# Patient Record
Sex: FEMALE | Race: WHITE | NOT HISPANIC OR LATINO | Employment: FULL TIME | URBAN - METROPOLITAN AREA
[De-identification: names, ages, dates, MRNs, and addresses within clinical notes are randomized per-mention and may not be internally consistent; named-entity substitution may affect disease eponyms.]

---

## 2017-03-13 ENCOUNTER — TRANSCRIBE ORDERS (OUTPATIENT)
Dept: PHYSICAL THERAPY | Facility: HOSPITAL | Age: 62
End: 2017-03-13

## 2017-03-13 DIAGNOSIS — S82.142D CLOSED DISPLACED BICONDYLAR FRACTURE OF LEFT TIBIA WITH ROUTINE HEALING, SUBSEQUENT ENCOUNTER: ICD-10-CM

## 2017-03-13 DIAGNOSIS — S82.92XD: Primary | ICD-10-CM

## 2017-03-14 ENCOUNTER — HOSPITAL ENCOUNTER (OUTPATIENT)
Dept: PHYSICAL THERAPY | Facility: HOSPITAL | Age: 62
Setting detail: THERAPIES SERIES
Discharge: HOME | End: 2017-03-14

## 2017-03-14 DIAGNOSIS — Z98.890 S/P ORIF (OPEN REDUCTION INTERNAL FIXATION) FRACTURE: Primary | ICD-10-CM

## 2017-03-14 DIAGNOSIS — Z87.81 S/P ORIF (OPEN REDUCTION INTERNAL FIXATION) FRACTURE: Primary | ICD-10-CM

## 2017-03-14 PROCEDURE — 97110 THERAPEUTIC EXERCISES: CPT | Performed by: PHYSICAL THERAPIST

## 2017-03-14 PROCEDURE — 97162 PT EVAL MOD COMPLEX 30 MIN: CPT | Performed by: PHYSICAL THERAPIST

## 2017-03-14 NOTE — PROGRESS NOTES
Outpatient Physical Therapy Ortho Initial Evaluation  [facility]     Patient Name: Rosalba Girard  : 1955  MRN: [id number]  Today's Date: 3/14/2017      Visit Date: 2017    Patient Active Problem List   Diagnosis   • Essential hypertension, benign   • GERD (gastroesophageal reflux disease)   • IBS (irritable bowel syndrome)   • S/P ORIF (open reduction internal fixation) fracture  left tibial plateau    • Tibial plateau fracture, left   • Leukocytosis        Past Medical History   Diagnosis Date   • Chronic superficial dermatitis    • Dietary counseling    • Encounter for routine adult health examination    • GERD (gastroesophageal reflux disease)    • Hair or hair follicle disorder    • History of anemia    • Hives    • Hypertension    • Irritable bowel disease    • Malaise and fatigue    • Myalgia and myositis    • PONV (postoperative nausea and vomiting)      on occasion    • Prediabetes    • Rash and nonspecific skin eruption    • Sinusitis, acute maxillary    • UTI (urinary tract infection)    • Wears glasses         Past Surgical History   Procedure Laterality Date   • Bladder surgery       Cuff   • Hysterectomy     • Cholecystectomy     • Hernia repair       umbilical    • Colonoscopy w/ polypectomy     • Tibial plateau open reduction internal fixation Left 2016     Procedure: LEFT TIBIAL PLATEAU OPEN REDUCTION INTERNAL FIXATION;  Surgeon: Constantine Durbin MD;  Location: [facility];  Service:        Visit Dx:     ICD-10-CM ICD-9-CM   1. S/P ORIF (open reduction internal fixation) fracture  left tibial plateau  Z96.7 V45.89    Z87.81 V15.51             Patient History       17 1115             Chief Complaint Difficulty Walking;Joint stiffness;Joint swelling;Muscle weakness;Pain;Difficulty with daily activities  -MW    Type of Pain Knee pain  -MW    Date Current Problem(s) Began 16  -MW    Brief Description of Current Complaint Pt reports falling on 2016 and  "sustaining a Lt tibial plateau fx as well as patellar fx. She was initially sent home, and then seen by ortho for surgical repair. LT tibial ORIF was performed on 12/21/2016 by Dr. Jasmine with plate and screws placed. After acute care hospitalization, pt reports a 2.5 to 3 week stay at [facility]. She has been followed by  PT since January 12, 2017. Pt was NWB per MD orders until 3/2/17 (per  PT report); pt states she has \"limited weight bearing.\" Pt also notes that she sprained her LT ankle during her original injury, but it has not been addressed. She also is complaining of LLE swelling with ankle and foot swelling, making it difficult to wear her shoe for work.   -MW    Previous treatment for THIS PROBLEM Rehabilitation   [facility] and    -MW    Onset Date- PT 3/14/2017  -MW    Patient/Caregiver Goals Improve mobility;Return to prior level of function   walk and climb stairs again  -MW    Patient's Rating of General Health Good  -MW    Patient seeing anyone else for problem(s)? Yes  -MW    How has patient tried to help current problem?  exercises; long stretches into flexion and extension with 3 lb wt.   -MW    Are you or can you be pregnant No  -MW       Pain Location Knee   LT   -MW    Pain at Present 6  -MW    Pain Frequency Constant/continuous  -MW    What Performance Factors Make the Current Problem(s) WORSE? bending, weight bearing   -MW    Difficulties at work? Just returned to work yesterday; using w/c in dept, has assist from coworkers.   -MW    Difficulties with ADL's? bathing, dressing   -MW       Any falls in the past year: Yes  -MW    Number of falls reported in the last 12 months 2  -MW    Factors that contributed to the fall: Lost balance  -MW    Does patient have a fear of falling Yes (comment)  -MW       Prior Rehab/Home Health Experiences Yes  -MW    When was the prior experience with Rehab/Home Health Jan - March  -MW    Are you currently receiving Home Health services No  -MW       Primary " "Language English  -MW    Are you able to read Yes  -MW    Are you able to write Yes  -MW    How does patient learn best? Demonstration  -MW    Teaching needs identified Home Exercise Program;Management of Condition  -MW    Patient is concerned about/has problems with Walking;Standing;Climbing Stairs;Difficulty with self care (i.e. bathing, dressing, toileting:;Performing home management (household chores, shopping, care of dependents);Performing job responsibilities/community activities (work, school,   would like to return to indep living (w/ mom now)  -MW    Does patient have problems with the following? None  -MW    Barriers to learning None  -MW    Action taken for identified issues condition management addressed during eval  -MW    Pt Participated in POC and Goals Yes  -MW       Are you being hurt, hit, or frightened by anyone at home or in your life? No  -MW    Are you being neglected by a caregiver No  -MW      User Key  (r) = Recorded By, (t) = Taken By, (c) = Cosigned By    Initials Name Provider Type    MW Yenifer Russell, PT Physical Therapist                PT Ortho       03/14/17 1118    Subjective Comments    Subjective Comments c/o pain LT knee; also swelling of LLE including foot/ ankle. Notes LT ankle sprained at time of original injury.   -MW    Precautions and Contraindications    Precautions/Limitations other (see comments);brace on when up   need to clarify WB status; per pt \"limited\" per  WBAT   -MW    Subjective Pain    Able to rate subjective pain? yes  -MW    Pre-Treatment Pain Level 5  -MW    Post-Treatment Pain Level 6  -MW    Subjective Pain Comment knee hurts with activity   -MW    ROM (Range of Motion)    General ROM lower extremity range of motion deficits identified  -MW    General LE Assessment    ROM RLE ROM was WFL  -MW    Left Knee    Extension/Flexion AROM Deficit 20 deg ext lag - 65 deg flex  -MW    Extension/Flexion PROM Deficit 12 deg ext lag - 65 deg flex   -MW    " Extension/Flexion ROM Details pain limites knee flex; both also impacted by soft tissue swelling of LLE   -MW    Left Ankle    Dorsiflexion AROM Deficit -5 deg  -MW    Dorsiflexion PROM Deficit -5 deg   -MW    Dorsiflexion Pathology firm pathologic end feel;other (see comments)   mixed soft tissue restriction and tight Achilles from NWB  -MW    Plantarflexion AROM Deficit 45 deg  -MW    Plantarflexion PROM Deficit NT  -MW    Plantarflexion Pathology other (see comments)   soft / edema limited   -MW    Inversion ROM Details WFL   -MW    Eversion ROM Details WFL   -MW    MMT (Manual Muscle Testing)    General MMT Assessment Detail NT due to c/o pain in knee with resistance at ankle   -MW    Girth    Girth Measured? Left Lower Extremity  -MW    LLE Quick Girth (cm)    Met-heads 24 cm  -MW    Mid foot 25.1 cm  -MW    Smallest ankle 25 cm  -MW    Largest calf 38.2 cm  -MW    Tib tuberosity 44 cm  -MW    Mid patella 42.5 cm  -MW    Distal thigh 54 cm  -MW    Proximal thigh 56 cm  -MW    Transfers    Transfers, Sit-Stand [place] verbal cues required;conditional independence  -MW    Transfers, Stand-Sit [place] conditional independence  -MW    Transfers, Sit-Stand-Sit, Assist Device rolling walker  -MW    Transfer, Maintain Weight Bearing Status other (see comments)   still acting as NWB; VC to use foot for balance   -MW    Transfer, Safety Issues weight-shifting ability decreased  -MW    Transfer, Impairments pain;motor control impaired;strength decreased;impaired balance;ROM decreased  -MW    Gait Assessment/Treatment    Gait, Comment not tested; will assess in parallel bars next visit  -MW      User Key  (r) = Recorded By, (t) = Taken By, (c) = Cosigned By    Initials Name Provider Type    ENRIQUE Russell, PT Physical Therapist                    Lymphedema       03/14/17 7281             Compression/Skin Care wrapping location;bandaging  -MW    Wrapping Location lower extremity  -MW    Wrapping Location LE  left:;foot to groin  -MW    Wrapping Comments compressogrip size 4 foot to proximal calf; size 5 mid calf to mid thigh   -MW    Bandage Layers cotton elastic stocking- single layer (comment size);cotton elastic stocking- double layer (comment size)   double distally  -MW    Compression/Skin Care Comments encouraged pt to keep compression in place to decrease edema   -MW      User Key  (r) = Recorded By, (t) = Taken By, (c) = Cosigned By    Initials Name Provider Type    ENRIQUE Russell PT Physical Therapist                    Therapy Education       03/14/17 1115             Given Symptoms/condition management;Edema management;HEP   sheet knee flex/ext; brace off when seated; compression on  -MW    Program New  -MW    How Provided Verbal;Demonstration  -MW    Provided to Patient  -MW    Level of Understanding Verbalized;Teach back education performed  -MW      User Key  (r) = Recorded By, (t) = Taken By, (c) = Cosigned By    Initials Name Provider Type    ENRIQUE Russell PT Physical Therapist                PT OP Goals       03/14/17 1115    PT Short Term Goals    STG 1 Pt independent with initial HEP for ROM, strengthening and standing balance.   -MW    STG 2 Pt to demonstrate increased LT knee AROM to 0-90 deg for improved transfers and function.   -MW    STG 3 Pt to demonstrate improved LT ankle AROM to > 5 deg DF for improved gait training / heel rise.   -    STG 4 Pt to ambulate > 350 ft with least restrictive A.D. safely (WB per MD order).   -    STG 5 Pt able to ascend / descend 6 steps independently with hand rail and A.D.   -    Long Term Goals    LTG Date to Achieve 06/14/17  -    LTG 1 Pt to demonstrate increased LT knee AROM to 0-110 deg for improved transfers and function.   -MW    LTG 2 Pt to ambulate household  and community distances with least restrictive A.D., WBAT.   -MW    LTG 3 Pt able to ascend / descend 1 flight of stairs independently with hand rail and A.D.   -    LTG 4  Pt to report increase in LEFS score by 50% to demonstrate improved functional mobility and QOL.   -MW    LTG 5 Pt able to return home / live independently.   -MW    Time Calculation    PT Goal Re-Cert Due Date 04/11/17  -MW      User Key  (r) = Recorded By, (t) = Taken By, (c) = Cosigned By    Initials Name Provider Type     Yenifer Russell, PT Physical Therapist                PT Assessment/Plan       03/14/17 1115          Functional Limitations Impaired gait;Impaired locomotion;Performance in self-care ADL;Performance in work activities;Limitation in home management  -MW    Impairments Joint mobility;Muscle strength;Pain;Range of motion;Gait;Balance;Edema  -MW    Assessment Comments Pt presents to OP PT for continued rehab of LT tibial plateau fx with ORIF and reported patellar fx. Pt displays limited LT knee and ankle ROM, weakness, LLE swelling from mid thigh to toes, impaired functional mobility. Pt states she wants to walk and climb stairs again. Progress has been limited by pain in the past with limited ROM and tolerance to weight bearing. Expect gradual progress with continued PT and pt participation in HEP. Need to clarify WB status and brace use expectations as pt's report and medical corespondence are inconsistent.   -MW    Rehab Potential Fair  -MW    Patient/caregiver participated in establishment of treatment plan and goals Yes  -MW    Patient would benefit from skilled therapy intervention Yes  -MW       PT Frequency 2x/week  -MW    Predicted Duration of Therapy Intervention (days/wks) 3 months   -MW    Planned CPT's? PT THER PROC EA 15 MIN: 87731;PT MANUAL THERAPY EA 15 MIN: 48075;PT GAIT TRAINING EA 15 MIN: 75753  -MW    Physical Therapy Interventions (Optional Details) ROM (Range of Motion);strengthening;stretching;manual therapy techniques;gait training;balance training  -    PT Plan Comments next vist at [address] [place] [address] for parallel bars / gait training; await clarification of WB status from  Dr. Durbin  -MW      User Key  (r) = Recorded By, (t) = Taken By, (c) = Cosigned By    Initials Name Provider Type    ENRIQUE Russell, AARTI Physical Therapist                  Exercises       03/14/17 1115          Subjective Comments    Subjective Comments c/o pain LT knee; also swelling of LLE including foot/ ankle. Notes LT ankle sprained at time of original injury.   -MW      Subjective Pain    Able to rate subjective pain? yes  -MW      Pre-Treatment Pain Level 5  -MW      Post-Treatment Pain Level 6  -MW      Subjective Pain Comment knee hurts with activity   -MW      Exercise 1    Exercise Name 1 AA knee ext/ flex w sheet   -MW      Cueing 1 Verbal;Tactile  -MW      Equipment 1 Other  -MW      Reps 1 10  -MW      Exercise 2    Exercise Name 2 AA heel slides   -MW      Cueing 2 Verbal;Tactile  -MW      Reps 2 5  -MW      Exercise 3    Exercise Name 3 Passive Ext   -MW      Time (Seconds) 3 90  -MW        User Key  (r) = Recorded By, (t) = Taken By, (c) = Cosigned By    Initials Name Provider Type    ENRIQUE Russell, AARTI Physical Therapist           Manual Rx (last 36 hours)      Manual Treatments       03/14/17 1115          Manual Rx 1    Manual Rx 1 Location LLE  -MW      Manual Rx 1 Type focused MLD to Lt knee, calf   -MW      Manual Rx 1 Duration 5  -MW      Manual Rx 2    Manual Rx 2 Location Lt knee   -MW      Manual Rx 2 Type A-P glides   -MW      Manual Rx 2 Grade very gentle   -MW        User Key  (r) = Recorded By, (t) = Taken By, (c) = Cosigned By    Initials Name Provider Type    ENRIQUE Russell PT Physical Therapist                            Outcome Measures       03/14/17 1115          Lower Extremity Functional Index    Any of your usual work, housework or school activities 1  -MW      Your usual hobbies, recreational or sporting activities 1  -MW      Getting into or out of the bath 1  -MW      Walking between rooms 1  -MW      Putting on your shoes or socks 1  -MW      Squatting  0  -MW      Lifting an object, like a bag of groceries from the floor 1  -MW      Performing light activities around your home 1  -MW      Performing heavy activities around your home 0  -MW      Getting into or out of a car 1  -MW      Walking 2 blocks 0  -MW      Walking a mile 0  -MW      Going up or down 10 stairs (about 1 flight of stairs) 1  -MW      Standing for 1 hour 1  -MW      Sitting for 1 hour 1  -MW      Running on even ground 0  -MW      Running on uneven ground 0  -MW      Making sharp turns while running fast 0  -MW      Hopping 1  -MW      Rolling over in bed 1  -MW      Total 13  -MW      Functional Assessment    Outcome Measure Options Lower Extremity Functional Scale (LEFS)  -MW        User Key  (r) = Recorded By, (t) = Taken By, (c) = Cosigned By    Initials Name Provider Type    ENRIQUE Russell, PT Physical Therapist            Time Calculation:   Start Time: 1115  Total Timed Code Minutes- PT: 20 minute(s)     Therapy Charges for Today     Code Description Service Date Service Provider Modifiers Qty    37623001725 HC PT EVAL MOD COMPLEXITY 4 3/14/2017 Yenifer Russell, PT GP 1    38607004417 HC PT THER PROC EA 15 MIN 3/14/2017 Yenifer Russell, PT GP 1          PT G-Codes  Outcome Measure Options: Lower Extremity Functional Scale (LEFS)         Yenifer Russell, AARTI  3/14/2017

## 2017-03-16 ENCOUNTER — HOSPITAL ENCOUNTER (OUTPATIENT)
Dept: PHYSICAL THERAPY | Facility: HOSPITAL | Age: 62
Setting detail: THERAPIES SERIES
Discharge: HOME | End: 2017-03-16

## 2017-03-16 DIAGNOSIS — Z74.09 IMPAIRED FUNCTIONAL MOBILITY, BALANCE, GAIT, AND ENDURANCE: ICD-10-CM

## 2017-03-16 DIAGNOSIS — Z87.81 S/P ORIF (OPEN REDUCTION INTERNAL FIXATION) FRACTURE: Primary | ICD-10-CM

## 2017-03-16 DIAGNOSIS — Z98.890 S/P ORIF (OPEN REDUCTION INTERNAL FIXATION) FRACTURE: Primary | ICD-10-CM

## 2017-03-16 DIAGNOSIS — M25.562 PAIN AND SWELLING OF LEFT KNEE: ICD-10-CM

## 2017-03-16 DIAGNOSIS — M25.462 PAIN AND SWELLING OF LEFT KNEE: ICD-10-CM

## 2017-03-16 PROCEDURE — 97116 GAIT TRAINING THERAPY: CPT | Performed by: PHYSICAL THERAPIST

## 2017-03-16 PROCEDURE — 97140 MANUAL THERAPY 1/> REGIONS: CPT | Performed by: PHYSICAL THERAPIST

## 2017-03-16 PROCEDURE — 97110 THERAPEUTIC EXERCISES: CPT | Performed by: PHYSICAL THERAPIST

## 2017-03-16 NOTE — PROGRESS NOTES
Outpatient Physical Therapy Ortho Treatment Note   [facility]     Patient Name: Rosalba Girard  : 1955  MRN: [id number]  Today's Date: 3/16/2017      Visit Date: 2017    Visit Dx:    ICD-10-CM ICD-9-CM   1. S/P ORIF (open reduction internal fixation) fracture  left tibial plateau  Z96.7 V45.89    Z87.81 V15.51   2. Impaired functional mobility, balance, gait, and endurance Z74.09 V49.89   3. Pain and swelling of left knee M25.562 719.46    M25.462 719.06       Patient Active Problem List   Diagnosis   • Essential hypertension, benign   • GERD (gastroesophageal reflux disease)   • IBS (irritable bowel syndrome)   • S/P ORIF (open reduction internal fixation) fracture  left tibial plateau    • Tibial plateau fracture, left   • Leukocytosis        Past Medical History   Diagnosis Date   • Chronic superficial dermatitis    • Dietary counseling    • Encounter for routine adult health examination    • GERD (gastroesophageal reflux disease)    • Hair or hair follicle disorder    • History of anemia    • Hives    • Hypertension    • Irritable bowel disease    • Malaise and fatigue    • Myalgia and myositis    • PONV (postoperative nausea and vomiting)      on occasion    • Prediabetes    • Rash and nonspecific skin eruption    • Sinusitis, acute maxillary    • UTI (urinary tract infection)    • Wears glasses         Past Surgical History   Procedure Laterality Date   • Bladder surgery       Cuff   • Hysterectomy     • Cholecystectomy     • Hernia repair       umbilical    • Colonoscopy w/ polypectomy     • Tibial plateau open reduction internal fixation Left 2016     Procedure: LEFT TIBIAL PLATEAU OPEN REDUCTION INTERNAL FIXATION;  Surgeon: Constantine Durbin MD;  Location: [facility];  Service:              PT Ortho       17 1115    Subjective Comments    Subjective Comments states very grateful to be able to walk a little in the parallel bars. Notes compression has helped with  swelling. Only wearing knee brace when standing up/ putting wt on Lt leg.   -MW    Precautions and Contraindications    Precautions/Limitations brace on when up   WBAT per MD   -MW    Subjective Pain    Able to rate subjective pain? yes  -MW    Pre-Treatment Pain Level 2  -MW    Post-Treatment Pain Level 4  -MW    Subjective Pain Comment took ibuprofen prior to PT   -MW    Left Knee    Extension/Flexion ROM Details 10-75 deg AAROM (10 deg lag)  -MW    Left Ankle    Dorsiflexion ROM Details 8 deg passively; 0 deg (to neutral) actively  -MW    Gait Assessment/Treatment    Gait, [place] Level conditional independence;verbal cues required  -MW    Gait, Assistive Device other (see comments)   parallel bars   -    Gait, Distance (Feet) 24   12' x 2 in parallel bars   -    Gait, Gait Pattern Analysis swing-to gait   VC for swing through   -    Gait, Gait Deviations left:;antalgic;decreased heel strike;weight-shifting ability decreased   wide ALEJANDRO; VC to bring Lt leg in line with body/ narrow ALEJANDRO   -    Gait, Maintain Weight Bearing Status cues to maintain weight bearing status   cues to wt bear; still has habit of NWB   -MW    Gait, Impairments ROM decreased;strength decreased;coordination impaired;motor control impaired;pain  -MW    Gait, Comment gait pattern improved with VC  for heel strike, wt shif; ankle DF weakness evident with poor toe raise for heel strike   -    Initials Name Provider Type     Yenifer Russell, PT Physical Therapist                            PT Assessment/Plan       03/16/17 1115          Functional Limitations Impaired gait;Impaired locomotion;Performance in self-care ADL;Performance in work activities;Limitation in home management  -    Impairments Joint mobility;Muscle strength;Pain;Range of motion;Gait;Balance;Edema  -MW    Assessment Comments Pt with good effort practicing WB in parallel bars. Demonstrated significant weakness of LT DF/ Ant tib with poor toe clearance  during gait. Pt more confident with knee flexion practice on stationary bike where she can control flexion / pressure. Edema decreased slightly with compressogrip use. Pain, swelling, weakness and soft tissue restrictions cont to limit function. Cont PT 2 x wk. Expect to add ASTYM to promote tissue movement/ scar tissue remodeling to improve lymph flow and flexibility/ ROM.   -MW    Rehab Potential Fair  -MW    Patient/caregiver participated in establishment of treatment plan and goals Yes  -MW    Patient would benefit from skilled therapy intervention Yes  -MW       PT Frequency 2x/week  -MW    Physical Therapy Interventions (Optional Details) ROM (Range of Motion);strengthening;stretching;manual therapy techniques;gait training;balance training  -MW    PT Plan Comments cont WBAT; bike, parallel bars, add ASTYM    -MW      User Key  (r) = Recorded By, (t) = Taken By, (c) = Cosigned By    Initials Name Provider Type    MW Yenifer Russell, PT Physical Therapist                    Exercises       03/16/17 1115          Subjective Comments    Subjective Comments states very grateful to be able to walk a little in the parallel bars. Notes compression has helped with swelling. Only wearing knee brace when standing up/ putting wt on Lt leg.   -MW      Subjective Pain    Able to rate subjective pain? yes  -MW      Pre-Treatment Pain Level 2  -MW      Post-Treatment Pain Level 4  -MW      Subjective Pain Comment took ibuprofen prior to PT   -MW      Exercise 1    Exercise Name 1 AAROM flex   -MW      Cueing 1 Verbal;Tactile  -MW      Reps 1 5  -MW      Time (Seconds) 1 60   each stretch   -MW      Exercise 2    Exercise Name 2 AA flexion on bike (partial rotations)   -MW      Time (Minutes) 2 5  -MW      Exercise 3    Exercise Name 3 Heel cord stretch   -MW      Reps 3 2  -MW      Time (Seconds) 3 90  -MW      Exercise 4    Exercise Name 4 Hip ext standing   -MW      Reps 4 10  -MW      Exercise 5    Exercise Name 5 high  marching / hip flex standing   -MW      Reps 5 10  -MW      Exercise 6    Exercise Name 6 sidestepping in parallel bars   -MW      Reps 6 1   6 ' Rt and Lt   -MW      Exercise 7    Exercise Name 7 backwards walking in parallel bars   -MW      Reps 7 2   12' x 2   -MW        User Key  (r) = Recorded By, (t) = Taken By, (c) = Cosigned By    Initials Name Provider Type    ENRIQUE Russell PT Physical Therapist                        Manual Rx (last 36 hours)      Manual Treatments       03/16/17 1115          Manual Rx 1    Manual Rx 1 Location LLE  -MW      Manual Rx 1 Type brief MLD lt knee,   -MW      Manual Rx 1 Duration 5  -MW      Manual Rx 2    Manual Rx 2 Location Lt knee   -MW      Manual Rx 2 Type A-P glides and distraction   -MW      Manual Rx 2 Grade gentle to grade 1  -MW      Manual Rx 2 Duration 5  -MW        User Key  (r) = Recorded By, (t) = Taken By, (c) = Cosigned By    Initials Name Provider Type    ENRIQUE Russell PT Physical Therapist                PT OP Goals       03/16/17 1722       Time Calculation    PT Goal Re-Cert Due Date 04/11/17  -MW       User Key  (r) = Recorded By, (t) = Taken By, (c) = Cosigned By    Initials Name Provider Type    ENRIQUE Russell PT Physical Therapist                Therapy Education       03/16/17 1115          Therapy Education    Given Symptoms/condition management;Edema management;HEP   sheet flex, ext; standing calf stretch   -MW      Program Modified  -MW      How Provided Verbal;Demonstration  -MW      Provided to Patient  -MW      Level of Understanding Verbalized;Teach back education performed  -MW        User Key  (r) = Recorded By, (t) = Taken By, (c) = Cosigned By    Initials Name Provider Type    ENRIQUE Russell PT Physical Therapist          Time Calculation:   Start Time: 1115  Total Timed Code Minutes- PT: 45 minute(s)    Therapy Charges for Today     Code Description Service Date Service Provider Modifiers Qty    46939770525  PT  MANUAL THERAPY EA 15 MIN 3/16/2017 Yenifer Russell, PT GP 1    08999839974 HC PT THER PROC EA 15 MIN 3/16/2017 Yenifer Russell, PT GP 1    24769693562 HC GAIT TRAINING EA 15 MIN 3/16/2017 Yenifer Russell, PT GP 1                    Yenifer Russell, PT  3/16/2017

## 2017-03-20 ENCOUNTER — HOSPITAL ENCOUNTER (OUTPATIENT)
Dept: PHYSICAL THERAPY | Facility: HOSPITAL | Age: 62
Setting detail: THERAPIES SERIES
Discharge: HOME | End: 2017-03-20

## 2017-03-20 DIAGNOSIS — Z74.09 IMPAIRED FUNCTIONAL MOBILITY, BALANCE, GAIT, AND ENDURANCE: ICD-10-CM

## 2017-03-20 DIAGNOSIS — Z98.890 S/P ORIF (OPEN REDUCTION INTERNAL FIXATION) FRACTURE: Primary | ICD-10-CM

## 2017-03-20 DIAGNOSIS — Z87.81 S/P ORIF (OPEN REDUCTION INTERNAL FIXATION) FRACTURE: Primary | ICD-10-CM

## 2017-03-20 DIAGNOSIS — M25.562 PAIN AND SWELLING OF LEFT KNEE: ICD-10-CM

## 2017-03-20 DIAGNOSIS — M25.462 PAIN AND SWELLING OF LEFT KNEE: ICD-10-CM

## 2017-03-20 PROCEDURE — 97140 MANUAL THERAPY 1/> REGIONS: CPT | Performed by: PHYSICAL THERAPIST

## 2017-03-20 PROCEDURE — 97110 THERAPEUTIC EXERCISES: CPT | Performed by: PHYSICAL THERAPIST

## 2017-03-20 PROCEDURE — 97116 GAIT TRAINING THERAPY: CPT | Performed by: PHYSICAL THERAPIST

## 2017-03-20 NOTE — PROGRESS NOTES
Outpatient Physical Therapy Ortho Treatment Note   Shanika     Patient Name: Rosalba Girard  : 1955  MRN: [id number]  Today's Date: 3/20/2017      Visit Date: 2017    Visit Dx:    ICD-10-CM ICD-9-CM   1. S/P ORIF (open reduction internal fixation) fracture  left tibial plateau  Z96.7 V45.89    Z87.81 V15.51   2. Impaired functional mobility, balance, gait, and endurance Z74.09 V49.89   3. Pain and swelling of left knee M25.562 719.46    M25.462 719.06       Patient Active Problem List   Diagnosis   • Essential hypertension, benign   • GERD (gastroesophageal reflux disease)   • IBS (irritable bowel syndrome)   • S/P ORIF (open reduction internal fixation) fracture  left tibial plateau    • Tibial plateau fracture, left   • Leukocytosis        Past Medical History   Diagnosis Date   • Chronic superficial dermatitis    • Dietary counseling    • Encounter for routine adult health examination    • GERD (gastroesophageal reflux disease)    • Hair or hair follicle disorder    • History of anemia    • Hives    • Hypertension    • Irritable bowel disease    • Malaise and fatigue    • Myalgia and myositis    • PONV (postoperative nausea and vomiting)      on occasion    • Prediabetes    • Rash and nonspecific skin eruption    • Sinusitis, acute maxillary    • UTI (urinary tract infection)    • Wears glasses         Past Surgical History   Procedure Laterality Date   • Bladder surgery       Cuff   • Hysterectomy     • Cholecystectomy     • Hernia repair       umbilical    • Colonoscopy w/ polypectomy     • Tibial plateau open reduction internal fixation Left 2016     Procedure: LEFT TIBIAL PLATEAU OPEN REDUCTION INTERNAL FIXATION;  Surgeon: Constantine Durbin MD;  Location: [facility];  Service:              PT Ortho       17 3745    Subjective Comments    Subjective Comments asking if reasonable to be able to walk in the next month  -MW    Precautions and Contraindications     Precautions/Limitations brace on when up   WBAT LLE; brace on when up (off for supervised gait training  -MW    Subjective Pain    Able to rate subjective pain? yes  -MW    Pre-Treatment Pain Level 4  -MW    Post-Treatment Pain Level 4  -MW    Subjective Pain Comment Lt knee   -MW    Left Knee    Extension/Flexion ROM Details 8-70 deg AAROM   -MW    Left Ankle    Dorsiflexion AROM Deficit neutral   -MW    Transfers    Transfers, Sit-Stand [place] conditional independence  -MW    Transfers, Stand-Sit [place] conditional independence  -MW    Transfers, Sit-Stand-Sit, Assist Device other (see comments)   UE support   -MW    Transfer, Maintain Weight Bearing Status cues to maintain weight bearing status   VC to weight bear   -MW    Transfer, Safety Issues weight-shifting ability decreased;step length decreased  -MW    Transfer, Impairments pain;postural control impaired;motor control impaired;impaired balance;strength decreased;ROM decreased  -MW    Gait Assessment/Treatment    Gait, [place] Level conditional independence;verbal cues required  -MW    Gait, Assistive Device other (see comments)   parallel bars   -MW    Gait, Gait Pattern Analysis swing-through gait   VC for swing through   -MW    Gait, Gait Deviations left:;antalgic;talisha decreased;decreased heel strike  -MW    Gait, Impairments ROM decreased;strength decreased;coordination impaired;motor control impaired;pain  -MW    Gait, Comment speed and wt shifting improved with practice in parallel bars   -MW      User Key  (r) = Recorded By, (t) = Taken By, (c) = Cosigned By    Initials Name Provider Type    ENRIQUE Russell PT Physical Therapist                            PT Assessment/Plan       03/20/17 1125          Functional Limitations Impaired gait;Impaired locomotion;Performance in self-care ADL;Performance in work activities;Limitation in home management  -MW    Impairments Joint mobility;Muscle strength;Pain;Range of  motion;Gait;Balance;Edema  -    Assessment Comments Pt with continued good effort but still focused on knee pain/ seems afraid to try to weight bear. Knee flexion remains problematic; expect to add ASTYM to stimulate tissue remodeling to improve ROM. Pt to cont to have brace off with knee bent 30 min of each hour, then elevated 30 min to improve mobility without increasing pedal edema. Cont PT 2 x wk.   -MW    Rehab Potential Fair  -MW    Patient/caregiver participated in establishment of treatment plan and goals Yes  -MW    Patient would benefit from skilled therapy intervention Yes  -MW       PT Frequency 2x/week  -MW    Physical Therapy Interventions (Optional Details) ROM (Range of Motion);strengthening;stretching;manual therapy techniques;gait training;balance training  -    PT Plan Comments cont WBAT; bike, parallel bars, add ASTYM    -      User Key  (r) = Recorded By, (t) = Taken By, (c) = Cosigned By    Initials Name Provider Type    ENRIQUE Russell, PT Physical Therapist                    Exercises       03/20/17 1125          Subjective Comments    Subjective Comments asking if reasonable to be able to walk in the next month  -      Subjective Pain    Able to rate subjective pain? yes  -MW      Pre-Treatment Pain Level 4  -MW      Post-Treatment Pain Level 4  -MW      Subjective Pain Comment Lt knee   -MW      Exercise 1    Exercise Name 1 see gym flowsheet for details   -        User Key  (r) = Recorded By, (t) = Taken By, (c) = Cosigned By    Initials Name Provider Type    ENRIQUE Russell PT Physical Therapist                        Manual Rx       Manual Treatments       03/20/17 1125          Manual Rx 1    Manual Rx 1 Location LLE  -MW      Manual Rx 1 Type brief MLD lt knee,   -MW      Manual Rx 1 Duration 5  -MW      Manual Rx 2    Manual Rx 2 Location Lt knee   -MW      Manual Rx 2 Type A-P glides and distraction   -MW      Manual Rx 2 Grade gentle to grade 1  -MW      Manual  Rx 2 Duration 5  -MW        User Key  (r) = Recorded By, (t) = Taken By, (c) = Cosigned By    Initials Name Provider Type    ENRIQUE Russell PT Physical Therapist                PT OP Goals       03/20/17 1353       Time Calculation    PT Goal Re-Cert Due Date 04/11/17  -MW       User Key  (r) = Recorded By, (t) = Taken By, (c) = Cosigned By    Initials Name Provider Type    ENRIQUE Russell PT Physical Therapist                Therapy Education       03/20/17 1125          Therapy Education    Given Symptoms/condition management;Edema management;HEP  -MW      Program Reinforced  -MW      How Provided Verbal;Demonstration  -MW      Provided to Patient  -MW      Level of Understanding Verbalized;Teach back education performed  -MW        User Key  (r) = Recorded By, (t) = Taken By, (c) = Cosigned By    Initials Name Provider Type    ENRIQUE Russell PT Physical Therapist                Time Calculation:   Start Time: 1125  Total Timed Code Minutes- PT: 45 minute(s)    Therapy Charges for Today     Code Description Service Date Service Provider Modifiers Qty    20464611368 HC PT MANUAL THERAPY EA 15 MIN 3/20/2017 Yenifer Russell PT GP 1    49762009459 HC PT THER PROC EA 15 MIN 3/20/2017 Yenifer Russell PT GP 1    89934906680 HC GAIT TRAINING EA 15 MIN 3/20/2017 Yenifer Russell PT GP 1                    Yenifer Russell PT  3/20/2017

## 2017-03-23 ENCOUNTER — HOSPITAL ENCOUNTER (OUTPATIENT)
Dept: PHYSICAL THERAPY | Facility: HOSPITAL | Age: 62
Setting detail: THERAPIES SERIES
Discharge: HOME | End: 2017-03-23

## 2017-03-23 DIAGNOSIS — M25.462 PAIN AND SWELLING OF LEFT KNEE: ICD-10-CM

## 2017-03-23 DIAGNOSIS — Z74.09 IMPAIRED FUNCTIONAL MOBILITY, BALANCE, GAIT, AND ENDURANCE: ICD-10-CM

## 2017-03-23 DIAGNOSIS — Z98.890 S/P ORIF (OPEN REDUCTION INTERNAL FIXATION) FRACTURE: Primary | ICD-10-CM

## 2017-03-23 DIAGNOSIS — Z87.81 S/P ORIF (OPEN REDUCTION INTERNAL FIXATION) FRACTURE: Primary | ICD-10-CM

## 2017-03-23 DIAGNOSIS — M25.562 PAIN AND SWELLING OF LEFT KNEE: ICD-10-CM

## 2017-03-23 PROCEDURE — 97140 MANUAL THERAPY 1/> REGIONS: CPT | Performed by: PHYSICAL THERAPIST

## 2017-03-23 PROCEDURE — 97110 THERAPEUTIC EXERCISES: CPT | Performed by: PHYSICAL THERAPIST

## 2017-03-23 PROCEDURE — 97116 GAIT TRAINING THERAPY: CPT | Performed by: PHYSICAL THERAPIST

## 2017-03-23 NOTE — PROGRESS NOTES
Outpatient Physical Therapy Ortho Treatment Note   Shanika     Patient Name: Rosalba Girard  : 1955  MRN: [id number]  Today's Date: 3/23/2017      Visit Date: 2017    Visit Dx:    ICD-10-CM ICD-9-CM   1. S/P ORIF (open reduction internal fixation) fracture  left tibial plateau  Z96.7 V45.89    Z87.81 V15.51   2. Pain and swelling of left knee M25.562 719.46    M25.462 719.06   3. Impaired functional mobility, balance, gait, and endurance Z74.09 V49.89       Patient Active Problem List   Diagnosis   • Essential hypertension, benign   • GERD (gastroesophageal reflux disease)   • IBS (irritable bowel syndrome)   • S/P ORIF (open reduction internal fixation) fracture  left tibial plateau    • Tibial plateau fracture, left   • Leukocytosis        Past Medical History:   Diagnosis Date   • Chronic superficial dermatitis    • Dietary counseling    • Encounter for routine adult health examination    • GERD (gastroesophageal reflux disease)    • Hair or hair follicle disorder    • History of anemia    • Hives    • Hypertension    • Irritable bowel disease    • Malaise and fatigue    • Myalgia and myositis    • PONV (postoperative nausea and vomiting)     on occasion    • Prediabetes    • Rash and nonspecific skin eruption    • Sinusitis, acute maxillary    • UTI (urinary tract infection)    • Wears glasses         Past Surgical History:   Procedure Laterality Date   • BLADDER SURGERY      Cuff   • CHOLECYSTECTOMY     • COLONOSCOPY W/ POLYPECTOMY     • HERNIA REPAIR      umbilical    • HYSTERECTOMY     • TIBIAL PLATEAU OPEN REDUCTION INTERNAL FIXATION Left 2016    Procedure: LEFT TIBIAL PLATEAU OPEN REDUCTION INTERNAL FIXATION;  Surgeon: Constantine Durbin MD;  Location: [facility];  Service:              PT Ortho       17 1120    Subjective Comments    Subjective Comments states she brought shorts for ASTYM; has been practicing walking at home with walker and knee brace   -MW     Precautions and Contraindications    Precautions/Limitations brace on when up   WBAT; brace off in PT for gait training  -MW    Subjective Pain    Able to rate subjective pain? yes  -MW    Pre-Treatment Pain Level 4  -MW    Post-Treatment Pain Level 4  -MW    Subjective Pain Comment LT knee   -MW    Transfers    Transfers, Sit-Stand [place] independent  -MW    Transfers, Stand-Sit [place] independent  -MW    Transfers, Sit-Stand-Sit, Assist Device rolling walker  -MW    Transfer, Safety Issues weight-shifting ability decreased;step length decreased  -MW    Transfer, Impairments pain;postural control impaired;motor control impaired;impaired balance;strength decreased;ROM decreased  -MW    Gait Assessment/Treatment    Gait, [place] Level conditional independence;verbal cues required  -MW    Gait, Assistive Device rolling walker  -MW    Gait, Distance (Feet) 75  -MW    Gait, Gait Pattern Analysis swing-to gait   VC for swing through   -MW    Gait, Gait Deviations left:;decreased heel strike;weight-shifting ability decreased;swing-to-stance ratio decreased;stride length decreased;step length decreased  -MW    Gait, Impairments ROM decreased;strength decreased;coordination impaired;motor control impaired;pain  -MW    Gait, Comment VC to step through with Rt foot to encourage natural gait pattern;   -MW      User Key  (r) = Recorded By, (t) = Taken By, (c) = Cosigned By    Initials Name Provider Type    MW Yenifer Russell, PT Physical Therapist                            PT Assessment/Plan       03/23/17 1120          Functional Limitations Impaired gait;Impaired locomotion;Performance in self-care ADL;Performance in work activities;Limitation in home management  -MW    Impairments Joint mobility;Muscle strength;Pain;Range of motion;Gait;Balance;Edema  -MW    Assessment Comments LT knee ROM improved after ATSYM and prone knee flexion with distraction and relaxation. Overall swelling also seems to have  decreased slightly. Needs frequent verbal cues to change gait pattern to begin to move toward a swing through rather than step to gait. Expect cont gradual progress with ASTYM, manual therapy, ther ex and gait training.   -MW    Rehab Potential Fair  -MW    Patient/caregiver participated in establishment of treatment plan and goals Yes  -MW    Patient would benefit from skilled therapy intervention Yes  -MW       PT Frequency 2x/week  -MW    Physical Therapy Interventions (Optional Details) ROM (Range of Motion);strengthening;stretching;manual therapy techniques;gait training;balance training  -    PT Plan Comments cont bike, ASTYM, gait/ ther ex  -MW      User Key  (r) = Recorded By, (t) = Taken By, (c) = Cosigned By    Initials Name Provider Type    ENRIQUE Russell, PT Physical Therapist                    Exercises       03/23/17 1120          Subjective Comments    Subjective Comments states she brought shorts for ASTYM; has been practicing walking at home with walker and knee brace   -MW      Subjective Pain    Able to rate subjective pain? yes  -MW      Pre-Treatment Pain Level 4  -MW      Post-Treatment Pain Level 4  -MW      Subjective Pain Comment LT knee   -MW      Exercise 1    Exercise Name 1 see gym flowsheet for details   -        User Key  (r) = Recorded By, (t) = Taken By, (c) = Cosigned By    Initials Name Provider Type    MW Yenifer Russell, PT Physical Therapist                        Manual Rx (last 36 hours)      Manual Treatments       03/23/17 1500          Manual Rx 1    Manual Rx 1 Location ASTYM LLE   -MW      Manual Rx 1 Duration 15  -MW      Manual Rx 2    Manual Rx 2 Location LLE   -MW      Manual Rx 2 Type brief local MLD   -MW      Manual Rx 2 Duration 5  -MW      Manual Rx 3    Manual Rx 3 Location LT knee   -MW      Manual Rx 3 Type A-P glides and distraction   -MW      Manual Rx 3 Duration 8  -MW        User Key  (r) = Recorded By, (t) = Taken By, (c) = Cosigned By     Initials Name Provider Type    ENRIQUE Russell PT Physical Therapist                PT OP Goals       03/23/17 1632       Time Calculation    PT Goal Re-Cert Due Date 04/11/17  -MW       User Key  (r) = Recorded By, (t) = Taken By, (c) = Cosigned By    Initials Name Provider Type    ENRIQUE Russell PT Physical Therapist                Therapy Education       03/23/17 1120          Therapy Education    Given Symptoms/condition management;Edema management;HEP   prone knee AA flexion w strap   -MW      Program Modified  -MW      How Provided Verbal;Demonstration  -MW      Provided to Patient  -MW      Level of Understanding Verbalized;Teach back education performed  -MW        User Key  (r) = Recorded By, (t) = Taken By, (c) = Cosigned By    Initials Name Provider Type    ENRIQUE Russell PT Physical Therapist                Time Calculation:   Start Time: 1120  Total Timed Code Minutes- PT: 50 minute(s)    Therapy Charges for Today     Code Description Service Date Service Provider Modifiers Qty    20351951096 HC PT THER PROC EA 15 MIN 3/23/2017 Yenifer Russell, PT GP 1    22782569183 HC PT MANUAL THERAPY EA 15 MIN 3/23/2017 Yenifer Russell, PT GP 1    11404641964 HC GAIT TRAINING EA 15 MIN 3/23/2017 Yenifer Russell, PT GP 1                    Yenifer Russell PT  3/23/2017

## 2017-03-27 ENCOUNTER — HOSPITAL ENCOUNTER (OUTPATIENT)
Dept: PHYSICAL THERAPY | Facility: HOSPITAL | Age: 62
Setting detail: THERAPIES SERIES
Discharge: HOME | End: 2017-03-27

## 2017-03-27 DIAGNOSIS — Z87.81 S/P ORIF (OPEN REDUCTION INTERNAL FIXATION) FRACTURE: Primary | ICD-10-CM

## 2017-03-27 DIAGNOSIS — M25.462 PAIN AND SWELLING OF LEFT KNEE: ICD-10-CM

## 2017-03-27 DIAGNOSIS — Z74.09 IMPAIRED FUNCTIONAL MOBILITY, BALANCE, GAIT, AND ENDURANCE: ICD-10-CM

## 2017-03-27 DIAGNOSIS — Z98.890 S/P ORIF (OPEN REDUCTION INTERNAL FIXATION) FRACTURE: Primary | ICD-10-CM

## 2017-03-27 DIAGNOSIS — M25.562 PAIN AND SWELLING OF LEFT KNEE: ICD-10-CM

## 2017-03-27 PROCEDURE — 97140 MANUAL THERAPY 1/> REGIONS: CPT | Performed by: PHYSICAL THERAPIST

## 2017-03-27 PROCEDURE — 97116 GAIT TRAINING THERAPY: CPT | Performed by: PHYSICAL THERAPIST

## 2017-03-27 NOTE — PROGRESS NOTES
Outpatient Physical Therapy Ortho Treatment Note   Shanika     Patient Name: Rosalba Girard  : 1955  MRN: [id number]  Today's Date: 3/27/2017      Visit Date: 2017    Visit Dx:    ICD-10-CM ICD-9-CM   1. S/P ORIF (open reduction internal fixation) fracture  left tibial plateau  Z96.7 V45.89    Z87.81 V15.51   2. Pain and swelling of left knee M25.562 719.46    M25.462 719.06   3. Impaired functional mobility, balance, gait, and endurance Z74.09 V49.89       Patient Active Problem List   Diagnosis   • Essential hypertension, benign   • GERD (gastroesophageal reflux disease)   • IBS (irritable bowel syndrome)   • S/P ORIF (open reduction internal fixation) fracture  left tibial plateau    • Tibial plateau fracture, left   • Leukocytosis        Past Medical History:   Diagnosis Date   • Chronic superficial dermatitis    • Dietary counseling    • Encounter for routine adult health examination    • GERD (gastroesophageal reflux disease)    • Hair or hair follicle disorder    • History of anemia    • Hives    • Hypertension    • Irritable bowel disease    • Malaise and fatigue    • Myalgia and myositis    • PONV (postoperative nausea and vomiting)     on occasion    • Prediabetes    • Rash and nonspecific skin eruption    • Sinusitis, acute maxillary    • UTI (urinary tract infection)    • Wears glasses         Past Surgical History:   Procedure Laterality Date   • BLADDER SURGERY      Cuff   • CHOLECYSTECTOMY     • COLONOSCOPY W/ POLYPECTOMY     • HERNIA REPAIR      umbilical    • HYSTERECTOMY     • TIBIAL PLATEAU OPEN REDUCTION INTERNAL FIXATION Left 2016    Procedure: LEFT TIBIAL PLATEAU OPEN REDUCTION INTERNAL FIXATION;  Surgeon: Constantine Durbin MD;  Location: [facility];  Service:              PT Ortho       17 1310    Subjective Comments    Subjective Comments says her heel has been tender; and ankle hurts more than knee at end of session   -MW    Subjective Pain    Able to  rate subjective pain? yes  -MW    Pre-Treatment Pain Level 4  -MW    Post-Treatment Pain Level 6  -MW    Subjective Pain Comment knee 4 / ankle 6   -MW    Left Knee    Extension/Flexion ROM Details 8-80 AAROM post ASTYM   -MW    Transfers    Transfers, Sit-Stand [place] independent  -MW    Transfers, Stand-Sit [place] independent  -MW    Transfers, Sit-Stand-Sit, Assist Device rolling walker  -MW    Transfer, Safety Issues weight-shifting ability decreased;step length decreased  -MW    Transfer, Impairments pain;postural control impaired;motor control impaired;impaired balance;strength decreased;ROM decreased  -MW    Gait Assessment/Treatment    Gait, [place] Level conditional independence;verbal cues required  -MW    Gait, Assistive Device rolling walker  -MW    Gait, Distance (Feet) 200   total walking around in clinic to restroom/ tx room to chg   -MW    Gait, Gait Pattern Analysis swing-to gait   VC for swing through   -MW    Gait, Impairments ROM decreased;strength decreased;coordination impaired;motor control impaired;pain  -MW      User Key  (r) = Recorded By, (t) = Taken By, (c) = Cosigned By    Initials Name Provider Type    MW Yenifer Russell, PT Physical Therapist                            PT Assessment/Plan       03/27/17 1310       PT Assessment    Functional Limitations Impaired gait;Impaired locomotion;Performance in self-care ADL;Performance in work activities;Limitation in home management  -MW     Impairments Joint mobility;Muscle strength;Pain;Range of motion;Gait;Balance;Edema  -MW     Assessment Comments Lt knee function slowly improving; pt taking initiative to ambulate in clinic with walker and brace on. Knee ROM again increased post ATSYM and manual techniques; 8-80. Noted irritation on skin from top edge of compressogrips; pt now wearing just below knee as edema decreased in leg overall. Noted post gait training in parallel bars that LLE is sweaty, warm and discolored  (pinkish purple) - need to monitor for complex regional pain syndrome given h/o trauma. Cont PT to progress ROM, strength and functional mobility.   -MW     Rehab Potential Fair  -MW     Patient/caregiver participated in establishment of treatment plan and goals Yes  -MW     Patient would benefit from skilled therapy intervention Yes  -MW     PT Plan    PT Frequency 2x/week  -MW     Physical Therapy Interventions (Optional Details) ROM (Range of Motion);strengthening;stretching;manual therapy techniques;gait training;balance training  -MW     PT Plan Comments cont bike, ASTYM, gait/ ther ex  -MW       User Key  (r) = Recorded By, (t) = Taken By, (c) = Cosigned By    Initials Name Provider Type    ENRIQUE Russell PT Physical Therapist                    Exercises       03/27/17 1310          Subjective Comments    Subjective Comments says her heel has been tender; and ankle hurts more than knee at end of session   -MW      Subjective Pain    Able to rate subjective pain? yes  -MW      Pre-Treatment Pain Level 4  -MW      Post-Treatment Pain Level 6  -MW      Subjective Pain Comment knee 4 / ankle 6   -MW      Exercise 1    Exercise Name 1 ther exer post ASTYM; see gym flowsheet for details   -MW        User Key  (r) = Recorded By, (t) = Taken By, (c) = Cosigned By    Initials Name Provider Type    ENRIQUE Russell, PT Physical Therapist                        Manual Rx (last 36 hours)      Manual Treatments       03/27/17 1310          Manual Rx 1    Manual Rx 1 Location ASTYM LLE   -MW      Manual Rx 1 Type evaluator, isolator and localizer   -MW      Manual Rx 1 Grade noted ring of erythema nearly circumferential around thigh, nonblanchable posterior aspect (compression binding, pt removed Friday)   -MW      Manual Rx 1 Duration 15  -MW      Manual Rx 2    Manual Rx 2 Location LT knee   -MW      Manual Rx 2 Type brief local MLD intermittently with knee stretching   -MW      Manual Rx 2 Duration 5  -MW       Manual Rx 3    Manual Rx 3 Location LT knee (prone AAROM in flexion)   -MW      Manual Rx 3 Type A-P glides and distraction; distraction in partial flexion and long axis distraction with relaxation cues)  -MW      Manual Rx 3 Duration 8  -MW        User Key  (r) = Recorded By, (t) = Taken By, (c) = Cosigned By    Initials Name Provider Type    ENRIQUE Russell PT Physical Therapist                PT OP Goals       03/27/17 1640       Time Calculation    PT Goal Re-Cert Due Date 04/11/17  -MW       User Key  (r) = Recorded By, (t) = Taken By, (c) = Cosigned By    Initials Name Provider Type    ENRIQUE Russell PT Physical Therapist                Therapy Education       03/27/17 1310          Therapy Education    Given Symptoms/condition management;Edema management;HEP   SLR, Hip ABD (SL), hip ext, knee ext/ quad set w heel elevat  -MW      Program Reinforced  -MW      How Provided Verbal;Demonstration  -MW      Provided to Patient  -MW      Level of Understanding Verbalized  -MW        User Key  (r) = Recorded By, (t) = Taken By, (c) = Cosigned By    Initials Name Provider Type    ENRIQUE Russell PT Physical Therapist                Time Calculation:   Start Time: 1310  Total Timed Code Minutes- PT: 45 minute(s)    Therapy Charges for Today     Code Description Service Date Service Provider Modifiers Qty    92112179776 HC PT MANUAL THERAPY EA 15 MIN 3/27/2017 Yenifer Russell PT GP 2    35387500423 HC GAIT TRAINING EA 15 MIN 3/27/2017 Yenifer Russell PT GP 1                    Yenifer Russell PT  3/27/2017

## 2017-03-30 ENCOUNTER — HOSPITAL ENCOUNTER (OUTPATIENT)
Dept: PHYSICAL THERAPY | Facility: HOSPITAL | Age: 62
Setting detail: THERAPIES SERIES
Discharge: HOME | End: 2017-03-30

## 2017-03-30 DIAGNOSIS — Z87.81 S/P ORIF (OPEN REDUCTION INTERNAL FIXATION) FRACTURE: Primary | ICD-10-CM

## 2017-03-30 DIAGNOSIS — Z74.09 IMPAIRED FUNCTIONAL MOBILITY, BALANCE, GAIT, AND ENDURANCE: ICD-10-CM

## 2017-03-30 DIAGNOSIS — M25.562 PAIN AND SWELLING OF LEFT KNEE: ICD-10-CM

## 2017-03-30 DIAGNOSIS — Z98.890 S/P ORIF (OPEN REDUCTION INTERNAL FIXATION) FRACTURE: Primary | ICD-10-CM

## 2017-03-30 DIAGNOSIS — M25.462 PAIN AND SWELLING OF LEFT KNEE: ICD-10-CM

## 2017-03-30 PROCEDURE — 97010 HOT OR COLD PACKS THERAPY: CPT

## 2017-03-30 PROCEDURE — 97140 MANUAL THERAPY 1/> REGIONS: CPT

## 2017-03-30 PROCEDURE — 97110 THERAPEUTIC EXERCISES: CPT

## 2017-03-30 NOTE — PROGRESS NOTES
Outpatient Physical Therapy Ortho Treatment Note   Shanika     Patient Name: Rosalba Girard  : 1955  MRN: [id number]  Today's Date: 3/30/2017      Visit Date: 2017    Visit Dx:    ICD-10-CM ICD-9-CM   1. S/P ORIF (open reduction internal fixation) fracture  left tibial plateau  Z96.7 V45.89    Z87.81 V15.51   2. Pain and swelling of left knee M25.562 719.46    M25.462 719.06   3. Impaired functional mobility, balance, gait, and endurance Z74.09 V49.89       Patient Active Problem List   Diagnosis   • Essential hypertension, benign   • GERD (gastroesophageal reflux disease)   • IBS (irritable bowel syndrome)   • S/P ORIF (open reduction internal fixation) fracture  left tibial plateau    • Tibial plateau fracture, left   • Leukocytosis        Past Medical History:   Diagnosis Date   • Chronic superficial dermatitis    • Dietary counseling    • Encounter for routine adult health examination    • GERD (gastroesophageal reflux disease)    • Hair or hair follicle disorder    • History of anemia    • Hives    • Hypertension    • Irritable bowel disease    • Malaise and fatigue    • Myalgia and myositis    • PONV (postoperative nausea and vomiting)     on occasion    • Prediabetes    • Rash and nonspecific skin eruption    • Sinusitis, acute maxillary    • UTI (urinary tract infection)    • Wears glasses         Past Surgical History:   Procedure Laterality Date   • BLADDER SURGERY      Cuff   • CHOLECYSTECTOMY     • COLONOSCOPY W/ POLYPECTOMY     • HERNIA REPAIR      umbilical    • HYSTERECTOMY     • TIBIAL PLATEAU OPEN REDUCTION INTERNAL FIXATION Left 2016    Procedure: LEFT TIBIAL PLATEAU OPEN REDUCTION INTERNAL FIXATION;  Surgeon: Constantine Durbin MD;  Location: [facility];  Service:                              PT Assessment/Plan       17 0815       PT Assessment    Assessment Comments Slow progress continues. Some gains with ROM stretching while seated today. Client noted  calcaneus tenderness today and difficulty weight bearing through left leg.  -MM     PT Plan    PT Plan Comments Continue per plan of treatment with exercises and manual therapy.  -MM       User Key  (r) = Recorded By, (t) = Taken By, (c) = Cosigned By    Initials Name Provider Type    FERNANDO Anderson PT Physical Therapist                Modalities       03/30/17 0815          Ice    Ice Applied Yes  -MM      Location left knee  -MM      Rx Minutes 10 mins  -MM      Ice S/P Rx Yes  -MM        User Key  (r) = Recorded By, (t) = Taken By, (c) = Cosigned By    Initials Name Provider Type    FERNANDO Anderson PT Physical Therapist                Exercises       03/30/17 0815          Subjective Comments    Subjective Comments Client reports moderate knee pain today at 4-5/10.  -MM      Subjective Pain    Able to rate subjective pain? yes  -MM      Pre-Treatment Pain Level 5  -MM      Post-Treatment Pain Level 5  -MM      Subjective Pain Comment Ankle pain was rated at 5/10.  -MM      Exercise 1    Exercise Name 1 Included exercises in clinical setting per flow sheet. Progressed exercises to include: mini step through lunges in parallel bars, standing leg curl, marching in parallel bars and PNF stretching.  -MM      Cueing 1 Verbal  -MM      Time (Minutes) 1 45  -MM      Treatment Type 1 Ther Ex  -MM        User Key  (r) = Recorded By, (t) = Taken By, (c) = Cosigned By    Initials Name Provider Type    FERNANDO Anderson PT Physical Therapist                        Manual Rx (last 36 hours)      Manual Treatments       03/30/17 0815          Manual Rx 1    Manual Rx 1 Location Provided soft tissue mobilization using ASTYM for left knee. Tenderness noted anterior and posterior left knee.   -MM      Manual Rx 1 Duration 15  -MM        User Key  (r) = Recorded By, (t) = Taken By, (c) = Cosigned By    Initials Name Provider Type    FERNANDO Anderson PT Physical Therapist                        Time Calculation:    Start Time: 0815  Total Timed Code Minutes- PT: 45 minute(s)    Therapy Charges for Today     Code Description Service Date Service Provider Modifiers Qty    79732375930 HC PT MANUAL THERAPY EA 15 MIN 3/30/2017 Padilla Anderson, PT GP 1    03597111962 HC PT THER PROC EA 15 MIN 3/30/2017 Padilla Anderson, PT GP 3    13365080715 HC PT HOT/COLD PACK WC NONMCARE 3/30/2017 Padilla Anderson, PT GP 1                    Padilla Anderson, PT  3/30/2017

## 2017-04-05 ENCOUNTER — HOSPITAL ENCOUNTER (OUTPATIENT)
Dept: PHYSICAL THERAPY | Facility: HOSPITAL | Age: 62
Setting detail: THERAPIES SERIES
Discharge: HOME | End: 2017-04-05

## 2017-04-05 DIAGNOSIS — Z74.09 IMPAIRED FUNCTIONAL MOBILITY, BALANCE, GAIT, AND ENDURANCE: ICD-10-CM

## 2017-04-05 DIAGNOSIS — M25.562 PAIN AND SWELLING OF LEFT KNEE: ICD-10-CM

## 2017-04-05 DIAGNOSIS — M25.462 PAIN AND SWELLING OF LEFT KNEE: ICD-10-CM

## 2017-04-05 DIAGNOSIS — Z87.81 S/P ORIF (OPEN REDUCTION INTERNAL FIXATION) FRACTURE: Primary | ICD-10-CM

## 2017-04-05 DIAGNOSIS — Z98.890 S/P ORIF (OPEN REDUCTION INTERNAL FIXATION) FRACTURE: Primary | ICD-10-CM

## 2017-04-05 PROCEDURE — 97140 MANUAL THERAPY 1/> REGIONS: CPT | Performed by: PHYSICAL THERAPIST

## 2017-04-05 PROCEDURE — 97110 THERAPEUTIC EXERCISES: CPT | Performed by: PHYSICAL THERAPIST

## 2017-04-05 NOTE — PROGRESS NOTES
Outpatient Physical Therapy Ortho Treatment Note   Shanika     Patient Name: Rosalba Girard  : 1955  MRN: [id number]  Today's Date: 2017      Visit Date: 2017    Visit Dx:    ICD-10-CM ICD-9-CM   1. S/P ORIF (open reduction internal fixation) fracture  left tibial plateau  Z96.7 V45.89    Z87.81 V15.51   2. Pain and swelling of left knee M25.562 719.46    M25.462 719.06   3. Impaired functional mobility, balance, gait, and endurance Z74.09 V49.89       Patient Active Problem List   Diagnosis   • Essential hypertension, benign   • GERD (gastroesophageal reflux disease)   • IBS (irritable bowel syndrome)   • S/P ORIF (open reduction internal fixation) fracture  left tibial plateau    • Tibial plateau fracture, left   • Leukocytosis        Past Medical History:   Diagnosis Date   • Chronic superficial dermatitis    • Dietary counseling    • Encounter for routine adult health examination    • GERD (gastroesophageal reflux disease)    • Hair or hair follicle disorder    • History of anemia    • Hives    • Hypertension    • Irritable bowel disease    • Malaise and fatigue    • Myalgia and myositis    • PONV (postoperative nausea and vomiting)     on occasion    • Prediabetes    • Rash and nonspecific skin eruption    • Sinusitis, acute maxillary    • UTI (urinary tract infection)    • Wears glasses         Past Surgical History:   Procedure Laterality Date   • BLADDER SURGERY      Cuff   • CHOLECYSTECTOMY     • COLONOSCOPY W/ POLYPECTOMY     • HERNIA REPAIR      umbilical    • HYSTERECTOMY     • TIBIAL PLATEAU OPEN REDUCTION INTERNAL FIXATION Left 2016    Procedure: LEFT TIBIAL PLATEAU OPEN REDUCTION INTERNAL FIXATION;  Surgeon: Constantine Durbin MD;  Location: [facility];  Service:              PT Ortho       17 1100    Subjective Comments    Subjective Comments States ankle pn often more severe than the knee. Has been taking her brace off occasionally at home when practicing  walking. Concerned that her weakness may cause her to fall.  -RB    Subjective Pain    Able to rate subjective pain? yes  -RB    Pre-Treatment Pain Level 7  -RB    Post-Treatment Pain Level 5  -RB    Subjective Pain Comment L knee 5/10  -RB    Left Knee    Extension/Flexion ROM Details 9-84 degrees post tx  -RB      User Key  (r) = Recorded By, (t) = Taken By, (c) = Cosigned By    Initials Name Provider Type    BRAEDEN Mcgovern, PT Physical Therapist                            PT Assessment/Plan       04/05/17 1137       PT Assessment    Assessment Comments Pt demo small improvement in knee flxn AAROM by end of tx. Required frequent cues for gt mechanics in // parallel bars-Emphasized heel strike and increasing hip/knee flxn throughout swing of the LLE  -RB     PT Plan    PT Plan Comments Cont per POC, progressing WBing ther ex as hossein for functional strengthening/balance and manual therapy to improve knee/ankle ROM.  -RB       User Key  (r) = Recorded By, (t) = Taken By, (c) = Cosigned By    Initials Name Provider Type    BRAEDEN Mcgovern, PT Physical Therapist                    Exercises       04/05/17 1100          Subjective Comments    Subjective Comments States ankle pn often more severe than the knee. Has been taking her brace off occasionally at home when practicing walking. Concerned that her weakness may cause her to fall.  -RB      Subjective Pain    Able to rate subjective pain? yes  -RB      Pre-Treatment Pain Level 7  -RB      Post-Treatment Pain Level 5  -RB      Subjective Pain Comment L knee 5/10  -RB      Exercise 1    Exercise Name 1 Ther ex in clinic per flow sheet, including warm up on NuStep, gt and balance activities in // bars, AAROM w/ manual assist  -RB      Time (Minutes) 1 35  -RB      Treatment Type 1 Ther Ex  -RB        User Key  (r) = Recorded By, (t) = Taken By, (c) = Cosigned By    Initials Name Provider Type    BRAEDEN Mcgovern, PT Physical Therapist                        Manual  Rx (last 36 hours)      Manual Treatments       04/05/17 1100          Manual Rx 1    Manual Rx 1 Location L knee  -RB      Manual Rx 1 Type Manual overpressure into TKE w/ active quad set  -RB      Manual Rx 1 Duration 20 reps  -RB      Manual Rx 2    Manual Rx 2 Location L knee  -RB      Manual Rx 2 Type post tibial glides in supine, followed by manual assist into flxn  -RB      Manual Rx 2 Duration 5 min  -RB        User Key  (r) = Recorded By, (t) = Taken By, (c) = Cosigned By    Initials Name Provider Type    BRAEDEN Mcgovern, PT Physical Therapist                PT OP Goals       04/05/17 1145       Time Calculation    PT Goal Re-Cert Due Date 04/11/17  -RB       User Key  (r) = Recorded By, (t) = Taken By, (c) = Cosigned By    Initials Name Provider Type    BRAEDEN Mcgovern PT Physical Therapist                Therapy Education       04/05/17 1137          Therapy Education    Given HEP;Symptoms/condition management  -RB      Program Reinforced  -RB      How Provided Verbal  -RB      Provided to Patient  -RB      Level of Understanding Teach back education performed  -RB        User Key  (r) = Recorded By, (t) = Taken By, (c) = Cosigned By    Initials Name Provider Type    BRAEDEN Mcgovern PT Physical Therapist                Time Calculation:   Start Time: 1030  Total Timed Code Minutes- PT: 45 minute(s)    Therapy Charges for Today     Code Description Service Date Service Provider Modifiers Qty    97679879606 HC PT THER PROC EA 15 MIN 4/5/2017 Shy Mcgovern, PT GP 2    09323796863 HC PT MANUAL THERAPY EA 15 MIN 4/5/2017 Shy Mcgovern, PT GP 1                    Shy Mcgovern, PT  4/5/2017

## 2017-04-07 ENCOUNTER — HOSPITAL ENCOUNTER (OUTPATIENT)
Dept: PHYSICAL THERAPY | Facility: HOSPITAL | Age: 62
Setting detail: THERAPIES SERIES
Discharge: HOME | End: 2017-04-07

## 2017-04-07 DIAGNOSIS — M25.562 PAIN AND SWELLING OF LEFT KNEE: ICD-10-CM

## 2017-04-07 DIAGNOSIS — M25.462 PAIN AND SWELLING OF LEFT KNEE: ICD-10-CM

## 2017-04-07 DIAGNOSIS — Z74.09 IMPAIRED FUNCTIONAL MOBILITY, BALANCE, GAIT, AND ENDURANCE: Primary | ICD-10-CM

## 2017-04-07 PROCEDURE — 97110 THERAPEUTIC EXERCISES: CPT

## 2017-04-07 PROCEDURE — 97140 MANUAL THERAPY 1/> REGIONS: CPT

## 2017-04-07 NOTE — PROGRESS NOTES
Outpatient Physical Therapy Ortho Treatment Note  [facility]     Patient Name: Rosalba Girard  : 1955  MRN: [id number]  Today's Date: 2017      Visit Date: 2017    Visit Dx:    ICD-10-CM ICD-9-CM   1. Impaired functional mobility, balance, gait, and endurance Z74.09 V49.89   2. Pain and swelling of left knee M25.562 719.46    M25.462 719.06       Patient Active Problem List   Diagnosis   • Essential hypertension, benign   • GERD (gastroesophageal reflux disease)   • IBS (irritable bowel syndrome)   • S/P ORIF (open reduction internal fixation) fracture  left tibial plateau    • Tibial plateau fracture, left   • Leukocytosis        Past Medical History:   Diagnosis Date   • Chronic superficial dermatitis    • Dietary counseling    • Encounter for routine adult health examination    • GERD (gastroesophageal reflux disease)    • Hair or hair follicle disorder    • History of anemia    • Hives    • Hypertension    • Irritable bowel disease    • Malaise and fatigue    • Myalgia and myositis    • PONV (postoperative nausea and vomiting)     on occasion    • Prediabetes    • Rash and nonspecific skin eruption    • Sinusitis, acute maxillary    • UTI (urinary tract infection)    • Wears glasses         Past Surgical History:   Procedure Laterality Date   • BLADDER SURGERY      Cuff   • CHOLECYSTECTOMY     • COLONOSCOPY W/ POLYPECTOMY     • HERNIA REPAIR      umbilical    • HYSTERECTOMY     • TIBIAL PLATEAU OPEN REDUCTION INTERNAL FIXATION Left 2016    Procedure: LEFT TIBIAL PLATEAU OPEN REDUCTION INTERNAL FIXATION;  Surgeon: Constantine Durbin MD;  Location: [facility];  Service:              PT Ortho       17 1100    Subjective Comments    Subjective Comments States ankle pn often more severe than the knee. Has been taking her brace off occasionally at home when practicing walking. Concerned that her weakness may cause her to fall.  -RB    Subjective Pain    Able to rate subjective  pain? yes  -RB    Pre-Treatment Pain Level 7  -RB    Post-Treatment Pain Level 5  -RB    Subjective Pain Comment L knee 5/10  -RB    Left Knee    Extension/Flexion ROM Details 9-84 degrees post tx  -RB      User Key  (r) = Recorded By, (t) = Taken By, (c) = Cosigned By    Initials Name Provider Type    BRAEDEN Mcgovern, PT Physical Therapist                            PT Assessment/Plan       04/07/17 1530       PT Assessment    Assessment Comments Much of pain and lack in knee flexion still appears to be linked to swelling/pitting edema around joint liine and patellar border.  -GB     PT Plan    PT Plan Comments Continue to progress as tolerated until next MD appointment.  -GB       User Key  (r) = Recorded By, (t) = Taken By, (c) = Cosigned By    Initials Name Provider Type    ORCÍO Posada, PT Physical Therapist                    Exercises       04/07/17 1500          Subjective Comments    Subjective Comments Patient is still with more complaints of ankle pain.  -GB      Subjective Pain    Able to rate subjective pain? yes  -GB      Pre-Treatment Pain Level 5  -GB      Post-Treatment Pain Level 4  -GB      Exercise 1    Exercise Name 1 Ther ex in clinical setting as per flow sheet  -GB      Time (Minutes) 1 40  -GB      Treatment Type 1 Ther Ex  -GB      Exercise 2    Exercise Name 2 Gait drills not listed include increased step length, step through gait pattern and high step march with pause and in line step with heel-toe pattern   Neuromuscular melissa  -GB      Time (Minutes) 2 6-7  -GB        User Key  (r) = Recorded By, (t) = Taken By, (c) = Cosigned By    Initials Name Provider Type    ROCÍO Posada PT Physical Therapist                        Manual Rx (last 36 hours)      Manual Treatments       04/07/17 1500          Manual Rx 1    Manual Rx 1 Location L knee  -GB      Manual Rx 1 Type Kinesiotape with Y strip for patellar support and Web technique for MJL and LJL swelling  -GB       Manual Rx 1 Duration 3-4 min  -GB      Manual Rx 2    Manual Rx 2 Location L knee  -GB      Manual Rx 2 Type post tibial glides in supine, followed by manual assist into flxn  -GB      Manual Rx 2 Duration 3-4 min  -GB      Manual Rx 3    Manual Rx 3 Location Left knee  -GB      Manual Rx 3 Type AAROM/PROM  -GB      Manual Rx 3 Duration 2-3 min  -GB      Manual Rx 4    Manual Rx 4 Location Left leg  -GB      Manual Rx 4 Type Comprisogrip - size 5 applied from MTP to above calf  -GB      Manual Rx 5    Manual Rx 5 Location Left Knee  -GB      Manual Rx 5 Type STM  -GB      Manual Rx 5 Duration 3-4 min  -GB        User Key  (r) = Recorded By, (t) = Taken By, (c) = Cosigned By    Initials Name Provider Type    ROCÍO Posada PT Physical Therapist                    Therapy Education       04/07/17 1532          Therapy Education    Given HEP   calf, prone knee flexion, quad sets, hip SLRs, Soleus stretch and Single leg stance knee curls added to HEP  -GB      Program Progressed  -GB      How Provided Verbal;Demonstration;Written  -GB      Provided to Patient  -GB      Level of Understanding Teach back education performed;Verbalized;Demonstrated  -GB        User Key  (r) = Recorded By, (t) = Taken By, (c) = Cosigned By    Initials Name Provider Type    ROCÍO Posada PT Physical Therapist                Time Calculation:   Start Time: 1025  Total Timed Code Minutes- PT: 55 minute(s)    Therapy Charges for Today     Code Description Service Date Service Provider Modifiers Qty    37454013053 HC PT THER PROC EA 15 MIN 4/7/2017 Peter Posada, PT GP 3    [id number] HC PT MANUAL THERAPY EA 15 MIN 4/7/2017 Peter Posada PT GP 1                    Peter Posada, PT  4/7/2017

## 2017-04-11 ENCOUNTER — HOSPITAL ENCOUNTER (OUTPATIENT)
Dept: PHYSICAL THERAPY | Facility: HOSPITAL | Age: 62
Setting detail: THERAPIES SERIES
Discharge: HOME | End: 2017-04-11

## 2017-04-11 DIAGNOSIS — Z87.81 S/P ORIF (OPEN REDUCTION INTERNAL FIXATION) FRACTURE: ICD-10-CM

## 2017-04-11 DIAGNOSIS — S82.142A TIBIAL PLATEAU FRACTURE, LEFT, CLOSED, INITIAL ENCOUNTER: ICD-10-CM

## 2017-04-11 DIAGNOSIS — Z98.890 S/P ORIF (OPEN REDUCTION INTERNAL FIXATION) FRACTURE: ICD-10-CM

## 2017-04-11 DIAGNOSIS — M25.562 PAIN AND SWELLING OF LEFT KNEE: ICD-10-CM

## 2017-04-11 DIAGNOSIS — Z74.09 IMPAIRED FUNCTIONAL MOBILITY, BALANCE, GAIT, AND ENDURANCE: Primary | ICD-10-CM

## 2017-04-11 DIAGNOSIS — M25.462 PAIN AND SWELLING OF LEFT KNEE: ICD-10-CM

## 2017-04-11 PROCEDURE — 97110 THERAPEUTIC EXERCISES: CPT

## 2017-04-11 NOTE — PROGRESS NOTES
Outpatient Physical Therapy Ortho Re-Assessment   [place]     Patient Name: Rosalba Girard  : 1955  MRN: [id number]  Today's Date: 2017      Visit Date: 2017    Patient Active Problem List   Diagnosis   • Essential hypertension, benign   • GERD (gastroesophageal reflux disease)   • IBS (irritable bowel syndrome)   • S/P ORIF (open reduction internal fixation) fracture  left tibial plateau    • Tibial plateau fracture, left   • Leukocytosis        Past Medical History:   Diagnosis Date   • Chronic superficial dermatitis    • Dietary counseling    • Encounter for routine adult health examination    • GERD (gastroesophageal reflux disease)    • Hair or hair follicle disorder    • History of anemia    • Hives    • Hypertension    • Irritable bowel disease    • Malaise and fatigue    • Myalgia and myositis    • PONV (postoperative nausea and vomiting)     on occasion    • Prediabetes    • Rash and nonspecific skin eruption    • Sinusitis, acute maxillary    • UTI (urinary tract infection)    • Wears glasses         Past Surgical History:   Procedure Laterality Date   • BLADDER SURGERY      Cuff   • CHOLECYSTECTOMY     • COLONOSCOPY W/ POLYPECTOMY     • HERNIA REPAIR      umbilical    • HYSTERECTOMY     • TIBIAL PLATEAU OPEN REDUCTION INTERNAL FIXATION Left 2016    Procedure: LEFT TIBIAL PLATEAU OPEN REDUCTION INTERNAL FIXATION;  Surgeon: Constantine Durbin MD;  Location: [facility];  Service:        Visit Dx:     ICD-10-CM ICD-9-CM   1. Impaired functional mobility, balance, gait, and endurance Z74.09 V49.89   2. Pain and swelling of left knee M25.562 719.46    M25.462 719.06   3. S/P ORIF (open reduction internal fixation) fracture  left tibial plateau  Z96.7 V45.89    Z87.81 V15.51   4. Tibial plateau fracture, left, closed, initial encounter S82.142A 823.00                 PT Ortho       17 1115    Subjective Comments    Subjective Comments Client reports that she is very  concerned about the swelling and discomfort throughout her left ankle. She also continues to have knee pain. She is also concerned about the lack of mobility, swelling, and loss of function in regard to walking and negotiating stairs.  -MM    Subjective Pain    Able to rate subjective pain? yes  -MM    Pre-Treatment Pain Level 5  -MM    Post-Treatment Pain Level 4  -MM    Left Knee    Extension/Flexion ROM Details -4/75 degrees (supine)   -MM    Left Ankle    Dorsiflexion AROM Deficit 3  -MM    Plantarflexion AROM Deficit 42  -MM    Left Hip    Hip Flexion Gross Movement (5/5) normal  -MM    Hip ABduction Gross Movement (4-/5) good minus  -MM    Hip ADduction Gross Movement (4-/5) good minus  -MM    Right Hip    Hip Flexion Gross Movement (5/5) normal  -MM    Hip ABduction Gross Movement (4/5) good  -MM    Hip ADduction Gross Movement (4/5) good  -MM    Left Knee    Knee Extension Gross Movement (4-/5) good minus  -MM    Knee Flexion Gross Movement (4-/5) good minus   limited range  -MM    Left Ankle/Foot    Ankle PF Gross Movement (4+/5) good plus  -MM    Ankle Dorsiflexion Gross Movement (4/5) good   with pain  -MM    Subtalar Inversion Gross Movement (4/5) good   with pain  -MM    Subtalar Eversion Gross Movement (4+/5) good plus  -MM    Girth    Girth Measured? --   compression stocking removed to check girth.  -MM    LLE Quick Girth (cm)    Met-heads 23 cm  -MM    Mid foot 23.8 cm  -MM    Smallest ankle 25 cm  -MM    Largest calf 36 cm  -MM    Tib tuberosity 38 cm  -MM    Mid patella 42.5 cm  -MM    Distal thigh 44 cm  -MM    Proximal thigh 56 cm  -MM    Transfers    Transfers, Sit-Stand-Sit, Assist Device rolling walker  -MM    Gait Assessment/Treatment    Gait, Summers Level conditional independence  -MM    Gait, Assistive Device rolling walker  -MM    Gait, Distance (Feet) 525  -MM    Gait, Gait Pattern Analysis swing-to gait  -MM    Gait, Gait Deviations --   left:;decreased heel strike;weight-shifting  ability decrease  -MM      User Key  (r) = Recorded By, (t) = Taken By, (c) = Cosigned By    Initials Name Provider Type    MM Padilla Anderson, PT Physical Therapist                                PT OP Goals       04/11/17 1115       PT Short Term Goals    STG Date to Achieve 05/04/17  -MM     STG 1 Pt independent with initial HEP for ROM, strengthening and standing balance.   -MM     STG 1 Progress Ongoing  -MM     STG 2 Pt to demonstrate increased LT knee AROM to 0-90 deg for improved transfers and function.   -MM     STG 2 Progress Ongoing  -MM     STG 2 Progress Comments slow progress  -MM     STG 3 Pt to demonstrate improved LT ankle AROM to > 5 deg DF for improved gait training / heel rise.   -MM     STG 3 Progress Ongoing;Progressing  -MM     STG 4 Pt to ambulate > 350 ft with least restrictive A.D. safely (WB per MD order).   -MM     STG 4 Progress Ongoing;Progressing  -MM     STG 4 Progress Comments able to ambulate with walker, but poor gait pattern  -MM     STG 5 Pt able to ascend / descend 6 steps independently with hand rail and A.D.   -MM     STG 5 Progress Ongoing  -MM     Long Term Goals    LTG Date to Achieve 06/14/17  -MM     LTG 1 Pt to demonstrate increased LT knee AROM to 0-110 deg for improved transfers and function.   -MM     LTG 1 Progress Ongoing  -MM     LTG 2 Pt to ambulate household  and community distances with least restrictive A.D., WBAT.   -MM     LTG 2 Progress Ongoing  -MM     LTG 3 Pt able to ascend / descend 1 flight of stairs independently with hand rail and A.D.   -MM     LTG 3 Progress Ongoing  -MM     LTG 4 Pt to report increase in LEFS score by 50% to demonstrate improved functional mobility and QOL.   -MM     LTG 4 Progress Ongoing  -MM     LTG 5 Pt able to return home / live independently.   -MM     LTG 5 Progress Ongoing  -MM     Time Calculation    PT Goal Re-Cert Due Date 05/11/17  -MM       User Key  (r) = Recorded By, (t) = Taken By, (c) = Cosigned By    Initials  Name Provider Type    MM Padilla PENALOZA Justin, PT Physical Therapist                PT Assessment/Plan       04/11/17 1115       PT Assessment    Assessment Comments Overall progress is slower than expected. Client has quite a bit of left LE swelling. She has several limitations. She is quite concerned about diffuse left ankle pain and tenderness. Knee ROM was less today from the previous couple of sessions, but is still better overall than it was when she started. Knee flexion was limited today to 75 degrees. Knee extension improved to -4 degrees.  Further skilled care is required to maximize overall ROM, function, and strength.  -MM     Please refer to paper survey for additional self-reported information Yes  -MM     Rehab Potential Fair  -MM     Patient/caregiver participated in establishment of treatment plan and goals Yes  -MM     Patient would benefit from skilled therapy intervention Yes  -MM     PT Plan    PT Frequency 2x/week  -MM     Predicted Duration of Therapy Intervention (days/wks) 6 weeks  -MM     PT Plan Comments Continue per plan of treatment. Client is also returning to MD on Thursday. Check on any updates regarding the knee or ankle.  -MM       User Key  (r) = Recorded By, (t) = Taken By, (c) = Cosigned By    Initials Name Provider Type    MM Padilla PENALOZA Justin, PT Physical Therapist                  Exercises       04/11/17 1115          Subjective Comments    Subjective Comments Client reports that she is very concerned about the swelling and discomfort throughout her left ankle. She also continues to have knee pain. She is also concerned about the lack of mobility, swelling, and loss of function in regard to walking and negotiating stairs.  -MM      Subjective Pain    Able to rate subjective pain? yes  -MM      Pre-Treatment Pain Level 5  -MM      Post-Treatment Pain Level 4  -MM      Exercise 1    Exercise Name 1 Re-exam time included in ther-ex. Also reviewed exercise program. Included nu-step in  the clinicl.  -MM      Time (Minutes) 1 45  -MM      Treatment Type 1 Ther Ex  -MM        User Key  (r) = Recorded By, (t) = Taken By, (c) = Cosigned By    Initials Name Provider Type    FERNANDO Anderson, PT Physical Therapist                              Outcome Measures       04/11/17 1115          Functional Assessment    Outcome Measure Options Lower Extremity Functional Scale (LEFS)  -MM        User Key  (r) = Recorded By, (t) = Taken By, (c) = Cosigned By    Initials Name Provider Type    MM Padilla Anderson, PT Physical Therapist            Time Calculation:   Start Time: 1115  Total Timed Code Minutes- PT: 45 minute(s)     Therapy Charges for Today     Code Description Service Date Service Provider Modifiers Qty    58547727281  PT THER PROC EA 15 MIN 4/11/2017 Padilla Anderson, PT GP 3          PT G-Codes  Outcome Measure Options: Lower Extremity Functional Scale (LEFS)         Padilla Anderson, PT  4/11/2017

## 2017-04-14 ENCOUNTER — HOSPITAL ENCOUNTER (OUTPATIENT)
Dept: PHYSICAL THERAPY | Facility: HOSPITAL | Age: 62
Setting detail: THERAPIES SERIES
Discharge: HOME | End: 2017-04-14

## 2017-04-14 DIAGNOSIS — Z98.890 S/P ORIF (OPEN REDUCTION INTERNAL FIXATION) FRACTURE: ICD-10-CM

## 2017-04-14 DIAGNOSIS — M25.462 PAIN AND SWELLING OF LEFT KNEE: Primary | ICD-10-CM

## 2017-04-14 DIAGNOSIS — Z87.81 S/P ORIF (OPEN REDUCTION INTERNAL FIXATION) FRACTURE: ICD-10-CM

## 2017-04-14 DIAGNOSIS — Z74.09 IMPAIRED FUNCTIONAL MOBILITY, BALANCE, GAIT, AND ENDURANCE: ICD-10-CM

## 2017-04-14 DIAGNOSIS — M25.562 PAIN AND SWELLING OF LEFT KNEE: Primary | ICD-10-CM

## 2017-04-14 PROCEDURE — 97140 MANUAL THERAPY 1/> REGIONS: CPT

## 2017-04-14 PROCEDURE — 97530 THERAPEUTIC ACTIVITIES: CPT

## 2017-04-14 PROCEDURE — 97112 NEUROMUSCULAR REEDUCATION: CPT

## 2017-04-14 NOTE — PROGRESS NOTES
Outpatient Physical Therapy Ortho Treatment Note   [place]     Patient Name: Rosalba Girard  : 1955  MRN: [id number]  Today's Date: 2017      Visit Date: 2017    Visit Dx:    ICD-10-CM ICD-9-CM   1. Pain and swelling of left knee M25.562 719.46    M25.462 719.06   2. S/P ORIF (open reduction internal fixation) fracture  left tibial plateau  Z96.7 V45.89    Z87.81 V15.51   3. Impaired functional mobility, balance, gait, and endurance Z74.09 V49.89       Patient Active Problem List   Diagnosis   • Essential hypertension, benign   • GERD (gastroesophageal reflux disease)   • IBS (irritable bowel syndrome)   • S/P ORIF (open reduction internal fixation) fracture  left tibial plateau    • Tibial plateau fracture, left   • Leukocytosis        Past Medical History:   Diagnosis Date   • Chronic superficial dermatitis    • Dietary counseling    • Encounter for routine adult health examination    • GERD (gastroesophageal reflux disease)    • Hair or hair follicle disorder    • History of anemia    • Hives    • Hypertension    • Irritable bowel disease    • Malaise and fatigue    • Myalgia and myositis    • PONV (postoperative nausea and vomiting)     on occasion    • Prediabetes    • Rash and nonspecific skin eruption    • Sinusitis, acute maxillary    • UTI (urinary tract infection)    • Wears glasses         Past Surgical History:   Procedure Laterality Date   • BLADDER SURGERY      Cuff   • CHOLECYSTECTOMY     • COLONOSCOPY W/ POLYPECTOMY     • HERNIA REPAIR      umbilical    • HYSTERECTOMY     • TIBIAL PLATEAU OPEN REDUCTION INTERNAL FIXATION Left 2016    Procedure: LEFT TIBIAL PLATEAU OPEN REDUCTION INTERNAL FIXATION;  Surgeon: Constantine Durbin MD;  Location: [facility];  Service:              PT Ortho       17 1030    Left Knee    Extension/Flexion ROM Details 75 flexion  -LF    LLE Quick Girth (cm)    Mid foot 23.8 cm  -LF    Smallest ankle 25.8 cm  -LF      User Key  (r)  "= Recorded By, (t) = Taken By, (c) = Cosigned By    Initials Name Provider Type    LF Anastacia Shields PT Physical Therapist                            PT Assessment/Plan       04/14/17 1200       PT Assessment    Assessment Comments cues required for increased step-length RLE.  some improvement in edema dorsum of foot observed following MLD.  Decreased UE assit with weight-shifting activities with encouragement  -LF     PT Plan    PT Plan Comments cont. per POC.    -LF       User Key  (r) = Recorded By, (t) = Taken By, (c) = Cosigned By    Initials Name Provider Type    LF Anastacia Shields, PT Physical Therapist                    Exercises       04/14/17 1030          Subjective Comments    Subjective Comments had follow-up with surgeon since last visit.  Had x-ray of foot/ankle and no apparent fracture.  Has order for dynasplint.  Wearing her knee brace, but per doctor doesn't need.  She feels it provides her stability for stairs  -LF      Subjective Pain    Able to rate subjective pain? yes  -LF      Pre-Treatment Pain Level 4  -LF      Post-Treatment Pain Level 4  -LF      Exercise 1    Exercise Name 1 ther ex per f/s.  Included fwd step-ups 4\" step x10 each, TKE on green theradisc.  Ambulated in clinic with RWx  -LF      Time (Minutes) 1 30  -LF      Treatment Type 1 Ther Ex  -LF      Exercise 2    Exercise Name 2 Neuromuscular re-ed included weight-shift side-side and tandem on blue theradiscs starting with bilat. UE support and progressed to finger support.  also static standing on green discs,  high march, heel-toe, backward walking.   -LF      Time (Minutes) 2 15  -LF        User Key  (r) = Recorded By, (t) = Taken By, (c) = Cosigned By    Initials Name Provider Type     Anastacia Shields PT Physical Therapist                        Manual Rx (last 36 hours)      Manual Treatments       04/14/17 1030          Manual Rx 4    Manual Rx 4 Location left leg  -LF      Manual Rx 4 Type size 5 compressogrip MT " heads to distal knee, with double layer foot to lower calf  -LF      Manual Rx 5    Manual Rx 5 Location left knee and lower leg  -LF      Manual Rx 5 Type ASTYM as well as simple MLD with focus foot and ankle   -LF      Manual Rx 5 Duration 15  -LF        User Key  (r) = Recorded By, (t) = Taken By, (c) = Cosigned By    Initials Name Provider Type    LF Anastacia Shields, PT Physical Therapist            Time Calculation:   Start Time: 1030  Total Timed Code Minutes- PT: 60 minute(s)    Therapy Charges for Today     Code Description Service Date Service Provider Modifiers Qty    64690056284 HC PT THERAPEUTIC ACT EA 15 MIN 4/14/2017 Anastacia Shields, PT GP 2    50214313857 HC PT MANUAL THERAPY EA 15 MIN 4/14/2017 Anastacia Shields, PT GP 1    44458813787 HC PT NEUROMUSC RE EDUCATION EA 15 MIN 4/14/2017 Anastacia Shields, PT GP 1                    Anastacia Shields, PT  4/14/2017

## 2017-04-18 ENCOUNTER — HOSPITAL ENCOUNTER (OUTPATIENT)
Dept: PHYSICAL THERAPY | Facility: HOSPITAL | Age: 62
Setting detail: THERAPIES SERIES
Discharge: HOME | End: 2017-04-18

## 2017-04-18 DIAGNOSIS — M25.562 PAIN AND SWELLING OF LEFT KNEE: ICD-10-CM

## 2017-04-18 DIAGNOSIS — M25.462 PAIN AND SWELLING OF LEFT KNEE: ICD-10-CM

## 2017-04-18 DIAGNOSIS — Z87.81 S/P ORIF (OPEN REDUCTION INTERNAL FIXATION) FRACTURE: ICD-10-CM

## 2017-04-18 DIAGNOSIS — S82.142A TIBIAL PLATEAU FRACTURE, LEFT, CLOSED, INITIAL ENCOUNTER: ICD-10-CM

## 2017-04-18 DIAGNOSIS — Z98.890 S/P ORIF (OPEN REDUCTION INTERNAL FIXATION) FRACTURE: ICD-10-CM

## 2017-04-18 DIAGNOSIS — Z74.09 IMPAIRED FUNCTIONAL MOBILITY, BALANCE, GAIT, AND ENDURANCE: Primary | ICD-10-CM

## 2017-04-18 PROCEDURE — 97140 MANUAL THERAPY 1/> REGIONS: CPT

## 2017-04-18 PROCEDURE — 97110 THERAPEUTIC EXERCISES: CPT

## 2017-04-18 NOTE — PROGRESS NOTES
Outpatient Physical Therapy Ortho Treatment Note   [place]     Patient Name: Rosalba Girard  : 1955  MRN: [id number]  Today's Date: 2017      Visit Date: 2017    Visit Dx:    ICD-10-CM ICD-9-CM   1. Impaired functional mobility, balance, gait, and endurance Z74.09 V49.89   2. Tibial plateau fracture, left, closed, initial encounter S82.142A 823.00   3. S/P ORIF (open reduction internal fixation) fracture  left tibial plateau  Z96.7 V45.89    Z87.81 V15.51   4. Pain and swelling of left knee M25.562 719.46    M25.462 719.06       Patient Active Problem List   Diagnosis   • Essential hypertension, benign   • GERD (gastroesophageal reflux disease)   • IBS (irritable bowel syndrome)   • S/P ORIF (open reduction internal fixation) fracture  left tibial plateau    • Tibial plateau fracture, left   • Leukocytosis        Past Medical History:   Diagnosis Date   • Chronic superficial dermatitis    • Dietary counseling    • Encounter for routine adult health examination    • GERD (gastroesophageal reflux disease)    • Hair or hair follicle disorder    • History of anemia    • Hives    • Hypertension    • Irritable bowel disease    • Malaise and fatigue    • Myalgia and myositis    • PONV (postoperative nausea and vomiting)     on occasion    • Prediabetes    • Rash and nonspecific skin eruption    • Sinusitis, acute maxillary    • UTI (urinary tract infection)    • Wears glasses         Past Surgical History:   Procedure Laterality Date   • BLADDER SURGERY      Cuff   • CHOLECYSTECTOMY     • COLONOSCOPY W/ POLYPECTOMY     • HERNIA REPAIR      umbilical    • HYSTERECTOMY     • TIBIAL PLATEAU OPEN REDUCTION INTERNAL FIXATION Left 2016    Procedure: LEFT TIBIAL PLATEAU OPEN REDUCTION INTERNAL FIXATION;  Surgeon: Constantine Durbin MD;  Location: [facility];  Service:                    PT Assessment/Plan       17 1100       PT Assessment    Assessment Comments Able to progress to  single UE support with weight-shifting activities, but more difficulty due to pain and weakness with increased weight-bearing on left   -LF     PT Plan    PT Plan Comments cont. 2x/week.  -LF       User Key  (r) = Recorded By, (t) = Taken By, (c) = Cosigned By    Initials Name Provider Type    LF Anastacia Shields, PT Physical Therapist                    Exercises       04/18/17 1100          Subjective Comments    Subjective Comments Reports increased pain yesterday for unknown reason, but better today  -LF      Subjective Pain    Able to rate subjective pain? yes  -LF      Pre-Treatment Pain Level 5  -LF      Post-Treatment Pain Level 3  -LF      Exercise 1    Exercise Name 1 cont' with ther ex per f/s.  Cues to initiate quad set with SLR.    -LF      Time (Minutes) 1 25  -LF      Exercise 2    Exercise Name 2 worked on weight-shifting with fwd step and side step with small lunge, and UE support x1.    -LF      Time (Minutes) 2 5  -LF        User Key  (r) = Recorded By, (t) = Taken By, (c) = Cosigned By    Initials Name Provider Type    LF Anastacia Shields, PT Physical Therapist                        Manual Rx (last 36 hours)      Manual Treatments       04/18/17 1100          Manual Rx 2    Manual Rx 2 Location L knee  -LF      Manual Rx 2 Type post tibial glides  with manual assist into flex sitting and supine.  Also patellar mobs all directions  -LF      Manual Rx 2 Duration 10  -LF      Manual Rx 4    Manual Rx 4 Location left leg  -LF      Manual Rx 4 Type size 4 compressogrip foot to proximal ankle, size 5 ankle to distal knee  -LF      Manual Rx 4 Duration tried 3.5 compressogrip initially for foot and ankle, but patient felt it was too tight  -LF      Manual Rx 5    Manual Rx 5 Location left knee and lower leg  -LF      Manual Rx 5 Type ASTYM as well as simple MLD L LE with focus foot and ankle   -LF      Manual Rx 5 Duration 20  -LF        User Key  (r) = Recorded By, (t) = Taken By, (c) = Cosigned By     Initials Name Provider Type    LF Anastacia Shields, PT Physical Therapist          Time Calculation:   Start Time: 1100  Total Timed Code Minutes- PT: 60 minute(s)    Therapy Charges for Today     Code Description Service Date Service Provider Modifiers Qty    71992075837 HC PT MANUAL THERAPY EA 15 MIN 4/18/2017 Anastacia Shields, PT GP 2    96122213188 HC PT THER PROC EA 15 MIN 4/18/2017 Anastacia Shields, PT GP 2                    Anastacia Shields, PT  4/18/2017

## 2017-04-21 ENCOUNTER — HOSPITAL ENCOUNTER (OUTPATIENT)
Dept: PHYSICAL THERAPY | Facility: HOSPITAL | Age: 62
Setting detail: THERAPIES SERIES
Discharge: HOME | End: 2017-04-21

## 2017-04-21 DIAGNOSIS — Z74.09 IMPAIRED FUNCTIONAL MOBILITY, BALANCE, GAIT, AND ENDURANCE: Primary | ICD-10-CM

## 2017-04-21 DIAGNOSIS — Z98.890 S/P ORIF (OPEN REDUCTION INTERNAL FIXATION) FRACTURE: ICD-10-CM

## 2017-04-21 DIAGNOSIS — M25.462 PAIN AND SWELLING OF LEFT KNEE: ICD-10-CM

## 2017-04-21 DIAGNOSIS — S82.142A TIBIAL PLATEAU FRACTURE, LEFT, CLOSED, INITIAL ENCOUNTER: ICD-10-CM

## 2017-04-21 DIAGNOSIS — M25.562 PAIN AND SWELLING OF LEFT KNEE: ICD-10-CM

## 2017-04-21 DIAGNOSIS — Z87.81 S/P ORIF (OPEN REDUCTION INTERNAL FIXATION) FRACTURE: ICD-10-CM

## 2017-04-21 PROCEDURE — 97110 THERAPEUTIC EXERCISES: CPT

## 2017-04-21 NOTE — PROGRESS NOTES
Outpatient Physical Therapy Ortho Treatment Note   [place]     Patient Name: Rosalba Girard  : 1955  MRN: [id number]  Today's Date: 2017      Visit Date: 2017    Visit Dx:    ICD-10-CM ICD-9-CM   1. Impaired functional mobility, balance, gait, and endurance Z74.09 V49.89   2. Tibial plateau fracture, left, closed, initial encounter S82.142A 823.00   3. S/P ORIF (open reduction internal fixation) fracture  left tibial plateau  Z96.7 V45.89    Z87.81 V15.51   4. Pain and swelling of left knee M25.562 719.46    M25.462 719.06       Patient Active Problem List   Diagnosis   • Essential hypertension, benign   • GERD (gastroesophageal reflux disease)   • IBS (irritable bowel syndrome)   • S/P ORIF (open reduction internal fixation) fracture  left tibial plateau    • Tibial plateau fracture, left   • Leukocytosis        Past Medical History:   Diagnosis Date   • Chronic superficial dermatitis    • Dietary counseling    • Encounter for routine adult health examination    • GERD (gastroesophageal reflux disease)    • Hair or hair follicle disorder    • History of anemia    • Hives    • Hypertension    • Irritable bowel disease    • Malaise and fatigue    • Myalgia and myositis    • PONV (postoperative nausea and vomiting)     on occasion    • Prediabetes    • Rash and nonspecific skin eruption    • Sinusitis, acute maxillary    • UTI (urinary tract infection)    • Wears glasses         Past Surgical History:   Procedure Laterality Date   • BLADDER SURGERY      Cuff   • CHOLECYSTECTOMY     • COLONOSCOPY W/ POLYPECTOMY     • HERNIA REPAIR      umbilical    • HYSTERECTOMY     • TIBIAL PLATEAU OPEN REDUCTION INTERNAL FIXATION Left 2016    Procedure: LEFT TIBIAL PLATEAU OPEN REDUCTION INTERNAL FIXATION;  Surgeon: Constantine Durbin MD;  Location: [facility];  Service:                              PT Assessment/Plan       17 1115       PT Assessment    Assessment Comments Began  exercises in the gym, but she needed to leave early due to personal reasons.   -MM     PT Plan    PT Plan Comments Continue as able with treatment to improve overall strength and function. Continue treatments to minimize swelling as well.   -MM       User Key  (r) = Recorded By, (t) = Taken By, (c) = Cosigned By    Initials Name Provider Type    MM Padilla Anderson, PT Physical Therapist                    Exercises       04/21/17 1115          Subjective Comments    Subjective Comments Client reported moderate knee and ankle pain.  -MM      Subjective Pain    Able to rate subjective pain? yes  -MM      Pre-Treatment Pain Level 5  -MM      Post-Treatment Pain Level 5  -MM      Exercise 1    Exercise Name 1 Included exercises in clinical setting per flow sheet.  -MM      Cueing 1 Verbal  -MM      Time (Minutes) 1 15  -MM      Treatment Type 1 Ther Ex  -MM        User Key  (r) = Recorded By, (t) = Taken By, (c) = Cosigned By    Initials Name Provider Type    MM Padilla Anderson, PT Physical Therapist                                       Time Calculation:   Start Time: 1115  Total Timed Code Minutes- PT: 15 minute(s)    Therapy Charges for Today     Code Description Service Date Service Provider Modifiers Qty    91566156849  PT THER PROC EA 15 MIN 4/21/2017 Padilla Anderson, PT GP 1                    Padilla Anderson, PT  4/21/2017

## 2017-04-25 ENCOUNTER — HOSPITAL ENCOUNTER (OUTPATIENT)
Dept: PHYSICAL THERAPY | Facility: HOSPITAL | Age: 62
Setting detail: THERAPIES SERIES
Discharge: HOME | End: 2017-04-25

## 2017-04-25 DIAGNOSIS — M25.462 PAIN AND SWELLING OF LEFT KNEE: ICD-10-CM

## 2017-04-25 DIAGNOSIS — S82.142A TIBIAL PLATEAU FRACTURE, LEFT, CLOSED, INITIAL ENCOUNTER: ICD-10-CM

## 2017-04-25 DIAGNOSIS — Z87.81 S/P ORIF (OPEN REDUCTION INTERNAL FIXATION) FRACTURE: ICD-10-CM

## 2017-04-25 DIAGNOSIS — Z74.09 IMPAIRED FUNCTIONAL MOBILITY, BALANCE, GAIT, AND ENDURANCE: Primary | ICD-10-CM

## 2017-04-25 DIAGNOSIS — Z98.890 S/P ORIF (OPEN REDUCTION INTERNAL FIXATION) FRACTURE: ICD-10-CM

## 2017-04-25 DIAGNOSIS — M25.562 PAIN AND SWELLING OF LEFT KNEE: ICD-10-CM

## 2017-04-25 PROCEDURE — 97140 MANUAL THERAPY 1/> REGIONS: CPT

## 2017-04-25 PROCEDURE — 97116 GAIT TRAINING THERAPY: CPT

## 2017-04-25 PROCEDURE — 97110 THERAPEUTIC EXERCISES: CPT

## 2017-04-25 NOTE — PROGRESS NOTES
Outpatient Physical Therapy Ortho Treatment Note  [facility]     Patient Name: Rosalba Girard  : 1955  MRN: [id number]  Today's Date: 2017      Visit Date: 2017    Visit Dx:    ICD-10-CM ICD-9-CM   1. Impaired functional mobility, balance, gait, and endurance Z74.09 V49.89   2. Tibial plateau fracture, left, closed, initial encounter S82.142A 823.00   3. S/P ORIF (open reduction internal fixation) fracture  left tibial plateau  Z96.7 V45.89    Z87.81 V15.51   4. Pain and swelling of left knee M25.562 719.46    M25.462 719.06       Patient Active Problem List   Diagnosis   • Essential hypertension, benign   • GERD (gastroesophageal reflux disease)   • IBS (irritable bowel syndrome)   • S/P ORIF (open reduction internal fixation) fracture  left tibial plateau    • Tibial plateau fracture, left   • Leukocytosis        Past Medical History:   Diagnosis Date   • Chronic superficial dermatitis    • Dietary counseling    • Encounter for routine adult health examination    • GERD (gastroesophageal reflux disease)    • Hair or hair follicle disorder    • History of anemia    • Hives    • Hypertension    • Irritable bowel disease    • Malaise and fatigue    • Myalgia and myositis    • PONV (postoperative nausea and vomiting)     on occasion    • Prediabetes    • Rash and nonspecific skin eruption    • Sinusitis, acute maxillary    • UTI (urinary tract infection)    • Wears glasses         Past Surgical History:   Procedure Laterality Date   • BLADDER SURGERY      Cuff   • CHOLECYSTECTOMY     • COLONOSCOPY W/ POLYPECTOMY     • HERNIA REPAIR      umbilical    • HYSTERECTOMY     • TIBIAL PLATEAU OPEN REDUCTION INTERNAL FIXATION Left 2016    Procedure: LEFT TIBIAL PLATEAU OPEN REDUCTION INTERNAL FIXATION;  Surgeon: Constantine Durbin MD;  Location: [facility];  Service:                              PT Assessment/Plan       17 9969       PT Assessment    Assessment Comments Client is  making improvement in the clinic with gait pattern and ability to use a standard cane for short distances. With practice she should be able to progress to independent walking with a cane for short distances and a rollater walker with a seat for longer distances. Client continues with remarkable knee flexion stiffness and limited ROM. A dynasplint should be most helpful for maximizing ROM. She is awaiting approval.   -MM     PT Plan    PT Plan Comments Continue per plan of treatment with focus on ROM and functional ability. Consider stair training for independence to her apartment. Continue training with standard cane.  -MM       User Key  (r) = Recorded By, (t) = Taken By, (c) = Cosigned By    Initials Name Provider Type    FERNANDO Anderson, PT Physical Therapist                    Exercises       04/25/17 1115          Subjective Comments    Subjective Comments Client reported pain today at 4/10.   -MM      Subjective Pain    Able to rate subjective pain? yes  -MM      Pre-Treatment Pain Level 4  -MM      Post-Treatment Pain Level 4  -MM      Exercise 1    Exercise Name 1 Included exercises in clinical setting per flow sheet. Exercises included: sit to stand primarily using left leg from high seat, nu-step, mini lunges, and step-ups. Also included active knee flexion and extension over stability ball to improve ROM.  -MM      Cueing 1 Verbal  -MM      Time (Minutes) 1 15  -MM      Treatment Type 1 Ther Ex  -MM      Exercise 2    Exercise Name 2 Practiced walking in the clinic with right hand support using parallel bars and using standard cane. Worked on step length, weight shift, and heel strike. Included marching, sidestepping, backward walking and forward walking.  -MM      Time (Minutes) 2 20  -MM        User Key  (r) = Recorded By, (t) = Taken By, (c) = Cosigned By    Initials Name Provider Type    FERNANDO Anderson, PT Physical Therapist                        Manual Rx (last 36 hours)      Manual  Treatments       04/25/17 1115          Manual Rx 1    Manual Rx 1 Location Included passive stretching and PNF techniques to maximize left knee flexion. Included oscillations as able.   -MM      Manual Rx 1 Duration 10  -MM        User Key  (r) = Recorded By, (t) = Taken By, (c) = Cosigned By    Initials Name Provider Type    MM Padilla Anderson, PT Physical Therapist                        Time Calculation:   Start Time: 1115  Total Timed Code Minutes- PT: 45 minute(s)    Therapy Charges for Today     Code Description Service Date Service Provider Modifiers Qty    66705371299  PT THER PROC EA 15 MIN 4/25/2017 Padilla Anderson, PT GP 1    [id number]  PT MANUAL THERAPY EA 15 MIN 4/25/2017 Padilla Anderson, PT GP 1    [id number]  GAIT TRAINING EA 15 MIN 4/25/2017 Padilla Anderson, PT GP 1                    Padilla Anderson, PT  4/25/2017

## 2017-04-28 ENCOUNTER — HOSPITAL ENCOUNTER (OUTPATIENT)
Dept: PHYSICAL THERAPY | Facility: HOSPITAL | Age: 62
Setting detail: THERAPIES SERIES
Discharge: HOME | End: 2017-04-28

## 2017-04-28 DIAGNOSIS — Z98.890 S/P ORIF (OPEN REDUCTION INTERNAL FIXATION) FRACTURE: ICD-10-CM

## 2017-04-28 DIAGNOSIS — Z74.09 IMPAIRED FUNCTIONAL MOBILITY, BALANCE, GAIT, AND ENDURANCE: Primary | ICD-10-CM

## 2017-04-28 DIAGNOSIS — S82.142A TIBIAL PLATEAU FRACTURE, LEFT, CLOSED, INITIAL ENCOUNTER: ICD-10-CM

## 2017-04-28 DIAGNOSIS — M25.462 PAIN AND SWELLING OF LEFT KNEE: ICD-10-CM

## 2017-04-28 DIAGNOSIS — Z87.81 S/P ORIF (OPEN REDUCTION INTERNAL FIXATION) FRACTURE: ICD-10-CM

## 2017-04-28 DIAGNOSIS — M25.562 PAIN AND SWELLING OF LEFT KNEE: ICD-10-CM

## 2017-04-28 PROCEDURE — 97116 GAIT TRAINING THERAPY: CPT

## 2017-04-28 PROCEDURE — 97140 MANUAL THERAPY 1/> REGIONS: CPT

## 2017-04-28 PROCEDURE — 97110 THERAPEUTIC EXERCISES: CPT

## 2017-04-28 NOTE — PROGRESS NOTES
Outpatient Physical Therapy Ortho Treatment Note  [facility]     Patient Name: Rosalba Girard  : 1955  MRN: [id number]  Today's Date: 2017      Visit Date: 2017    Visit Dx:    ICD-10-CM ICD-9-CM   1. Impaired functional mobility, balance, gait, and endurance Z74.09 V49.89   2. Pain and swelling of left knee M25.562 719.46    M25.462 719.06   3. Tibial plateau fracture, left, closed, initial encounter S82.142A 823.00   4. S/P ORIF (open reduction internal fixation) fracture  left tibial plateau  Z96.7 V45.89    Z87.81 V15.51       Patient Active Problem List   Diagnosis   • Essential hypertension, benign   • GERD (gastroesophageal reflux disease)   • IBS (irritable bowel syndrome)   • S/P ORIF (open reduction internal fixation) fracture  left tibial plateau    • Tibial plateau fracture, left   • Leukocytosis        Past Medical History:   Diagnosis Date   • Chronic superficial dermatitis    • Dietary counseling    • Encounter for routine adult health examination    • GERD (gastroesophageal reflux disease)    • Hair or hair follicle disorder    • History of anemia    • Hives    • Hypertension    • Irritable bowel disease    • Malaise and fatigue    • Myalgia and myositis    • PONV (postoperative nausea and vomiting)     on occasion    • Prediabetes    • Rash and nonspecific skin eruption    • Sinusitis, acute maxillary    • UTI (urinary tract infection)    • Wears glasses         Past Surgical History:   Procedure Laterality Date   • BLADDER SURGERY      Cuff   • CHOLECYSTECTOMY     • COLONOSCOPY W/ POLYPECTOMY     • HERNIA REPAIR      umbilical    • HYSTERECTOMY     • TIBIAL PLATEAU OPEN REDUCTION INTERNAL FIXATION Left 2016    Procedure: LEFT TIBIAL PLATEAU OPEN REDUCTION INTERNAL FIXATION;  Surgeon: Constantine Durbin MD;  Location: [facility];  Service:                PT Assessment/Plan       17 1000       PT Assessment    Assessment Comments Demonstrate step-thru gait  pattern using RWx today without being cued.  Apprehensive with cane, especially going up/down stairs.  She is dependent on w/c for mobility at work and to/from appointments.   Set goal for patient to be walking with Rwx primarily by  next week   -LF     PT Plan    PT Plan Comments continue per POC to progress strength and ROM and decrease dependence on w/c for mobility  -LF       User Key  (r) = Recorded By, (t) = Taken By, (c) = Cosigned By    Initials Name Provider Type    LF Anastacia Shields, PT Physical Therapist                    Exercises       04/28/17 1000          Subjective Comments    Subjective Comments having a little more pain today and contributes to the weather  -LF      Subjective Pain    Able to rate subjective pain? yes  -LF      Pre-Treatment Pain Level 6  -LF      Post-Treatment Pain Level 3  -LF      Exercise 1    Exercise Name 1 Exercises included side-step and high march step in parallel bars, step-over 1/2 foam roll, lateral weight-shifting; toe taps onto 2 cones to work on balance  -LF      Time (Minutes) 1 20  -LF      Exercise 2    Exercise Name 2 Continued gait training with cane, as well as walking in parallel bars with UE support x1.  cues for step-thru gait pattern.  Also up/radha 1 flight stairs with HR and SC, CGA provided.  Ambulated with RWx outside up/down ramp, and curb step w/ CGA.  She is independent ambulating with RWx on level ground, and SBA with cane on level ground.  Also getting in/of restroom w/ rolling walking and navigating opening the door which she was able to do with verbal cues  -LF      Time (Minutes) 2 30  -LF        User Key  (r) = Recorded By, (t) = Taken By, (c) = Cosigned By    Initials Name Provider Type    LF Anastacia Shields, PT Physical Therapist                Manual Rx (last 36 hours)      Manual Treatments       04/28/17 1000          Manual Rx 1    Manual Rx 1 Location included passive stretching to increase knee flexion, patellar mobs.  Also educated  patient on patellar mobs and reinforced importance of self-stretching into flexion multiple times a day  -LF      Manual Rx 1 Duration 10  -LF      Manual Rx 4    Manual Rx 4 Location left leg  -LF      Manual Rx 4 Type size 4 compressogrip foot to distal knee  -LF      Manual Rx 5    Manual Rx 5 Location left knee and lower leg  -LF      Manual Rx 5 Type ASTYM; as well as simple MLD at ankle  -LF      Manual Rx 5 Duration 20  -LF        User Key  (r) = Recorded By, (t) = Taken By, (c) = Cosigned By    Initials Name Provider Type    DIOGENES Shields, AARTI Physical Therapist                    Therapy Education       04/28/17 1000          Therapy Education    Given HEP;Symptoms/condition management  -LF      Program Reinforced  -LF      How Provided Verbal;Demonstration  -LF      Provided to Patient  -LF      Level of Understanding Verbalized  -LF        User Key  (r) = Recorded By, (t) = Taken By, (c) = Cosigned By    Initials Name Provider Type    DIOGENES Shields PT Physical Therapist                Time Calculation:   Start Time: 1000  Total Timed Code Minutes- PT: 80 minute(s)    Therapy Charges for Today     Code Description Service Date Service Provider Modifiers Qty    77872936710 HC PT MANUAL THERAPY EA 15 MIN 4/28/2017 Anastacia Shields, PT GP 2    16846440696 HC PT THER PROC EA 15 MIN 4/28/2017 Anastacia Shields, PT GP 1    75537808210 HC GAIT TRAINING EA 15 MIN 4/28/2017 Anastacia Shields, PT GP 2                    Anastacia Shields, PT  4/28/2017

## 2017-05-02 ENCOUNTER — HOSPITAL ENCOUNTER (OUTPATIENT)
Dept: PHYSICAL THERAPY | Facility: HOSPITAL | Age: 62
Setting detail: THERAPIES SERIES
Discharge: HOME | End: 2017-05-02

## 2017-05-02 DIAGNOSIS — Z74.09 IMPAIRED FUNCTIONAL MOBILITY, BALANCE, GAIT, AND ENDURANCE: Primary | ICD-10-CM

## 2017-05-02 DIAGNOSIS — S82.142A TIBIAL PLATEAU FRACTURE, LEFT, CLOSED, INITIAL ENCOUNTER: ICD-10-CM

## 2017-05-02 DIAGNOSIS — M25.462 PAIN AND SWELLING OF LEFT KNEE: ICD-10-CM

## 2017-05-02 DIAGNOSIS — M25.562 PAIN AND SWELLING OF LEFT KNEE: ICD-10-CM

## 2017-05-02 PROCEDURE — 97110 THERAPEUTIC EXERCISES: CPT

## 2017-05-02 PROCEDURE — 97116 GAIT TRAINING THERAPY: CPT

## 2017-05-02 PROCEDURE — 97140 MANUAL THERAPY 1/> REGIONS: CPT

## 2017-05-04 ENCOUNTER — HOSPITAL ENCOUNTER (OUTPATIENT)
Dept: PHYSICAL THERAPY | Facility: HOSPITAL | Age: 62
Setting detail: THERAPIES SERIES
Discharge: HOME | End: 2017-05-04

## 2017-05-04 DIAGNOSIS — M25.462 PAIN AND SWELLING OF LEFT KNEE: ICD-10-CM

## 2017-05-04 DIAGNOSIS — M25.562 PAIN AND SWELLING OF LEFT KNEE: ICD-10-CM

## 2017-05-04 DIAGNOSIS — Z87.81 S/P ORIF (OPEN REDUCTION INTERNAL FIXATION) FRACTURE: ICD-10-CM

## 2017-05-04 DIAGNOSIS — Z98.890 S/P ORIF (OPEN REDUCTION INTERNAL FIXATION) FRACTURE: ICD-10-CM

## 2017-05-04 DIAGNOSIS — Z74.09 IMPAIRED FUNCTIONAL MOBILITY, BALANCE, GAIT, AND ENDURANCE: Primary | ICD-10-CM

## 2017-05-04 PROCEDURE — 97140 MANUAL THERAPY 1/> REGIONS: CPT

## 2017-05-04 PROCEDURE — 97530 THERAPEUTIC ACTIVITIES: CPT

## 2017-05-04 PROCEDURE — 97116 GAIT TRAINING THERAPY: CPT

## 2017-05-05 ENCOUNTER — APPOINTMENT (OUTPATIENT)
Dept: PHYSICAL THERAPY | Facility: HOSPITAL | Age: 62
End: 2017-05-05

## 2017-05-09 ENCOUNTER — HOSPITAL ENCOUNTER (OUTPATIENT)
Dept: PHYSICAL THERAPY | Facility: HOSPITAL | Age: 62
Setting detail: THERAPIES SERIES
Discharge: HOME | End: 2017-05-09

## 2017-05-09 DIAGNOSIS — M25.562 PAIN AND SWELLING OF LEFT KNEE: ICD-10-CM

## 2017-05-09 DIAGNOSIS — Z98.890 S/P ORIF (OPEN REDUCTION INTERNAL FIXATION) FRACTURE: ICD-10-CM

## 2017-05-09 DIAGNOSIS — M25.462 PAIN AND SWELLING OF LEFT KNEE: ICD-10-CM

## 2017-05-09 DIAGNOSIS — Z87.81 S/P ORIF (OPEN REDUCTION INTERNAL FIXATION) FRACTURE: ICD-10-CM

## 2017-05-09 DIAGNOSIS — Z74.09 IMPAIRED FUNCTIONAL MOBILITY, BALANCE, GAIT, AND ENDURANCE: Primary | ICD-10-CM

## 2017-05-09 PROCEDURE — 97110 THERAPEUTIC EXERCISES: CPT

## 2017-05-09 PROCEDURE — 97116 GAIT TRAINING THERAPY: CPT

## 2017-05-09 PROCEDURE — 97140 MANUAL THERAPY 1/> REGIONS: CPT

## 2017-05-12 ENCOUNTER — HOSPITAL ENCOUNTER (OUTPATIENT)
Dept: PHYSICAL THERAPY | Facility: HOSPITAL | Age: 62
Setting detail: THERAPIES SERIES
Discharge: HOME | End: 2017-05-12

## 2017-05-12 DIAGNOSIS — Z98.890 S/P ORIF (OPEN REDUCTION INTERNAL FIXATION) FRACTURE: ICD-10-CM

## 2017-05-12 DIAGNOSIS — M25.562 PAIN AND SWELLING OF LEFT KNEE: ICD-10-CM

## 2017-05-12 DIAGNOSIS — M25.462 PAIN AND SWELLING OF LEFT KNEE: ICD-10-CM

## 2017-05-12 DIAGNOSIS — Z87.81 S/P ORIF (OPEN REDUCTION INTERNAL FIXATION) FRACTURE: ICD-10-CM

## 2017-05-12 DIAGNOSIS — Z74.09 IMPAIRED FUNCTIONAL MOBILITY, BALANCE, GAIT, AND ENDURANCE: Primary | ICD-10-CM

## 2017-05-12 PROCEDURE — 97110 THERAPEUTIC EXERCISES: CPT

## 2017-05-12 PROCEDURE — 97140 MANUAL THERAPY 1/> REGIONS: CPT

## 2017-05-16 ENCOUNTER — HOSPITAL ENCOUNTER (OUTPATIENT)
Dept: PHYSICAL THERAPY | Facility: HOSPITAL | Age: 62
Setting detail: THERAPIES SERIES
Discharge: HOME | End: 2017-05-16

## 2017-05-16 DIAGNOSIS — Z87.81 S/P ORIF (OPEN REDUCTION INTERNAL FIXATION) FRACTURE: ICD-10-CM

## 2017-05-16 DIAGNOSIS — Z74.09 IMPAIRED FUNCTIONAL MOBILITY, BALANCE, GAIT, AND ENDURANCE: Primary | ICD-10-CM

## 2017-05-16 DIAGNOSIS — M25.562 PAIN AND SWELLING OF LEFT KNEE: ICD-10-CM

## 2017-05-16 DIAGNOSIS — M25.462 PAIN AND SWELLING OF LEFT KNEE: ICD-10-CM

## 2017-05-16 DIAGNOSIS — Z98.890 S/P ORIF (OPEN REDUCTION INTERNAL FIXATION) FRACTURE: ICD-10-CM

## 2017-05-16 PROCEDURE — 97110 THERAPEUTIC EXERCISES: CPT

## 2017-05-16 PROCEDURE — 97116 GAIT TRAINING THERAPY: CPT

## 2017-05-16 PROCEDURE — 97140 MANUAL THERAPY 1/> REGIONS: CPT

## 2017-05-19 ENCOUNTER — HOSPITAL ENCOUNTER (OUTPATIENT)
Dept: PHYSICAL THERAPY | Facility: HOSPITAL | Age: 62
Setting detail: THERAPIES SERIES
Discharge: HOME | End: 2017-05-19

## 2017-05-19 DIAGNOSIS — M25.562 PAIN AND SWELLING OF LEFT KNEE: ICD-10-CM

## 2017-05-19 DIAGNOSIS — Z98.890 S/P ORIF (OPEN REDUCTION INTERNAL FIXATION) FRACTURE: ICD-10-CM

## 2017-05-19 DIAGNOSIS — M25.462 PAIN AND SWELLING OF LEFT KNEE: ICD-10-CM

## 2017-05-19 DIAGNOSIS — Z87.81 S/P ORIF (OPEN REDUCTION INTERNAL FIXATION) FRACTURE: ICD-10-CM

## 2017-05-19 DIAGNOSIS — S82.142A TIBIAL PLATEAU FRACTURE, LEFT, CLOSED, INITIAL ENCOUNTER: ICD-10-CM

## 2017-05-19 DIAGNOSIS — Z74.09 IMPAIRED FUNCTIONAL MOBILITY, BALANCE, GAIT, AND ENDURANCE: Primary | ICD-10-CM

## 2017-05-19 PROCEDURE — 97110 THERAPEUTIC EXERCISES: CPT

## 2017-05-19 PROCEDURE — 97116 GAIT TRAINING THERAPY: CPT

## 2017-05-23 ENCOUNTER — HOSPITAL ENCOUNTER (OUTPATIENT)
Dept: PHYSICAL THERAPY | Facility: HOSPITAL | Age: 62
Setting detail: THERAPIES SERIES
Discharge: HOME | End: 2017-05-23

## 2017-05-23 DIAGNOSIS — Z74.09 IMPAIRED FUNCTIONAL MOBILITY, BALANCE, GAIT, AND ENDURANCE: Primary | ICD-10-CM

## 2017-05-23 DIAGNOSIS — Z98.890 S/P ORIF (OPEN REDUCTION INTERNAL FIXATION) FRACTURE: ICD-10-CM

## 2017-05-23 DIAGNOSIS — M25.562 PAIN AND SWELLING OF LEFT KNEE: ICD-10-CM

## 2017-05-23 DIAGNOSIS — S82.142A TIBIAL PLATEAU FRACTURE, LEFT, CLOSED, INITIAL ENCOUNTER: ICD-10-CM

## 2017-05-23 DIAGNOSIS — M25.462 PAIN AND SWELLING OF LEFT KNEE: ICD-10-CM

## 2017-05-23 DIAGNOSIS — Z87.81 S/P ORIF (OPEN REDUCTION INTERNAL FIXATION) FRACTURE: ICD-10-CM

## 2017-05-23 PROCEDURE — 97010 HOT OR COLD PACKS THERAPY: CPT

## 2017-05-23 PROCEDURE — 97110 THERAPEUTIC EXERCISES: CPT

## 2017-05-23 PROCEDURE — 97116 GAIT TRAINING THERAPY: CPT

## 2017-05-26 ENCOUNTER — HOSPITAL ENCOUNTER (OUTPATIENT)
Dept: PHYSICAL THERAPY | Facility: HOSPITAL | Age: 62
Setting detail: THERAPIES SERIES
Discharge: HOME | End: 2017-05-26

## 2017-05-26 DIAGNOSIS — S82.142A TIBIAL PLATEAU FRACTURE, LEFT, CLOSED, INITIAL ENCOUNTER: ICD-10-CM

## 2017-05-26 DIAGNOSIS — Z74.09 IMPAIRED FUNCTIONAL MOBILITY, BALANCE, GAIT, AND ENDURANCE: Primary | ICD-10-CM

## 2017-05-26 DIAGNOSIS — Z87.81 S/P ORIF (OPEN REDUCTION INTERNAL FIXATION) FRACTURE: ICD-10-CM

## 2017-05-26 DIAGNOSIS — Z98.890 S/P ORIF (OPEN REDUCTION INTERNAL FIXATION) FRACTURE: ICD-10-CM

## 2017-05-26 DIAGNOSIS — M25.462 PAIN AND SWELLING OF LEFT KNEE: ICD-10-CM

## 2017-05-26 DIAGNOSIS — M25.562 PAIN AND SWELLING OF LEFT KNEE: ICD-10-CM

## 2017-05-26 PROCEDURE — 97110 THERAPEUTIC EXERCISES: CPT

## 2017-05-26 PROCEDURE — 97116 GAIT TRAINING THERAPY: CPT

## 2017-05-30 ENCOUNTER — HOSPITAL ENCOUNTER (OUTPATIENT)
Dept: PHYSICAL THERAPY | Facility: HOSPITAL | Age: 62
Setting detail: THERAPIES SERIES
Discharge: HOME | End: 2017-05-30

## 2017-05-30 DIAGNOSIS — M25.462 PAIN AND SWELLING OF LEFT KNEE: ICD-10-CM

## 2017-05-30 DIAGNOSIS — Z98.890 S/P ORIF (OPEN REDUCTION INTERNAL FIXATION) FRACTURE: ICD-10-CM

## 2017-05-30 DIAGNOSIS — Z74.09 IMPAIRED FUNCTIONAL MOBILITY, BALANCE, GAIT, AND ENDURANCE: Primary | ICD-10-CM

## 2017-05-30 DIAGNOSIS — Z87.81 S/P ORIF (OPEN REDUCTION INTERNAL FIXATION) FRACTURE: ICD-10-CM

## 2017-05-30 DIAGNOSIS — M25.562 PAIN AND SWELLING OF LEFT KNEE: ICD-10-CM

## 2017-05-30 DIAGNOSIS — S82.142A TIBIAL PLATEAU FRACTURE, LEFT, CLOSED, INITIAL ENCOUNTER: ICD-10-CM

## 2017-05-30 PROCEDURE — 97116 GAIT TRAINING THERAPY: CPT

## 2017-05-30 PROCEDURE — 97110 THERAPEUTIC EXERCISES: CPT

## 2017-06-02 ENCOUNTER — HOSPITAL ENCOUNTER (OUTPATIENT)
Dept: PHYSICAL THERAPY | Facility: HOSPITAL | Age: 62
Setting detail: THERAPIES SERIES
Discharge: HOME | End: 2017-06-02

## 2017-06-02 DIAGNOSIS — M25.462 PAIN AND SWELLING OF LEFT KNEE: ICD-10-CM

## 2017-06-02 DIAGNOSIS — Z87.81 S/P ORIF (OPEN REDUCTION INTERNAL FIXATION) FRACTURE: ICD-10-CM

## 2017-06-02 DIAGNOSIS — M25.562 PAIN AND SWELLING OF LEFT KNEE: ICD-10-CM

## 2017-06-02 DIAGNOSIS — Z98.890 S/P ORIF (OPEN REDUCTION INTERNAL FIXATION) FRACTURE: ICD-10-CM

## 2017-06-02 DIAGNOSIS — Z74.09 IMPAIRED FUNCTIONAL MOBILITY, BALANCE, GAIT, AND ENDURANCE: Primary | ICD-10-CM

## 2017-06-02 PROCEDURE — 97110 THERAPEUTIC EXERCISES: CPT

## 2017-06-02 NOTE — THERAPY TREATMENT NOTE
Outpatient Physical Therapy Ortho Treatment Note   [facility]     Patient Name: Rosalba Girard  : 1955  MRN: [id number]  Today's Date: 2017      Visit Date: 2017    Visit Dx:    ICD-10-CM ICD-9-CM   1. Impaired functional mobility, balance, gait, and endurance Z74.09 V49.89   2. Pain and swelling of left knee M25.562 719.46    M25.462 719.06   3. S/P ORIF (open reduction internal fixation) fracture  left tibial plateau  Z96.7 V45.89    Z87.81 V15.51       Patient Active Problem List   Diagnosis   • Essential hypertension, benign   • GERD (gastroesophageal reflux disease)   • IBS (irritable bowel syndrome)   • S/P ORIF (open reduction internal fixation) fracture  left tibial plateau    • Tibial plateau fracture, left   • Leukocytosis        Past Medical History:   Diagnosis Date   • Chronic superficial dermatitis    • Dietary counseling    • Encounter for routine adult health examination    • GERD (gastroesophageal reflux disease)    • Hair or hair follicle disorder    • History of anemia    • Hives    • Hypertension    • Irritable bowel disease    • Malaise and fatigue    • Myalgia and myositis    • PONV (postoperative nausea and vomiting)     on occasion    • Prediabetes    • Rash and nonspecific skin eruption    • Sinusitis, acute maxillary    • UTI (urinary tract infection)    • Wears glasses         Past Surgical History:   Procedure Laterality Date   • BLADDER SURGERY      Cuff   • CHOLECYSTECTOMY     • COLONOSCOPY W/ POLYPECTOMY     • HERNIA REPAIR      umbilical    • HYSTERECTOMY     • TIBIAL PLATEAU OPEN REDUCTION INTERNAL FIXATION Left 2016    Procedure: LEFT TIBIAL PLATEAU OPEN REDUCTION INTERNAL FIXATION;  Surgeon: Constantine Durbin MD;  Location: [facility];  Service:              PT Assessment/Plan       17 1115       PT Assessment    Assessment Comments Increased use of walker and today 1st time she did not arrive in w/c.  Continues with very slow progression  "of knee flexion ROM.    -LF     PT Plan    PT Plan Comments Re-exam due next visit  -LF       User Key  (r) = Recorded By, (t) = Taken By, (c) = Cosigned By    Initials Name Provider Type     Anastacia Shields PT Physical Therapist                    Exercises       06/02/17 1115          Subjective Comments    Subjective Comments Patient says she walked over from her office on campus using rollator walker.  had to stop and rest a couple of times  -LF      Subjective Pain    Able to rate subjective pain? yes  -LF      Pre-Treatment Pain Level 6  -LF      Post-Treatment Pain Level 6  -LF      Exercise 1    Exercise Name 1 ther ex in clinical setting per f/s and include fwd, side, and backward walking in parallel bars w/ UE support only with backward; star stepping w/o UE support; marching; fwd step-ups 4\" step, and TKE on 4\" step.  Ambulated in clinic w/ SC.  AAROM to increase knee flex (187 degrees)  -LF      Time (Minutes) 1 45  -LF      Treatment Type 1 Ther Ex  -LF        User Key  (r) = Recorded By, (t) = Taken By, (c) = Cosigned By    Initials Name Provider Type     Anastacia Shields PT Physical Therapist                    Therapy Education       06/02/17 1115          Therapy Education    Given HEP;Symptoms/condition management   importance HEP for strength/ROM  -LF      Program Progressed   also progression of walking program to increase endurance  -LF      How Provided Verbal;Demonstration  -LF      Provided to Patient  -LF      Level of Understanding Verbalized  -LF        User Key  (r) = Recorded By, (t) = Taken By, (c) = Cosigned By    Initials Name Provider Type     Anastacia Shields PT Physical Therapist                Time Calculation:   Start Time: 1115  Total Timed Code Minutes- PT: 45 minute(s)    Therapy Charges for Today     Code Description Service Date Service Provider Modifiers Qty    49662803307 HC PT THER PROC EA 15 MIN 6/2/2017 Anastacia Shields, PT GP 3                    Anastacia Wiley" Cornelius, PT  6/2/2017

## 2017-06-06 ENCOUNTER — HOSPITAL ENCOUNTER (OUTPATIENT)
Dept: PHYSICAL THERAPY | Facility: HOSPITAL | Age: 62
Setting detail: THERAPIES SERIES
Discharge: HOME | End: 2017-06-06

## 2017-06-06 DIAGNOSIS — M25.562 PAIN AND SWELLING OF LEFT KNEE: ICD-10-CM

## 2017-06-06 DIAGNOSIS — Z87.81 S/P ORIF (OPEN REDUCTION INTERNAL FIXATION) FRACTURE: ICD-10-CM

## 2017-06-06 DIAGNOSIS — Z74.09 IMPAIRED FUNCTIONAL MOBILITY, BALANCE, GAIT, AND ENDURANCE: Primary | ICD-10-CM

## 2017-06-06 DIAGNOSIS — M25.462 PAIN AND SWELLING OF LEFT KNEE: ICD-10-CM

## 2017-06-06 DIAGNOSIS — Z98.890 S/P ORIF (OPEN REDUCTION INTERNAL FIXATION) FRACTURE: ICD-10-CM

## 2017-06-06 PROCEDURE — 97110 THERAPEUTIC EXERCISES: CPT

## 2017-06-06 NOTE — THERAPY RE-EVALUATION
Outpatient Physical Therapy Ortho Re-Assessment   [place]     Patient Name: Rosalba Girard  : 1955  MRN: [id number]  Today's Date: 2017      Visit Date: 2017    Patient Active Problem List   Diagnosis   • Essential hypertension, benign   • GERD (gastroesophageal reflux disease)   • IBS (irritable bowel syndrome)   • S/P ORIF (open reduction internal fixation) fracture  left tibial plateau    • Tibial plateau fracture, left   • Leukocytosis        Past Medical History:   Diagnosis Date   • Chronic superficial dermatitis    • Dietary counseling    • Encounter for routine adult health examination    • GERD (gastroesophageal reflux disease)    • Hair or hair follicle disorder    • History of anemia    • Hives    • Hypertension    • Irritable bowel disease    • Malaise and fatigue    • Myalgia and myositis    • PONV (postoperative nausea and vomiting)     on occasion    • Prediabetes    • Rash and nonspecific skin eruption    • Sinusitis, acute maxillary    • UTI (urinary tract infection)    • Wears glasses         Past Surgical History:   Procedure Laterality Date   • BLADDER SURGERY      Cuff   • CHOLECYSTECTOMY     • COLONOSCOPY W/ POLYPECTOMY     • HERNIA REPAIR      umbilical    • HYSTERECTOMY     • TIBIAL PLATEAU OPEN REDUCTION INTERNAL FIXATION Left 2016    Procedure: LEFT TIBIAL PLATEAU OPEN REDUCTION INTERNAL FIXATION;  Surgeon: Constantine Durbin MD;  Location: [facility];  Service:        Visit Dx:     ICD-10-CM ICD-9-CM   1. Impaired functional mobility, balance, gait, and endurance Z74.09 V49.89   2. Pain and swelling of left knee M25.562 719.46    M25.462 719.06   3. S/P ORIF (open reduction internal fixation) fracture  left tibial plateau  Z96.7 V45.89    Z87.81 V15.51                 PT Ortho       17 1030    Subjective Comments    Subjective Comments Has been using rollator at work.  Walking room to room at house w/o A.D. at times.  Did not have opportunity to  walk outide her home over the weekend to increase gait distance  -LF    Subjective Pain    Able to rate subjective pain? yes  -LF    Pre-Treatment Pain Level 5  -LF    Post-Treatment Pain Level 5  -LF    Left Knee    Extension/Flexion ROM Details 86 AAROM flex  -LF    Left Ankle    Dorsiflexion AROM Deficit to neutral  -LF    Dorsiflexion PROM Deficit 5  -LF    Left Hip    Hip Flexion Gross Movement (4+/5) good plus  -LF    Hip Extension Gross Movement (3+/5) fair plus  -LF    Hip ABduction Gross Movement (3+/5) fair plus  -LF    Right Hip    Hip Flexion Gross Movement (4+/5) good plus  -LF    Hip Extension Gross Movement (3+/5) fair plus  -LF    Hip ABduction Gross Movement (5/5) normal  -LF    Left Knee    Knee Extension Gross Movement (4/5) good  -LF    Knee Flexion Gross Movement (4/5) good  -LF    LLE Quick Girth (cm)    Smallest ankle 26 cm  -LF    Largest calf 38 cm  -LF    Transfers    Transfers, Sit-Stand [place] independent  -LF    Transfers, Stand-Sit [place] independent  -LF    Transfer, Maintain Weight Bearing Status --   increased weight shift to R  -LF    Transfer, Safety Issues weight-shifting ability decreased  -LF    Transfer, Impairments ROM decreased;strength decreased;pain  -LF    Gait Assessment/Treatment    Gait, [place] Level independent  -LF    Gait, Assistive Device quad cane  -LF    Gait, Distance (Feet) 110  -LF    Gait, Gait Deviations left:;decreased heel strike;antalgic;right:;step length decreased;weight-shifting ability decreased;toe-to-floor clearance decreased  -    Gait, Comment ambulated 20' w/o A.D.; community distance with rollator  -    Stairs Assessment/Treatment    Number of Stairs 1 flight  -LF    Stairs, [place] Level independent  -LF    Stairs, Assistive Device straight cane  -LF    Stairs, Technique Used step to step (ascending);step to step (descending)   attempted leading with L LE 1 step and req'd Terrance  -    Stairs, Safety Issues  weight-shifting ability decreased  -LF    Stairs, Impairments pain;strength decreased;ROM decreased  -LF      User Key  (r) = Recorded By, (t) = Taken By, (c) = Cosigned By    Initials Name Provider Type    DIOGENES Shields, PT Physical Therapist                  Therapy Education       06/06/17 1030          Therapy Education    Given HEP;Symptoms/condition management   walking at home to increased endurance  -LF      Program Reinforced  -LF      How Provided Verbal  -LF      Provided to Patient  -LF      Level of Understanding Verbalized;Demonstrated  -LF        User Key  (r) = Recorded By, (t) = Taken By, (c) = Cosigned By    Initials Name Provider Type    LF Anastacia Shields, PT Physical Therapist                PT OP Goals       06/06/17 1030       PT Short Term Goals    STG Date to Achieve 05/04/17  -LF     STG 1 Pt independent with initial HEP for ROM, strengthening and standing balance.   -LF     STG 1 Progress Partially Met;Ongoing  -LF     STG 2 Pt to demonstrate increased LT knee AROM to 0-90 deg for improved transfers and function.   -LF     STG 2 Progress Ongoing  -LF     STG 3 Pt to demonstrate improved LT ankle AROM to > 5 deg DF for improved gait training / heel rise.   -LF     STG 3 Progress Ongoing;Progressing  -LF     STG 3 Progress Comments met for PROM  -LF     STG 4 Pt to ambulate > 350 ft with least restrictive A.D. safely (WB per MD order).   -LF     STG 4 Progress Met  -LF     STG 5 Pt able to ascend / descend 6 steps independently with hand rail and A.D.   -LF     STG 5 Progress Met  -LF     Long Term Goals    LTG Date to Achieve 06/14/17  -LF     LTG 1 Pt to demonstrate increased LT knee AROM to 0-110 deg for improved transfers and function.   -LF     LTG 1 Progress Ongoing  -LF     LTG 2 Pt to ambulate household  and community distances with least restrictive A.D., WBAT.   -LF     LTG 2 Progress Met  -LF     LTG 3 Pt able to ascend / descend 1 flight of stairs independently with hand  david and A.D.   -LF     LTG 3 Progress Partially Met  -LF     LTG 3 Progress Comments step-to pattern  -LF     LTG 4 Pt to report increase in LEFS score by 50% to demonstrate improved functional mobility and QOL.   -LF     LTG 4 Progress Ongoing  -LF     LTG 5 Pt able to return home / live independently.   -LF     LTG 5 Progress Ongoing  -LF     LTG 5 Progress Comments concerned for being able to get in/out of her tub, manage stairs, and getting to bus stop  -LF       User Key  (r) = Recorded By, (t) = Taken By, (c) = Cosigned By    Initials Name Provider Type    LF Anastacia Shields, PT Physical Therapist                PT Assessment/Plan       06/06/17 1030       PT Assessment    Functional Limitations Impaired gait;Limitation in home management;Performance in leisure activities;Performance in self-care ADL;Performance in work activities  -LF     Impairments Balance;Edema;Gait;Endurance;Impaired flexibility;Muscle strength;Pain;Joint mobility  -LF     Assessment Comments Slow, steady progress towards goals.  Decreased dependence on w/c  in the last week now that she is using rollator at work, and to walk to P.T. clinic from her office on campus.  She is independent short distances with straight cane, about 20' w/o A.D.,  and indep. ascending/descending stairs.  AAROM progressed to 86 degrees, compared to 80 degrees last month.  Would benefit from Dynasplint to assist with progression of knee flex, but awaiting insurance approval.  Continues to have weakness at hips, but anticipate this should improve now that she has decreased use of w/c and is up walking more.  Continue to reinforce importance of HEP for progressing strength and ROM, along with increasing gait distance and endurance.    -LF     Please refer to paper survey for additional self-reported information Yes  -LF     Rehab Potential Fair  -LF     Patient/caregiver participated in establishment of treatment plan and goals Yes  -LF     Patient would benefit  from skilled therapy intervention Yes  -LF     PT Plan    PT Frequency 2x/week  -LF     Planned CPT's? PT THER PROC EA 15 MIN: 68161;PT MANUAL THERAPY EA 15 MIN: 55691;PT NEUROMUSC RE-EDUCATION EA 15 MIN: 54730;PT GAIT TRAINING EA 15 MIN: 67750;PT HOT/COLD PACK WC NONMCARE: 99629  -LF     PT Plan Comments cont. 2x/week to progress strength, ROM, gait  -LF       User Key  (r) = Recorded By, (t) = Taken By, (c) = Cosigned By    Initials Name Provider Type    LF Anastacia Shields, AARTI Physical Therapist                  Exercises       06/06/17 1030          Subjective Comments    Subjective Comments Has been using rollator at work.  Walking room to room at house w/o A.D. at times.  Did not have opportunity to walk outide her home over the weekend to increase gait distance  -LF      Subjective Pain    Able to rate subjective pain? yes  -LF      Pre-Treatment Pain Level 5  -LF      Post-Treatment Pain Level 5  -LF      Exercise 1    Exercise Name 1 ther ex per f/s.  updated objetive measures for POC  -LF      Time (Minutes) 1 50  -LF      Treatment Type 1 Ther Ex  -LF        User Key  (r) = Recorded By, (t) = Taken By, (c) = Cosigned By    Initials Name Provider Type    LF Anastacia Shields, AARTI Physical Therapist                      Outcome Measures       06/06/17 1030          2 Minute Walk Test    Gait, Assistive Device straight cane  -LF      Distance Ambulated in 2 Minutes 110  -LF      Lower Extremity Functional Index    Any of your usual work, housework or school activities 1  -LF      Your usual hobbies, recreational or sporting activities 1  -LF      Getting into or out of the bath 1  -LF      Walking between rooms 1  -LF      Putting on your shoes or socks 2  -LF      Squatting 1  -LF      Lifting an object, like a bag of groceries from the floor 1  -LF      Performing light activities around your home 1  -LF      Performing heavy activities around your home 1  -LF      Getting into or out of a car 2  -LF       Walking 2 blocks 1  -LF      Walking a mile 1  -LF      Going up or down 10 stairs (about 1 flight of stairs) 1  -LF      Standing for 1 hour 1  -LF      Sitting for 1 hour 1  -LF      Running on even ground 1  -LF      Running on uneven ground 0  -LF      Making sharp turns while running fast 0  -LF      Hopping 0  -LF      Rolling over in bed 1  -LF      Total 19  -LF      Timed Up and Go (TUG)    TUG Test 1 21 seconds  -LF      TUG Test 2 26.5 seconds   w/o A.D.  -LF      Timed Up and Go Comments 19.3 w/ RWx  -LF      Functional Assessment    Outcome Measure Options Lower Extremity Functional Scale (LEFS);2 Minute Walk Test;Timed Up and Go (TUG)  -LF        User Key  (r) = Recorded By, (t) = Taken By, (c) = Cosigned By    Initials Name Provider Type    LF Anastacia Shields, PT Physical Therapist            Time Calculation:   Start Time: 1030  Total Timed Code Minutes- PT: 50 minute(s)     Therapy Charges for Today     Code Description Service Date Service Provider Modifiers Qty    46124012433  PT THER PROC EA 15 MIN 6/6/2017 Anastacia Shields, PT GP 3          PT G-Codes  Outcome Measure Options: Lower Extremity Functional Scale (LEFS), 2 Minute Walk Test, Timed Up and Go (TUG)         Anastacia Shields, PT  6/6/2017

## 2017-06-08 ENCOUNTER — HOSPITAL ENCOUNTER (OUTPATIENT)
Dept: PHYSICAL THERAPY | Facility: HOSPITAL | Age: 62
Setting detail: THERAPIES SERIES
Discharge: HOME | End: 2017-06-08

## 2017-06-08 DIAGNOSIS — Z74.09 IMPAIRED FUNCTIONAL MOBILITY, BALANCE, GAIT, AND ENDURANCE: Primary | ICD-10-CM

## 2017-06-08 DIAGNOSIS — M25.462 PAIN AND SWELLING OF LEFT KNEE: ICD-10-CM

## 2017-06-08 DIAGNOSIS — Z87.81 S/P ORIF (OPEN REDUCTION INTERNAL FIXATION) FRACTURE: ICD-10-CM

## 2017-06-08 DIAGNOSIS — Z98.890 S/P ORIF (OPEN REDUCTION INTERNAL FIXATION) FRACTURE: ICD-10-CM

## 2017-06-08 DIAGNOSIS — M25.562 PAIN AND SWELLING OF LEFT KNEE: ICD-10-CM

## 2017-06-08 PROCEDURE — 97110 THERAPEUTIC EXERCISES: CPT

## 2017-06-08 PROCEDURE — 97140 MANUAL THERAPY 1/> REGIONS: CPT

## 2017-06-08 NOTE — THERAPY TREATMENT NOTE
Outpatient Physical Therapy Ortho Treatment Note   [place]     Patient Name: Rosalba Girard  : 1955  MRN: [id number]  Today's Date: 2017      Visit Date: 2017    Visit Dx:    ICD-10-CM ICD-9-CM   1. Impaired functional mobility, balance, gait, and endurance Z74.09 V49.89   2. Pain and swelling of left knee M25.562 719.46    M25.462 719.06   3. S/P ORIF (open reduction internal fixation) fracture  left tibial plateau  Z96.7 V45.89    Z87.81 V15.51       Patient Active Problem List   Diagnosis   • Essential hypertension, benign   • GERD (gastroesophageal reflux disease)   • IBS (irritable bowel syndrome)   • S/P ORIF (open reduction internal fixation) fracture  left tibial plateau    • Tibial plateau fracture, left   • Leukocytosis        Past Medical History:   Diagnosis Date   • Chronic superficial dermatitis    • Dietary counseling    • Encounter for routine adult health examination    • GERD (gastroesophageal reflux disease)    • Hair or hair follicle disorder    • History of anemia    • Hives    • Hypertension    • Irritable bowel disease    • Malaise and fatigue    • Myalgia and myositis    • PONV (postoperative nausea and vomiting)     on occasion    • Prediabetes    • Rash and nonspecific skin eruption    • Sinusitis, acute maxillary    • UTI (urinary tract infection)    • Wears glasses         Past Surgical History:   Procedure Laterality Date   • BLADDER SURGERY      Cuff   • CHOLECYSTECTOMY     • COLONOSCOPY W/ POLYPECTOMY     • HERNIA REPAIR      umbilical    • HYSTERECTOMY     • TIBIAL PLATEAU OPEN REDUCTION INTERNAL FIXATION Left 2016    Procedure: LEFT TIBIAL PLATEAU OPEN REDUCTION INTERNAL FIXATION;  Surgeon: Constantine Durbin MD;  Location[facility];  Service:              PT Ortho       17 1030    Subjective Comments    Subjective Comments Has been using rollator at work.  Walking room to room at house w/o A.D. at times.  Did not have opportunity to walk  outide her home over the weekend to increase gait distance  -LF    Subjective Pain    Able to rate subjective pain? yes  -LF    Pre-Treatment Pain Level 5  -LF    Post-Treatment Pain Level 5  -LF    Left Knee    Extension/Flexion ROM Details 86 AAROM flex  -LF    Left Ankle    Dorsiflexion AROM Deficit to neutral  -LF    Dorsiflexion PROM Deficit 5  -LF    Left Hip    Hip Flexion Gross Movement (4+/5) good plus  -LF    Hip Extension Gross Movement (3+/5) fair plus  -LF    Hip ABduction Gross Movement (3+/5) fair plus  -LF    Right Hip    Hip Flexion Gross Movement (4+/5) good plus  -LF    Hip Extension Gross Movement (3+/5) fair plus  -LF    Hip ABduction Gross Movement (5/5) normal  -LF    Left Knee    Knee Extension Gross Movement (4/5) good  -LF    Knee Flexion Gross Movement (4/5) good  -LF    LLE Quick Girth (cm)    Smallest ankle 26 cm  -LF    Largest calf 38 cm  -LF    Transfers    Transfers, Sit-Stand [place] independent  -LF    Transfers, Stand-Sit [place] independent  -LF    Transfer, Maintain Weight Bearing Status --   increased weight shift to R  -LF    Transfer, Safety Issues weight-shifting ability decreased  -LF    Transfer, Impairments ROM decreased;strength decreased;pain  -LF    Gait Assessment/Treatment    Gait, [place] Level independent  -LF    Gait, Assistive Device quad cane  -LF    Gait, Distance (Feet) 110  -LF    Gait, Gait Deviations left:;decreased heel strike;antalgic;right:;step length decreased;weight-shifting ability decreased;toe-to-floor clearance decreased  -    Gait, Comment ambulated 20' w/o A.D.; community distance with rollator  -    Stairs Assessment/Treatment    Number of Stairs 1 flight  -LF    Stairs, [place] Level independent  -LF    Stairs, Assistive Device straight cane  -LF    Stairs, Technique Used step to step (ascending);step to step (descending)   attempted leading with L LE 1 step and req'd Terrance  -    Stairs, Safety Issues weight-shifting  ability decreased  -LF    Stairs, Impairments pain;strength decreased;ROM decreased  -LF      User Key  (r) = Recorded By, (t) = Taken By, (c) = Cosigned By    Initials Name Provider Type    DIOGENES Shields PT Physical Therapist                            PT Assessment/Plan       06/08/17 0800       PT Assessment    Assessment Comments Improved ambulation and patient walking w/o A.D. in clinic today.  Using rollator for longer distance.  No progress with ROM today. Bridges difficult for patient due to weakness  -LF     PT Plan    PT Plan Comments cont. 2x/week to progress strength, ROM  -LF       User Key  (r) = Recorded By, (t) = Taken By, (c) = Cosigned By    Initials Name Provider Type     Anastacia Shields PT Physical Therapist                    Exercises       06/08/17 0800          Subjective Comments    Subjective Comments Getting easier to get in/out of tub  -LF      Subjective Pain    Able to rate subjective pain? yes  -LF      Pre-Treatment Pain Level 6  -LF      Post-Treatment Pain Level 6  -LF      Exercise 1    Exercise Name 1 ther ex per f/s.  Also standing gastroc stretch on incline.  Cues for improved heel-toe strike while on treadmill.    -LF      Time (Minutes) 1 50  -LF        User Key  (r) = Recorded By, (t) = Taken By, (c) = Cosigned By    Initials Name Provider Type     Anastacia Shields PT Physical Therapist                        Manual Rx (last 36 hours)      Manual Treatments       06/08/17 0800          Manual Rx 1    Manual Rx 1 Location ant. and post. tibial glides, patellar mobs, assisted stretch to increase flexion.  82-86 degrees flex with overpressure  -LF      Manual Rx 1 Duration 10  -LF        User Key  (r) = Recorded By, (t) = Taken By, (c) = Cosigned By    Initials Name Provider Type     Anastacia Shields PT Physical Therapist                        Outcome Measures       06/06/17 1030          2 Minute Walk Test    Gait, Assistive Device straight cane  -LF       Distance Ambulated in 2 Minutes 110  -LF      Lower Extremity Functional Index    Any of your usual work, housework or school activities 1  -LF      Your usual hobbies, recreational or sporting activities 1  -LF      Getting into or out of the bath 1  -LF      Walking between rooms 1  -LF      Putting on your shoes or socks 2  -LF      Squatting 1  -LF      Lifting an object, like a bag of groceries from the floor 1  -LF      Performing light activities around your home 1  -LF      Performing heavy activities around your home 1  -LF      Getting into or out of a car 2  -LF      Walking 2 blocks 1  -LF      Walking a mile 1  -LF      Going up or down 10 stairs (about 1 flight of stairs) 1  -LF      Standing for 1 hour 1  -LF      Sitting for 1 hour 1  -LF      Running on even ground 1  -LF      Running on uneven ground 0  -LF      Making sharp turns while running fast 0  -LF      Hopping 0  -LF      Rolling over in bed 1  -LF      Total 19  -LF      Timed Up and Go (TUG)    TUG Test 1 21 seconds  -LF      TUG Test 2 26.5 seconds   w/o A.D.  -LF      Timed Up and Go Comments 19.3 w/ RWx  -LF      Functional Assessment    Outcome Measure Options Lower Extremity Functional Scale (LEFS);2 Minute Walk Test;Timed Up and Go (TUG)  -LF        User Key  (r) = Recorded By, (t) = Taken By, (c) = Cosigned By    Initials Name Provider Type    LF Anastacia Shields, PT Physical Therapist            Time Calculation:   Start Time: 0800  Total Timed Code Minutes- PT: 60 minute(s)    Therapy Charges for Today     Code Description Service Date Service Provider Modifiers Qty    88467624555 HC PT MANUAL THERAPY EA 15 MIN 6/8/2017 Anastacia Shields, PT GP 1    69742252780 HC PT THER PROC EA 15 MIN 6/8/2017 Anastacia Shields, PT GP 3                    Anastacia Shields PT  6/8/2017

## 2017-06-13 ENCOUNTER — HOSPITAL ENCOUNTER (OUTPATIENT)
Dept: PHYSICAL THERAPY | Facility: HOSPITAL | Age: 62
Setting detail: THERAPIES SERIES
Discharge: HOME | End: 2017-06-13

## 2017-06-13 DIAGNOSIS — Z74.09 IMPAIRED FUNCTIONAL MOBILITY, BALANCE, GAIT, AND ENDURANCE: Primary | ICD-10-CM

## 2017-06-13 DIAGNOSIS — M25.462 PAIN AND SWELLING OF LEFT KNEE: ICD-10-CM

## 2017-06-13 DIAGNOSIS — M25.562 PAIN AND SWELLING OF LEFT KNEE: ICD-10-CM

## 2017-06-13 DIAGNOSIS — Z87.81 S/P ORIF (OPEN REDUCTION INTERNAL FIXATION) FRACTURE: ICD-10-CM

## 2017-06-13 DIAGNOSIS — Z98.890 S/P ORIF (OPEN REDUCTION INTERNAL FIXATION) FRACTURE: ICD-10-CM

## 2017-06-13 PROCEDURE — 97110 THERAPEUTIC EXERCISES: CPT

## 2017-06-13 NOTE — THERAPY TREATMENT NOTE
Outpatient Physical Therapy Ortho Treatment Note   [place]     Patient Name: Rosalba Girard  : 1955  MRN: [id number]  Today's Date: 2017      Visit Date: 2017    Visit Dx:    ICD-10-CM ICD-9-CM   1. Impaired functional mobility, balance, gait, and endurance Z74.09 V49.89   2. Pain and swelling of left knee M25.562 719.46    M25.462 719.06   3. S/P ORIF (open reduction internal fixation) fracture  left tibial plateau  Z96.7 V45.89    Z87.81 V15.51       Patient Active Problem List   Diagnosis   • Essential hypertension, benign   • GERD (gastroesophageal reflux disease)   • IBS (irritable bowel syndrome)   • S/P ORIF (open reduction internal fixation) fracture  left tibial plateau    • Tibial plateau fracture, left   • Leukocytosis        Past Medical History:   Diagnosis Date   • Chronic superficial dermatitis    • Dietary counseling    • Encounter for routine adult health examination    • GERD (gastroesophageal reflux disease)    • Hair or hair follicle disorder    • History of anemia    • Hives    • Hypertension    • Irritable bowel disease    • Malaise and fatigue    • Myalgia and myositis    • PONV (postoperative nausea and vomiting)     on occasion    • Prediabetes    • Rash and nonspecific skin eruption    • Sinusitis, acute maxillary    • UTI (urinary tract infection)    • Wears glasses         Past Surgical History:   Procedure Laterality Date   • BLADDER SURGERY      Cuff   • CHOLECYSTECTOMY     • COLONOSCOPY W/ POLYPECTOMY     • HERNIA REPAIR      umbilical    • HYSTERECTOMY     • TIBIAL PLATEAU OPEN REDUCTION INTERNAL FIXATION Left 2016    Procedure: LEFT TIBIAL PLATEAU OPEN REDUCTION INTERNAL FIXATION;  Surgeon: Constantine Durbin MD;  Location: [facility];  Service:                  PT Assessment/Plan       17 1030       PT Assessment    Assessment Comments ther ex today focus on hip weakness.  Improved gait with cues.  Patient set-up with Dynasplint following  "her appointment today  -LF     PT Plan    PT Plan Comments cont. 2x/week  -LF       User Key  (r) = Recorded By, (t) = Taken By, (c) = Cosigned By    Initials Name Provider Type    LF Anastacia Shields, PT Physical Therapist                    Exercises       06/13/17 1030          Subjective Comments    Subjective Comments Using walker and cane more at work  -LF      Subjective Pain    Able to rate subjective pain? yes  -LF      Pre-Treatment Pain Level 6  -LF      Post-Treatment Pain Level 6  -LF      Exercise 1    Exercise Name 1 continued with ther ex in clinical setting per f/s.  Minimal clearance with half bridge L due to weakness.  Also included step-up 6\" step fwd; and side with hip abd  -LF      Time (Minutes) 1 38  -LF      Treatment Type 1 Ther Ex  -LF      Exercise 2    Exercise Name 2 Gait training with cues for heel strike/toe off, and decreased step width  -LF      Time (Minutes) 2 3  -LF        User Key  (r) = Recorded By, (t) = Taken By, (c) = Cosigned By    Initials Name Provider Type     Anastacia Shields, PT Physical Therapist            Time Calculation:   Start Time: 1030  Total Timed Code Minutes- PT: 41 minute(s)    Therapy Charges for Today     Code Description Service Date Service Provider Modifiers Qty    02824387694  PT THER PROC EA 15 MIN 6/13/2017 Anastacia Shields, PT GP 3                    Anastacia Shields, PT  6/13/2017     "

## 2017-06-16 ENCOUNTER — HOSPITAL ENCOUNTER (OUTPATIENT)
Dept: PHYSICAL THERAPY | Facility: HOSPITAL | Age: 62
Setting detail: THERAPIES SERIES
Discharge: HOME | End: 2017-06-16

## 2017-06-16 DIAGNOSIS — Z98.890 S/P ORIF (OPEN REDUCTION INTERNAL FIXATION) FRACTURE: ICD-10-CM

## 2017-06-16 DIAGNOSIS — M25.562 PAIN AND SWELLING OF LEFT KNEE: ICD-10-CM

## 2017-06-16 DIAGNOSIS — Z87.81 S/P ORIF (OPEN REDUCTION INTERNAL FIXATION) FRACTURE: ICD-10-CM

## 2017-06-16 DIAGNOSIS — M25.462 PAIN AND SWELLING OF LEFT KNEE: ICD-10-CM

## 2017-06-16 DIAGNOSIS — Z74.09 IMPAIRED FUNCTIONAL MOBILITY, BALANCE, GAIT, AND ENDURANCE: Primary | ICD-10-CM

## 2017-06-16 PROCEDURE — 97110 THERAPEUTIC EXERCISES: CPT

## 2017-06-16 NOTE — THERAPY TREATMENT NOTE
Outpatient Physical Therapy Ortho Treatment Note   [place]     Patient Name: Rosalba Girard  : 1955  MRN: [id number]  Today's Date: 2017      Visit Date: 2017    Visit Dx:    ICD-10-CM ICD-9-CM   1. Impaired functional mobility, balance, gait, and endurance Z74.09 V49.89   2. Pain and swelling of left knee M25.562 719.46    M25.462 719.06   3. S/P ORIF (open reduction internal fixation) fracture  left tibial plateau  Z96.7 V45.89    Z87.81 V15.51       Patient Active Problem List   Diagnosis   • Essential hypertension, benign   • GERD (gastroesophageal reflux disease)   • IBS (irritable bowel syndrome)   • S/P ORIF (open reduction internal fixation) fracture  left tibial plateau    • Tibial plateau fracture, left   • Leukocytosis        Past Medical History:   Diagnosis Date   • Chronic superficial dermatitis    • Dietary counseling    • Encounter for routine adult health examination    • GERD (gastroesophageal reflux disease)    • Hair or hair follicle disorder    • History of anemia    • Hives    • Hypertension    • Irritable bowel disease    • Malaise and fatigue    • Myalgia and myositis    • PONV (postoperative nausea and vomiting)     on occasion    • Prediabetes    • Rash and nonspecific skin eruption    • Sinusitis, acute maxillary    • UTI (urinary tract infection)    • Wears glasses         Past Surgical History:   Procedure Laterality Date   • BLADDER SURGERY      Cuff   • CHOLECYSTECTOMY     • COLONOSCOPY W/ POLYPECTOMY     • HERNIA REPAIR      umbilical    • HYSTERECTOMY     • TIBIAL PLATEAU OPEN REDUCTION INTERNAL FIXATION Left 2016    Procedure: LEFT TIBIAL PLATEAU OPEN REDUCTION INTERNAL FIXATION;  Surgeon: Constantine Durbin MD;  Location: [facility];  Service:              PT Assessment/Plan       17 1120       PT Assessment    Assessment Comments Tends to have left lateral trunk shift due to weakness, improved with cues.  No significant change in knee  ROM, anticipate further improvement with increased use of dynasplint  -LF     PT Plan    PT Plan Comments cont. 2x/week  -LF       User Key  (r) = Recorded By, (t) = Taken By, (c) = Cosigned By    Initials Name Provider Type     Aanstacia Shields, PT Physical Therapist                  Exercises       06/16/17 1120          Subjective Comments    Subjective Comments Reports compliance with wearing dynasplint 2hrs/day  -LF      Subjective Pain    Able to rate subjective pain? yes  -LF      Pre-Treatment Pain Level 6  -LF      Post-Treatment Pain Level 6  -LF      Exercise 1    Exercise Name 1 Ther ex per f/s.  Sidelying hip abd with back and LE against wall  as cue for positioning.  Also did staggered stance with trunk rotation 4lb ball.  Less motion with R foot in front.    -LF      Time (Minutes) 1 40  -LF      Treatment Type 1 Ther Ex  -LF        User Key  (r) = Recorded By, (t) = Taken By, (c) = Cosigned By    Initials Name Provider Type     Anastacia Shields, PT Physical Therapist                 Manual Rx (last 36 hours)      Manual Treatments       06/16/17 1120          Manual Rx 1    Manual Rx 1 Location ant. and post. tibial glides, patellar mobs, assisted stretch to increase flexion.  86 degrees flex  -LF      Manual Rx 1 Duration 6  -LF        User Key  (r) = Recorded By, (t) = Taken By, (c) = Cosigned By    Initials Name Provider Type     Anastacia Shields PT Physical Therapist          Time Calculation:   Start Time: 1120  Total Timed Code Minutes- PT: 46 minute(s)    Therapy Charges for Today     Code Description Service Date Service Provider Modifiers Qty    75915558126  PT THER PROC EA 15 MIN 6/16/2017 Anastacia Shields, PT GP 3                    Anastacia Shields, PT  6/16/2017

## 2017-06-19 ENCOUNTER — OFFICE VISIT (OUTPATIENT)
Dept: FAMILY MEDICINE CLINIC | Facility: CLINIC | Age: 62
End: 2017-06-19

## 2017-06-19 VITALS
BODY MASS INDEX: 31.02 KG/M2 | HEART RATE: 78 BPM | TEMPERATURE: 97 F | WEIGHT: 193 LBS | HEIGHT: 66 IN | OXYGEN SATURATION: 98 % | DIASTOLIC BLOOD PRESSURE: 80 MMHG | SYSTOLIC BLOOD PRESSURE: 102 MMHG

## 2017-06-19 DIAGNOSIS — Z87.81 S/P ORIF (OPEN REDUCTION INTERNAL FIXATION) FRACTURE: ICD-10-CM

## 2017-06-19 DIAGNOSIS — L65.9 HAIR LOSS: ICD-10-CM

## 2017-06-19 DIAGNOSIS — R45.89 DEPRESSED MOOD: ICD-10-CM

## 2017-06-19 DIAGNOSIS — Z98.890 S/P ORIF (OPEN REDUCTION INTERNAL FIXATION) FRACTURE: ICD-10-CM

## 2017-06-19 DIAGNOSIS — I10 ESSENTIAL HYPERTENSION: Primary | ICD-10-CM

## 2017-06-19 PROCEDURE — 99214 OFFICE O/P EST MOD 30 MIN: CPT | Performed by: FAMILY MEDICINE

## 2017-06-19 RX ORDER — HYDROCODONE BITARTRATE AND ACETAMINOPHEN 7.5; 325 MG/1; MG/1
1 TABLET ORAL EVERY 8 HOURS PRN
Qty: 60 TABLET | Refills: 0 | Status: SHIPPED | OUTPATIENT
Start: 2017-06-19 | End: 2017-09-28

## 2017-06-19 RX ORDER — DULOXETIN HYDROCHLORIDE 30 MG/1
30 CAPSULE, DELAYED RELEASE ORAL DAILY
Qty: 90 CAPSULE | Refills: 1 | Status: SHIPPED | OUTPATIENT
Start: 2017-06-19 | End: 2020-07-29

## 2017-06-19 RX ORDER — BISOPROLOL FUMARATE AND HYDROCHLOROTHIAZIDE 2.5; 6.25 MG/1; MG/1
1 TABLET ORAL DAILY
Qty: 90 TABLET | Refills: 3 | Status: SHIPPED | OUTPATIENT
Start: 2017-06-19 | End: 2018-02-01

## 2017-06-19 NOTE — PROGRESS NOTES
Subjective   Rosalba Girard is a 61 y.o. female    HPI Comments: Patient presents today for recheck regarding her hypertension.  Since I last saw her she suffered a significant injury to her left lower extremity, a tibial plateau fracture which required open reduction and internal fixation.  She continues to have discomfort and decreased range of motion despite extensive physical therapy.  She reports that the stress of all of this has caused her hair to fall out and caused her to feel quite depressed as she is worried she will never be back to normal.  Extended discussion today with the patient regarding reactive depression and treatment options.    Hypertension   Pertinent negatives include no chest pain, headaches, palpitations or shortness of breath.   Leg Pain      Alopecia   Associated symptoms include arthralgias, joint swelling and myalgias. Pertinent negatives include no abdominal pain, chest pain, coughing, fatigue, headaches, nausea, rash or weakness.   Depression Patient presents with the following symptoms: nervousness/anxiety.  Patient is not experiencing: confusion, decreased concentration, palpitations, shortness of breath and suicidal ideas.        The following portions of the patient's history were reviewed and updated as appropriate: allergies, current medications, past social history and problem list    Review of Systems   Constitutional: Negative for appetite change, fatigue and unexpected weight change.   HENT: Negative.    Respiratory: Negative for cough, chest tightness and shortness of breath.    Cardiovascular: Negative for chest pain, palpitations and leg swelling.   Gastrointestinal: Negative for abdominal pain, diarrhea and nausea.   Genitourinary: Negative.    Musculoskeletal: Positive for arthralgias, gait problem, joint swelling and myalgias.   Skin: Negative for color change and rash.   Neurological: Negative for dizziness, tremors, syncope, weakness, light-headedness and  headaches.   Hematological: Negative for adenopathy. Does not bruise/bleed easily.   Psychiatric/Behavioral: Positive for dysphoric mood and sleep disturbance. Negative for agitation, behavioral problems, confusion, decreased concentration and suicidal ideas. The patient is nervous/anxious.        Objective     Vitals:    06/19/17 0849   BP: 102/80   Pulse: 78   Temp: 97 °F (36.1 °C)   SpO2: 98%       Physical Exam   Constitutional: She is oriented to person, place, and time. She appears well-developed and well-nourished.   HENT:   Head: Normocephalic.   Mouth/Throat: Oropharynx is clear and moist.   Eyes: Conjunctivae are normal. Pupils are equal, round, and reactive to light.   Neck: Neck supple. No JVD present. No thyromegaly present.   Cardiovascular: Normal rate, regular rhythm, normal heart sounds, intact distal pulses and normal pulses.    No murmur heard.  Pulmonary/Chest: Effort normal and breath sounds normal. No respiratory distress.   Abdominal: Soft. Bowel sounds are normal. There is no hepatosplenomegaly. There is no tenderness.   Musculoskeletal: She exhibits no edema.        Left knee: She exhibits decreased range of motion, swelling and deformity. She exhibits no effusion. Tenderness found.   Lymphadenopathy:     She has no cervical adenopathy.   Neurological: She is alert and oriented to person, place, and time.   Skin: Skin is warm and dry. No rash noted.   Psychiatric: She has a normal mood and affect. Her behavior is normal.   Nursing note and vitals reviewed.      Assessment/Plan   Problem List Items Addressed This Visit        Other    S/P ORIF (open reduction internal fixation) fracture  left tibial plateau     Relevant Medications    HYDROcodone-acetaminophen (NORCO) 7.5-325 MG per tablet      Other Visit Diagnoses     Essential hypertension    -  Primary    Relevant Medications    bisoprolol-hydrochlorothiazide (ZIAC) 2.5-6.25 MG per tablet    Depressed mood        Relevant Medications     DULoxetine (CYMBALTA) 30 MG capsule    Hair loss

## 2017-06-20 ENCOUNTER — HOSPITAL ENCOUNTER (OUTPATIENT)
Dept: PHYSICAL THERAPY | Facility: HOSPITAL | Age: 62
Setting detail: THERAPIES SERIES
Discharge: HOME | End: 2017-06-20

## 2017-06-20 DIAGNOSIS — Z74.09 IMPAIRED FUNCTIONAL MOBILITY, BALANCE, GAIT, AND ENDURANCE: Primary | ICD-10-CM

## 2017-06-20 DIAGNOSIS — M25.462 PAIN AND SWELLING OF LEFT KNEE: ICD-10-CM

## 2017-06-20 DIAGNOSIS — Z87.81 S/P ORIF (OPEN REDUCTION INTERNAL FIXATION) FRACTURE: ICD-10-CM

## 2017-06-20 DIAGNOSIS — Z98.890 S/P ORIF (OPEN REDUCTION INTERNAL FIXATION) FRACTURE: ICD-10-CM

## 2017-06-20 DIAGNOSIS — M25.562 PAIN AND SWELLING OF LEFT KNEE: ICD-10-CM

## 2017-06-20 PROCEDURE — 97110 THERAPEUTIC EXERCISES: CPT

## 2017-06-20 NOTE — THERAPY TREATMENT NOTE
Outpatient Physical Therapy Ortho Treatment Note   [place]     Patient Name: Rosalba Girard  : 1955  MRN: [id number]  Today's Date: 2017      Visit Date: 2017    Visit Dx:    ICD-10-CM ICD-9-CM   1. Impaired functional mobility, balance, gait, and endurance Z74.09 V49.89   2. Pain and swelling of left knee M25.562 719.46    M25.462 719.06   3. S/P ORIF (open reduction internal fixation) fracture  left tibial plateau  Z96.7 V45.89    Z87.81 V15.51       Patient Active Problem List   Diagnosis   • Essential hypertension, benign   • GERD (gastroesophageal reflux disease)   • IBS (irritable bowel syndrome)   • S/P ORIF (open reduction internal fixation) fracture  left tibial plateau    • Tibial plateau fracture, left   • Leukocytosis        Past Medical History:   Diagnosis Date   • Chronic superficial dermatitis    • Dietary counseling    • Encounter for routine adult health examination    • GERD (gastroesophageal reflux disease)    • Hair or hair follicle disorder    • History of anemia    • Hives    • Hypertension    • Irritable bowel disease    • Malaise and fatigue    • Myalgia and myositis    • PONV (postoperative nausea and vomiting)     on occasion    • Prediabetes    • Rash and nonspecific skin eruption    • Sinusitis, acute maxillary    • UTI (urinary tract infection)    • Wears glasses         Past Surgical History:   Procedure Laterality Date   • BLADDER SURGERY      Cuff   • CHOLECYSTECTOMY     • COLONOSCOPY W/ POLYPECTOMY     • HERNIA REPAIR      umbilical    • HYSTERECTOMY     • TIBIAL PLATEAU OPEN REDUCTION INTERNAL FIXATION Left 2016    Procedure: LEFT TIBIAL PLATEAU OPEN REDUCTION INTERNAL FIXATION;  Surgeon: Constantine Durbin MD;  Location: [facility];  Service:              PT Ortho       17 1030    Left Knee    Extension/Flexion ROM Details 80 AROM; 87 AAROM  -LF      User Key  (r) = Recorded By, (t) = Taken By, (c) = Cosigned By    Initials Name  Provider Type    LF Anastacia Shields PT Physical Therapist                  PT Assessment/Plan       06/20/17 1030       PT Assessment    Assessment Comments No significant chge in ROM, but patient says it feels less tight.  Exercise incorporting weight-shift and trunk motion are challenging for patient.  Demonstrated improved heel-toe gait  -LF     PT Plan    PT Plan Comments cont 2x/week  -LF       User Key  (r) = Recorded By, (t) = Taken By, (c) = Cosigned By    Initials Name Provider Type    LF Anastacia Shields PT Physical Therapist                    Exercises       06/20/17 1030          Subjective Comments    Subjective Comments Reports walking more with SC  -LF      Subjective Pain    Able to rate subjective pain? yes  -LF      Pre-Treatment Pain Level 6  -LF      Post-Treatment Pain Level 4  -LF      Exercise 1    Exercise Name 1 Cont'd with ther ex per f/s with focus on hip and core stability, improved weight-shifting.  Also AAROM to increase flexion ROM.  Walked in clinic w/o A.D.  -LF      Time (Minutes) 1 40  -LF      Treatment Type 1 Ther Ex  -LF        User Key  (r) = Recorded By, (t) = Taken By, (c) = Cosigned By    Initials Name Provider Type     Anastacia Shields PT Physical Therapist            Time Calculation:   Start Time: 1025  Total Timed Code Minutes- PT: 40 minute(s)    Therapy Charges for Today     Code Description Service Date Service Provider Modifiers Qty    32866662877  PT THER PROC EA 15 MIN 6/20/2017 Anastacia Shields, PT GP 3                    Anastacia Shields, PT  6/20/2017

## 2017-06-22 ENCOUNTER — HOSPITAL ENCOUNTER (OUTPATIENT)
Dept: PHYSICAL THERAPY | Facility: HOSPITAL | Age: 62
Setting detail: THERAPIES SERIES
Discharge: HOME | End: 2017-06-22

## 2017-06-22 DIAGNOSIS — Z98.890 S/P ORIF (OPEN REDUCTION INTERNAL FIXATION) FRACTURE: ICD-10-CM

## 2017-06-22 DIAGNOSIS — M25.462 PAIN AND SWELLING OF LEFT KNEE: ICD-10-CM

## 2017-06-22 DIAGNOSIS — Z74.09 IMPAIRED FUNCTIONAL MOBILITY, BALANCE, GAIT, AND ENDURANCE: Primary | ICD-10-CM

## 2017-06-22 DIAGNOSIS — Z87.81 S/P ORIF (OPEN REDUCTION INTERNAL FIXATION) FRACTURE: ICD-10-CM

## 2017-06-22 DIAGNOSIS — M25.562 PAIN AND SWELLING OF LEFT KNEE: ICD-10-CM

## 2017-06-22 PROCEDURE — 97110 THERAPEUTIC EXERCISES: CPT

## 2017-06-22 PROCEDURE — 97140 MANUAL THERAPY 1/> REGIONS: CPT

## 2017-06-22 NOTE — THERAPY TREATMENT NOTE
Outpatient Physical Therapy Ortho Treatment Note   [place]     Patient Name: Rosalba Girard  : 1955  MRN: [id number]  Today's Date: 2017      Visit Date: 2017    Visit Dx:    ICD-10-CM ICD-9-CM   1. Impaired functional mobility, balance, gait, and endurance Z74.09 V49.89   2. Pain and swelling of left knee M25.562 719.46    M25.462 719.06   3. S/P ORIF (open reduction internal fixation) fracture  left tibial plateau  Z96.7 V45.89    Z87.81 V15.51       Patient Active Problem List   Diagnosis   • Essential hypertension, benign   • GERD (gastroesophageal reflux disease)   • IBS (irritable bowel syndrome)   • S/P ORIF (open reduction internal fixation) fracture  left tibial plateau    • Tibial plateau fracture, left   • Leukocytosis        Past Medical History:   Diagnosis Date   • Chronic superficial dermatitis    • Dietary counseling    • Encounter for routine adult health examination    • GERD (gastroesophageal reflux disease)    • Hair or hair follicle disorder    • History of anemia    • Hives    • Hypertension    • Irritable bowel disease    • Malaise and fatigue    • Myalgia and myositis    • PONV (postoperative nausea and vomiting)     on occasion    • Prediabetes    • Rash and nonspecific skin eruption    • Sinusitis, acute maxillary    • UTI (urinary tract infection)    • Wears glasses         Past Surgical History:   Procedure Laterality Date   • BLADDER SURGERY      Cuff   • CHOLECYSTECTOMY     • COLONOSCOPY W/ POLYPECTOMY     • HERNIA REPAIR      umbilical    • HYSTERECTOMY     • TIBIAL PLATEAU OPEN REDUCTION INTERNAL FIXATION Left 2016    Procedure: LEFT TIBIAL PLATEAU OPEN REDUCTION INTERNAL FIXATION;  Surgeon: Constantine Durbin MD;  Location: [facility];  Service:              PT Ortho       17 1030    Left Knee    Extension/Flexion ROM Details 80 AROM; 87 AAROM  -LF      User Key  (r) = Recorded By, (t) = Taken By, (c) = Cosigned By    Initials Name  Provider Type     Anastacia Shields PT Physical Therapist                  PT Assessment/Plan       06/22/17 0845       PT Assessment    Assessment Comments Patient is able  walk without A.D., mainly uses RWx outside of therapy.  Hip weakness and Ankle ROM restriction contribute to decreased normal gait pattern  -LF     PT Plan    PT Plan Comments cont. 2x/week  -LF       User Key  (r) = Recorded By, (t) = Taken By, (c) = Cosigned By    Initials Name Provider Type     Anastacia Shields, PT Physical Therapist                    Exercises       06/22/17 0845          Subjective Comments    Subjective Comments Reports gradual improvement in overall mobility  -LF      Subjective Pain    Able to rate subjective pain? yes  -LF      Pre-Treatment Pain Level 6  -LF      Post-Treatment Pain Level 4  -LF      Exercise 1    Exercise Name 1 Ther ex per f/s.  Included bridge with SB and 1/2 bridge with foot placed on stool on floor. AAROM to work on increased knee flexion.  Advised to increased wear time of stretching splint from 2 to 3 hrs.  -LF      Time (Minutes) 1 45  -LF      Treatment Type 1 Ther Ex  -LF        User Key  (r) = Recorded By, (t) = Taken By, (c) = Cosigned By    Initials Name Provider Type     Anastacia Shields PT Physical Therapist               Manual Rx (last 36 hours)      Manual Treatments       06/22/17 0845          Manual Rx 1    Manual Rx 1 Location STM including ASTYM tecnique to left quads and knee.  Increased soft-tissue texture throughout, especially distal quads  -LF      Manual Rx 1 Duration 8  -LF        User Key  (r) = Recorded By, (t) = Taken By, (c) = Cosigned By    Initials Name Provider Type     Anastacia Shields PT Physical Therapist          Time Calculation:   Start Time: 0845  Total Timed Code Minutes- PT: 53 minute(s)    Therapy Charges for Today     Code Description Service Date Service Provider Modifiers Qty    54483430491 HC PT MANUAL THERAPY EA 15 MIN 6/22/2017 Anastacia Wiley  Cornelius, PT GP 1    [id number]  PT THER PROC EA 15 MIN 6/22/2017 Anastacia Shields, PT GP 3                    Anastacia Shields, PT  6/22/2017

## 2017-06-27 ENCOUNTER — HOSPITAL ENCOUNTER (OUTPATIENT)
Dept: PHYSICAL THERAPY | Facility: HOSPITAL | Age: 62
Setting detail: THERAPIES SERIES
Discharge: HOME | End: 2017-06-27

## 2017-06-27 DIAGNOSIS — Z87.81 S/P ORIF (OPEN REDUCTION INTERNAL FIXATION) FRACTURE: ICD-10-CM

## 2017-06-27 DIAGNOSIS — Z98.890 S/P ORIF (OPEN REDUCTION INTERNAL FIXATION) FRACTURE: ICD-10-CM

## 2017-06-27 DIAGNOSIS — M25.462 PAIN AND SWELLING OF LEFT KNEE: ICD-10-CM

## 2017-06-27 DIAGNOSIS — Z74.09 IMPAIRED FUNCTIONAL MOBILITY, BALANCE, GAIT, AND ENDURANCE: Primary | ICD-10-CM

## 2017-06-27 DIAGNOSIS — M25.562 PAIN AND SWELLING OF LEFT KNEE: ICD-10-CM

## 2017-06-27 PROCEDURE — 97110 THERAPEUTIC EXERCISES: CPT

## 2017-06-27 NOTE — THERAPY TREATMENT NOTE
Outpatient Physical Therapy Ortho Treatment Note   [place]     Patient Name: Rosalba Girard  : 1955  MRN: [id number]  Today's Date: 2017      Visit Date: 2017    Visit Dx:    ICD-10-CM ICD-9-CM   1. Impaired functional mobility, balance, gait, and endurance Z74.09 V49.89   2. Pain and swelling of left knee M25.562 719.46    M25.462 719.06   3. S/P ORIF (open reduction internal fixation) fracture  left tibial plateau  Z96.7 V45.89    Z87.81 V15.51       Patient Active Problem List   Diagnosis   • Essential hypertension, benign   • GERD (gastroesophageal reflux disease)   • IBS (irritable bowel syndrome)   • S/P ORIF (open reduction internal fixation) fracture  left tibial plateau    • Tibial plateau fracture, left   • Leukocytosis        Past Medical History:   Diagnosis Date   • Chronic superficial dermatitis    • Dietary counseling    • Encounter for routine adult health examination    • GERD (gastroesophageal reflux disease)    • Hair or hair follicle disorder    • History of anemia    • Hives    • Hypertension    • Irritable bowel disease    • Malaise and fatigue    • Myalgia and myositis    • PONV (postoperative nausea and vomiting)     on occasion    • Prediabetes    • Rash and nonspecific skin eruption    • Sinusitis, acute maxillary    • UTI (urinary tract infection)    • Wears glasses         Past Surgical History:   Procedure Laterality Date   • BLADDER SURGERY      Cuff   • CHOLECYSTECTOMY     • COLONOSCOPY W/ POLYPECTOMY     • HERNIA REPAIR      umbilical    • HYSTERECTOMY     • TIBIAL PLATEAU OPEN REDUCTION INTERNAL FIXATION Left 2016    Procedure: LEFT TIBIAL PLATEAU OPEN REDUCTION INTERNAL FIXATION;  Surgeon: Constantine Durbin MD;  Location: [facility];  Service:              PT Ortho       17 1030    Left Knee    Extension/Flexion ROM Details 84 AROM, 88 AAROM  -LF      17 1000    LLE Quick Girth (cm)    Smallest ankle 26 cm  -LF    Largest calf 37.1  cm   (R 40.2)  -LF    Tib tuberosity 37 cm  -LF      User Key  (r) = Recorded By, (t) = Taken By, (c) = Cosigned By    Initials Name Provider Type     Anastacia Shields PT Physical Therapist                  PT Assessment/Plan       06/27/17 1030       PT Assessment    Assessment Comments slight improvement in ROM today, patient to bring in splint next visit to see if tension needs to be adjusted  -     PT Plan    PT Plan Comments cont. 2x/week  -LF       User Key  (r) = Recorded By, (t) = Taken By, (c) = Cosigned By    Initials Name Provider Type     Anastacia Shields PT Physical Therapist                    Exercises       06/27/17 1030          Subjective Comments    Subjective Comments Reports wearing dynaspint for 3 hrs a day.  Leg feels less stiff  -      Subjective Pain    Able to rate subjective pain? yes  -LF      Pre-Treatment Pain Level 4  -LF      Post-Treatment Pain Level 4  -LF      Exercise 1    Exercise Name 1 ther  per f/s with review of HEP. warm-up on Nu-step x10 min and this time not included  -LF      Time (Minutes) 1 40  -LF      Treatment Type 1 Ther Ex  -LF        User Key  (r) = Recorded By, (t) = Taken By, (c) = Cosigned By    Initials Name Provider Type     Anastacia Shields PT Physical Therapist                Manual Rx (last 36 hours)      Manual Treatments       06/27/17 1030          Manual Rx 1    Manual Rx 1 Location [employer] to help increase knee flex ROM including tibial and patella glides, contract relax, PROm w/ overpressure  -LF      Manual Rx 1 Duration 6  -LF        User Key  (r) = Recorded By, (t) = Taken By, (c) = Cosigned By    Initials Name Provider Type     Anastacia Shields PT Physical Therapist          Time Calculation:   Start Time: 1030  Total Timed Code Minutes- PT: 46 minute(s)    Therapy Charges for Today     Code Description Service Date Service Provider Modifiers Qty    19174699807 HC PT THER PROC EA 15 MIN 6/27/2017 Anastacia Shields, AARTI GP 3                     Anastacia Shields, PT  6/27/2017

## 2017-06-29 ENCOUNTER — HOSPITAL ENCOUNTER (OUTPATIENT)
Dept: PHYSICAL THERAPY | Facility: HOSPITAL | Age: 62
Setting detail: THERAPIES SERIES
Discharge: HOME | End: 2017-06-29

## 2017-06-29 DIAGNOSIS — M25.462 PAIN AND SWELLING OF LEFT KNEE: ICD-10-CM

## 2017-06-29 DIAGNOSIS — M25.562 PAIN AND SWELLING OF LEFT KNEE: ICD-10-CM

## 2017-06-29 DIAGNOSIS — Z74.09 IMPAIRED FUNCTIONAL MOBILITY, BALANCE, GAIT, AND ENDURANCE: Primary | ICD-10-CM

## 2017-06-29 DIAGNOSIS — Z87.81 S/P ORIF (OPEN REDUCTION INTERNAL FIXATION) FRACTURE: ICD-10-CM

## 2017-06-29 DIAGNOSIS — Z98.890 S/P ORIF (OPEN REDUCTION INTERNAL FIXATION) FRACTURE: ICD-10-CM

## 2017-06-29 PROCEDURE — 97116 GAIT TRAINING THERAPY: CPT

## 2017-06-29 PROCEDURE — 97110 THERAPEUTIC EXERCISES: CPT

## 2017-06-29 NOTE — THERAPY TREATMENT NOTE
Outpatient Physical Therapy Ortho Treatment Note   [place]     Patient Name: Rosalba Girard  : 1955  MRN: [id number]  Today's Date: 2017      Visit Date: 2017    Visit Dx:    ICD-10-CM ICD-9-CM   1. Impaired functional mobility, balance, gait, and endurance Z74.09 V49.89   2. Pain and swelling of left knee M25.562 719.46    M25.462 719.06   3. S/P ORIF (open reduction internal fixation) fracture  left tibial plateau  Z96.7 V45.89    Z87.81 V15.51       Patient Active Problem List   Diagnosis   • Essential hypertension, benign   • GERD (gastroesophageal reflux disease)   • IBS (irritable bowel syndrome)   • S/P ORIF (open reduction internal fixation) fracture  left tibial plateau    • Tibial plateau fracture, left   • Leukocytosis        Past Medical History:   Diagnosis Date   • Chronic superficial dermatitis    • Dietary counseling    • Encounter for routine adult health examination    • GERD (gastroesophageal reflux disease)    • Hair or hair follicle disorder    • History of anemia    • Hives    • Hypertension    • Irritable bowel disease    • Malaise and fatigue    • Myalgia and myositis    • PONV (postoperative nausea and vomiting)     on occasion    • Prediabetes    • Rash and nonspecific skin eruption    • Sinusitis, acute maxillary    • UTI (urinary tract infection)    • Wears glasses         Past Surgical History:   Procedure Laterality Date   • BLADDER SURGERY      Cuff   • CHOLECYSTECTOMY     • COLONOSCOPY W/ POLYPECTOMY     • HERNIA REPAIR      umbilical    • HYSTERECTOMY     • TIBIAL PLATEAU OPEN REDUCTION INTERNAL FIXATION Left 2016    Procedure: LEFT TIBIAL PLATEAU OPEN REDUCTION INTERNAL FIXATION;  Surgeon: Constantine Durbin MD;  Location: [facility];  Service:              PT Ortho       17 1030    Left Knee    Extension/Flexion ROM Details 84 AROM, 88 AAROM  -LF      17 1000    LLE Quick Girth (cm)    Smallest ankle 26 cm  -LF    Largest calf 37.1  "cm   (R 40.2)  -LF    Tib tuberosity 37 cm  -LF      User Key  (r) = Recorded By, (t) = Taken By, (c) = Cosigned By    Initials Name Provider Type    LF Anastacia Shields, PT Physical Therapist                  PT Assessment/Plan       06/29/17 0930       PT Assessment    Assessment Comments AROM to 88 degrees today, and PROM to 90 degrees.  Improved ambulation, weight-shifting, and balance  -LF     PT Plan    PT Plan Comments Re-exam next visit.  Need to practice stairs simulating carrying groceries  -LF       User Key  (r) = Recorded By, (t) = Taken By, (c) = Cosigned By    Initials Name Provider Type    LF Anastacia Shields, PT Physical Therapist                    Exercises       06/29/17 0930          Subjective Comments    Subjective Comments She says she is concerned returning to her own appointment in terms of getting in/out of tub-shower, carrying her groceries upstairs  -LF      Subjective Pain    Able to rate subjective pain? yes  -LF      Pre-Treatment Pain Level 4  -LF      Post-Treatment Pain Level 4  -LF      Exercise 1    Exercise Name 1 continued with ther ex per f/s.  Also including standing on green theradiscs with trunk rotation 2pl resistance.  Step-ups 4\" step x10 each, and step-up and over 4\" step.  Has difficulty descending  step leading with RLE due to limited ankle ROM L.    -LF      Time (Minutes) 1 40  -LF      Treatment Type 1 Ther Ex  -LF      Exercise 2    Exercise Name 2 gait training including up/down 2flights stairs, ramp, and curb  with HR and SC.  She hesistates and seems to have fear of falling at when maneuvering last step up.  Also weaving between cones using SC and did this without LOB, no hesitation with steps  -LF      Time (Minutes) 2 10  -LF        User Key  (r) = Recorded By, (t) = Taken By, (c) = Cosigned By    Initials Name Provider Type    DIOGENES Shields, PT Physical Therapist          Time Calculation:   Start Time: 0930  Total Timed Code Minutes- PT: 50 " minute(s)    Therapy Charges for Today     Code Description Service Date Service Provider Modifiers Qty    87019522839  PT THER PROC EA 15 MIN 6/29/2017 Anastacia Shields, PT GP 2    [id number]  GAIT TRAINING EA 15 MIN 6/29/2017 Anatsacia Shields, PT GP 1                    Anastacia Shields, PT  6/29/2017

## 2017-07-06 ENCOUNTER — HOSPITAL ENCOUNTER (OUTPATIENT)
Dept: PHYSICAL THERAPY | Facility: HOSPITAL | Age: 62
Setting detail: THERAPIES SERIES
Discharge: HOME | End: 2017-07-06

## 2017-07-06 DIAGNOSIS — Z98.890 S/P ORIF (OPEN REDUCTION INTERNAL FIXATION) FRACTURE: ICD-10-CM

## 2017-07-06 DIAGNOSIS — Z74.09 IMPAIRED FUNCTIONAL MOBILITY, BALANCE, GAIT, AND ENDURANCE: Primary | ICD-10-CM

## 2017-07-06 DIAGNOSIS — M25.462 PAIN AND SWELLING OF LEFT KNEE: ICD-10-CM

## 2017-07-06 DIAGNOSIS — M25.562 PAIN AND SWELLING OF LEFT KNEE: ICD-10-CM

## 2017-07-06 DIAGNOSIS — Z87.81 S/P ORIF (OPEN REDUCTION INTERNAL FIXATION) FRACTURE: ICD-10-CM

## 2017-07-06 PROCEDURE — 97110 THERAPEUTIC EXERCISES: CPT

## 2017-07-06 PROCEDURE — 97140 MANUAL THERAPY 1/> REGIONS: CPT

## 2017-07-11 ENCOUNTER — HOSPITAL ENCOUNTER (OUTPATIENT)
Dept: PHYSICAL THERAPY | Facility: HOSPITAL | Age: 62
Setting detail: THERAPIES SERIES
Discharge: HOME | End: 2017-07-11

## 2017-07-11 DIAGNOSIS — Z98.890 S/P ORIF (OPEN REDUCTION INTERNAL FIXATION) FRACTURE: ICD-10-CM

## 2017-07-11 DIAGNOSIS — Z87.81 S/P ORIF (OPEN REDUCTION INTERNAL FIXATION) FRACTURE: ICD-10-CM

## 2017-07-11 DIAGNOSIS — S82.142A TIBIAL PLATEAU FRACTURE, LEFT, CLOSED, INITIAL ENCOUNTER: ICD-10-CM

## 2017-07-11 DIAGNOSIS — Z74.09 IMPAIRED FUNCTIONAL MOBILITY, BALANCE, GAIT, AND ENDURANCE: Primary | ICD-10-CM

## 2017-07-11 DIAGNOSIS — M25.462 PAIN AND SWELLING OF LEFT KNEE: ICD-10-CM

## 2017-07-11 DIAGNOSIS — M25.562 PAIN AND SWELLING OF LEFT KNEE: ICD-10-CM

## 2017-07-11 PROCEDURE — 97110 THERAPEUTIC EXERCISES: CPT

## 2017-07-11 NOTE — THERAPY TREATMENT NOTE
Outpatient Physical Therapy Ortho Treatment Note  [facility]     Patient Name: Rosalba Girard  : 1955  MRN: [id number]  Today's Date: 2017      Visit Date: 2017    Visit Dx:    ICD-10-CM ICD-9-CM   1. Impaired functional mobility, balance, gait, and endurance Z74.09 V49.89   2. Pain and swelling of left knee M25.562 719.46    M25.462 719.06   3. S/P ORIF (open reduction internal fixation) fracture  left tibial plateau  Z96.7 V45.89    Z87.81 V15.51   4. Tibial plateau fracture, left, closed, initial encounter S89.328A 823.00       Patient Active Problem List   Diagnosis   • Essential hypertension, benign   • GERD (gastroesophageal reflux disease)   • IBS (irritable bowel syndrome)   • S/P ORIF (open reduction internal fixation) fracture  left tibial plateau    • Tibial plateau fracture, left   • Leukocytosis        Past Medical History:   Diagnosis Date   • Chronic superficial dermatitis    • Dietary counseling    • Encounter for routine adult health examination    • GERD (gastroesophageal reflux disease)    • Hair or hair follicle disorder    • History of anemia    • Hives    • Hypertension    • Irritable bowel disease    • Malaise and fatigue    • Myalgia and myositis    • PONV (postoperative nausea and vomiting)     on occasion    • Prediabetes    • Rash and nonspecific skin eruption    • Sinusitis, acute maxillary    • UTI (urinary tract infection)    • Wears glasses         Past Surgical History:   Procedure Laterality Date   • BLADDER SURGERY      Cuff   • CHOLECYSTECTOMY     • COLONOSCOPY W/ POLYPECTOMY     • HERNIA REPAIR      umbilical    • HYSTERECTOMY     • TIBIAL PLATEAU OPEN REDUCTION INTERNAL FIXATION Left 2016    Procedure: LEFT TIBIAL PLATEAU OPEN REDUCTION INTERNAL FIXATION;  Surgeon: Constantine Durbin MD;  Location: [facility];  Service:              PT Ortho       17 1020    Subjective Comments    Subjective Comments Patient reports sorenees in L knee  today after wearing dynasplint earlier today. No issues with HEP.  -DR    Subjective Pain    Able to rate subjective pain? yes  -DR    Pre-Treatment Pain Level 6  -DR    Post-Treatment Pain Level 4  -DR    Left Knee    Extension/Flexion ROM Details 0-75 degrees AROM; 0-85 degrees AAROM prior to AAROM with strap; 0-88 degrees AAROM end of session.  -      User Key  (r) = Recorded By, (t) = Taken By, (c) = Cosigned By    Initials Name Provider Type    DR Moisés Rivero, PT Physical Therapist                            PT Assessment/Plan       07/11/17 1100       PT Assessment    Assessment Comments Pt demonstrates L knee AAROM 0-88 degrees today. She progressed therapeutic exercises by increasing repetitions today. Required frequent verbal cues to perform exercises correctly.  -DR     PT Plan    PT Plan Comments Continue per POC.   -       User Key  (r) = Recorded By, (t) = Taken By, (c) = Cosigned By    Initials Name Provider Type    DR Moisés Rivero, PT Physical Therapist                    Exercises       07/11/17 1027          Subjective Comments    Subjective Comments Patient reports sorenees in L knee today after wearing dynasplint earlier today. No issues with HEP.  -DR      Subjective Pain    Able to rate subjective pain? yes  -DR      Pre-Treatment Pain Level 6  -DR      Post-Treatment Pain Level 4  -DR      Exercise 1    Exercise Name 1 Therapeutic exercises per flow sheet-increased repetitions for previous exercises.  -DR      Time (Minutes) 1 44  -DR      Treatment Type 1 Ther Ex  -DR        User Key  (r) = Recorded By, (t) = Taken By, (c) = Cosigned By    Initials Name Provider Type    DR Moisés Rivero, PT Physical Therapist                                   Therapy Education       07/11/17 1121          Therapy Education    Given HEP  -      Program Reinforced  -      How Provided Verbal;Demonstration  -      Provided to Patient  -DR      Level of Understanding Verbalized;Demonstrated  -         User Key  (r) = Recorded By, (t) = Taken By, (c) = Cosigned By    Initials Name Provider Type    DR Moisés Rivero, PT Physical Therapist                Time Calculation:   Start Time: 1027  Total Timed Code Minutes- PT: 44 minute(s)    Therapy Charges for Today     Code Description Service Date Service Provider Modifiers Qty    18934399554  PT THER PROC EA 15 MIN 7/11/2017 Moisés Rivero, PT GP 3                    Moisés Rivero, PT  7/11/2017

## 2017-07-18 ENCOUNTER — HOSPITAL ENCOUNTER (OUTPATIENT)
Dept: PHYSICAL THERAPY | Facility: HOSPITAL | Age: 62
Setting detail: THERAPIES SERIES
Discharge: HOME | End: 2017-07-18

## 2017-07-18 DIAGNOSIS — M25.462 PAIN AND SWELLING OF LEFT KNEE: ICD-10-CM

## 2017-07-18 DIAGNOSIS — Z98.890 S/P ORIF (OPEN REDUCTION INTERNAL FIXATION) FRACTURE: ICD-10-CM

## 2017-07-18 DIAGNOSIS — Z74.09 IMPAIRED FUNCTIONAL MOBILITY, BALANCE, GAIT, AND ENDURANCE: Primary | ICD-10-CM

## 2017-07-18 DIAGNOSIS — M25.562 PAIN AND SWELLING OF LEFT KNEE: ICD-10-CM

## 2017-07-18 DIAGNOSIS — Z87.81 S/P ORIF (OPEN REDUCTION INTERNAL FIXATION) FRACTURE: ICD-10-CM

## 2017-07-18 PROCEDURE — 97140 MANUAL THERAPY 1/> REGIONS: CPT

## 2017-07-18 PROCEDURE — 97110 THERAPEUTIC EXERCISES: CPT

## 2017-07-18 NOTE — THERAPY TREATMENT NOTE
Outpatient Physical Therapy Ortho Treatment Note   [facility]     Patient Name: Rosalba Girard  : 1955  MRN: [id number]  Today's Date: 2017      Visit Date: 2017    Visit Dx:    ICD-10-CM ICD-9-CM   1. Impaired functional mobility, balance, gait, and endurance Z74.09 V49.89   2. Pain and swelling of left knee M25.562 719.46    M25.462 719.06   3. S/P ORIF (open reduction internal fixation) fracture  left tibial plateau  Z96.7 V45.89    Z87.81 V15.51       Patient Active Problem List   Diagnosis   • Essential hypertension, benign   • GERD (gastroesophageal reflux disease)   • IBS (irritable bowel syndrome)   • S/P ORIF (open reduction internal fixation) fracture  left tibial plateau    • Tibial plateau fracture, left   • Leukocytosis        Past Medical History:   Diagnosis Date   • Chronic superficial dermatitis    • Dietary counseling    • Encounter for routine adult health examination    • GERD (gastroesophageal reflux disease)    • Hair or hair follicle disorder    • History of anemia    • Hives    • Hypertension    • Irritable bowel disease    • Malaise and fatigue    • Myalgia and myositis    • PONV (postoperative nausea and vomiting)     on occasion    • Prediabetes    • Rash and nonspecific skin eruption    • Sinusitis, acute maxillary    • UTI (urinary tract infection)    • Wears glasses         Past Surgical History:   Procedure Laterality Date   • BLADDER SURGERY      Cuff   • CHOLECYSTECTOMY     • COLONOSCOPY W/ POLYPECTOMY     • HERNIA REPAIR      umbilical    • HYSTERECTOMY     • TIBIAL PLATEAU OPEN REDUCTION INTERNAL FIXATION Left 2016    Procedure: LEFT TIBIAL PLATEAU OPEN REDUCTION INTERNAL FIXATION;  Surgeon: Constantine Durbin MD;  Location: [facility];  Service:                              PT Assessment/Plan       17 1030       PT Assessment    Assessment Comments No progression of ROM this date.  Some improvement observed in foot/ankle edema following  "manual treatment.  Subjectively reports improvement in functional activities  -LF     PT Plan    PT Plan Comments cont. with ther ex, manual therapy   -LF       User Key  (r) = Recorded By, (t) = Taken By, (c) = Cosigned By    Initials Name Provider Type    DIOGENES Shields PT Physical Therapist                    Exercises       07/18/17 1030          Subjective Comments    Subjective Comments c/o swelling bilateral feet and ankle L>R that is causing discomfort.  Trying to use dynasplint 3hrs/day.  Used her cane out in the community this past weekend intead of the walker  -      Subjective Pain    Able to rate subjective pain? yes  -LF      Pre-Treatment Pain Level 5  -LF      Post-Treatment Pain Level 4  -LF      Exercise 1    Exercise Name 1 Practiced up/down 2 flights stairs HRx1 and SC.  She did not hesitate at top of first flight, but did at top of 2nd.  No difficulty going down.  Then worked on AAROM exercises  -      Time (Minutes) 1 15  -LF      Treatment Type 1 Ther Ex  -LF        User Key  (r) = Recorded By, (t) = Taken By, (c) = Cosigned By    Initials Name Provider Type    DIOGENES Shields, PT Physical Therapist             Manual Rx (last 36 hours)      Manual Treatments       07/18/17 1030          Manual Rx 1    Manual Rx 1 Location ASTYM left quads and knee, hamstring, gastroc-soleus.  Mild soft-tissue texture distal quads  -LF      Manual Rx 1 Duration 15  -LF      Manual Rx 2    Manual Rx 2 Location simple-brief MLD with focus foot and ankle.  Firm edema bilateral ankles, that softened following MLD.  Applied kinesiotape with \"fingers\" fanned across ankle and dorsum of feet.  4\" compressogrip foot to knee.  Advised to follow-up with PCP for B LE edema  -LF      Manual Rx 2 Duration 15  -LF        User Key  (r) = Recorded By, (t) = Taken By, (c) = Cosigned By    Initials Name Provider Type    DIOGENES Shields PT Physical Therapist            Time Calculation:   Start Time: " 1030  Total Timed Code Minutes- PT: 45 minute(s)    Therapy Charges for Today     Code Description Service Date Service Provider Modifiers Qty    59373637511 HC PT THER PROC EA 15 MIN 7/18/2017 Anastacia Shields, PT GP 1    39057258526 HC PT MANUAL THERAPY EA 15 MIN 7/18/2017 Anastacia Shields, PT GP 2                    Anastacia Shields, PT  7/18/2017

## 2017-07-20 ENCOUNTER — APPOINTMENT (OUTPATIENT)
Dept: PHYSICAL THERAPY | Facility: HOSPITAL | Age: 62
End: 2017-07-20

## 2017-07-25 ENCOUNTER — HOSPITAL ENCOUNTER (OUTPATIENT)
Dept: PHYSICAL THERAPY | Facility: HOSPITAL | Age: 62
Setting detail: THERAPIES SERIES
Discharge: HOME | End: 2017-07-25

## 2017-07-25 DIAGNOSIS — Z74.09 IMPAIRED FUNCTIONAL MOBILITY, BALANCE, GAIT, AND ENDURANCE: Primary | ICD-10-CM

## 2017-07-25 DIAGNOSIS — M25.462 PAIN AND SWELLING OF LEFT KNEE: ICD-10-CM

## 2017-07-25 DIAGNOSIS — Z98.890 S/P ORIF (OPEN REDUCTION INTERNAL FIXATION) FRACTURE: ICD-10-CM

## 2017-07-25 DIAGNOSIS — S82.142A TIBIAL PLATEAU FRACTURE, LEFT, CLOSED, INITIAL ENCOUNTER: ICD-10-CM

## 2017-07-25 DIAGNOSIS — Z87.81 S/P ORIF (OPEN REDUCTION INTERNAL FIXATION) FRACTURE: ICD-10-CM

## 2017-07-25 DIAGNOSIS — M25.562 PAIN AND SWELLING OF LEFT KNEE: ICD-10-CM

## 2017-07-25 PROCEDURE — 97140 MANUAL THERAPY 1/> REGIONS: CPT

## 2017-07-25 PROCEDURE — 97110 THERAPEUTIC EXERCISES: CPT

## 2017-07-25 NOTE — THERAPY TREATMENT NOTE
Outpatient Physical Therapy Ortho Treatment Note  [facility]     Patient Name: Rosalba Girard  : 1955  MRN: [id number]  Today's Date: 2017      Visit Date: 2017    Visit Dx:    ICD-10-CM ICD-9-CM   1. Impaired functional mobility, balance, gait, and endurance Z74.09 V49.89   2. Pain and swelling of left knee M25.562 719.46    M25.462 719.06   3. S/P ORIF (open reduction internal fixation) fracture  left tibial plateau  Z96.7 V45.89    Z87.81 V15.51   4. Tibial plateau fracture, left, closed, initial encounter S86.992A 823.00       Patient Active Problem List   Diagnosis   • Essential hypertension, benign   • GERD (gastroesophageal reflux disease)   • IBS (irritable bowel syndrome)   • S/P ORIF (open reduction internal fixation) fracture  left tibial plateau    • Tibial plateau fracture, left   • Leukocytosis        Past Medical History:   Diagnosis Date   • Chronic superficial dermatitis    • Dietary counseling    • Encounter for routine adult health examination    • GERD (gastroesophageal reflux disease)    • Hair or hair follicle disorder    • History of anemia    • Hives    • Hypertension    • Irritable bowel disease    • Malaise and fatigue    • Myalgia and myositis    • PONV (postoperative nausea and vomiting)     on occasion    • Prediabetes    • Rash and nonspecific skin eruption    • Sinusitis, acute maxillary    • UTI (urinary tract infection)    • Wears glasses         Past Surgical History:   Procedure Laterality Date   • BLADDER SURGERY      Cuff   • CHOLECYSTECTOMY     • COLONOSCOPY W/ POLYPECTOMY     • HERNIA REPAIR      umbilical    • HYSTERECTOMY     • TIBIAL PLATEAU OPEN REDUCTION INTERNAL FIXATION Left 2016    Procedure: LEFT TIBIAL PLATEAU OPEN REDUCTION INTERNAL FIXATION;  Surgeon: Constantine Durbin MD;  Location: [facility];  Service:              PT Ortho       17 1030    Left Knee    Extension/Flexion ROM Details AAROM to 90 degrees supine  -LF       User Key  (r) = Recorded By, (t) = Taken By, (c) = Cosigned By    Initials Name Provider Type     Anastacia Shields, PT Physical Therapist                            PT Assessment/Plan       07/25/17 1030       PT Assessment    Assessment Comments Patient with continued improvement in overall gait as hip and core strength have improved.   She has made minimal progress in the past 6 weeks with ROM.    Nearing maximal benefit of P.T.  -LF     PT Plan    PT Plan Comments Re-exam due next visit.  cont. pending further recommendation from surgeon  -       User Key  (r) = Recorded By, (t) = Taken By, (c) = Cosigned By    Initials Name Provider Type     Anastacia Shields, PT Physical Therapist                    Exercises       07/25/17 1030          Subjective Comments    Subjective Comments Patient feels like her knee is bending better.  Seemed easier to step over into tub.  She is nervous to try the stairs at her apartment building, but plans to do so this weekend.  Trying to wear dynasplint 3hrs/day and has some soreness with this.  Has follow-up with her doctor on Thursday  -LF      Subjective Pain    Able to rate subjective pain? yes  -LF      Pre-Treatment Pain Level 6  -LF      Post-Treatment Pain Level 4  -LF      Exercise 1    Exercise Name 1 Presents to clinic with rollator.  Ambulated throughout clinic w/o A.D.  Used cane for up/down 2 flight stairs 1HR step-to pattern.  Completed ther ex per f/s    -LF      Time (Minutes) 1 25  -LF        User Key  (r) = Recorded By, (t) = Taken By, (c) = Cosigned By    Initials Name Provider Type     Anastacia Shields, PT Physical Therapist               Manual Rx (last 36 hours)      Manual Treatments       07/25/17 1030          Manual Rx 1    Manual Rx 1 Location ASTYM left quads and knee, hamstring, gastroc-soleus.  Mild soft-tissue texture distal quads.  Simple MLD at ankle to help with swelling  -LF      Manual Rx 1 Duration 15  -LF        User Key  (r) =  Recorded By, (t) = Taken By, (c) = Cosigned By    Initials Name Provider Type     Anastacia Shields, PT Physical Therapist            Time Calculation:   Start Time: 1030  Total Timed Code Minutes- PT: 40 minute(s)    Therapy Charges for Today     Code Description Service Date Service Provider Modifiers Qty    87265308695  PT THER PROC EA 15 MIN 7/25/2017 Anastacai Shields, PT GP 2    [id number]  PT MANUAL THERAPY EA 15 MIN 7/25/2017 Anastacia Shields, PT GP 1            Anastacia Shields, PT  7/25/2017

## 2017-07-27 ENCOUNTER — APPOINTMENT (OUTPATIENT)
Dept: PHYSICAL THERAPY | Facility: HOSPITAL | Age: 62
End: 2017-07-27

## 2017-07-31 ENCOUNTER — TELEPHONE (OUTPATIENT)
Dept: FAMILY MEDICINE CLINIC | Facility: CLINIC | Age: 62
End: 2017-07-31

## 2017-07-31 NOTE — TELEPHONE ENCOUNTER
----- Message from Lashonda Chen sent at 7/31/2017  9:35 AM EDT -----  Contact: PATIENT   [phone] WORK   SHE DROPPED OFF FMLA PAPERWORK ON Friday AND IT NEEDS TO BE COMPLETED AND SENT OUT TODAY BECAUSE IT RUNS OUT TOMORROW. PLEASE CALL HER AND LET HER KNOW WHEN ITS SENT. CALL HER WORK NUMBER. THANK YOU

## 2017-08-03 ENCOUNTER — HOSPITAL ENCOUNTER (OUTPATIENT)
Dept: PHYSICAL THERAPY | Facility: HOSPITAL | Age: 62
Setting detail: THERAPIES SERIES
Discharge: HOME | End: 2017-08-03

## 2017-08-03 DIAGNOSIS — S82.142A TIBIAL PLATEAU FRACTURE, LEFT, CLOSED, INITIAL ENCOUNTER: ICD-10-CM

## 2017-08-03 DIAGNOSIS — Z98.890 S/P ORIF (OPEN REDUCTION INTERNAL FIXATION) FRACTURE: ICD-10-CM

## 2017-08-03 DIAGNOSIS — Z74.09 IMPAIRED FUNCTIONAL MOBILITY, BALANCE, GAIT, AND ENDURANCE: Primary | ICD-10-CM

## 2017-08-03 DIAGNOSIS — Z87.81 S/P ORIF (OPEN REDUCTION INTERNAL FIXATION) FRACTURE: ICD-10-CM

## 2017-08-03 DIAGNOSIS — M25.562 PAIN AND SWELLING OF LEFT KNEE: ICD-10-CM

## 2017-08-03 DIAGNOSIS — M25.462 PAIN AND SWELLING OF LEFT KNEE: ICD-10-CM

## 2017-08-03 PROCEDURE — 97110 THERAPEUTIC EXERCISES: CPT

## 2017-08-08 ENCOUNTER — HOSPITAL ENCOUNTER (OUTPATIENT)
Dept: PHYSICAL THERAPY | Facility: HOSPITAL | Age: 62
Setting detail: THERAPIES SERIES
Discharge: HOME | End: 2017-08-08

## 2017-08-08 DIAGNOSIS — Z74.09 IMPAIRED FUNCTIONAL MOBILITY, BALANCE, GAIT, AND ENDURANCE: Primary | ICD-10-CM

## 2017-08-08 DIAGNOSIS — M25.462 PAIN AND SWELLING OF LEFT KNEE: ICD-10-CM

## 2017-08-08 DIAGNOSIS — Z98.890 S/P ORIF (OPEN REDUCTION INTERNAL FIXATION) FRACTURE: ICD-10-CM

## 2017-08-08 DIAGNOSIS — Z87.81 S/P ORIF (OPEN REDUCTION INTERNAL FIXATION) FRACTURE: ICD-10-CM

## 2017-08-08 DIAGNOSIS — M25.562 PAIN AND SWELLING OF LEFT KNEE: ICD-10-CM

## 2017-08-08 PROCEDURE — 97110 THERAPEUTIC EXERCISES: CPT

## 2017-08-08 NOTE — THERAPY TREATMENT NOTE
Outpatient Physical Therapy Ortho Treatment Note   [place]     Patient Name: Rosalba Girard  : 1955  MRN: [id number]  Today's Date: 2017      Visit Date: 2017    Visit Dx:    ICD-10-CM ICD-9-CM   1. Impaired functional mobility, balance, gait, and endurance Z74.09 V49.89   2. Pain and swelling of left knee M25.562 719.46    M25.462 719.06   3. S/P ORIF (open reduction internal fixation) fracture  left tibial plateau  Z96.7 V45.89    Z87.81 V15.51       Patient Active Problem List   Diagnosis   • Essential hypertension, benign   • GERD (gastroesophageal reflux disease)   • IBS (irritable bowel syndrome)   • S/P ORIF (open reduction internal fixation) fracture  left tibial plateau    • Tibial plateau fracture, left   • Leukocytosis        Past Medical History:   Diagnosis Date   • Chronic superficial dermatitis    • Dietary counseling    • Encounter for routine adult health examination    • GERD (gastroesophageal reflux disease)    • Hair or hair follicle disorder    • History of anemia    • Hives    • Hypertension    • Irritable bowel disease    • Malaise and fatigue    • Myalgia and myositis    • PONV (postoperative nausea and vomiting)     on occasion    • Prediabetes    • Rash and nonspecific skin eruption    • Sinusitis, acute maxillary    • UTI (urinary tract infection)    • Wears glasses         Past Surgical History:   Procedure Laterality Date   • BLADDER SURGERY      Cuff   • CHOLECYSTECTOMY     • COLONOSCOPY W/ POLYPECTOMY     • HERNIA REPAIR      umbilical    • HYSTERECTOMY     • TIBIAL PLATEAU OPEN REDUCTION INTERNAL FIXATION Left 2016    Procedure: LEFT TIBIAL PLATEAU OPEN REDUCTION INTERNAL FIXATION;  Surgeon: Constantine Durbin MD;  Location: [facility];  Service:              PT Ortho       17 1000    Left Knee    Extension/Flexion ROM Details AROM to 92, AAROM to 95  -LF      User Key  (r) = Recorded By, (t) = Taken By, (c) = Cosigned By    Initials Name  Provider Type    LF Anastacia Shields, PT Physical Therapist                  PT Assessment/Plan       08/08/17 1000       PT Assessment    Assessment Comments able to consistently get >90 degree flex today.    -LF     PT Plan    PT Plan Comments cont. 1x/week to help progress ROM, and strength to maximize functional mobility  -LF       User Key  (r) = Recorded By, (t) = Taken By, (c) = Cosigned By    Initials Name Provider Type    LF Anastacia Shields, PT Physical Therapist                    Exercises       08/08/17 1000          Subjective Comments    Subjective Comments Patient states she has moved into her apartment.  Brother placed grap bar in her tub-shower.  Presents with straight cane today  -LF      Subjective Pain    Able to rate subjective pain? yes  -LF      Pre-Treatment Pain Level 4  -LF      Post-Treatment Pain Level 4  -LF      Exercise 1    Exercise Name 1 began on Treadmill and included ther ex per f/s.  Also up/down flight of stairs 1HR and cane carrying 6lb bag on shldr.  Then practiced w/o A.D. carrying bag in hand and other hand on HR.  Independent, step-to pattern and did hesitate ascending to landing when she did not have A.D.  Also walking obstacle curse for narrow base around cones and step over 1/2 fm roll.  Hesistates prior to step-over, but no LOB.  AROM/AAROM with P.T. assist for knee flex.  tall kneeling onto folded mat and airdex (for elevated surface) and able to return to standing w/ UE from table.  Unable to position in 1/2 kneeling.  Also had to lean slightly fwd w/ arms resting on chair in front due to blance and limited knee ROM  -LF      Time (Minutes) 1 50  -LF        User Key  (r) = Recorded By, (t) = Taken By, (c) = Cosigned By    Initials Name Provider Type     Anastacia Shields, PT Physical Therapist          Time Calculation:   Start Time: 1030  Total Timed Code Minutes- PT: 50 minute(s)    Therapy Charges for Today     Code Description Service Date Service Provider  Modifiers Qty    [id number]  PT THER PROC EA 15 MIN 8/8/2017 Anastacia Shields, PT GP 3                    Anastacia Shields, PT  8/8/2017

## 2017-08-15 ENCOUNTER — HOSPITAL ENCOUNTER (OUTPATIENT)
Dept: PHYSICAL THERAPY | Facility: HOSPITAL | Age: 62
Setting detail: THERAPIES SERIES
Discharge: HOME | End: 2017-08-15

## 2017-08-15 DIAGNOSIS — M25.562 PAIN AND SWELLING OF LEFT KNEE: ICD-10-CM

## 2017-08-15 DIAGNOSIS — Z98.890 S/P ORIF (OPEN REDUCTION INTERNAL FIXATION) FRACTURE: ICD-10-CM

## 2017-08-15 DIAGNOSIS — Z87.81 S/P ORIF (OPEN REDUCTION INTERNAL FIXATION) FRACTURE: ICD-10-CM

## 2017-08-15 DIAGNOSIS — M25.462 PAIN AND SWELLING OF LEFT KNEE: ICD-10-CM

## 2017-08-15 DIAGNOSIS — Z74.09 IMPAIRED FUNCTIONAL MOBILITY, BALANCE, GAIT, AND ENDURANCE: Primary | ICD-10-CM

## 2017-08-15 DIAGNOSIS — S82.142A TIBIAL PLATEAU FRACTURE, LEFT, CLOSED, INITIAL ENCOUNTER: ICD-10-CM

## 2017-08-15 PROCEDURE — 97110 THERAPEUTIC EXERCISES: CPT

## 2017-08-15 PROCEDURE — 97140 MANUAL THERAPY 1/> REGIONS: CPT

## 2017-08-15 NOTE — THERAPY TREATMENT NOTE
Outpatient Physical Therapy Ortho Treatment Note   [place]     Patient Name: Rosalba Girard  : 1955  MRN: [id number]  Today's Date: 8/15/2017      Visit Date: 08/15/2017    Visit Dx:    ICD-10-CM ICD-9-CM   1. Impaired functional mobility, balance, gait, and endurance Z74.09 V49.89   2. Pain and swelling of left knee M25.562 719.46    M25.462 719.06   3. S/P ORIF (open reduction internal fixation) fracture  left tibial plateau  Z96.7 V45.89    Z87.81 V15.51   4. Tibial plateau fracture, left, closed, initial encounter S86.240A 823.00       Patient Active Problem List   Diagnosis   • Essential hypertension, benign   • GERD (gastroesophageal reflux disease)   • IBS (irritable bowel syndrome)   • S/P ORIF (open reduction internal fixation) fracture  left tibial plateau    • Tibial plateau fracture, left   • Leukocytosis        Past Medical History:   Diagnosis Date   • Chronic superficial dermatitis    • Dietary counseling    • Encounter for routine adult health examination    • GERD (gastroesophageal reflux disease)    • Hair or hair follicle disorder    • History of anemia    • Hives    • Hypertension    • Irritable bowel disease    • Malaise and fatigue    • Myalgia and myositis    • PONV (postoperative nausea and vomiting)     on occasion    • Prediabetes    • Rash and nonspecific skin eruption    • Sinusitis, acute maxillary    • UTI (urinary tract infection)    • Wears glasses         Past Surgical History:   Procedure Laterality Date   • BLADDER SURGERY      Cuff   • CHOLECYSTECTOMY     • COLONOSCOPY W/ POLYPECTOMY     • HERNIA REPAIR      umbilical    • HYSTERECTOMY     • TIBIAL PLATEAU OPEN REDUCTION INTERNAL FIXATION Left 2016    Procedure: LEFT TIBIAL PLATEAU OPEN REDUCTION INTERNAL FIXATION;  Surgeon: Constantine Durbin MD;  Location: [facility];  Service:                              PT Assessment/Plan       08/15/17 1030       PT Assessment    Assessment Comments Good progress  with knee flexion range of motion to 101° passively.  Client still has low endurance and some difficulty walking on an incline and negotiating stairs with use of a cane.  -MM     PT Plan    PT Plan Comments Continue once per week with exercises and manual therapy.  -MM       User Key  (r) = Recorded By, (t) = Taken By, (c) = Cosigned By    Initials Name Provider Type    FERNANDO Anderson PT Physical Therapist                    Exercises       08/15/17 1030          Subjective Pain    Able to rate subjective pain? yes  -MM      Pre-Treatment Pain Level 6  -MM      Post-Treatment Pain Level 6  -MM      Exercise 1    Exercise Name 1 Included exercises in clinical setting per flow sheet.  -MM      Cueing 1 Verbal;Tactile;Demo  -MM      Time (Minutes) 1 33  -MM        User Key  (r) = Recorded By, (t) = Taken By, (c) = Cosigned By    Initials Name Provider Type    FENRANDO Anderson PT Physical Therapist                        Manual Rx (last 36 hours)      Manual Treatments       08/15/17 1030          Manual Rx 1    Manual Rx 1 Location Included passive assistive stretching for maximizing knee flexion range of motion.  -MM      Manual Rx 1 Duration 12  -MM        User Key  (r) = Recorded By, (t) = Taken By, (c) = Cosigned By    Initials Name Provider Type    FERNANDO Anderson PT Physical Therapist                        Time Calculation:   Start Time: 1030  Total Timed Code Minutes- PT: 45 minute(s)    Therapy Charges for Today     Code Description Service Date Service Provider Modifiers Qty    56775168570  PT MANUAL THERAPY EA 15 MIN 8/15/2017 Padilla Anderson PT GP 1    [id number]  PT THER PROC EA 15 MIN 8/15/2017 Padilla Anderson PT GP 2                    Padilla Anderson PT  8/15/2017

## 2017-08-22 ENCOUNTER — HOSPITAL ENCOUNTER (OUTPATIENT)
Dept: PHYSICAL THERAPY | Facility: HOSPITAL | Age: 62
Setting detail: THERAPIES SERIES
Discharge: HOME | End: 2017-08-22

## 2017-08-22 DIAGNOSIS — M25.462 PAIN AND SWELLING OF LEFT KNEE: ICD-10-CM

## 2017-08-22 DIAGNOSIS — M25.562 PAIN AND SWELLING OF LEFT KNEE: ICD-10-CM

## 2017-08-22 DIAGNOSIS — Z87.81 S/P ORIF (OPEN REDUCTION INTERNAL FIXATION) FRACTURE: ICD-10-CM

## 2017-08-22 DIAGNOSIS — Z74.09 IMPAIRED FUNCTIONAL MOBILITY, BALANCE, GAIT, AND ENDURANCE: Primary | ICD-10-CM

## 2017-08-22 DIAGNOSIS — Z98.890 S/P ORIF (OPEN REDUCTION INTERNAL FIXATION) FRACTURE: ICD-10-CM

## 2017-08-22 PROCEDURE — 97140 MANUAL THERAPY 1/> REGIONS: CPT

## 2017-08-22 PROCEDURE — 97110 THERAPEUTIC EXERCISES: CPT

## 2017-08-22 NOTE — THERAPY TREATMENT NOTE
Outpatient Physical Therapy Ortho Treatment Note   [place]     Patient Name: Rosalba Girard  : 1955  MRN: [id number]  Today's Date: 2017      Visit Date: 2017    Visit Dx:    ICD-10-CM ICD-9-CM   1. Impaired functional mobility, balance, gait, and endurance Z74.09 V49.89   2. Pain and swelling of left knee M25.562 719.46    M25.462 719.06   3. S/P ORIF (open reduction internal fixation) fracture  left tibial plateau  Z96.7 V45.89    Z87.81 V15.51       Patient Active Problem List   Diagnosis   • Essential hypertension, benign   • GERD (gastroesophageal reflux disease)   • IBS (irritable bowel syndrome)   • S/P ORIF (open reduction internal fixation) fracture  left tibial plateau    • Tibial plateau fracture, left   • Leukocytosis        Past Medical History:   Diagnosis Date   • Chronic superficial dermatitis    • Dietary counseling    • Encounter for routine adult health examination    • GERD (gastroesophageal reflux disease)    • Hair or hair follicle disorder    • History of anemia    • Hives    • Hypertension    • Irritable bowel disease    • Malaise and fatigue    • Myalgia and myositis    • PONV (postoperative nausea and vomiting)     on occasion    • Prediabetes    • Rash and nonspecific skin eruption    • Sinusitis, acute maxillary    • UTI (urinary tract infection)    • Wears glasses         Past Surgical History:   Procedure Laterality Date   • BLADDER SURGERY      Cuff   • CHOLECYSTECTOMY     • COLONOSCOPY W/ POLYPECTOMY     • HERNIA REPAIR      umbilical    • HYSTERECTOMY     • TIBIAL PLATEAU OPEN REDUCTION INTERNAL FIXATION Left 2016    Procedure: LEFT TIBIAL PLATEAU OPEN REDUCTION INTERNAL FIXATION;  Surgeon: Constantine Durbni MD;  Location: [facility];  Service:                      Lymphedema       17 1035          Skin Changes/Observations    Location/Assessment Lower Extremity  -LF      Skin Observations Comment color and temp WNL's  -LF      Lymphedema  Pulses/Capillary Refill    Lymphedema Pulses/Capillary Refill lower extremity pulses  -LF      Dorsalis Pedis Pulse left:;+2 normal  -LF        User Key  (r) = Recorded By, (t) = Taken By, (c) = Cosigned By    Initials Name Provider Type    DIOGENES Shields, PT Physical Therapist                    PT Assessment/Plan       08/22/17 1035       PT Assessment    Assessment Comments Active knee flex 95 degrees, and passive 100 degrees.  Continues to have issues with LE edema and has not followed-up with physician as previously recommended.  Some improvement noted with simple MLD  -LF     PT Plan    PT Plan Comments cont. 1x/week   -LF       User Key  (r) = Recorded By, (t) = Taken By, (c) = Cosigned By    Initials Name Provider Type    DIOGENES Shields, PT Physical Therapist                    Exercises       08/22/17 1035          Subjective Comments    Subjective Comments C/o increased LE pain and swelling.  She says she went up/down basement stairs at her mom's house and struggled to get up top step and noted increased symptoms following this.  Seemed a little better when she first got up this morning  -LF      Subjective Pain    Able to rate subjective pain? yes  -LF      Pre-Treatment Pain Level 6  -LF      Post-Treatment Pain Level 5  -LF      Exercise 1    Exercise Name 1 Included ther ex per f/s.  Also worked on getting into tall kneeling with focus on equal weight-shift and postural control.  Req'd CGA  -LF      Cueing 1 Verbal;Tactile  -LF      Time (Minutes) 1 30  -LF        User Key  (r) = Recorded By, (t) = Taken By, (c) = Cosigned By    Initials Name Provider Type    DIOGENES Shields, PT Physical Therapist                  Manual Rx (last 36 hours)      Manual Treatments       08/22/17 1035          Manual Rx 1    Manual Rx 1 Location Included passive stretching into flexion, ant/post tibial glides to promote inc'd ROM.  Also MLD posterior knee, foot and ankle.  Pitting edema dorsum of foot and  "prox. ankle, mod. firmness at malleoli.  applied 4\" compressogrip  foot to knee (doubled at foot and ankle)  -LF      Manual Rx 1 Duration 12  -LF        User Key  (r) = Recorded By, (t) = Taken By, (c) = Cosigned By    Initials Name Provider Type    LF Anastacia Shiedls, PT Physical Therapist              Time Calculation:   Start Time: 1035  Total Timed Code Minutes- PT: 42 minute(s)    Therapy Charges for Today     Code Description Service Date Service Provider Modifiers Qty    84470709817  PT THER PROC EA 15 MIN 8/22/2017 Anastacia Shields, PT GP 2    [id number]  PT MANUAL THERAPY EA 15 MIN 8/22/2017 Anastacia Shields, PT GP 1                    Anastacia Shields, PT  8/22/2017       "

## 2017-08-25 ENCOUNTER — TELEPHONE (OUTPATIENT)
Dept: FAMILY MEDICINE CLINIC | Facility: CLINIC | Age: 62
End: 2017-08-25

## 2017-08-25 NOTE — TELEPHONE ENCOUNTER
----- Message from Cesilia Chávez sent at 8/18/2017  2:14 PM EDT -----  Contact: PT.  PT. IS NEEDING TO KNOW IF HER [facility] PAPERWORK WAS REVISED & FORWARDED ONTO [facility] FOR ADMIT START DATE TO FOLLOWING YR.  [facility] RECEIVED THE FORMER PAPERWORK, NEEDING IT REFAXED.  PT. CAN BE REACHED @: [phone].

## 2017-08-25 NOTE — TELEPHONE ENCOUNTER
Patient needed the leave dates changed/updated. Spoke with Taylor Gibson and got correct fax number and forms have been faxed. Patient notified.

## 2017-08-28 ENCOUNTER — TELEPHONE (OUTPATIENT)
Dept: FAMILY MEDICINE CLINIC | Facility: CLINIC | Age: 62
End: 2017-08-28

## 2017-08-28 NOTE — TELEPHONE ENCOUNTER
----- Message from iKta Owens sent at 8/28/2017  2:01 PM EDT -----  Patient lost her prescription for HYDROcodone-acetaminophen (NORCO) 5-325 MG 1 tablet every 12 hours..Wants to know if Dr.Van Cox can re-write her a script and she will pick it up, if someone could call her to let her know either way..  ThanksKita..

## 2017-08-28 NOTE — TELEPHONE ENCOUNTER
MAILE done and patient got it filled July 27 #20. Dr. Durbin has been writing it for her in recent months. Patient advised to contact his office. I don't see where Dr. QUINTANA has written a prescription for her recently.

## 2017-08-29 ENCOUNTER — HOSPITAL ENCOUNTER (OUTPATIENT)
Dept: PHYSICAL THERAPY | Facility: HOSPITAL | Age: 62
Setting detail: THERAPIES SERIES
Discharge: HOME | End: 2017-08-29

## 2017-08-29 DIAGNOSIS — M25.462 PAIN AND SWELLING OF LEFT KNEE: ICD-10-CM

## 2017-08-29 DIAGNOSIS — M25.562 PAIN AND SWELLING OF LEFT KNEE: ICD-10-CM

## 2017-08-29 DIAGNOSIS — Z74.09 IMPAIRED FUNCTIONAL MOBILITY, BALANCE, GAIT, AND ENDURANCE: Primary | ICD-10-CM

## 2017-08-29 DIAGNOSIS — Z87.81 S/P ORIF (OPEN REDUCTION INTERNAL FIXATION) FRACTURE: ICD-10-CM

## 2017-08-29 DIAGNOSIS — Z98.890 S/P ORIF (OPEN REDUCTION INTERNAL FIXATION) FRACTURE: ICD-10-CM

## 2017-08-29 PROCEDURE — 97110 THERAPEUTIC EXERCISES: CPT

## 2017-08-29 PROCEDURE — 97140 MANUAL THERAPY 1/> REGIONS: CPT

## 2017-08-29 NOTE — THERAPY DISCHARGE NOTE
Outpatient Physical Therapy Ortho Treatment Note/Discharge Summary   [place]     Patient Name: Rosalba Girard  : 1955  MRN: [id number]  Today's Date: 2017      Visit Date: 2017    Visit Dx:    ICD-10-CM ICD-9-CM   1. Impaired functional mobility, balance, gait, and endurance Z74.09 V49.89   2. Pain and swelling of left knee M25.562 719.46    M25.462 719.06   3. S/P ORIF (open reduction internal fixation) fracture  left tibial plateau  Z96.7 V45.89    Z87.81 V15.51       Patient Active Problem List   Diagnosis   • Essential hypertension, benign   • GERD (gastroesophageal reflux disease)   • IBS (irritable bowel syndrome)   • S/P ORIF (open reduction internal fixation) fracture  left tibial plateau    • Tibial plateau fracture, left   • Leukocytosis        Past Medical History:   Diagnosis Date   • Chronic superficial dermatitis    • Dietary counseling    • Encounter for routine adult health examination    • GERD (gastroesophageal reflux disease)    • Hair or hair follicle disorder    • History of anemia    • Hives    • Hypertension    • Irritable bowel disease    • Malaise and fatigue    • Myalgia and myositis    • PONV (postoperative nausea and vomiting)     on occasion    • Prediabetes    • Rash and nonspecific skin eruption    • Sinusitis, acute maxillary    • UTI (urinary tract infection)    • Wears glasses         Past Surgical History:   Procedure Laterality Date   • BLADDER SURGERY      Cuff   • CHOLECYSTECTOMY     • COLONOSCOPY W/ POLYPECTOMY     • HERNIA REPAIR      umbilical    • HYSTERECTOMY     • TIBIAL PLATEAU OPEN REDUCTION INTERNAL FIXATION Left 2016    Procedure: LEFT TIBIAL PLATEAU OPEN REDUCTION INTERNAL FIXATION;  Surgeon: Constantine Durbin MD;  Location: [facility];  Service:              PT Ortho       17 1000    Subjective Comments    Subjective Comments c/o back and LE pain.  She says she is at her apartment during week and does not have as much  pain sleeping in her bed, as compared to mom's house.  Notes more with standing, improved sitting/supine.  Reports walking more.  Primarily using SC  -LF    Subjective Pain    Able to rate subjective pain? yes  -LF    Pre-Treatment Pain Level 8  -LF    Post-Treatment Pain Level 4  -LF    Left Knee    Extension/Flexion ROM Details AROM 98; AAROM 101   90 degrees prior to PROM/AAROM exercises  -LF    Left Hip    Hip Flexion Gross Movement (4+/5) good plus  -LF    Hip Extension Gross Movement (4-/5) good minus  -LF    Hip ABduction Gross Movement (4+/5) good plus  -LF    Right Hip    Hip Flexion Gross Movement (4+/5) good plus  -LF    Hip Extension Gross Movement (4/5) good  -LF    Hip ABduction Gross Movement (5/5) normal  -LF    LLE Quick Girth (cm)    Met-heads 22 cm  -LF    Mid foot 23.5 cm  -LF    Smallest ankle 23 cm  -LF    Largest calf 36.3 cm  -LF    Gait Assessment/Treatment    Gait, [place] Level independent  -LF    Stairs Assessment/Treatment    Stairs, Comment up/down 2 flight stairs step-to pattern using SC adn HRx1.  Independent, but hesistates at top step due to fear of falling.  Also demonstrate ability to lead up with LLE  -LF      User Key  (r) = Recorded By, (t) = Taken By, (c) = Cosigned By    Initials Name Provider Type    DIOGENES Shields, PT Physical Therapist                            PT Assessment/Plan       08/29/17 1000       PT Assessment    Assessment Comments Rosalba has been seen for extensive outpatient therapy treatment beginning in March.  Overall progress was slow.  She initially came to us in a wheelchair, and is now ambulating usually with cane or walker.  She is able to walk without A.D.  She has now returned to living at her apartment and is able to manage stairs and tub transfers.  Treatment has been focused on ROM, strength, normalizing gait, reducing swelling.  She still has left hip weakness, but overall improved.  She was provided with updated HEP today.  Patient at  "maximal benefit of P.T.  and will be discharged at this time.      -LF     PT Plan    PT Plan Comments d/c to HEP  -LF       User Key  (r) = Recorded By, (t) = Taken By, (c) = Cosigned By    Initials Name Provider Type    DIOGENES Shields, PT Physical Therapist                    Exercises       08/29/17 1000          Subjective Comments    Subjective Comments c/o back and LE pain.  She says she is at her apartment during week and does not have as much pain sleeping in her bed, as compared to mom's house.  Notes more with standing, improved sitting/supine.  Reports walking more.  Primarily using SC  -LF      Subjective Pain    Able to rate subjective pain? yes  -LF      Pre-Treatment Pain Level 8  -LF      Post-Treatment Pain Level 4  -LF      Exercise 1    Exercise Name 1 Nu-step L5 x6'; Treadmill 2.0 incline 2.0 MPH x5'. Updated objective measures and functional tests. Ther ex included all fours rock, hip extension, cat stretch, lat. flex, stretch; supine bridge, 1/2 bridge, abd set; SLS with focus on maintain level pelvis; Reviewed other exercises per new handout given today  -LF      Cueing 1 Verbal;Tactile;Demo  -LF      Time (Minutes) 1 45  -LF        User Key  (r) = Recorded By, (t) = Taken By, (c) = Cosigned By    Initials Name Provider Type    DIOGENES Shields PT Physical Therapist                        Manual Rx (last 36 hours)      Manual Treatments       08/29/17 1000          Manual Rx 1    Manual Rx 1 Location Educated on self-MLD LLE with verbal, tactile cues and patient returned demonstration.  Passive stretching into flexion.  Applied 4\" compressogrip foot to knee, double layer foot and ankle  -LF      Manual Rx 1 Duration 25  -LF        User Key  (r) = Recorded By, (t) = Taken By, (c) = Cosigned By    Initials Name Provider Type    DIOGENES Shields PT Physical Therapist                PT OP Goals       08/29/17 1000       PT Short Term Goals    STG Date to Achieve 05/04/17  -LF     STG " 1 Pt independent with initial HEP for ROM, strengthening and standing balance.   -LF     STG 1 Progress Met  -LF     STG 2 Pt to demonstrate increased LT knee AROM to 0-90 deg for improved transfers and function.   -LF     STG 2 Progress Met  -LF     STG 3 Pt to demonstrate improved LT ankle AROM to > 5 deg DF for improved gait training / heel rise.   -LF     STG 3 Progress Partially Met  -LF     STG 3 Progress Comments not tested   -LF     STG 4 Pt to ambulate > 350 ft with least restrictive A.D. safely (WB per MD order).   -LF     STG 4 Progress Met  -LF     STG 5 Pt able to ascend / descend 6 steps independently with hand rail and A.D.   -LF     STG 5 Progress Met  -LF     Long Term Goals    LTG Date to Achieve 08/04/17  -LF     LTG 1 Pt to demonstrate increased LT knee AROM to 0-110 deg for improved transfers and function.   -LF     LTG 1 Progress Not Met  -LF     LTG 1 Progress Comments improved from 65 to 101  -LF     LTG 2 Pt to ambulate household  and community distances with least restrictive A.D., WBAT.   -LF     LTG 2 Progress Met  -LF     LTG 3 Pt able to ascend / descend 1 flight of stairs independently with hand rail and carrying 8lb object to simulated groceries   -LF     LTG 3 Progress Met  -LF     LTG 4 Pt to report increase in LEFS score by 50% to demonstrate improved functional mobility and QOL.   -LF     LTG 4 Progress Met  -LF     LTG 4 Progress Comments Improved from 16% to 36%  -LF     LTG 5 Pt able to return home / live independently.   -LF     LTG 5 Progress Met  -LF       User Key  (r) = Recorded By, (t) = Taken By, (c) = Cosigned By    Initials Name Provider Type    DIOGENES Shields, PT Physical Therapist                Therapy Education       08/29/17 1000          Therapy Education    Education Details Instructed on self-MLD LLE; Reviewed/updated HEP and issued updated handouts.  Reinforced getting knee high compression stocking (limited by transportation)  -LF      Given  HEP;Symptoms/condition management;Edema management  -LF      Program Reinforced  -LF      How Provided Verbal;Demonstration;Written  -LF      Provided to Patient  -LF      Level of Understanding Teach back education performed  -LF        User Key  (r) = Recorded By, (t) = Taken By, (c) = Cosigned By    Initials Name Provider Type    LF Anastacia Shields, AARTI Physical Therapist                Outcome Measures       08/29/17 1000          30 Second Chair Stand Test    30 Second Chair Stand Test 7   limited by leg pain; used LE's  -LF      [place] Balance Scale    Sitting to Standing 4  -LF      Standing Unsupported 4  -LF      Sitting with Back Unsupported but Feet Supported on Floor or on Stool 4  -LF      Standing to Sitting 4  -LF      Transfers 4  -LF      Standing Unsupported with Eyes Closed 4  -LF      Standing Unsupported with Feet Together 4  -LF      Reaching Forward with Outstretched Arm While Standing 4  -LF       Object From the Floor From a Standing Position 4  -LF      Turning to Look Behind Over Left and Right Shoulders While Standing 4  -LF      Turn 360 Degrees 4  -LF      Place Alternate Foot on Step or Stool While Standing Unsupported 4  -LF      Standing Unsupported with One Foot in Front 3  -LF      Standing on One Leg 1   on LLE, no difficulty RLE  -LF      [place] Total Score 52  -LF      Lower Extremity Functional Index    Any of your usual work, housework or school activities 1  -LF      Your usual hobbies, recreational or sporting activities 1  -LF      Getting into or out of the bath 2  -LF      Walking between rooms 2  -LF      Putting on your shoes or socks 2  -LF      Squatting 1  -LF      Lifting an object, like a bag of groceries from the floor 2  -LF      Performing light activities around your home 2  -LF      Performing heavy activities around your home 1  -LF      Getting into or out of a car 2  -LF      Walking 2 blocks 1  -LF      Walking a mile 1  -LF      Going up or down 10  stairs (about 1 flight of stairs) 1  -LF      Standing for 1 hour 1  -LF      Sitting for 1 hour 2  -LF      Running on even ground 2  -LF      Running on uneven ground 1  -LF      Making sharp turns while running fast 1  -LF      Hopping 1  -LF      Rolling over in bed 2  -LF      Total 29  -LF      Timed Up and Go (TUG)    TUG Test 1 11.6 seconds  -LF      TUG Test 2 12 seconds  -LF      Timed Up and Go Comments w/o A.D.  -LF      Functional Assessment    Outcome Measure Options [place] Balance;30 Second Chair Stand Test;Timed Up and Go (TUG);Lower Extremity Functional Scale (LEFS)  -LF        User Key  (r) = Recorded By, (t) = Taken By, (c) = Cosigned By    Initials Name Provider Type    LF Anastacia Shields, PT Physical Therapist            Time Calculation:   Start Time: 1030  Total Timed Code Minutes- PT: 70 minute(s)    Therapy Charges for Today     Code Description Service Date Service Provider Modifiers Qty    23428984983 HC PT MANUAL THERAPY EA 15 MIN 8/29/2017 Anastacia Shields, PT GP 2    [id number] HC PT THER PROC EA 15 MIN 8/29/2017 Anastacia Shields, PT GP 3          PT G-Codes  Outcome Measure Options: [place] Balance, 30 Second Chair Stand Test, Timed Up and Go (TUG), Lower Extremity Functional Scale (LEFS)     OP PT Discharge Summary  Date of Discharge: 08/29/17  Reason for Discharge: Maximum functional potential achieved  Outcomes Achieved: Patient able to partially acheive established goals  Discharge Destination: Home with home program      Anastacia Shields, PT  8/29/2017

## 2017-09-05 ENCOUNTER — APPOINTMENT (OUTPATIENT)
Dept: PHYSICAL THERAPY | Facility: HOSPITAL | Age: 62
End: 2017-09-05

## 2017-09-12 ENCOUNTER — TELEPHONE (OUTPATIENT)
Dept: ORTHOPEDIC SURGERY | Facility: CLINIC | Age: 62
End: 2017-09-12

## 2017-09-12 ENCOUNTER — APPOINTMENT (OUTPATIENT)
Dept: PHYSICAL THERAPY | Facility: HOSPITAL | Age: 62
End: 2017-09-12

## 2017-09-19 ENCOUNTER — APPOINTMENT (OUTPATIENT)
Dept: PHYSICAL THERAPY | Facility: HOSPITAL | Age: 62
End: 2017-09-19

## 2017-09-26 ENCOUNTER — APPOINTMENT (OUTPATIENT)
Dept: PHYSICAL THERAPY | Facility: HOSPITAL | Age: 62
End: 2017-09-26

## 2017-09-28 ENCOUNTER — OFFICE VISIT (OUTPATIENT)
Dept: ORTHOPEDIC SURGERY | Facility: CLINIC | Age: 62
End: 2017-09-28

## 2017-09-28 VITALS
HEART RATE: 88 BPM | SYSTOLIC BLOOD PRESSURE: 142 MMHG | HEIGHT: 65 IN | WEIGHT: 198 LBS | BODY MASS INDEX: 32.99 KG/M2 | DIASTOLIC BLOOD PRESSURE: 80 MMHG

## 2017-09-28 DIAGNOSIS — M25.562 LEFT KNEE PAIN, UNSPECIFIED CHRONICITY: ICD-10-CM

## 2017-09-28 DIAGNOSIS — M24.662 ARTHROFIBROSIS OF KNEE JOINT, LEFT: ICD-10-CM

## 2017-09-28 DIAGNOSIS — M17.32 POST-TRAUMATIC OSTEOARTHRITIS OF LEFT KNEE: Primary | ICD-10-CM

## 2017-09-28 PROCEDURE — 99214 OFFICE O/P EST MOD 30 MIN: CPT | Performed by: ORTHOPAEDIC SURGERY

## 2017-09-28 RX ORDER — TRAMADOL HYDROCHLORIDE 50 MG/1
50 TABLET ORAL EVERY 6 HOURS PRN
Qty: 60 TABLET | Refills: 1 | Status: SHIPPED | OUTPATIENT
Start: 2017-09-28 | End: 2018-05-02

## 2017-09-28 NOTE — PROGRESS NOTES
"    [facility] Note    Subjective     CC: Left knee pain and leg pain    HPI    Rosalba Girard is a 62 y.o. female.  Patient returns for follow-up after ORIF of her left lateral tibial plateau December 2016.  She has done well overall recently.  She had a lot of postoperative stiffness.  She is complaining now of some thigh pain and lower extremity pain and numbness and tingling.  She has seen Dr. Driscoll in the past.  She has pain in both legs.  She is using over-the-counter medication for pain but his does not seem to be enough.  She is having some weakness going up and down steps she tells me.  She denies bowel or bladder disturbance.       ROS:    Constiutional:Pt denies fever, chills, nausea, or vomiting.  MSK:as above    Objective      Physical Exam  /80  Pulse 88  Ht 64.5\" (163.8 cm)  Wt 198 lb (89.8 kg)  LMP  (LMP Unknown)  BMI 33.46 kg/m2    Body mass index is 33.46 kg/(m^2).      Ortho Exam  Peripheral Vascular:    Upper Extremity:   Inspection:  Left--no cyanotic nail beds Right--no cyanotic nail beds   Bilateral:  Pink nail beds with brisk capillary refill   Palpation:  Bilateral radial pulse normal    Musculoskeletal:      Lower Extremity:   Assistive Device Used for Ambulation: Cane    Inspection and Palpation:      Left knee:  Calf:  Soft and non tender  Effusion:  1+  Pulses:  2+  Ecchymosis:  None  Warmth:  Appropriate  Incision:  Clean, dry, and intact.  Healing appropriately    ROM:  Left:  Extension: 0    Flexion: 95      Deformities/Malalignments/Discrepancies:    Left:  none    Functional Testing:  Left:  Straight Leg Raise: 5/5    Imaging/Studies  2 views of the tibia are taken today in the office.  Patient's implant remains intact.  Her post traumatic arthritis appears to be stable.    Assessment:  1. Post-traumatic osteoarthritis of left knee    2. Left knee pain, unspecified chronicity    3. Arthrofibrosis of knee joint, left        Plan:  1. We will get her " a prescription for tramadol today.  Gilberto has been run  2. Follow-up as needed  3. We recommended she see Dr. Driscoll again for her low back to further evaluate her symptoms and to see if the herniated disc she was told she had has worsened.      Medical Decision Making  Management Options : prescription/IM medicine  Data/Risk: radiology tests and independent visualization of imaging, lab tests, or EMG/NCV    Constantine Durbin MD  09/28/17  10:05 AM

## 2017-11-07 ENCOUNTER — TRANSCRIBE ORDERS (OUTPATIENT)
Dept: ADMINISTRATIVE | Facility: HOSPITAL | Age: 62
End: 2017-11-07

## 2017-11-07 DIAGNOSIS — Z12.31 VISIT FOR SCREENING MAMMOGRAM: Primary | ICD-10-CM

## 2017-11-15 ENCOUNTER — HOSPITAL ENCOUNTER (OUTPATIENT)
Dept: MAMMOGRAPHY | Facility: HOSPITAL | Age: 62
Discharge: HOME | End: 2017-11-15
Admitting: FAMILY MEDICINE

## 2017-11-15 ENCOUNTER — APPOINTMENT (OUTPATIENT)
Dept: MAMMOGRAPHY | Facility: HOSPITAL | Age: 62
End: 2017-11-15

## 2017-11-15 DIAGNOSIS — Z12.31 VISIT FOR SCREENING MAMMOGRAM: ICD-10-CM

## 2017-11-15 PROCEDURE — G0202 SCR MAMMO BI INCL CAD: HCPCS

## 2017-11-15 PROCEDURE — 77063 BREAST TOMOSYNTHESIS BI: CPT

## 2017-11-16 ENCOUNTER — OFFICE VISIT (OUTPATIENT)
Dept: FAMILY MEDICINE CLINIC | Facility: CLINIC | Age: 62
End: 2017-11-16

## 2017-11-16 VITALS
BODY MASS INDEX: 32.51 KG/M2 | TEMPERATURE: 98.2 F | DIASTOLIC BLOOD PRESSURE: 78 MMHG | SYSTOLIC BLOOD PRESSURE: 114 MMHG | HEIGHT: 65 IN | HEART RATE: 72 BPM | RESPIRATION RATE: 16 BRPM | WEIGHT: 195.1 LBS

## 2017-11-16 DIAGNOSIS — R68.89 FLU-LIKE SYMPTOMS: Primary | ICD-10-CM

## 2017-11-16 DIAGNOSIS — J06.9 ACUTE URI: ICD-10-CM

## 2017-11-16 LAB
EXPIRATION DATE: NORMAL
FLUAV AG NPH QL: NEGATIVE
FLUBV AG NPH QL: NEGATIVE
INTERNAL CONTROL: NORMAL
Lab: NORMAL

## 2017-11-16 PROCEDURE — 77067 SCR MAMMO BI INCL CAD: CPT | Performed by: RADIOLOGY

## 2017-11-16 PROCEDURE — 87804 INFLUENZA ASSAY W/OPTIC: CPT | Performed by: PHYSICIAN ASSISTANT

## 2017-11-16 PROCEDURE — 77063 BREAST TOMOSYNTHESIS BI: CPT | Performed by: RADIOLOGY

## 2017-11-16 PROCEDURE — 99213 OFFICE O/P EST LOW 20 MIN: CPT | Performed by: PHYSICIAN ASSISTANT

## 2017-11-16 RX ORDER — AMOXICILLIN 875 MG/1
875 TABLET, COATED ORAL 2 TIMES DAILY
Qty: 14 TABLET | Refills: 0 | Status: SHIPPED | OUTPATIENT
Start: 2017-11-16 | End: 2018-01-08

## 2017-11-16 NOTE — PROGRESS NOTES
Subjective   Rosalba Girard is a 62 y.o. female    History of Present Illness  Patient comes in today complaining of feeling sick since yesterday.  She states she is concerned she may have the flu although she did take a flu shot in October.  She works here at [employer].  She states she has been coughing and congested with a sore throat feeling feverish and achy since yesterday.  She took over-the-counter sinus medicine without relief.  Her nose is clogged and she feels weak.  She states she is nauseated and she vomited once and had diarrhea once last night.  Denies abdominal pain.  The following portions of the patient's history were reviewed and updated as appropriate: allergies, current medications, past social history and problem list    Review of Systems   Constitutional: Positive for chills, diaphoresis and fatigue. Negative for fever.   HENT: Positive for congestion, postnasal drip, rhinorrhea, sneezing, sore throat and voice change. Negative for sinus pressure.    Respiratory: Positive for cough.    Gastrointestinal: Positive for diarrhea, nausea and vomiting. Negative for abdominal pain.   Musculoskeletal: Positive for myalgias.       Objective     Vitals:    11/16/17 0953   BP: 114/78   Pulse: 72   Resp: 16   Temp: 98.2 °F (36.8 °C)       Physical Exam   Constitutional: She appears well-developed and well-nourished. No distress.   HENT:   Head: Normocephalic and atraumatic.   Right Ear: External ear normal.   Left Ear: External ear normal.   Nose: Nose normal.   Mouth/Throat: Oropharynx is clear and moist. No oropharyngeal exudate.   Eyes: Conjunctivae are normal. Right eye exhibits no discharge. Left eye exhibits no discharge.   Neck: Normal range of motion. Neck supple.   Cardiovascular: Normal rate, regular rhythm and normal heart sounds.    Pulmonary/Chest: Effort normal and breath sounds normal. No respiratory distress.   Lymphadenopathy:     She has no cervical adenopathy.   Skin:  Skin is warm and dry. She is not diaphoretic.   Nursing note and vitals reviewed.    Rapid flu screen negative, results discussed with patient.  Assessment/Plan     Diagnoses and all orders for this visit:    Flu-like symptoms  -     POCT Influenza A/B    Acute URI    Other orders  -     amoxicillin (AMOXIL) 875 MG tablet; Take 1 tablet by mouth 2 (Two) Times a Day.    Work note given through tomorrow

## 2017-11-21 ENCOUNTER — TRANSCRIBE ORDERS (OUTPATIENT)
Dept: MAMMOGRAPHY | Facility: HOSPITAL | Age: 62
End: 2017-11-21

## 2017-11-21 ENCOUNTER — HOSPITAL ENCOUNTER (OUTPATIENT)
Dept: MAMMOGRAPHY | Facility: HOSPITAL | Age: 62
Discharge: HOME | End: 2017-11-21
Admitting: FAMILY MEDICINE

## 2017-11-21 DIAGNOSIS — R92.8 ABNORMAL MAMMOGRAM: Primary | ICD-10-CM

## 2017-11-21 DIAGNOSIS — R92.8 ABNORMAL MAMMOGRAM: ICD-10-CM

## 2017-11-21 PROCEDURE — G0206 DX MAMMO INCL CAD UNI: HCPCS

## 2017-11-21 PROCEDURE — 77065 DX MAMMO INCL CAD UNI: CPT | Performed by: RADIOLOGY

## 2018-01-08 ENCOUNTER — OFFICE VISIT (OUTPATIENT)
Dept: FAMILY MEDICINE CLINIC | Facility: CLINIC | Age: 63
End: 2018-01-08

## 2018-01-08 VITALS
HEART RATE: 88 BPM | OXYGEN SATURATION: 97 % | SYSTOLIC BLOOD PRESSURE: 122 MMHG | TEMPERATURE: 97.7 F | HEIGHT: 65 IN | BODY MASS INDEX: 32.82 KG/M2 | RESPIRATION RATE: 20 BRPM | DIASTOLIC BLOOD PRESSURE: 82 MMHG | WEIGHT: 197 LBS

## 2018-01-08 DIAGNOSIS — Z87.81 S/P ORIF (OPEN REDUCTION INTERNAL FIXATION) FRACTURE: ICD-10-CM

## 2018-01-08 DIAGNOSIS — Z98.890 S/P ORIF (OPEN REDUCTION INTERNAL FIXATION) FRACTURE: ICD-10-CM

## 2018-01-08 DIAGNOSIS — L23.9 ALLERGIC DERMATITIS: Primary | ICD-10-CM

## 2018-01-08 PROCEDURE — 99213 OFFICE O/P EST LOW 20 MIN: CPT | Performed by: FAMILY MEDICINE

## 2018-01-08 PROCEDURE — 96372 THER/PROPH/DIAG INJ SC/IM: CPT | Performed by: FAMILY MEDICINE

## 2018-01-08 RX ORDER — PREDNISONE 20 MG/1
TABLET ORAL
Qty: 15 TABLET | Refills: 0 | Status: SHIPPED | OUTPATIENT
Start: 2018-01-08 | End: 2018-05-02

## 2018-01-08 RX ORDER — ETODOLAC 500 MG/1
500 TABLET, FILM COATED ORAL 2 TIMES DAILY WITH MEALS
Qty: 60 TABLET | Refills: 2 | Status: SHIPPED | OUTPATIENT
Start: 2018-01-08 | End: 2018-05-02 | Stop reason: SDUPTHER

## 2018-01-08 RX ORDER — METHYLPREDNISOLONE ACETATE 40 MG/ML
40 INJECTION, SUSPENSION INTRA-ARTICULAR; INTRALESIONAL; INTRAMUSCULAR; SOFT TISSUE ONCE
Status: COMPLETED | OUTPATIENT
Start: 2018-01-08 | End: 2018-01-08

## 2018-01-08 RX ORDER — METHYLPREDNISOLONE ACETATE 40 MG/ML
40 INJECTION, SUSPENSION INTRA-ARTICULAR; INTRALESIONAL; INTRAMUSCULAR; SOFT TISSUE ONCE
Qty: 1 ML | Refills: 0 | Status: SHIPPED | OUTPATIENT
Start: 2018-01-08 | End: 2018-01-08

## 2018-01-08 RX ADMIN — METHYLPREDNISOLONE ACETATE 40 MG: 40 INJECTION, SUSPENSION INTRA-ARTICULAR; INTRALESIONAL; INTRAMUSCULAR; SOFT TISSUE at 14:16

## 2018-01-08 NOTE — PROGRESS NOTES
Subjective   Rosalba Girard is a 62 y.o. female    HPI Comments: Patient reports sudden onset of lip and tongue swelling and subsequent development of rash on her trunk.  She has recently started tramadol but was also eating peanuts over the weekend and has had previous suspected allergy to peanut products.  She denies any shortness of breath.  She took Benadryl which helped slightly with her lip swelling but did not help her rash.  She is on tramadol for knee pain.    Rash   Pertinent negatives include no congestion, cough, fatigue, fever, shortness of breath or sore throat.       The following portions of the patient's history were reviewed and updated as appropriate: allergies, current medications, past social history and problem list    Review of Systems   Constitutional: Negative for chills, fatigue and fever.   HENT: Positive for facial swelling. Negative for congestion and sore throat.    Eyes: Negative.    Respiratory: Negative for cough and shortness of breath.    Cardiovascular: Negative for chest pain and palpitations.   Musculoskeletal: Negative.    Skin: Positive for color change and rash.       Objective     Vitals:    01/08/18 1328   BP: 122/82   Pulse: 88   Resp: 20   Temp: 97.7 °F (36.5 °C)   SpO2: 97%       Physical Exam   Constitutional: She appears well-developed and well-nourished.   HENT:   Head: Normocephalic.   Mouth/Throat: Oropharynx is clear and moist.   Eyes: Conjunctivae are normal. Pupils are equal, round, and reactive to light.   Neck: Neck supple. No thyromegaly present.   Cardiovascular: Normal rate and regular rhythm.    Pulmonary/Chest: Effort normal and breath sounds normal. She has no wheezes.   Musculoskeletal: She exhibits no edema or deformity.   Skin: Skin is warm and dry. Rash noted.   Psychiatric: She has a normal mood and affect.   Nursing note and vitals reviewed.      Assessment/Plan   Problem List Items Addressed This Visit        Other    S/P ORIF (open reduction  internal fixation) fracture  left tibial plateau     Relevant Medications    etodolac (LODINE) 500 MG tablet      Other Visit Diagnoses     Allergic dermatitis    -  Primary    Relevant Medications    predniSONE (DELTASONE) 20 MG tablet    methylPREDNISolone acetate (DEPO-medrol) injection 40 mg (Completed)

## 2018-02-01 DIAGNOSIS — I10 ESSENTIAL HYPERTENSION: ICD-10-CM

## 2018-02-01 RX ORDER — BISOPROLOL FUMARATE AND HYDROCHLOROTHIAZIDE 2.5; 6.25 MG/1; MG/1
1 TABLET ORAL DAILY
Qty: 90 TABLET | Refills: 3 | Status: SHIPPED | OUTPATIENT
Start: 2018-02-01 | End: 2018-02-26 | Stop reason: SDUPTHER

## 2018-02-26 ENCOUNTER — TELEPHONE (OUTPATIENT)
Dept: FAMILY MEDICINE CLINIC | Facility: CLINIC | Age: 63
End: 2018-02-26

## 2018-02-26 DIAGNOSIS — I10 ESSENTIAL HYPERTENSION: ICD-10-CM

## 2018-02-26 RX ORDER — BISOPROLOL FUMARATE AND HYDROCHLOROTHIAZIDE 2.5; 6.25 MG/1; MG/1
1 TABLET ORAL DAILY
Qty: 90 TABLET | Refills: 3 | Status: SHIPPED | OUTPATIENT
Start: 2018-02-26 | End: 2018-02-28 | Stop reason: SDUPTHER

## 2018-02-26 NOTE — TELEPHONE ENCOUNTER
PATIENT STATES THAT [facility] IS WAITING ON AN RX TO BE SENT FOR HER BISOPROLOL. PLEASE GIVE HER A CALL ON MOBILE OR WORK [phone]

## 2018-02-28 ENCOUNTER — TELEPHONE (OUTPATIENT)
Dept: FAMILY MEDICINE CLINIC | Facility: CLINIC | Age: 63
End: 2018-02-28

## 2018-02-28 DIAGNOSIS — I10 ESSENTIAL HYPERTENSION: ICD-10-CM

## 2018-02-28 RX ORDER — BISOPROLOL FUMARATE AND HYDROCHLOROTHIAZIDE 2.5; 6.25 MG/1; MG/1
1 TABLET ORAL DAILY
Qty: 90 TABLET | Refills: 0 | Status: SHIPPED | OUTPATIENT
Start: 2018-02-28 | End: 2018-05-02

## 2018-04-08 DIAGNOSIS — I10 ESSENTIAL HYPERTENSION: ICD-10-CM

## 2018-04-09 ENCOUNTER — TELEPHONE (OUTPATIENT)
Dept: FAMILY MEDICINE CLINIC | Facility: CLINIC | Age: 63
End: 2018-04-09

## 2018-04-09 RX ORDER — BISOPROLOL FUMARATE AND HYDROCHLOROTHIAZIDE 2.5; 6.25 MG/1; MG/1
1 TABLET ORAL DAILY
Qty: 90 TABLET | Refills: 0 | Status: SHIPPED | OUTPATIENT
Start: 2018-04-09 | End: 2018-05-02 | Stop reason: SDUPTHER

## 2018-04-09 NOTE — TELEPHONE ENCOUNTER
----- Message from Lashonda Chen sent at 4/9/2018  9:39 AM EDT -----  Contact: PATIENT  SHE NEEDS A 90 DAY SUPPLY OF HER BLOOD PRESSURE CALLED IN TO [facility]. SHE DOESN'T KNOW THE NAME, STARTS WITH L. SHE CHANGED HER MEDS ALL TO [facility] AND THOUGHT ALL THIS WAS TAKEN CARE OF. AND SHE ASKS THAT SHE GET A PHONE CALL WHEN THIS HAS BEEN TAKEN CARE OF.

## 2018-04-09 NOTE — TELEPHONE ENCOUNTER
----- Message from Cesilia Chávez sent at 4/9/2018  1:03 PM EDT -----  Contact: PT.  Pt. SEE'S Dr. MCKINLEY.  Pt. IS NEEDING A BP MED. REFILLED Of:  [facility], #90 DAYS SUPPLY.  Pt. IS SEEING  On: 05-.  RX=[facility].  Pt. CAN BE REACHED @ ABOVE WORK # OR HOME #.

## 2018-05-02 ENCOUNTER — OFFICE VISIT (OUTPATIENT)
Dept: FAMILY MEDICINE CLINIC | Facility: CLINIC | Age: 63
End: 2018-05-02

## 2018-05-02 VITALS
SYSTOLIC BLOOD PRESSURE: 118 MMHG | BODY MASS INDEX: 34.83 KG/M2 | HEART RATE: 87 BPM | DIASTOLIC BLOOD PRESSURE: 80 MMHG | HEIGHT: 64 IN | OXYGEN SATURATION: 99 % | WEIGHT: 204 LBS | TEMPERATURE: 97.3 F

## 2018-05-02 DIAGNOSIS — Z87.81 S/P ORIF (OPEN REDUCTION INTERNAL FIXATION) FRACTURE: ICD-10-CM

## 2018-05-02 DIAGNOSIS — Z98.890 S/P ORIF (OPEN REDUCTION INTERNAL FIXATION) FRACTURE: ICD-10-CM

## 2018-05-02 DIAGNOSIS — M54.2 CERVICALGIA: ICD-10-CM

## 2018-05-02 DIAGNOSIS — R53.83 FATIGUE, UNSPECIFIED TYPE: ICD-10-CM

## 2018-05-02 DIAGNOSIS — I10 ESSENTIAL HYPERTENSION: Primary | ICD-10-CM

## 2018-05-02 PROCEDURE — 99213 OFFICE O/P EST LOW 20 MIN: CPT | Performed by: FAMILY MEDICINE

## 2018-05-02 PROCEDURE — 96372 THER/PROPH/DIAG INJ SC/IM: CPT | Performed by: FAMILY MEDICINE

## 2018-05-02 RX ORDER — BISOPROLOL FUMARATE AND HYDROCHLOROTHIAZIDE 2.5; 6.25 MG/1; MG/1
1 TABLET ORAL DAILY
Qty: 90 TABLET | Refills: 3 | Status: SHIPPED | OUTPATIENT
Start: 2018-05-02 | End: 2019-05-09 | Stop reason: SDUPTHER

## 2018-05-02 RX ORDER — ETODOLAC 500 MG/1
500 TABLET, FILM COATED ORAL 2 TIMES DAILY WITH MEALS
Qty: 180 TABLET | Refills: 3 | Status: SHIPPED | OUTPATIENT
Start: 2018-05-02 | End: 2018-10-23

## 2018-05-02 RX ADMIN — CYANOCOBALAMIN 1000 MCG: 1000 INJECTION, SOLUTION INTRAMUSCULAR; SUBCUTANEOUS at 08:11

## 2018-05-02 NOTE — PROGRESS NOTES
Subjective   Rosalba Girard is a 62 y.o. female    Here for follow-up and recheck regarding hypertension, knee pain and neck pain.  Blood pressure has been well controlled and is due for refills.  She is also pleased with the Lodine as it has helped with her knee pain and neck pain with no adverse reaction.  She would like refills for this medication also.  She feels she hurt her neck when she fell and fractured her left knee.      Hypertension   Associated symptoms include neck pain. Pertinent negatives include no chest pain, headaches, palpitations or shortness of breath.   Knee Pain          The following portions of the patient's history were reviewed and updated as appropriate: allergies, current medications, past social history and problem list    Review of Systems   Constitutional: Negative for fatigue and unexpected weight change.   Respiratory: Negative for cough, chest tightness and shortness of breath.    Cardiovascular: Negative for chest pain, palpitations and leg swelling.   Gastrointestinal: Negative for nausea.   Musculoskeletal: Positive for arthralgias, gait problem, joint swelling and neck pain.   Skin: Negative for color change and rash.   Neurological: Negative for dizziness, syncope, weakness and headaches.   Hematological: Negative.    Psychiatric/Behavioral: Negative.        Objective     Vitals:    05/02/18 0921   BP: 118/80   Pulse: 87   Temp: 97.3 °F (36.3 °C)   SpO2: 99%       Physical Exam   Constitutional: She appears well-developed and well-nourished.   Neck: No JVD present. Spinous process tenderness and muscular tenderness present. Decreased range of motion present. No thyromegaly present.   Cardiovascular: Normal rate, regular rhythm, normal heart sounds, intact distal pulses and normal pulses.    No murmur heard.  Pulmonary/Chest: Effort normal and breath sounds normal. No respiratory distress.   Abdominal: Soft. Bowel sounds are normal. There is no hepatosplenomegaly. There is  no tenderness.   Musculoskeletal: She exhibits no edema.   Lymphadenopathy:     She has no cervical adenopathy.   Skin: Skin is warm and dry.   Nursing note and vitals reviewed.      Assessment/Plan   Problem List Items Addressed This Visit        Other    S/P ORIF (open reduction internal fixation) fracture  left tibial plateau     Relevant Medications    etodolac (LODINE) 500 MG tablet      Other Visit Diagnoses     Essential hypertension    -  Primary    Relevant Medications    bisoprolol-hydrochlorothiazide (ZIAC) 2.5-6.25 MG per tablet    Cervicalgia        Fatigue, unspecified type        Relevant Medications    cyanocobalamin injection 1,000 mcg (Completed)

## 2018-05-03 RX ORDER — CYANOCOBALAMIN 1000 UG/ML
1000 INJECTION, SOLUTION INTRAMUSCULAR; SUBCUTANEOUS ONCE
Status: COMPLETED | OUTPATIENT
Start: 2018-05-03 | End: 2018-05-02

## 2018-05-22 ENCOUNTER — HOSPITAL ENCOUNTER (OUTPATIENT)
Dept: MAMMOGRAPHY | Facility: HOSPITAL | Age: 63
Discharge: HOME | End: 2018-05-22
Admitting: FAMILY MEDICINE

## 2018-05-22 ENCOUNTER — TRANSCRIBE ORDERS (OUTPATIENT)
Dept: MAMMOGRAPHY | Facility: HOSPITAL | Age: 63
End: 2018-05-22

## 2018-05-22 DIAGNOSIS — Z12.31 VISIT FOR SCREENING MAMMOGRAM: Primary | ICD-10-CM

## 2018-05-22 DIAGNOSIS — R92.8 ABNORMAL MAMMOGRAM: ICD-10-CM

## 2018-05-22 PROCEDURE — 77065 DX MAMMO INCL CAD UNI: CPT | Performed by: RADIOLOGY

## 2018-05-22 PROCEDURE — 77065 DX MAMMO INCL CAD UNI: CPT

## 2018-06-07 ENCOUNTER — OFFICE VISIT (OUTPATIENT)
Dept: ORTHOPEDIC SURGERY | Facility: CLINIC | Age: 63
End: 2018-06-07

## 2018-06-07 VITALS — OXYGEN SATURATION: 98 % | WEIGHT: 202.16 LBS | HEART RATE: 104 BPM | BODY MASS INDEX: 34.51 KG/M2 | HEIGHT: 64 IN

## 2018-06-07 DIAGNOSIS — Z87.81 S/P ORIF (OPEN REDUCTION INTERNAL FIXATION) FRACTURE: ICD-10-CM

## 2018-06-07 DIAGNOSIS — M25.572 ACUTE LEFT ANKLE PAIN: Primary | ICD-10-CM

## 2018-06-07 DIAGNOSIS — R52 PAIN: ICD-10-CM

## 2018-06-07 DIAGNOSIS — Z98.890 S/P ORIF (OPEN REDUCTION INTERNAL FIXATION) FRACTURE: ICD-10-CM

## 2018-06-07 DIAGNOSIS — S82.142D TIBIAL PLATEAU FRACTURE, LEFT, CLOSED, WITH ROUTINE HEALING, SUBSEQUENT ENCOUNTER: ICD-10-CM

## 2018-06-07 PROCEDURE — 99214 OFFICE O/P EST MOD 30 MIN: CPT | Performed by: ORTHOPAEDIC SURGERY

## 2018-06-07 NOTE — PROGRESS NOTES
"    [facility] Note    Subjective     CC: Pain and Edema of the Left Ankle (Left ankle/Started about 3-4 days ago/Pain is at a 10 today/She has tried to elevate and rest it some) and Follow-up of the Left Knee      HPI    Rosalba Girard is a 62 y.o. female. Patient is here today for left ankle swelling.  She complains of leg pain as well.  She denies chest pain or shortness of breath.  Patient denies any recent trauma.  She underwent ORIF of the left lateral tibial plateau and did OK only after the procedure.  2 her credit, she has gone back to work.       ROS:    Constiutional:Pt denies fever, chills, nausea, or vomiting.  MSK:as above    Objective      Past Medical History  Past Medical History:   Diagnosis Date   • Asthma    • Chronic superficial dermatitis    • Closed fracture of left tibial plateau with routine healing    • Dietary counseling    • Encounter for routine adult health examination    • Fibromyalgia    • GERD (gastroesophageal reflux disease)    • Hair or hair follicle disorder    • History of anemia    • Hives    • Hypertension    • Irritable bowel disease    • Malaise and fatigue    • Myalgia and myositis    • PONV (postoperative nausea and vomiting)     on occasion    • Prediabetes    • Rash and nonspecific skin eruption    • Sinusitis, acute maxillary    • UTI (urinary tract infection)    • Wears glasses          Physical Exam  Pulse 104   Ht 163.8 cm (64.49\")   Wt 91.7 kg (202 lb 2.6 oz)   LMP  (LMP Unknown)   SpO2 98%   BMI 34.18 kg/m²     Body mass index is 34.18 kg/m².    Patient is well nourished and well developed.        Ortho Exam  Peripheral Vascular:  Lower Extremity:  Inspection:  Left--rapid capillary refill  Palpation:  Dorsalis pedis pulse:  Left--normal    Assistive Devices used for Ambulation:  Standard cane    Neurologic  Sensory:    Light Touch:     Left foot:  Dorsal intact and plantar intact   [place]:  deferred    Overall Assessment of " Muscle Strength and Tone:  Lower Extremities:       Left:  Tibialis anterior--4+/5    Gastroc soleus--4+/5    EHL--5/5    FHL--5/5    Musculoskeletal  Lower Extremity  Ankle/Foot:  Inspection and Palpation:        Left:  Tenderness: Over the sinus Tarsi    Swelling:present but appropriate    Calf Tenderness:  None    Skin:  intact    Effusion:  None    Crepitus:  None   ROM:     Left: Plantarflexion--20    Dorsiflexion--5       Imaging/Labs/EMG Reviewed:  Imaging Results (last 24 hours)     Procedure Component Value Units Date/Time    XR Ankle 3+ View Left [[id number]] Resulted:  06/07/18 1309     Updated:  06/07/18 1310    Narrative:       Knee X-Ray    Indication: Pain    Study:  Upright AP, Lateral, and Sunrise views of Left knee    Comparison: Left knee 9/28/2017     Findings:  The fracture of the lateral tibial plateau appears to be healed and well   aligned.  The proximal tibial implant is intact.  No bony lesions are present  Normal soft tissue appearance  Normal joint spaces and alignment    Impression: Healed left lateral tibial plateau fracture status post ORIF          XR Knee 3+ View With [place] Left [[id number]] Resulted:  06/07/18 1308     Updated:  06/07/18 1310    Narrative:       Left Ankle X-Ray    Indication: Pain  Views: AP, Lateral, Mortise    Comparison: none    Findings:   No fracture  No bony lesion  Soft tissues normal  Normal joint spaces with mortise well-aligned, no evidence of syndesmosis   widening  Diffuse osteopenia is noted    Impression: Negative left ankle x-ray.          Assessment:  1. Acute left ankle pain    2. Pain    3. S/P ORIF (open reduction internal fixation) fracture    4. Tibial plateau fracture, left, closed, with routine healing, subsequent encounter        Plan:  1. Recommend over the counter anti-inflammatories for pain and/or swelling  2. We recommended Jobst stockings for the patient.  3. 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  4. Patient is asking for an impairment rating which is being requested by her attorneys.  Even though this is not a work-related case, she qualifies for a 5% whole person impairment based on the diagnosis related ratings.  5. Follow-up when necessary      Medical Decision Making  Management Options : over-the-counter medicine  Data/Risk: radiology tests and independent visualization of imaging, lab tests, or EMG/NCV    Constantine Durbin MD  06/07/18  2:32 PM

## 2018-08-02 ENCOUNTER — TELEPHONE (OUTPATIENT)
Dept: FAMILY MEDICINE CLINIC | Facility: CLINIC | Age: 63
End: 2018-08-02

## 2018-08-02 NOTE — TELEPHONE ENCOUNTER
----- Message from Lashonda Chen sent at 8/1/2018  3:08 PM EDT -----  Contact: PATIENT  SHE CALLED TO CHECK THE STATUS OF FMLA PAPERWORK, SHE NEEDS IT COMPLETED ASAP. PLEASE CALL HER AND LET HER KNOW [phone]

## 2018-08-10 ENCOUNTER — OFFICE VISIT (OUTPATIENT)
Dept: FAMILY MEDICINE CLINIC | Facility: CLINIC | Age: 63
End: 2018-08-10

## 2018-08-10 VITALS
SYSTOLIC BLOOD PRESSURE: 118 MMHG | HEART RATE: 71 BPM | HEIGHT: 65 IN | TEMPERATURE: 98.6 F | WEIGHT: 208 LBS | OXYGEN SATURATION: 99 % | BODY MASS INDEX: 34.66 KG/M2 | DIASTOLIC BLOOD PRESSURE: 74 MMHG

## 2018-08-10 DIAGNOSIS — R53.83 FATIGUE, UNSPECIFIED TYPE: ICD-10-CM

## 2018-08-10 DIAGNOSIS — E53.8 VITAMIN B 12 DEFICIENCY: ICD-10-CM

## 2018-08-10 DIAGNOSIS — J06.9 ACUTE URI: Primary | ICD-10-CM

## 2018-08-10 PROCEDURE — 99213 OFFICE O/P EST LOW 20 MIN: CPT | Performed by: PHYSICIAN ASSISTANT

## 2018-08-10 PROCEDURE — 96372 THER/PROPH/DIAG INJ SC/IM: CPT | Performed by: PHYSICIAN ASSISTANT

## 2018-08-10 RX ORDER — AMOXICILLIN 875 MG/1
875 TABLET, COATED ORAL 2 TIMES DAILY
Qty: 20 TABLET | Refills: 0 | Status: SHIPPED | OUTPATIENT
Start: 2018-08-10 | End: 2018-10-23

## 2018-08-10 RX ORDER — CYANOCOBALAMIN 1000 UG/ML
1000 INJECTION, SOLUTION INTRAMUSCULAR; SUBCUTANEOUS
Status: SHIPPED | OUTPATIENT
Start: 2018-08-10

## 2018-08-10 RX ORDER — DEXTROMETHORPHAN HYDROBROMIDE AND PROMETHAZINE HYDROCHLORIDE 15; 6.25 MG/5ML; MG/5ML
SYRUP ORAL
Qty: 180 ML | Refills: 0 | Status: SHIPPED | OUTPATIENT
Start: 2018-08-10 | End: 2018-10-23

## 2018-08-10 RX ADMIN — CYANOCOBALAMIN 1000 MCG: 1000 INJECTION, SOLUTION INTRAMUSCULAR; SUBCUTANEOUS at 13:05

## 2018-08-10 NOTE — PROGRESS NOTES
Anthony Girard is a 63 y.o. female  Sore Throat (Sore throat and dry cough x2 days ) and Sinus Pressure (sinus pressure x2 days )      History of Present Illness  Patient comes in today for evaluation of sore throat and sinus pressure with cough for the past 2 days.  Symptoms have been worsening.  She states she had some nausea but no vomiting or abdominal pain.  Additionally she is due a B12 shot, she has a history of pernicious anemia with B12 deficiency.  The following portions of the patient's history were reviewed and updated as appropriate: allergies, current medications, past social history and problem list    Review of Systems   Constitutional: Positive for fatigue. Negative for fever.   HENT: Positive for congestion, postnasal drip, rhinorrhea, sneezing, sore throat and voice change. Negative for sinus pressure.    Respiratory: Positive for cough.    Gastrointestinal: Positive for nausea.       Objective     Vitals:    08/10/18 1146   BP: 118/74   Pulse: 71   Temp: 98.6 °F (37 °C)   SpO2: 99%       Physical Exam   Constitutional: She is oriented to person, place, and time. She appears well-developed and well-nourished. No distress.   HENT:   Head: Normocephalic and atraumatic.   Right Ear: External ear normal.   Left Ear: External ear normal.   Nose: Nose normal.   Mouth/Throat: Oropharynx is clear and moist. No oropharyngeal exudate.   Eyes: Conjunctivae are normal. Right eye exhibits no discharge. Left eye exhibits no discharge.   Neck: Normal range of motion. Neck supple.   Cardiovascular: Normal rate, regular rhythm and normal heart sounds.    Pulmonary/Chest: Effort normal and breath sounds normal. No respiratory distress.   Lymphadenopathy:     She has no cervical adenopathy.   Neurological: She is alert and oriented to person, place, and time.   Skin: Skin is warm and dry. She is not diaphoretic.   Nursing note and vitals reviewed.      Assessment/Plan     Diagnoses and all orders for  this visit:    Acute URI    Fatigue, unspecified type  -     cyanocobalamin injection 1,000 mcg; Inject 1 mL into the appropriate muscle as directed by prescriber Every 28 (Twenty-Eight) Days.    Vitamin B 12 deficiency    Other orders  -     amoxicillin (AMOXIL) 875 MG tablet; Take 1 tablet by mouth 2 (Two) Times a Day.  -     promethazine-dextromethorphan (PROMETHAZINE-DM) 6.25-15 MG/5ML syrup; Take 1-2 tsp every 4 - 6 hours as needed for cough

## 2018-10-23 ENCOUNTER — OFFICE VISIT (OUTPATIENT)
Dept: FAMILY MEDICINE CLINIC | Facility: CLINIC | Age: 63
End: 2018-10-23

## 2018-10-23 VITALS
HEIGHT: 65 IN | SYSTOLIC BLOOD PRESSURE: 114 MMHG | TEMPERATURE: 98.3 F | OXYGEN SATURATION: 99 % | BODY MASS INDEX: 35.19 KG/M2 | DIASTOLIC BLOOD PRESSURE: 68 MMHG | WEIGHT: 211.2 LBS | HEART RATE: 77 BPM

## 2018-10-23 DIAGNOSIS — G89.29 CHRONIC PAIN OF LEFT KNEE: Primary | ICD-10-CM

## 2018-10-23 DIAGNOSIS — R21 RASH: ICD-10-CM

## 2018-10-23 DIAGNOSIS — M25.562 CHRONIC PAIN OF LEFT KNEE: Primary | ICD-10-CM

## 2018-10-23 PROCEDURE — 96372 THER/PROPH/DIAG INJ SC/IM: CPT | Performed by: PHYSICIAN ASSISTANT

## 2018-10-23 PROCEDURE — 99213 OFFICE O/P EST LOW 20 MIN: CPT | Performed by: PHYSICIAN ASSISTANT

## 2018-10-23 RX ORDER — TRIAMCINOLONE ACETONIDE 40 MG/ML
40 INJECTION, SUSPENSION INTRA-ARTICULAR; INTRAMUSCULAR ONCE
Status: COMPLETED | OUTPATIENT
Start: 2018-10-23 | End: 2018-10-23

## 2018-10-23 RX ORDER — FLUCONAZOLE 200 MG/1
TABLET ORAL
Qty: 3 TABLET | Refills: 0 | Status: SHIPPED | OUTPATIENT
Start: 2018-10-23 | End: 2019-05-09

## 2018-10-23 RX ORDER — METHOCARBAMOL 750 MG/1
750 TABLET, FILM COATED ORAL 4 TIMES DAILY
Qty: 40 TABLET | Refills: 5 | Status: SHIPPED | OUTPATIENT
Start: 2018-10-23 | End: 2020-02-05 | Stop reason: SDUPTHER

## 2018-10-23 RX ADMIN — TRIAMCINOLONE ACETONIDE 40 MG: 40 INJECTION, SUSPENSION INTRA-ARTICULAR; INTRAMUSCULAR at 11:55

## 2018-10-23 NOTE — PROGRESS NOTES
Subjective   Rosabla Girard is a 63 y.o. female  Rash (rash on neck, chest and breast x4 days )      History of Present Illness  Patient comes in today with 2 separate issue she states she started developing a rash that is itching between her breasts and on her neck 4 days ago.  She feels it was her Lodine that she was recently prescribed for chronic knee pain.  She has discontinued that medicine.  She denies shortness of breath, she states she's had problems with chronic left knee pain since a fracture 2 years ago.  She is requesting a different medication to take as needed for knee pain.  She states the rash between her breast has improved a little bit with hydrocortisone cream and antifungal cream.  The following portions of the patient's history were reviewed and updated as appropriate: allergies, current medications, past social history and problem list    Review of Systems   Constitutional: Negative for fever.   Musculoskeletal: Positive for arthralgias and gait problem.   Skin: Positive for color change and rash. Negative for pallor and wound.       Objective     Vitals:    10/23/18 1049   BP: 114/68   Pulse: 77   Temp: 98.3 °F (36.8 °C)   SpO2: 99%       Physical Exam   Constitutional: She appears well-developed and well-nourished. No distress.   HENT:   Head: Normocephalic and atraumatic.   Cardiovascular: Normal rate, regular rhythm and normal heart sounds.    Pulmonary/Chest: Effort normal and breath sounds normal.   Musculoskeletal: She exhibits tenderness. She exhibits no edema or deformity.   Neurological: She exhibits normal muscle tone. Coordination normal.   Skin: Skin is warm and dry. Rash noted. She is not diaphoretic. No erythema. No pallor.   Monilial dermatitis noted between breasts and contact dermatitis erythematous macular rash on anterior neck the remainder of skin is clear.   Psychiatric: She has a normal mood and affect. Her behavior is normal.   Nursing note and vitals  reviewed.      Assessment/Plan     Diagnoses and all orders for this visit:    Chronic pain of left knee  -     methocarbamol (ROBAXIN) 750 MG tablet; Take 1 tablet by mouth 4 (Four) Times a Day. As needed for knee pain    Rash  -     triamcinolone acetonide (KENALOG-40) injection 40 mg; Inject 1 mL into the appropriate muscle as directed by prescriber 1 (One) Time.    Other orders  -     fluocinonide-emollient (LIDEX-E) 0.05 % cream; Apply topically to the appropriate area as directed 2 (Two) Times a Day to rash on neck  -     fluconazole (DIFLUCAN) 200 MG tablet; Take one tablet by mouth on day 1, one tablet on day 4, and one tablet on day 7 for rash.

## 2018-11-14 ENCOUNTER — TELEPHONE (OUTPATIENT)
Dept: ORTHOPEDIC SURGERY | Facility: CLINIC | Age: 63
End: 2018-11-14

## 2018-11-14 NOTE — TELEPHONE ENCOUNTER
The patient dropped off some forms for her to be able to ride [employer] due to her injury today. She would like to be called at [phone].

## 2018-11-26 ENCOUNTER — APPOINTMENT (OUTPATIENT)
Dept: MAMMOGRAPHY | Facility: HOSPITAL | Age: 63
End: 2018-11-26

## 2019-02-05 ENCOUNTER — OFFICE VISIT (OUTPATIENT)
Dept: FAMILY MEDICINE CLINIC | Facility: CLINIC | Age: 64
End: 2019-02-05

## 2019-02-05 VITALS
HEIGHT: 65 IN | SYSTOLIC BLOOD PRESSURE: 126 MMHG | OXYGEN SATURATION: 99 % | TEMPERATURE: 98.8 F | HEART RATE: 110 BPM | BODY MASS INDEX: 34.99 KG/M2 | DIASTOLIC BLOOD PRESSURE: 70 MMHG | WEIGHT: 210 LBS

## 2019-02-05 DIAGNOSIS — J06.9 ACUTE URI: Primary | ICD-10-CM

## 2019-02-05 DIAGNOSIS — E53.8 VITAMIN B 12 DEFICIENCY: ICD-10-CM

## 2019-02-05 PROCEDURE — 99213 OFFICE O/P EST LOW 20 MIN: CPT | Performed by: PHYSICIAN ASSISTANT

## 2019-02-05 PROCEDURE — 96372 THER/PROPH/DIAG INJ SC/IM: CPT | Performed by: PHYSICIAN ASSISTANT

## 2019-02-05 RX ORDER — DEXTROMETHORPHAN HYDROBROMIDE AND PROMETHAZINE HYDROCHLORIDE 15; 6.25 MG/5ML; MG/5ML
5-10 SYRUP ORAL EVERY 4 HOURS PRN
Qty: 180 ML | Refills: 0 | OUTPATIENT
Start: 2019-02-05 | End: 2019-09-08

## 2019-02-05 RX ORDER — AMOXICILLIN 875 MG/1
875 TABLET, COATED ORAL 2 TIMES DAILY
Qty: 14 TABLET | Refills: 0 | Status: SHIPPED | OUTPATIENT
Start: 2019-02-05 | End: 2019-05-09

## 2019-02-05 RX ADMIN — CYANOCOBALAMIN 1000 MCG: 1000 INJECTION, SOLUTION INTRAMUSCULAR; SUBCUTANEOUS at 10:58

## 2019-02-05 NOTE — PROGRESS NOTES
Subjective   Rosalba Girard is a 63 y.o. female  Cough (dry cough x1 day); Sore Throat (sore throat x1 day ); and Sinus Problem (sinus drainage x1 day )      History of Present Illness  Patient comes in today concerned with worsening sinus congestion, sinus pressure and postnasal drainage.  She's also been experiencing decreased energy overall lately, she is a history of B12 deficiency feels that she needs a B12 shot today.  She has been using over-the-counter medication with little relief, some tightness to her breathing with occasional wheezing.he following portions of the patient's history were reviewed and updated as appropriate: allergies, current medications, past social history and problem list    Review of Systems   Constitutional: Positive for fatigue. Negative for fever.   HENT: Positive for congestion, postnasal drip, rhinorrhea, sneezing, sore throat and voice change. Negative for sinus pressure.    Respiratory: Positive for cough.        Objective     Vitals:    02/05/19 0852   BP: 126/70   Pulse: 110   Temp: 98.8 °F (37.1 °C)   SpO2: 99%       Physical Exam   Constitutional: She appears well-developed and well-nourished. No distress.   Obesity noted     HENT:   Head: Normocephalic and atraumatic.   Right Ear: Tympanic membrane and ear canal normal.   Left Ear: Tympanic membrane and ear canal normal.   Nose: Mucosal edema, rhinorrhea and sinus tenderness present. Right sinus exhibits maxillary sinus tenderness and frontal sinus tenderness. Left sinus exhibits maxillary sinus tenderness and frontal sinus tenderness.   Mouth/Throat: Oropharynx is clear and moist. No oropharyngeal exudate.   Eyes: Pupils are equal, round, and reactive to light.   Neck: No thyromegaly present.   Cardiovascular: Normal rate and regular rhythm.   Pulmonary/Chest: Effort normal and breath sounds normal.   Abdominal: Soft. There is no tenderness.   Skin: Skin is warm and dry. She is not diaphoretic.   Psychiatric: She has a  normal mood and affect. Her behavior is normal. Judgment and thought content normal.   Nursing note and vitals reviewed.      Assessment/Plan     Diagnoses and all orders for this visit:    Acute URI    Vitamin B 12 deficiency    Other orders  -     promethazine-dextromethorphan (PROMETHAZINE-DM) 6.25-15 MG/5ML syrup; Take 5-10 mL by mouth Every 4 (Four) Hours As Needed for cough  -     amoxicillin (AMOXIL) 875 MG tablet; Take 1 tablet by mouth 2 (Two) Times a Day.  -     Chlorcyclizine-Pseudoephed 25-60 MG tablet; Take 1/2 to 1 every 12 hours as needed for congestion

## 2019-04-23 ENCOUNTER — OFFICE VISIT (OUTPATIENT)
Dept: ORTHOPEDIC SURGERY | Facility: CLINIC | Age: 64
End: 2019-04-23

## 2019-04-23 VITALS — HEIGHT: 64 IN | BODY MASS INDEX: 35.12 KG/M2 | WEIGHT: 205.69 LBS | HEART RATE: 87 BPM | OXYGEN SATURATION: 99 %

## 2019-04-23 DIAGNOSIS — M25.572 ACUTE LEFT ANKLE PAIN: Primary | ICD-10-CM

## 2019-04-23 DIAGNOSIS — S82.142D TIBIAL PLATEAU FRACTURE, LEFT, CLOSED, WITH ROUTINE HEALING, SUBSEQUENT ENCOUNTER: ICD-10-CM

## 2019-04-23 DIAGNOSIS — M17.32 POST-TRAUMATIC OSTEOARTHRITIS OF LEFT KNEE: ICD-10-CM

## 2019-04-23 PROCEDURE — 99213 OFFICE O/P EST LOW 20 MIN: CPT | Performed by: ORTHOPAEDIC SURGERY

## 2019-04-23 NOTE — PROGRESS NOTES
"    [facility] [place] [facility] Note    Subjective     CC: Follow-up (10 month follow up - left lower leg pain - Ankle ORIF 12/21/16)      STEFANIA Girard is a 63 y.o. female.  Patient returns the office today for evaluation of her left leg.  She continues to have pain and swelling in her left leg.  No recent history of trauma.      ROS:    Constiutional:Pt denies fever, chills, nausea, or vomiting.  MSK:as above    Objective      Past Medical History  Past Medical History:   Diagnosis Date   • Asthma    • Chronic superficial dermatitis    • Closed fracture of left tibial plateau with routine healing    • Dietary counseling    • Encounter for routine adult health examination    • Fibromyalgia    • GERD (gastroesophageal reflux disease)    • Hair or hair follicle disorder    • History of anemia    • Hives    • Hypertension    • Irritable bowel disease    • Malaise and fatigue    • Myalgia and myositis    • PONV (postoperative nausea and vomiting)     on occasion    • Prediabetes    • Rash and nonspecific skin eruption    • Sinusitis, acute maxillary    • UTI (urinary tract infection)    • Wears glasses          Physical Exam  Pulse 87   Ht 163.8 cm (64.49\")   Wt 93.3 kg (205 lb 11 oz)   LMP  (LMP Unknown)   SpO2 99%   Breastfeeding? No   BMI 34.77 kg/m²     Body mass index is 34.77 kg/m².    Patient is well nourished and well developed.        Ortho Exam  Range of motion 0-120 left knee  Calf is soft and nontender  Mild asymmetry left greater than right with circumference of the calf  Overlying skin is not erythematous and not indurated  Surgical incision is benign in appearance  No crepitance is appreciated  Lateral joint line tenderness but no medial joint line tenderness  4+ out of 5 quadriceps strength compared to contralateral side    Imaging/Labs/EMG Reviewed:  No x-rays were taken today but I have reviewed a note provided to me from her physical therapist who states that physical therapy " would be of great benefit to her.  The note is dated 3/19/2019    Assessment:  1. Acute left ankle pain    2. Tibial plateau fracture, left, closed, with routine healing, subsequent encounter    3. Post-traumatic osteoarthritis of left knee        Plan:  1. Recommend over the counter anti-inflammatories for pain and/or swelling  2. Physical therapy will be ordered for her knee pain and swelling  3. Compression stockings may be of benefit as well.  4. Patient has been instructed to sign a release so her  can get any notes that are necessary moving forward.      Medical Decision Making  Management Options : over-the-counter medicine and physical/occupational therapy  Data/Risk: summary of old records    Constantine Durbin MD  04/23/19  12:58 PM

## 2019-05-08 ENCOUNTER — HOSPITAL ENCOUNTER (OUTPATIENT)
Dept: PHYSICAL THERAPY | Facility: HOSPITAL | Age: 64
Setting detail: THERAPIES SERIES
Discharge: HOME | End: 2019-05-08

## 2019-05-08 DIAGNOSIS — M25.572 ACUTE LEFT ANKLE PAIN: Primary | ICD-10-CM

## 2019-05-08 DIAGNOSIS — M17.32 POST-TRAUMATIC OSTEOARTHRITIS OF LEFT KNEE: ICD-10-CM

## 2019-05-08 DIAGNOSIS — S82.122D CLOSED FRACTURE OF LATERAL PORTION OF LEFT TIBIAL PLATEAU WITH ROUTINE HEALING, SUBSEQUENT ENCOUNTER: ICD-10-CM

## 2019-05-08 PROCEDURE — 97161 PT EVAL LOW COMPLEX 20 MIN: CPT

## 2019-05-08 NOTE — THERAPY EVALUATION
Outpatient Physical Therapy Ortho Initial Evaluation   [facility]     Patient Name: Rosalba Girard  : 1955  MRN: [id number]  Today's Date: 2019      Visit Date: 2019    Patient Active Problem List   Diagnosis   • Essential hypertension, benign   • GERD (gastroesophageal reflux disease)   • IBS (irritable bowel syndrome)   • S/P ORIF (open reduction internal fixation) fracture  left tibial plateau    • Tibial plateau fracture, left   • Leukocytosis        Past Medical History:   Diagnosis Date   • Asthma    • Chronic superficial dermatitis    • Closed fracture of left tibial plateau with routine healing    • Dietary counseling    • Encounter for routine adult health examination    • Fibromyalgia    • GERD (gastroesophageal reflux disease)    • Hair or hair follicle disorder    • History of anemia    • Hives    • Hypertension    • Irritable bowel disease    • Malaise and fatigue    • Myalgia and myositis    • PONV (postoperative nausea and vomiting)     on occasion    • Prediabetes    • Rash and nonspecific skin eruption    • Sinusitis, acute maxillary    • UTI (urinary tract infection)    • Wears glasses         Past Surgical History:   Procedure Laterality Date   • BLADDER SURGERY      Cuff   • BREAST BIOPSY Right    • CHOLECYSTECTOMY     • COLONOSCOPY W/ POLYPECTOMY     • GALLBLADDER SURGERY     • HERNIA REPAIR      umbilical    • HYSTERECTOMY     • TIBIAL PLATEAU OPEN REDUCTION INTERNAL FIXATION Left 2016    Procedure: LEFT TIBIAL PLATEAU OPEN REDUCTION INTERNAL FIXATION;  Surgeon: Constantine Durbin MD;  Location: [facility];  Service:        Visit Dx:     ICD-10-CM ICD-9-CM   1. Acute left ankle pain M25.572 719.47   2. Closed fracture of lateral portion of left tibial plateau with routine healing, subsequent encounter S82.122D V54.16   3. Post-traumatic osteoarthritis of left knee M17.32 715.26     Patient History     Row Name 19 0825             History    Chief  "Complaint  Pain  -AC      Type of Pain  Knee pain;Ankle pain  -AC      Brief Description of Current Complaint  Pt was previously seen at our clinic from March-April 2017 s/p L tibial ORIF.  States she is continuing to have issues with swelling in LLE and difficulty bending L knee.  As a result, has difficulty sleeping and cleaning.  Has anterior/lateral knee pain. 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 Has had pain in L ankle too since she injured herself prior to ORIF; notes pain with DF.   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 Does not do much outside of work.   -AC      Patient seeing anyone else for problem(s)?  Gifty  -AC      How has patient tried to help current problem?  Stretching, ice (helps a little)  -AC      History of Previous Related Injuries  L tibial ORIF 12/2016; sprained L ankle 20 years ago  -AC         Pain     Pain Location  Knee;Ankle  -AC      Pain at Present  6  -AC      Pain at Best  1  -AC      Pain at Worst  10  -AC      Pain Frequency  Intermittent  -AC      Pain Description  Aching;Nagging  -AC      What Performance Factors Make the Current Problem(s) WORSE?  Increased walking/standing, stairs (has to do step-to)  -AC      What Performance Factors Make the Current Problem(s) BETTER?  Take shoes off, elevate feet or lie supine, ice  -AC      Is your sleep disturbed?  Yes  -AC      Is medication used to assist with sleep?  Yes Can only take mm relaxers on the weekends  -AC      Total hours of sleep per night  Wake up 2-3x througout night  -AC      What position " "do you sleep in?  Left sidelying;Right sidelying  -AC      Difficulties at work?  Sitting at desk without pain  -AC      Difficulties with ADL's?  Difficulty ambulating stairs, cleaning bathtub/refrigerator  -AC         Fall Risk Assessment    Any falls in the past year:  Yes  -AC      Number of falls reported in the last 12 months  2  -AC      Factors that contributed to the fall:  Lost balance;Other (comment) If she does not have cane  -AC      Does patient have a fear of falling  Yes (comment)  -AC         Daily Activities    Primary Language  English  -AC      Are you able to read  Yes  -AC      Are you able to write  Yes  -AC      How does patient learn best?  Listening;Reading;Demonstration  -AC      Does patient have problems with the following?  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  No  -AC        User Key  (r) = Recorded By, (t) = Taken By, (c) = Cosigned By    Initials Name Provider Type    AC Ladan Herbert, PT Physical Therapist        PT Ortho     Row Name 05/08/19 0833       Posture/Observations    Posture/Observations Comments  Pt is wearing L knee brace and ambulating with antalgic gait with straight cane in RUE  -AC       Quarter Clearing    Quarter Clearing  Lower Quarter Clearing  -AC       DTR- Lower Quarter Clearing    Patellar tendon (L2-4)  Bilateral:;1- Minimal response  -AC    Achilles tendon (S1-2)  Right:;1- Minimal response;Left:;0- No response  -AC       Sensory Screen for Light Touch- Lower Quarter Clearing    L1 (inguinal area)  Left:;Diminished  -AC    L2 (anterior mid thigh)  Left:;Diminished  -AC    L3 (distal anterior thigh)  Left:;Diminished  -AC    L4 (medial lower leg/foot)  Left:;Diminished  -AC    L5 (lateral lower leg/great toe)  Left:;Diminished  " -AC    S1 (bottom of foot)  Left:;Diminished  -AC       Myotomal Screen- Lower Quarter Clearing    Hip flexion (L2)  Bilateral:;4 (Good) L knee pain  -AC    Knee extension (L3)  Right:;4+ (Good +);Left:;4 (Good) L anterior shin pain  -AC    Ankle DF (L4)  Bilateral:;4+ (Good +)  -AC    Great toe extension (L5)  Right:;5 (Normal);Left:;4+ (Good +)  -AC    Ankle PF (S1)  Right:;2 (Poor);Left:;2- (Poor -)  -AC    Knee flexion (S2)  Right:;4 (Good);Left:;3 (Fair) L knee pain  -AC       Special Tests/Palpation    Special Tests/Palpation  Ankle/Foot;Knee  -AC       Knee Palpation    Knee Palpation?  Yes  -AC    Medial Joint Line  Tender;Swollen  -AC    Lateral Joint Line  Tender;Swollen  -AC       Foot/Ankle Palpation    Foot/Ankle Palpation?  Yes  -AC    Lateral Malleolus  Tender  -AC    ATFL  Tender  -AC       General ROM    LT Lower Ext  Lt Knee Extension/Flexion;Lt Ankle Dorsiflexion;Lt Ankle Plantarflexion;Lt Ankle Inversion;Lt Ankle Eversion  -AC       Left Lower Ext    Lt Knee Extension/Flexion AROM  0-110  -AC    Lt Knee Extension/Flexion PROM  0-115  -AC    Lt Ankle Dorsiflexion AROM  6  -AC    Lt Ankle Plantarflexion AROM  52 with anterior ankle pain  -AC    Lt Ankle Inversion AROM  21 anterior shin pain  -AC    Lt Ankle Eversion AROM  11 anterior shin pain  -AC       Girth    Girth Measured?  Ankle Nonpitting edema concentrated superior to ankle  -AC       Transfers    Comment (Transfers)  SLS R= 5 sec, L= 3 sec  -AC      User Key  (r) = Recorded By, (t) = Taken By, (c) = Cosigned By    Initials Name Provider Type    AC Ladan Herbert, PT Physical Therapist        Therapy Education  Education Details: HEP provided including: heel slides with towel, SLR, and 4-way ankle with red TB (provided). Pt provided and donned 3.5 cm compression wrap for ankle, and provided a 4 cm wrap if the original became too compressive.   Given: HEP  Program: New  How Provided: Verbal, Demonstration, Written  Provided to:  Patient  Level of Understanding: Verbalized     PT OP Goals     Row Name 05/08/19 0825          PT Short Term Goals    STG Date to Achieve  05/29/19  -AC     STG 1  Decrease L knee and ankle pain by > or = 50%.  -AC     STG 1 Progress  New  -AC     STG 2  Perform independent HEP to improve ROM/mobility and manage symptoms.  -AC     STG 2 Progress  New  -AC     STG 3  Improve L knee flexion ROM to at least 118 deg.  -AC     STG 3 Progress  New  -AC        Long Term Goals    LTG Date to Achieve  06/19/19  -AC     LTG 1  Decrease L knee and ankle pain by > or = 75%.  -AC     LTG 1 Progress  New  -AC     LTG 2  Improve LEFS to > or = 35/80 to reflect significant improvements in functional mobility.  -AC     LTG 2 Progress  New  -AC     LTG 3  Enable ability to ambulate up/down steps with minimal-no pain (< or = 3/10).  -AC     LTG 3 Progress  New  -AC     LTG 4  Enable ability to perform work duties with minimal-no pain (< or = 3/10).  -AC     LTG 4 Progress  New  -AC     LTG 5  Improve LE strength to grossly 4+/5.  -AC     LTG 5 Progress  New  -AC        Time Calculation    PT Goal Re-Cert Due Date  08/06/19  -       User Key  (r) = Recorded By, (t) = Taken By, (c) = Cosigned By    Initials Name Provider Type    Ladan Mcknight, PT Physical Therapist        PT Assessment/Plan     Row Name 05/08/19 0825          PT Assessment    Functional Limitations  Impaired gait;Limitation in home management;Limitations in community activities;Performance in self-care ADL;Performance in work activities  -AC     Impairments  Gait;Impaired muscle power;Impaired muscle endurance;Joint integrity;Edema;Muscle strength;Pain;Range of motion  -AC     Assessment Comments  Pt presents with signs and symptoms consistent with diagnoses, with evolving symptoms of low complexity. Pt has mild edema in LLE and associated pain, mild ROM and strength deficits, and difficulty performing work and daily tasks.  PT intervention is warranted to  restore mobility/strength and return to PLOF.  -AC     Please refer to paper survey for additional self-reported information  Yes  -AC     Rehab Potential  Fair  -AC     Patient/caregiver participated in establishment of treatment plan and goals  Yes  -AC     Patient would benefit from skilled therapy intervention  Yes  -AC        PT Plan    PT Frequency  1x/week  -AC     Predicted Duration of Therapy Intervention (Therapy Eval)  8 weeks  -AC     Planned CPT's?  PT EVAL LOW COMPLEXITY: 12734;PT RE-EVAL: 06527;PT THER PROC EA 15 MIN: 59850;PT THER ACT EA 15 MIN: 31451;PT MANUAL THERAPY EA 15 MIN: 72881;PT NEUROMUSC RE-EDUCATION EA 15 MIN: 87507;PT SELF CARE/HOME MGMT/TRAIN EA 15: 30307;PT ELECTRICAL STIM UNATTEND: ;PT THER MASS EA 15 MIN: 02648;PT THER SUPP EA 15 MIN  -AC     PT Plan Comments  Progress ROM/strengthening exercises as tolerated. Provide additional compression wraps if pt responds well to them.  -AC       User Key  (r) = Recorded By, (t) = Taken By, (c) = Cosigned By    Initials Name Provider Type    Ladan Mcknight, PT Physical Therapist        Outcome Measure Options: Lower Extremity Functional Scale (LEFS)  Lower Extremity Functional Index  Any of your usual work, housework or school activities: Quite a bit of difficulty  Your usual hobbies, recreational or sporting activities: Quite a bit of difficulty  Getting into or out of the bath: Quite a bit of difficulty  Walking between rooms: Quite a bit of difficulty  Putting on your shoes or socks: Quite a bit of difficulty  Squatting: Quite a bit of difficulty  Lifting an object, like a bag of groceries from the floor: Quite a bit of difficulty  Performing light activities around your home: Quite a bit of difficulty  Performing heavy activities around your home: Quite a bit of difficulty  Getting into or out of a car: Quite a bit of difficulty  Walking 2 blocks: Quite a bit of difficulty  Walking a mile: Quite a bit of difficulty  Going up or  down 10 stairs (about 1 flight of stairs): Quite a bit of difficulty  Standing for 1 hour: Quite a bit of difficulty  Sitting for 1 hour: Quite a bit of difficulty  Running on even ground: Quite a bit of difficulty  Running on uneven ground: Quite a bit of difficulty  Making sharp turns while running fast: Quite a bit of difficulty  Hopping: Quite a bit of difficulty  Rolling over in bed: Quite a bit of difficulty  Total: 20    Time Calculation:     Start Time: 0825     Therapy Charges for Today     Code Description Service Date Service Provider Modifiers Qty    31237566046 HC PT EVAL LOW COMPLEXITY 4 5/8/2019 Ladan Herbert, PT GP 1        PT G-Codes  Outcome Measure Options: Lower Extremity Functional Scale (LEFS)  Total: 20     Ladan Herbert, PT  5/8/2019

## 2019-05-09 ENCOUNTER — OFFICE VISIT (OUTPATIENT)
Dept: FAMILY MEDICINE CLINIC | Facility: CLINIC | Age: 64
End: 2019-05-09

## 2019-05-09 VITALS
RESPIRATION RATE: 16 BRPM | OXYGEN SATURATION: 96 % | HEART RATE: 84 BPM | DIASTOLIC BLOOD PRESSURE: 68 MMHG | WEIGHT: 210 LBS | SYSTOLIC BLOOD PRESSURE: 116 MMHG | TEMPERATURE: 98 F | BODY MASS INDEX: 35.85 KG/M2 | HEIGHT: 64 IN

## 2019-05-09 DIAGNOSIS — I10 ESSENTIAL HYPERTENSION: ICD-10-CM

## 2019-05-09 DIAGNOSIS — J40 BRONCHITIS: Primary | ICD-10-CM

## 2019-05-09 PROCEDURE — 99214 OFFICE O/P EST MOD 30 MIN: CPT | Performed by: PHYSICIAN ASSISTANT

## 2019-05-09 RX ORDER — PREDNISONE 20 MG/1
20 TABLET ORAL 2 TIMES DAILY
Qty: 14 TABLET | Refills: 0 | Status: SHIPPED | OUTPATIENT
Start: 2019-05-09 | End: 2020-02-05

## 2019-05-09 RX ORDER — BISOPROLOL FUMARATE AND HYDROCHLOROTHIAZIDE 2.5; 6.25 MG/1; MG/1
1 TABLET ORAL DAILY
Qty: 90 TABLET | Refills: 3 | Status: SHIPPED | OUTPATIENT
Start: 2019-05-09 | End: 2020-02-05 | Stop reason: SDUPTHER

## 2019-05-09 RX ORDER — CEFDINIR 300 MG/1
300 CAPSULE ORAL 2 TIMES DAILY
Qty: 20 CAPSULE | Refills: 0 | OUTPATIENT
Start: 2019-05-09 | End: 2019-09-08

## 2019-05-09 NOTE — PROGRESS NOTES
Subjective   Rosalba Girard is a 63 y.o. female  Cough (Occ productive cough, runny nose, PND x2 weeks. Treating with old script of prom DM, OTC med) and Hypertension (RF bisoprolol-HCTZ)      History of Present Illness  Patient is a pleasant 63-year-old white female who comes in for sinus pressure sinus congestion cough is been productive with green sputum mild sore throat cough is productive worse at night when lying down symptoms are getting worse no shortness of breath no chest pain OTC meds not working    Patient complains of hypertension needs refill medication takes Ziac meds working well no chest pain  The following portions of the patient's history were reviewed and updated as appropriate: allergies, current medications, past social history and problem list    Review of Systems   Constitutional: Negative for chills, fatigue and fever.   HENT: Positive for congestion, ear pain, postnasal drip, rhinorrhea and sinus pressure. Negative for sore throat.    Eyes: Positive for pain.   Respiratory: Positive for cough, chest tightness and wheezing. Negative for shortness of breath.    Cardiovascular: Negative for chest pain.   Gastrointestinal: Negative for nausea.   Neurological: Positive for headaches. Negative for dizziness.   Hematological: Negative for adenopathy.       Objective     Vitals:    05/09/19 1211   BP: 116/68   Pulse: 84   Resp: 16   Temp: 98 °F (36.7 °C)   SpO2: 96%       Physical Exam   Constitutional: She appears well-developed and well-nourished.   HENT:   Head: Normocephalic and atraumatic.   Right Ear: Tympanic membrane and ear canal normal.   Left Ear: Tympanic membrane and ear canal normal.   Nose: Mucosal edema, rhinorrhea and sinus tenderness present. Right sinus exhibits maxillary sinus tenderness and frontal sinus tenderness. Left sinus exhibits maxillary sinus tenderness and frontal sinus tenderness.   Mouth/Throat: Oropharynx is clear and moist. No oropharyngeal exudate.   Eyes:  Pupils are equal, round, and reactive to light.   Cardiovascular: Normal rate and regular rhythm.   Pulmonary/Chest: Effort normal and breath sounds normal.   Nursing note and vitals reviewed.      Assessment/Plan     Diagnoses and all orders for this visit:    Bronchitis  -     cefdinir (OMNICEF) 300 MG capsule; Take 1 capsule by mouth 2 (Two) Times a Day.  -     predniSONE (DELTASONE) 20 MG tablet; Take 1 tablet by mouth 2 (Two) Times a Day.    Essential hypertension  -     bisoprolol-hydrochlorothiazide (ZIAC) 2.5-6.25 MG per tablet; Take 1 tablet by mouth Daily.    Follow-up if no better

## 2019-05-10 NOTE — PROGRESS NOTES
I have reviewed the notes, assessments, and/or procedures performed by Deshawn Bazzi PA-C, I concur with his documentation of Rosalba Girard.

## 2019-05-15 ENCOUNTER — HOSPITAL ENCOUNTER (OUTPATIENT)
Dept: PHYSICAL THERAPY | Facility: HOSPITAL | Age: 64
Setting detail: THERAPIES SERIES
Discharge: HOME | End: 2019-05-15

## 2019-05-15 DIAGNOSIS — M17.32 POST-TRAUMATIC OSTEOARTHRITIS OF LEFT KNEE: ICD-10-CM

## 2019-05-15 DIAGNOSIS — M25.572 ACUTE LEFT ANKLE PAIN: Primary | ICD-10-CM

## 2019-05-15 DIAGNOSIS — S82.122D CLOSED FRACTURE OF LATERAL PORTION OF LEFT TIBIAL PLATEAU WITH ROUTINE HEALING, SUBSEQUENT ENCOUNTER: ICD-10-CM

## 2019-05-15 PROCEDURE — 97110 THERAPEUTIC EXERCISES: CPT

## 2019-05-15 NOTE — THERAPY TREATMENT NOTE
Outpatient Physical Therapy Ortho Treatment Note   [facility]     Patient Name: Rosalba Girard  : 1955  MRN: [id number]  Today's Date: 5/15/2019      Visit Date: 05/15/2019    Visit Dx:    ICD-10-CM ICD-9-CM   1. Acute left ankle pain M25.572 719.47   2. Closed fracture of lateral portion of left tibial plateau with routine healing, subsequent encounter S82.122D V54.16   3. Post-traumatic osteoarthritis of left knee M17.32 715.26       Patient Active Problem List   Diagnosis   • Essential hypertension, benign   • GERD (gastroesophageal reflux disease)   • IBS (irritable bowel syndrome)   • S/P ORIF (open reduction internal fixation) fracture  left tibial plateau    • Tibial plateau fracture, left   • Leukocytosis        Past Medical History:   Diagnosis Date   • Asthma    • Chronic superficial dermatitis    • Closed fracture of left tibial plateau with routine healing    • Dietary counseling    • Encounter for routine adult health examination    • Fibromyalgia    • GERD (gastroesophageal reflux disease)    • Hair or hair follicle disorder    • History of anemia    • Hives    • Hypertension    • Irritable bowel disease    • Malaise and fatigue    • Myalgia and myositis    • PONV (postoperative nausea and vomiting)     on occasion    • Prediabetes    • Rash and nonspecific skin eruption    • Sinusitis, acute maxillary    • UTI (urinary tract infection)    • Wears glasses         Past Surgical History:   Procedure Laterality Date   • BLADDER SURGERY      Cuff   • BREAST BIOPSY Right    • CHOLECYSTECTOMY     • COLONOSCOPY W/ POLYPECTOMY     • GALLBLADDER SURGERY     • HERNIA REPAIR      umbilical    • HYSTERECTOMY     • TIBIAL PLATEAU OPEN REDUCTION INTERNAL FIXATION Left 2016    Procedure: LEFT TIBIAL PLATEAU OPEN REDUCTION INTERNAL FIXATION;  Surgeon: Constantine Durbin MD;  Location: [facility];  Service:      PT Assessment/Plan     Row Name 05/15/19 0800          PT Assessment    Assessment  Comments  Pt continues to demonstrate mild-moderate edema in L lower leg and had moderate c/o L knee/lower leg pain today.  However, pt had no c/o pain with exercises, and required frequent VCs for form. Provided additional compression wraps and made recommendations for compression socks for better management of edema.  -AC        PT Plan    PT Plan Comments  Progress per plan of treatment, with manual therapy and modalities as needed.  -AC       User Key  (r) = Recorded By, (t) = Taken By, (c) = Cosigned By    Initials Name Provider Type    Ladan Mcknight, PT Physical Therapist        Modalities     Row Name 05/15/19 0800             Ice    Ice Applied  Yes  -AC      Location  L knee/lower leg Pt supine with legs elevated on an incline  -AC      Rx Minutes  10 mins  -AC      Ice S/P Rx  Yes  -AC        User Key  (r) = Recorded By, (t) = Taken By, (c) = Cosigned By    Initials Name Provider Type    Ladan Mcknight, PT Physical Therapist        Exercises     Row Name 05/15/19 0800             Subjective Comments    Subjective Comments  Pt states she has been having anterior/lateral knee pain running down her lateral leg into her foot. States the pain is constant, and is unsure what she did.  -AC         Subjective Pain    Able to rate subjective pain?  yes  -AC      Pre-Treatment Pain Level  7  -AC      Post-Treatment Pain Level  0  -AC         Total Minutes    53305 - PT Therapeutic Exercise Minutes  38  -AC         Exercise 1    Exercise Name 1  NuStep  -AC      Cueing 1  --  -AC      Time 1  7 min  -AC      Additional Comments  L4  -AC         Exercise 2    Exercise Name 2  HS curls with ball  -AC      Reps 2  15  -AC         Exercise 3    Exercise Name 3  SLR  -AC      Sets 3  2  -AC      Reps 3  10  -AC      Additional Comments  3#  -AC         Exercise 4    Exercise Name 4  HS stretch with strap  -AC      Time 4  1'  -AC         Exercise 5    Exercise Name 5  4-way ankle  -AC      Reps 5  15 ea   "-AC      Additional Comments  Red TB  -AC         Exercise 6    Exercise Name 6  TG squats and heel raises  -AC      Reps 6  15/15  -AC         Exercise 7    Exercise Name 7  Heel raises from 1/2 foam  -AC      Reps 7  15  -AC         Exercise 8    Exercise Name 8  Gastroc stretch from 1/2 foam  -AC      Time 8  1'  -        User Key  (r) = Recorded By, (t) = Taken By, (c) = Cosigned By    Initials Name Provider Type    AC Ladan Herbert, PT Physical Therapist        Therapy Education  Education Details: Provided 3.5 compression wrap for knee/upper shin area and 3.0 wrap for foot/ankle with 0.5\" overlap.  Pt provided compression sock recommendations.  Given: Symptoms/condition management  Program: New, Reinforced  How Provided: Verbal, Demonstration  Provided to: Patient  Level of Understanding: Verbalized  34688 - PT Self Care/Mgmt Minutes: 5    Time Calculation:   Start Time: 0800  Therapy Charges for Today     Code Description Service Date Service Provider Modifiers Qty    32600972974  PT THER PROC EA 15 MIN 5/15/2019 Ladan Herbert, PT GP 3        Ladan Herbert, PT  5/15/2019     "

## 2019-06-12 ENCOUNTER — HOSPITAL ENCOUNTER (OUTPATIENT)
Dept: PHYSICAL THERAPY | Facility: HOSPITAL | Age: 64
Setting detail: THERAPIES SERIES
Discharge: HOME | End: 2019-06-12

## 2019-06-12 DIAGNOSIS — M25.462 PAIN AND SWELLING OF LEFT KNEE: ICD-10-CM

## 2019-06-12 DIAGNOSIS — Z74.09 IMPAIRED FUNCTIONAL MOBILITY, BALANCE, GAIT, AND ENDURANCE: ICD-10-CM

## 2019-06-12 DIAGNOSIS — M25.562 PAIN AND SWELLING OF LEFT KNEE: ICD-10-CM

## 2019-06-12 DIAGNOSIS — M25.572 ACUTE LEFT ANKLE PAIN: Primary | ICD-10-CM

## 2019-06-12 PROCEDURE — 97110 THERAPEUTIC EXERCISES: CPT

## 2019-06-12 PROCEDURE — 97010 HOT OR COLD PACKS THERAPY: CPT

## 2019-06-12 NOTE — THERAPY TREATMENT NOTE
Outpatient Physical Therapy Ortho Treatment Note   Shanika     Patient Name: Rosalba Girard  : 1955  MRN: [id number]  Today's Date: 2019      Visit Date: 2019    Visit Dx:    ICD-10-CM ICD-9-CM   1. Acute left ankle pain M25.572 719.47   2. Pain and swelling of left knee M25.562 719.46    M25.462 719.06   3. Impaired functional mobility, balance, gait, and endurance Z74.09 V49.89       Patient Active Problem List   Diagnosis   • Essential hypertension, benign   • GERD (gastroesophageal reflux disease)   • IBS (irritable bowel syndrome)   • S/P ORIF (open reduction internal fixation) fracture  left tibial plateau    • Tibial plateau fracture, left   • Leukocytosis        Past Medical History:   Diagnosis Date   • Asthma    • Chronic superficial dermatitis    • Closed fracture of left tibial plateau with routine healing    • Dietary counseling    • Encounter for routine adult health examination    • Fibromyalgia    • GERD (gastroesophageal reflux disease)    • Hair or hair follicle disorder    • History of anemia    • Hives    • Hypertension    • Irritable bowel disease    • Malaise and fatigue    • Myalgia and myositis    • PONV (postoperative nausea and vomiting)     on occasion    • Prediabetes    • Rash and nonspecific skin eruption    • Sinusitis, acute maxillary    • UTI (urinary tract infection)    • Wears glasses         Past Surgical History:   Procedure Laterality Date   • BLADDER SURGERY      Cuff   • BREAST BIOPSY Right    • CHOLECYSTECTOMY     • COLONOSCOPY W/ POLYPECTOMY     • GALLBLADDER SURGERY     • HERNIA REPAIR      umbilical    • HYSTERECTOMY     • TIBIAL PLATEAU OPEN REDUCTION INTERNAL FIXATION Left 2016    Procedure: LEFT TIBIAL PLATEAU OPEN REDUCTION INTERNAL FIXATION;  Surgeon: Constantine Durbin MD;  [facility];  Service:        PT Assessment/Plan     Row Name 19 9638          PT Assessment    Assessment Comments  Client continues with  swelling in her left leg. She tolerated exercises well and reports some relief with cold pack.   -MM        PT Plan    PT Plan Comments  Continue per plan of treatment with exercises and use cold packs as needed.   -MM       User Key  (r) = Recorded By, (t) = Taken By, (c) = Cosigned By    Initials Name Provider Type    Padilla Miller, PT Physical Therapist          Modalities     Row Name 06/12/19 0930             Ice    Ice Applied  Yes  -MM      Location  L knee/lower leg Pt supine with legs elevated on an incline  -MM      Rx Minutes  10 mins  -MM      Ice S/P Rx  Yes  -MM        User Key  (r) = Recorded By, (t) = Taken By, (c) = Cosigned By    Initials Name Provider Type    Padilla Miller, PT Physical Therapist        Exercises     Row Name 06/12/19 0930 06/12/19 0900          Subjective Comments    Subjective Comments  No new complaints. Client reports mild knee pain this morning.   -MM  --        Subjective Pain    Able to rate subjective pain?  yes  -MM  --     Pre-Treatment Pain Level  3  -MM  --     Post-Treatment Pain Level  2  -MM  --        Total Minutes    36996 - PT Therapeutic Exercise Minutes  40  -MM  --        Exercise 1    Exercise Name 1  Included exercises in clinical setting per flow sheet. Added: LAQ, sit to stand, heel/toe raises, and sidestepping with band on feet.   -MM  --  -MM     Cueing 1  Verbal  -MM  --     Time 1  40  -MM  --  -MM     Additional Comments  ther ex  -MM  --  -MM       User Key  (r) = Recorded By, (t) = Taken By, (c) = Cosigned By    Initials Name Provider Type    Padilla Miller, PT Physical Therapist          Time Calculation:   Start Time: 0930  Therapy Charges for Today     Code Description Service Date Service Provider Modifiers Qty    45945346512 HC PT THER PROC EA 15 MIN 6/12/2019 Padilla Anderson, PT GP 3    30253910824 HC PT HOT/COLD PACK WC NONMCARE 6/12/2019 Padilla Anderson, PT GP 1                    Padilla Anderson, PT  6/12/2019

## 2019-06-26 ENCOUNTER — HOSPITAL ENCOUNTER (OUTPATIENT)
Dept: PHYSICAL THERAPY | Facility: HOSPITAL | Age: 64
Setting detail: THERAPIES SERIES
Discharge: HOME | End: 2019-06-26

## 2019-06-26 DIAGNOSIS — M25.562 PAIN AND SWELLING OF LEFT KNEE: ICD-10-CM

## 2019-06-26 DIAGNOSIS — S82.122D CLOSED FRACTURE OF LATERAL PORTION OF LEFT TIBIAL PLATEAU WITH ROUTINE HEALING, SUBSEQUENT ENCOUNTER: ICD-10-CM

## 2019-06-26 DIAGNOSIS — M25.572 ACUTE LEFT ANKLE PAIN: Primary | ICD-10-CM

## 2019-06-26 DIAGNOSIS — Z74.09 IMPAIRED FUNCTIONAL MOBILITY, BALANCE, GAIT, AND ENDURANCE: ICD-10-CM

## 2019-06-26 DIAGNOSIS — M25.462 PAIN AND SWELLING OF LEFT KNEE: ICD-10-CM

## 2019-06-26 PROCEDURE — 97010 HOT OR COLD PACKS THERAPY: CPT

## 2019-06-26 PROCEDURE — 97110 THERAPEUTIC EXERCISES: CPT

## 2019-06-26 NOTE — THERAPY PROGRESS REPORT/RE-CERT
Outpatient Physical Therapy Ortho Progress Note  [facility]     Patient Name: Rosalba Girard  : 1955  MRN: [id number]  Today's Date: 2019      Visit Date: 2019    Visit Dx:    ICD-10-CM ICD-9-CM   1. Acute left ankle pain M25.572 719.47   2. Pain and swelling of left knee M25.562 719.46    M25.462 719.06   3. Impaired functional mobility, balance, gait, and endurance Z74.09 V49.89   4. Closed fracture of lateral portion of left tibial plateau with routine healing, subsequent encounter S82.122D V54.16       Patient Active Problem List   Diagnosis   • Essential hypertension, benign   • GERD (gastroesophageal reflux disease)   • IBS (irritable bowel syndrome)   • S/P ORIF (open reduction internal fixation) fracture  left tibial plateau    • Tibial plateau fracture, left   • Leukocytosis        Past Medical History:   Diagnosis Date   • Asthma    • Chronic superficial dermatitis    • Closed fracture of left tibial plateau with routine healing    • Dietary counseling    • Encounter for routine adult health examination    • Fibromyalgia    • GERD (gastroesophageal reflux disease)    • Hair or hair follicle disorder    • History of anemia    • Hives    • Hypertension    • Irritable bowel disease    • Malaise and fatigue    • Myalgia and myositis    • PONV (postoperative nausea and vomiting)     on occasion    • Prediabetes    • Rash and nonspecific skin eruption    • Sinusitis, acute maxillary    • UTI (urinary tract infection)    • Wears glasses         Past Surgical History:   Procedure Laterality Date   • BLADDER SURGERY      Cuff   • BREAST BIOPSY Right    • CHOLECYSTECTOMY     • COLONOSCOPY W/ POLYPECTOMY     • GALLBLADDER SURGERY     • HERNIA REPAIR      umbilical    • HYSTERECTOMY     • TIBIAL PLATEAU OPEN REDUCTION INTERNAL FIXATION Left 2016    Procedure: LEFT TIBIAL PLATEAU OPEN REDUCTION INTERNAL FIXATION;  Surgeon: Constantine Durbin MD;  Location: [facility];  Service:         PT Ortho     Row Name 06/26/19 0935       Myotomal Screen- Lower Quarter Clearing    Hip flexion (L2)  Right:;4+ (Good +);Left:;4 (Good) Pain in anterior/posterior thigh  -AC    Knee extension (L3)  Bilateral:;4+ (Good +) Anterior knee pain  -AC    Ankle DF (L4)  Bilateral:;5 (Normal)  -AC    Knee flexion (S2)  Right:;4+ (Good +);Left:;4- (Good -) Anterior/posterior pain  -AC       Left Lower Ext    Lt Knee Extension/Flexion AROM  3-105  -AC    Lt Knee Extension/Flexion PROM  0-110  -AC       Girth    Girth Measured?  Right Lower Extremity;Left Lower Extremity  -AC       RLE Quick Girth (cm)    Smallest ankle  24.2 cm  -AC    Largest calf  36.8 cm  -AC    Mid patella  48.8 cm  -AC       LLE Quick Girth (cm)    Smallest ankle  25.7 cm  -AC    Largest calf  39.1 cm  -AC    Mid patella  49.8 cm  -AC      User Key  (r) = Recorded By, (t) = Taken By, (c) = Cosigned By    Initials Name Provider Type    AC Ladan Herbert, PT Physical Therapist        PT Assessment/Plan     Row Name 06/26/19 0935          PT Assessment    Functional Limitations  Limitation in home management;Performance in leisure activities;Performance in work activities;Performance in self-care ADL;Limitations in community activities  -AC     Impairments  Pain;Range of motion;Joint mobility;Muscle strength;Gait;Poor body mechanics;Impaired muscle endurance;Impaired muscle power;Impaired muscle length  -AC     Assessment Comments  Pt has attended 4/7 scheduled PT sessions to address LLE pain.  Pt reports 30% improvement in overall symptoms but 6% function per LEFS.  Pt demonstrates improvements in LLE strength but slight decrease in ROM today. Pt demonstrates mild increase in LLE girth and signs indicating potential CRPS, with hyperactive pain responses to MMT.  Compression sleeves were donned today and pt educated on proper sock wear. Further PT intervention is warranted to progress towards remaining goals.   -AC     Please refer to paper  survey for additional self-reported information  Yes  -AC     Rehab Potential  Fair  -AC     Patient/caregiver participated in establishment of treatment plan and goals  Yes  -AC     Patient would benefit from skilled therapy intervention  Yes  -AC        PT Plan    PT Frequency  1x/week  -AC     Predicted Duration of Therapy Intervention (Therapy Eval)  6 weeks   -AC     Planned CPT's?  PT EVAL LOW COMPLEXITY: 74474;PT THER PROC EA 15 MIN: 28238;PT MANUAL THERAPY EA 15 MIN: 40915;PT GAIT TRAINING EA 15 MIN: 11474;PT HOT/COLD PACK WC NONMCARE: 18562;PT SELF CARE/MGMT/TRAIN 15 MIN: 34488;PT RE-EVAL: 18494;PT THER ACT EA 15 MIN: 84915;PT NEUROMUSC RE-EDUCATION EA 15 MIN: 55082;PT THER SUPP EA 15 MIN;PT THER MASS EA 15 MIN: 77344  -AC     PT Plan Comments  Progress exercises including standing activities as tolerated, with manual/modalities PRN.   -AC       User Key  (r) = Recorded By, (t) = Taken By, (c) = Cosigned By    Initials Name Provider Type    Ladan Mcknight, PT Physical Therapist        Modalities     Row Name 06/26/19 0935             Ice    Ice Applied  Yes  -AC      Location  L knee/lower leg  -AC      Rx Minutes  10 mins  -AC      Ice S/P Rx  Yes  -AC        User Key  (r) = Recorded By, (t) = Taken By, (c) = Cosigned By    Initials Name Provider Type    Ladan Mckngiht, PT Physical Therapist        Exercises     Row Name 06/26/19 0935             Subjective Comments    Subjective Comments  Pt states she is continuing to have anterolateral knee pain.  Thinks it is hurting more recently due to increased activity. Also having new pain posterior to knee.  -AC         Subjective Pain    Able to rate subjective pain?  yes  -AC      Pre-Treatment Pain Level  7  -AC      Post-Treatment Pain Level  4  -AC         Total Minutes    75870 - PT Therapeutic Exercise Minutes  30  -AC         Exercise 1    Exercise Name 1  Reassessment measures included in therex time.  -AC      Cueing 1  Verbal;Demo  -AC       Time 1  30  -AC      Additional Comments  Ther Ex  -AC        User Key  (r) = Recorded By, (t) = Taken By, (c) = Cosigned By    Initials Name Provider Type    Ladan Mcknight, PT Physical Therapist        PT OP Goals     Row Name 06/26/19 0935          PT Short Term Goals    STG Date to Achieve  05/29/19  -AC     STG 1  Decrease L knee and ankle pain by > or = 50%.  -AC     STG 1 Progress  Ongoing;Progressing 30%  -AC     STG 2  Perform independent HEP to improve ROM/mobility and manage symptoms.  -AC     STG 2 Progress  Met  -AC     STG 3  Improve L knee flexion ROM to at least 118 deg.  -AC     STG 3 Progress  Ongoing  -AC        Long Term Goals    LTG Date to Achieve  06/19/19  -AC     LTG 1  Decrease L knee and ankle pain by > or = 75%.  -AC     LTG 1 Progress  Ongoing;Progressing  -AC     LTG 2  Improve LEFS to > or = 35/80 to reflect significant improvements in functional mobility.  -AC     LTG 2 Progress  Ongoing  -AC     LTG 3  Enable ability to ambulate up/down steps with minimal-no pain (< or = 3/10).  -AC     LTG 3 Progress  Ongoing  -AC     LTG 4  Enable ability to perform work duties with minimal-no pain (< or = 3/10).  -AC     LTG 4 Progress  Ongoing  -AC     LTG 5  Improve LE strength to grossly 4+/5.  -AC     LTG 5 Progress  Ongoing;Progressing  -AC        Time Calculation    PT Goal Re-Cert Due Date  08/06/19  -AC       User Key  (r) = Recorded By, (t) = Taken By, (c) = Cosigned By    Initials Name Provider Type    Ladan Mcknight, PT Physical Therapist        Outcome Measure Options: Lower Extremity Functional Scale (LEFS)  Lower Extremity Functional Index  Any of your usual work, housework or school activities: Extreme difficulty or unable to perform activity  Your usual hobbies, recreational or sporting activities: Quite a bit of difficulty  Getting into or out of the bath: Quite a bit of difficulty  Walking between rooms: Moderate difficulty  Putting on your shoes or socks:  Extreme difficulty or unable to perform activity  Squatting: Extreme difficulty or unable to perform activity  Lifting an object, like a bag of groceries from the floor: Quite a bit of difficulty  Performing light activities around your home: Quite a bit of difficulty  Performing heavy activities around your home: Extreme difficulty or unable to perform activity  Getting into or out of a car: Extreme difficulty or unable to perform activity  Walking 2 blocks: Extreme difficulty or unable to perform activity  Walking a mile: Extreme difficulty or unable to perform activity  Going up or down 10 stairs (about 1 flight of stairs): Extreme difficulty or unable to perform activity  Standing for 1 hour: Extreme difficulty or unable to perform activity  Sitting for 1 hour: Extreme difficulty or unable to perform activity  Running on even ground: Extreme difficulty or unable to perform activity  Running on uneven ground: Extreme difficulty or unable to perform activity  Making sharp turns while running fast: Extreme difficulty or unable to perform activity  Hopping: Extreme difficulty or unable to perform activity  Rolling over in bed: Extreme difficulty or unable to perform activity  Total: 6    Time Calculation:   Start Time: 0935  Therapy Charges for Today     Code Description Service Date Service Provider Modifiers Qty    31570785969 HC PT THER PROC EA 15 MIN 6/26/2019 Ladan Herbert, PT GP 2    01814316726 HC PT HOT/COLD PACK WC NONMCARE 6/26/2019 Ladan Herbert, PT GP 1        PT G-Codes  Outcome Measure Options: Lower Extremity Functional Scale (LEFS)  Total: 6     Ladan Herbert, PT  6/26/2019

## 2019-07-17 ENCOUNTER — HOSPITAL ENCOUNTER (OUTPATIENT)
Dept: PHYSICAL THERAPY | Facility: HOSPITAL | Age: 64
Setting detail: THERAPIES SERIES
Discharge: HOME | End: 2019-07-17

## 2019-07-17 DIAGNOSIS — M25.572 ACUTE LEFT ANKLE PAIN: Primary | ICD-10-CM

## 2019-07-17 DIAGNOSIS — Z74.09 IMPAIRED FUNCTIONAL MOBILITY, BALANCE, GAIT, AND ENDURANCE: ICD-10-CM

## 2019-07-17 DIAGNOSIS — S82.122D CLOSED FRACTURE OF LATERAL PORTION OF LEFT TIBIAL PLATEAU WITH ROUTINE HEALING, SUBSEQUENT ENCOUNTER: ICD-10-CM

## 2019-07-17 PROCEDURE — 97110 THERAPEUTIC EXERCISES: CPT

## 2019-07-17 NOTE — THERAPY TREATMENT NOTE
Outpatient Physical Therapy Ortho Treatment Note   Shanika     Patient Name: Rosalba Girard  : 1955  MRN: [id number]  Today's Date: 2019      Visit Date: 2019    Visit Dx:    ICD-10-CM ICD-9-CM   1. Acute left ankle pain M25.572 719.47   2. Impaired functional mobility, balance, gait, and endurance Z74.09 V49.89   3. Closed fracture of lateral portion of left tibial plateau with routine healing, subsequent encounter S82.122D V54.16       Patient Active Problem List   Diagnosis   • Essential hypertension, benign   • GERD (gastroesophageal reflux disease)   • IBS (irritable bowel syndrome)   • S/P ORIF (open reduction internal fixation) fracture  left tibial plateau    • Tibial plateau fracture, left   • Leukocytosis        Past Medical History:   Diagnosis Date   • Asthma    • Chronic superficial dermatitis    • Closed fracture of left tibial plateau with routine healing    • Dietary counseling    • Encounter for routine adult health examination    • Fibromyalgia    • GERD (gastroesophageal reflux disease)    • Hair or hair follicle disorder    • History of anemia    • Hives    • Hypertension    • Irritable bowel disease    • Malaise and fatigue    • Myalgia and myositis    • PONV (postoperative nausea and vomiting)     on occasion    • Prediabetes    • Rash and nonspecific skin eruption    • Sinusitis, acute maxillary    • UTI (urinary tract infection)    • Wears glasses         Past Surgical History:   Procedure Laterality Date   • BLADDER SURGERY      Cuff   • BREAST BIOPSY Right    • CHOLECYSTECTOMY     • COLONOSCOPY W/ POLYPECTOMY     • GALLBLADDER SURGERY     • HERNIA REPAIR      umbilical    • HYSTERECTOMY     • TIBIAL PLATEAU OPEN REDUCTION INTERNAL FIXATION Left 2016    Procedure: LEFT TIBIAL PLATEAU OPEN REDUCTION INTERNAL FIXATION;  Surgeon: Constantine Durbin MD;  Location: [facility];  Service:        PT Assessment/Plan     Row Name 19 0945          PT  Assessment    Assessment Comments  Exercises were progressed and tolerated well. She had mild discomfort with leg press, but felt okay afterward.  -MM        PT Plan    PT Plan Comments  Continue per plan of treatment with exercises as needed.   -MM       User Key  (r) = Recorded By, (t) = Taken By, (c) = Cosigned By    Initials Name Provider Type    Padilla Miller, PT Physical Therapist            Exercises     Row Name 07/17/19 0945             Subjective Comments    Subjective Comments  Client reports moderate knee pain overall. She continues to have some stiffness and swelling.  -MM         Subjective Pain    Able to rate subjective pain?  yes  -MM      Pre-Treatment Pain Level  5  -MM      Post-Treatment Pain Level  5  -MM         Total Minutes    49193 - PT Therapeutic Exercise Minutes  30  -MM         Exercise 1    Exercise Name 1  Continued exercises in clinical setting per flow sheet. Updated exercises to include: leg press, 3-way leg kicks with band. Also included recumbent bike.  -MM      Cueing 1  Verbal  -MM      Time 1  30  -MM      Additional Comments  ther ex  -MM        User Key  (r) = Recorded By, (t) = Taken By, (c) = Cosigned By    Initials Name Provider Type    Padilla Miller, PT Physical Therapist      Time Calculation:   Start Time: 0945  Therapy Charges for Today     Code Description Service Date Service Provider Modifiers Qty    52881236157  PT THER PROC EA 15 MIN 7/17/2019 Padilla Anderson, PT GP 2        Padilla Anderson, PT  7/17/2019

## 2019-07-24 ENCOUNTER — HOSPITAL ENCOUNTER (OUTPATIENT)
Dept: PHYSICAL THERAPY | Facility: HOSPITAL | Age: 64
Setting detail: THERAPIES SERIES
Discharge: HOME | End: 2019-07-24

## 2019-07-24 PROCEDURE — 97110 THERAPEUTIC EXERCISES: CPT | Performed by: PHYSICAL THERAPIST

## 2019-07-24 NOTE — THERAPY TREATMENT NOTE
Outpatient Physical Therapy Ortho Treatment Note   Shanika     Patient Name: Rosalba Girard  : 1955  MRN: [id number]  Today's Date: 2019      Visit Date: 2019    Visit Dx:  No diagnosis found.    Patient Active Problem List   Diagnosis   • Essential hypertension, benign   • GERD (gastroesophageal reflux disease)   • IBS (irritable bowel syndrome)   • S/P ORIF (open reduction internal fixation) fracture  left tibial plateau    • Tibial plateau fracture, left   • Leukocytosis        Past Medical History:   Diagnosis Date   • Asthma    • Chronic superficial dermatitis    • Closed fracture of left tibial plateau with routine healing    • Dietary counseling    • Encounter for routine adult health examination    • Fibromyalgia    • GERD (gastroesophageal reflux disease)    • Hair or hair follicle disorder    • History of anemia    • Hives    • Hypertension    • Irritable bowel disease    • Malaise and fatigue    • Myalgia and myositis    • PONV (postoperative nausea and vomiting)     on occasion    • Prediabetes    • Rash and nonspecific skin eruption    • Sinusitis, acute maxillary    • UTI (urinary tract infection)    • Wears glasses         Past Surgical History:   Procedure Laterality Date   • BLADDER SURGERY      Cuff   • BREAST BIOPSY Right    • CHOLECYSTECTOMY     • COLONOSCOPY W/ POLYPECTOMY     • GALLBLADDER SURGERY     • HERNIA REPAIR      umbilical    • HYSTERECTOMY     • TIBIAL PLATEAU OPEN REDUCTION INTERNAL FIXATION Left 2016    Procedure: LEFT TIBIAL PLATEAU OPEN REDUCTION INTERNAL FIXATION;  Surgeon: Constantine Durbin MD;  Location: [facility];  Service:                        PT Assessment/Plan     Row Name 19 1009          PT Assessment    Assessment Comments  Client reporting increased discomfort since last session results in modification of TE today as well as ice modality post intervention.  Continues to use brace for community mobility in adjunct with SPC   -CR       User Key  (r) = Recorded By, (t) = Taken By, (c) = Cosigned By    Initials Name Provider Type    Unruly Pickering PT Physical Therapist          Modalities     Row Name 07/24/19 0900             Ice    Ice Applied  Yes  -CR      Location  left knee  -CR      Rx Minutes  10 mins  -CR      Ice S/P Rx  Yes  -CR        User Key  (r) = Recorded By, (t) = Taken By, (c) = Cosigned By    Initials Name Provider Type    Unruly Pickering PT Physical Therapist        Exercises     Row Name 07/24/19 0900             Subjective Comments    Subjective Comments  Client reports having increased soreness following last session.  Attributes this to leg press machine  -CR         Subjective Pain    Able to rate subjective pain?  yes  -CR      Pre-Treatment Pain Level  7  -CR         Total Minutes    39985 - PT Therapeutic Exercise Minutes  25  -CR         Exercise 1    Exercise Name 1  Nu Step Machine  -CR      Time 1  6  -CR         Exercise 2    Exercise Name 2  3 way leg kicks  -CR      Sets 2  3  -CR      Reps 2  10  -CR      Additional Comments  red t band   -CR         Exercise 3    Exercise Name 3  Heel raises  -CR      Reps 3  30  -CR         Exercise 4    Exercise Name 4  Bridge  -CR      Sets 4  3  -CR      Reps 4  10  -CR      Additional Comments  over ball  -CR         Exercise 5    Exercise Name 5  HS stretch/quad stretch  -CR      Reps 5  5  -CR      Time 5  30  -CR        User Key  (r) = Recorded By, (t) = Taken By, (c) = Cosigned By    Initials Name Provider Type    Unruly Pickering PT Physical Therapist                                          Time Calculation:      Therapy Charges for Today     Code Description Service Date Service Provider Modifiers Qty    85048054575  PT THER PROC EA 15 MIN 7/24/2019 Unruly Jama, AARTI GP 2                    Unruly Jama PT  7/24/2019

## 2019-07-31 ENCOUNTER — HOSPITAL ENCOUNTER (OUTPATIENT)
Dept: PHYSICAL THERAPY | Facility: HOSPITAL | Age: 64
Setting detail: THERAPIES SERIES
Discharge: HOME | End: 2019-07-31

## 2019-07-31 DIAGNOSIS — S82.122D CLOSED FRACTURE OF LATERAL PORTION OF LEFT TIBIAL PLATEAU WITH ROUTINE HEALING, SUBSEQUENT ENCOUNTER: ICD-10-CM

## 2019-07-31 DIAGNOSIS — M25.562 PAIN AND SWELLING OF LEFT KNEE: ICD-10-CM

## 2019-07-31 DIAGNOSIS — M25.462 PAIN AND SWELLING OF LEFT KNEE: ICD-10-CM

## 2019-07-31 DIAGNOSIS — Z74.09 IMPAIRED FUNCTIONAL MOBILITY, BALANCE, GAIT, AND ENDURANCE: ICD-10-CM

## 2019-07-31 DIAGNOSIS — M25.572 ACUTE LEFT ANKLE PAIN: Primary | ICD-10-CM

## 2019-07-31 PROCEDURE — 97140 MANUAL THERAPY 1/> REGIONS: CPT

## 2019-07-31 PROCEDURE — 97110 THERAPEUTIC EXERCISES: CPT

## 2019-08-07 ENCOUNTER — HOSPITAL ENCOUNTER (OUTPATIENT)
Dept: PHYSICAL THERAPY | Facility: HOSPITAL | Age: 64
Setting detail: THERAPIES SERIES
Discharge: HOME | End: 2019-08-07

## 2019-08-07 DIAGNOSIS — S82.122D CLOSED FRACTURE OF LATERAL PORTION OF LEFT TIBIAL PLATEAU WITH ROUTINE HEALING, SUBSEQUENT ENCOUNTER: ICD-10-CM

## 2019-08-07 DIAGNOSIS — M25.562 PAIN AND SWELLING OF LEFT KNEE: ICD-10-CM

## 2019-08-07 DIAGNOSIS — M25.572 ACUTE LEFT ANKLE PAIN: Primary | ICD-10-CM

## 2019-08-07 DIAGNOSIS — M25.462 PAIN AND SWELLING OF LEFT KNEE: ICD-10-CM

## 2019-08-07 DIAGNOSIS — Z74.09 IMPAIRED FUNCTIONAL MOBILITY, BALANCE, GAIT, AND ENDURANCE: ICD-10-CM

## 2019-08-07 PROCEDURE — 97110 THERAPEUTIC EXERCISES: CPT

## 2019-08-07 NOTE — THERAPY TREATMENT NOTE
Outpatient Physical Therapy Ortho Treatment Note   Shanika     Patient Name: Rosalba Girard  : 1955  MRN: [id number]  Today's Date: 2019      Visit Date: 2019    Visit Dx:    ICD-10-CM ICD-9-CM   1. Acute left ankle pain M25.572 719.47   2. Impaired functional mobility, balance, gait, and endurance Z74.09 V49.89   3. Closed fracture of lateral portion of left tibial plateau with routine healing, subsequent encounter S82.122D V54.16   4. Pain and swelling of left knee M25.562 719.46    M25.462 719.06     Patient Active Problem List   Diagnosis   • Essential hypertension, benign   • GERD (gastroesophageal reflux disease)   • IBS (irritable bowel syndrome)   • S/P ORIF (open reduction internal fixation) fracture  left tibial plateau    • Tibial plateau fracture, left   • Leukocytosis     Past Medical History:   Diagnosis Date   • Asthma    • Chronic superficial dermatitis    • Closed fracture of left tibial plateau with routine healing    • Dietary counseling    • Encounter for routine adult health examination    • Fibromyalgia    • GERD (gastroesophageal reflux disease)    • Hair or hair follicle disorder    • History of anemia    • Hives    • Hypertension    • Irritable bowel disease    • Malaise and fatigue    • Myalgia and myositis    • PONV (postoperative nausea and vomiting)     on occasion    • Prediabetes    • Rash and nonspecific skin eruption    • Sinusitis, acute maxillary    • UTI (urinary tract infection)    • Wears glasses      Past Surgical History:   Procedure Laterality Date   • BLADDER SURGERY      Cuff   • BREAST BIOPSY Right    • CHOLECYSTECTOMY     • COLONOSCOPY W/ POLYPECTOMY     • GALLBLADDER SURGERY     • HERNIA REPAIR      umbilical    • HYSTERECTOMY     • TIBIAL PLATEAU OPEN REDUCTION INTERNAL FIXATION Left 2016    Procedure: LEFT TIBIAL PLATEAU OPEN REDUCTION INTERNAL FIXATION;  Surgeon: Constantine Durbin MD;  Location: [facility];  Service:      PT  Assessment/Plan     Row Name 08/07/19 0800          PT Assessment    Assessment Comments  Client reports exercises today felt good. She still has swelling and some pain throughout the week. Knee flexion is with mild limitation. Knee extension AROM is full.   -MM        PT Plan    PT Plan Comments  Continue per plan of treatment with exercises and manual therapy.  -MM       User Key  (r) = Recorded By, (t) = Taken By, (c) = Cosigned By    Initials Name Provider Type    Padilla Miller, PT Physical Therapist        Modalities     Row Name 08/07/19 0800             Ice    Ice Applied  Yes  -MM      Location  left knee  -MM      Rx Minutes  10 mins  -MM      Ice S/P Rx  Yes  -MM        User Key  (r) = Recorded By, (t) = Taken By, (c) = Cosigned By    Initials Name Provider Type    Padilla Miller, PT Physical Therapist        Exercises     Row Name 08/07/19 0800             Subjective Comments    Subjective Comments  Client reports feeling a little better today. She reports pain today at 6/10.   -MM         Subjective Pain    Able to rate subjective pain?  yes  -MM      Pre-Treatment Pain Level  6  -MM      Post-Treatment Pain Level  5  -MM         Total Minutes    23874 - PT Therapeutic Exercise Minutes  30  -MM         Exercise 1    Exercise Name 1  Recumbent bike L3  -MM      Time 1  5 min  -MM         Exercise 2    Exercise Name 2  total gym squats  -MM      Sets 2  3  -MM      Reps 2  10  -MM         Exercise 3    Exercise Name 3  heel raises/toe raises  -MM      Reps 3  20/20  -MM         Exercise 4    Exercise Name 4  marching  -MM      Reps 4  20  -MM         Exercise 5    Exercise Name 5  side step/diagonal step with band  -MM      Reps 5  15/15  -MM         Exercise 6    Exercise Name 6  HS stretch/quad stretch  -MM      Time 6  2 min  -MM         Exercise 7    Exercise Name 7  gastroc stretch  -MM      Time 7  1 min  -MM         Exercise 8    Exercise Name 8  heel slides  -MM      Time 8   2 min   -MM         Exercise 9    Exercise Name 9  long arc quads  -MM      Reps 9  20  -MM         Exercise 10    Exercise Name 10  standing leg curl  -MM      Reps 10  15  -MM        User Key  (r) = Recorded By, (t) = Taken By, (c) = Cosigned By    Initials Name Provider Type    Padilla Miller, PT Physical Therapist        Time Calculation:   Start Time: 0800  Therapy Charges for Today     Code Description Service Date Service Provider Modifiers Qty    [id number]  PT THER PROC EA 15 MIN 8/7/2019 Padilla Anderson, PT GP 2        Padilla Anderson, PT  8/7/2019

## 2019-08-14 ENCOUNTER — HOSPITAL ENCOUNTER (OUTPATIENT)
Dept: PHYSICAL THERAPY | Facility: HOSPITAL | Age: 64
Setting detail: THERAPIES SERIES
Discharge: HOME | End: 2019-08-14

## 2019-08-14 DIAGNOSIS — M25.572 ACUTE LEFT ANKLE PAIN: Primary | ICD-10-CM

## 2019-08-14 DIAGNOSIS — Z74.09 IMPAIRED FUNCTIONAL MOBILITY, BALANCE, GAIT, AND ENDURANCE: ICD-10-CM

## 2019-08-14 DIAGNOSIS — S82.122D CLOSED FRACTURE OF LATERAL PORTION OF LEFT TIBIAL PLATEAU WITH ROUTINE HEALING, SUBSEQUENT ENCOUNTER: ICD-10-CM

## 2019-08-14 PROCEDURE — 97140 MANUAL THERAPY 1/> REGIONS: CPT

## 2019-08-14 PROCEDURE — 97110 THERAPEUTIC EXERCISES: CPT

## 2019-08-14 NOTE — THERAPY TREATMENT NOTE
Outpatient Physical Therapy Ortho Treatment Note   Shanika     Patient Name: Rosalba Girard  : 1955  MRN: [id number]  Today's Date: 2019      Visit Date: 2019    Visit Dx:    ICD-10-CM ICD-9-CM   1. Acute left ankle pain M25.572 719.47   2. Impaired functional mobility, balance, gait, and endurance Z74.09 V49.89   3. Closed fracture of lateral portion of left tibial plateau with routine healing, subsequent encounter S82.122D V54.16       Patient Active Problem List   Diagnosis   • Essential hypertension, benign   • GERD (gastroesophageal reflux disease)   • IBS (irritable bowel syndrome)   • S/P ORIF (open reduction internal fixation) fracture  left tibial plateau    • Tibial plateau fracture, left   • Leukocytosis        Past Medical History:   Diagnosis Date   • Asthma    • Chronic superficial dermatitis    • Closed fracture of left tibial plateau with routine healing    • Dietary counseling    • Encounter for routine adult health examination    • Fibromyalgia    • GERD (gastroesophageal reflux disease)    • Hair or hair follicle disorder    • History of anemia    • Hives    • Hypertension    • Irritable bowel disease    • Malaise and fatigue    • Myalgia and myositis    • PONV (postoperative nausea and vomiting)     on occasion    • Prediabetes    • Rash and nonspecific skin eruption    • Sinusitis, acute maxillary    • UTI (urinary tract infection)    • Wears glasses         Past Surgical History:   Procedure Laterality Date   • BLADDER SURGERY      Cuff   • BREAST BIOPSY Right    • CHOLECYSTECTOMY     • COLONOSCOPY W/ POLYPECTOMY     • GALLBLADDER SURGERY     • HERNIA REPAIR      umbilical    • HYSTERECTOMY     • TIBIAL PLATEAU OPEN REDUCTION INTERNAL FIXATION Left 2016    Procedure: LEFT TIBIAL PLATEAU OPEN REDUCTION INTERNAL FIXATION;  Surgeon: Constantine Durbin MD;  Location: [facility];  Service:        PT Assessment/Plan     Row Name 19 0845          PT  Assessment    Assessment Comments  Client is walking well without using the cane with slower talisha. Exercises in the clinic were without complaints, though she notes some soreness with sit to stand transfers. She reports some relief with ASTYM.  -MM        PT Plan    PT Plan Comments  Continue per plan of treatment.   -MM       User Key  (r) = Recorded By, (t) = Taken By, (c) = Cosigned By    Initials Name Provider Type     Padilla Anderson, PT Physical Therapist            Exercises     Row Name 08/14/19 0900 08/14/19 0821          Subjective Comments    Subjective Comments  --  Client reports moderate pain today. She notes some discomfort transferring sit to stand, but feeling okay walking.   -MM        Subjective Pain    Able to rate subjective pain?  --  yes  -MM     Pre-Treatment Pain Level  --  6  -MM     Post-Treatment Pain Level  --  3  -MM        Total Minutes    61348 - PT Therapeutic Exercise Minutes  --  30  -MM     56498 - PT Manual Therapy Minutes  15  -MM  --        Exercise 1    Exercise Name 1  --  Recumbent bike L3  -MM     Time 1  --  5 min  -MM        Exercise 2    Exercise Name 2  --  total gym squats  -MM     Sets 2  --  --  -MM     Reps 2  --  20  -MM        Exercise 3    Exercise Name 3  --  heel raises/toe raises  -MM     Reps 3  --  20/20  -MM        Exercise 4    Exercise Name 4  --  standing leg curls  -MM     Reps 4  --  20  -MM        Exercise 5    Exercise Name 5  --  side step/diagonal step with band  -MM     Reps 5  --  15/15  -MM        Exercise 6    Exercise Name 6  --  HS stretch/quad stretch  -MM     Time 6  --  2 min  -MM        Exercise 7    Exercise Name 7  --  gastroc stretch  -MM     Time 7  --  1 min  -MM        Exercise 8    Exercise Name 8  --  heel slides with ball  -MM     Time 8  --   2 min  -MM        Exercise 9    Exercise Name 9  --  bridging with ball  -MM     Reps 9  --  15  -MM        Exercise 10    Exercise Name 10  --  SLR flexion  -MM     Reps 10  --  15   -MM        Exercise 11    Exercise Name 11  --  3-way leg kicks with band  -MM     Reps 11  --  15  -MM       User Key  (r) = Recorded By, (t) = Taken By, (c) = Cosigned By    Initials Name Provider Type    Padilla Miller, PT Physical Therapist            Manual Rx (last 36 hours)      Manual Treatments     Row Name 08/14/19 0900             Total Minutes    13248 - PT Manual Therapy Minutes  15  -MM         Manual Rx 1    Manual Rx 1 Location  L knee  -MM      Manual Rx 1 Type  Provided soft tissue mobilization using ASTYM technique for anterior/posterior L knee.   -MM      Manual Rx 1 Duration  15  -MM        User Key  (r) = Recorded By, (t) = Taken By, (c) = Cosigned By    Initials Name Provider Type    Padilla Miller, PT Physical Therapist      Time Calculation:   Start Time: 0845  Therapy Charges for Today     Code Description Service Date Service Provider Modifiers Qty    [id number]  PT THER PROC EA 15 MIN 8/14/2019 Padilla Anderson, PT GP 2    [id number]  PT MANUAL THERAPY EA 15 MIN 8/14/2019 Padilla Anderson, PT GP 1      Padilla Anderson, PT  8/14/2019

## 2019-08-21 ENCOUNTER — HOSPITAL ENCOUNTER (OUTPATIENT)
Dept: PHYSICAL THERAPY | Facility: HOSPITAL | Age: 64
Setting detail: THERAPIES SERIES
Discharge: HOME | End: 2019-08-21

## 2019-08-21 DIAGNOSIS — M25.572 ACUTE LEFT ANKLE PAIN: Primary | ICD-10-CM

## 2019-08-21 DIAGNOSIS — M25.562 PAIN AND SWELLING OF LEFT KNEE: ICD-10-CM

## 2019-08-21 DIAGNOSIS — M25.462 PAIN AND SWELLING OF LEFT KNEE: ICD-10-CM

## 2019-08-21 DIAGNOSIS — Z74.09 IMPAIRED FUNCTIONAL MOBILITY, BALANCE, GAIT, AND ENDURANCE: ICD-10-CM

## 2019-08-21 DIAGNOSIS — S82.122D CLOSED FRACTURE OF LATERAL PORTION OF LEFT TIBIAL PLATEAU WITH ROUTINE HEALING, SUBSEQUENT ENCOUNTER: ICD-10-CM

## 2019-08-21 PROCEDURE — 97010 HOT OR COLD PACKS THERAPY: CPT

## 2019-08-21 PROCEDURE — 97110 THERAPEUTIC EXERCISES: CPT

## 2019-08-21 PROCEDURE — 97140 MANUAL THERAPY 1/> REGIONS: CPT

## 2019-08-21 NOTE — THERAPY TREATMENT NOTE
Outpatient Physical Therapy Ortho Treatment Note   Shanika     Patient Name: Rosalba Girard  : 1955  MRN: [id number]  Today's Date: 2019      Visit Date: 2019    Visit Dx:    ICD-10-CM ICD-9-CM   1. Acute left ankle pain M25.572 719.47   2. Impaired functional mobility, balance, gait, and endurance Z74.09 V49.89   3. Closed fracture of lateral portion of left tibial plateau with routine healing, subsequent encounter S82.122D V54.16   4. Pain and swelling of left knee M25.562 719.46    M25.462 719.06     Patient Active Problem List   Diagnosis   • Essential hypertension, benign   • GERD (gastroesophageal reflux disease)   • IBS (irritable bowel syndrome)   • S/P ORIF (open reduction internal fixation) fracture  left tibial plateau    • Tibial plateau fracture, left   • Leukocytosis     Past Medical History:   Diagnosis Date   • Asthma    • Chronic superficial dermatitis    • Closed fracture of left tibial plateau with routine healing    • Dietary counseling    • Encounter for routine adult health examination    • Fibromyalgia    • GERD (gastroesophageal reflux disease)    • Hair or hair follicle disorder    • History of anemia    • Hives    • Hypertension    • Irritable bowel disease    • Malaise and fatigue    • Myalgia and myositis    • PONV (postoperative nausea and vomiting)     on occasion    • Prediabetes    • Rash and nonspecific skin eruption    • Sinusitis, acute maxillary    • UTI (urinary tract infection)    • Wears glasses      Past Surgical History:   Procedure Laterality Date   • BLADDER SURGERY      Cuff   • BREAST BIOPSY Right    • CHOLECYSTECTOMY     • COLONOSCOPY W/ POLYPECTOMY     • GALLBLADDER SURGERY     • HERNIA REPAIR      umbilical    • HYSTERECTOMY     • TIBIAL PLATEAU OPEN REDUCTION INTERNAL FIXATION Left 2016    Procedure: LEFT TIBIAL PLATEAU OPEN REDUCTION INTERNAL FIXATION;  Surgeon: Constantine Durbin MD;  Location: [facility];  Service:      PT  "Ortho     Row Name 08/21/19 0900       Left Lower Ext    Lt Knee Extension/Flexion AROM  0-121  -MM      User Key  (r) = Recorded By, (t) = Taken By, (c) = Cosigned By    Initials Name Provider Type    Padilla Miller, PT Physical Therapist        PT Assessment/Plan     Row Name 08/21/19 0900          PT Assessment    Assessment Comments  Client reports pain at 6/10, but also feeling \"pretty good.\" No complaints with exercises. Knee ROM improved to 121 degrees flexion with heel slides. [employer] walking on treadmill for 5 minutes. She demonstrated a good gait pattern with normal heel strike and equal stance phase, while overall talisha is slower than normal.She also has mild swelling in the left lower leg.   -MM        PT Plan    PT Plan Comments  Continue per plan of treatment for another visit. Update objective measures. Check tolerance for treadmill walking. Check independence with exercises to continue.  -MM       User Key  (r) = Recorded By, (t) = Taken By, (c) = Cosigned By    Initials Name Provider Type    Padilla Miller, PT Physical Therapist        Exercises     Row Name 08/21/19 0900             Subjective Comments    Subjective Comments  Client reports that pain is a little better overall for her knee and ankle. She reports knee pain at 6/10.   -MM         Subjective Pain    Able to rate subjective pain?  yes  -MM      Pre-Treatment Pain Level  6  -MM      Post-Treatment Pain Level  4  -MM      Subjective Pain Comment  After walking on treadmill at end of session client noted that knee feels \"pretty good.\" She is concerned though that she has frequent pain and mobility is not equal to the right knee.   -MM         Total Minutes    65959 - PT Therapeutic Exercise Minutes  30  -MM      68133 - PT Manual Therapy Minutes  15  -MM         Exercise 1    Exercise Name 1  Recumbent bike L3  -MM      Time 1  5 min  -MM         Exercise 2    Exercise Name 2  total gym squats  -MM      Reps 2  20  -MM  "        Exercise 3    Exercise Name 3  heel raises/toe raises  -MM      Reps 3  20/20  -MM         Exercise 4    Exercise Name 4  prone leg curls  -MM      Reps 4  20  -MM         Exercise 5    Exercise Name 5  side step/diagonal step with band  -MM      Reps 5  15/15  -MM         Exercise 6    Exercise Name 6  HS stretch/quad stretch  -MM      Time 6  2 min  -MM         Exercise 7    Exercise Name 7  gastroc stretch  -MM      Time 7  1 min  -MM         Exercise 8    Exercise Name 8  heel slides with ball  -MM      Time 8   2 min  -MM         Exercise 9    Exercise Name 9  bridging with ball  -MM      Reps 9  15  -MM         Exercise 10    Exercise Name 10  SLR flexion/extension  -MM      Reps 10  15/15  -MM         Exercise 11    Exercise Name 11  Nu-step crosstrainer  -MM      Reps 11  --  -MM      Time 11  5 min  -MM         Exercise 12    Exercise Name 12  TM walking 1.2 mph  -MM      Time 12  5 min  -MM      Additional Comments  0.09 distance in miles  -MM        User Key  (r) = Recorded By, (t) = Taken By, (c) = Cosigned By    Initials Name Provider Type    Padilla Miller, PT Physical Therapist           Manual Rx (last 36 hours)      Manual Treatments     Row Name 08/21/19 0900             Total Minutes    50730 - PT Manual Therapy Minutes  15  -MM         Manual Rx 1    Manual Rx 1 Location  L knee  -MM      Manual Rx 1 Type  Provided soft tissue mobilization using ASTYM technique for anterior/posterior L knee.   -MM      Manual Rx 1 Duration  15  -MM        User Key  (r) = Recorded By, (t) = Taken By, (c) = Cosigned By    Initials Name Provider Type    Padilla Miller, PT Physical Therapist        Time Calculation:   Start Time: 0900  Therapy Charges for Today     Code Description Service Date Service Provider Modifiers Qty    [id number]  PT THER PROC EA 15 MIN 8/21/2019 Padilla Anderson, PT  2    [id number]  PT HOT/COLD PACK WC NONMCARE 8/21/2019 Padilla Anderson, PT  1    [id number]   PT MANUAL THERAPY EA 15 MIN 8/21/2019 Padilla Anderson, PT  1        Padilla Anderson, PT  8/21/2019

## 2019-08-28 ENCOUNTER — HOSPITAL ENCOUNTER (OUTPATIENT)
Dept: PHYSICAL THERAPY | Facility: HOSPITAL | Age: 64
Setting detail: THERAPIES SERIES
Discharge: HOME | End: 2019-08-28

## 2019-08-28 DIAGNOSIS — S82.122D CLOSED FRACTURE OF LATERAL PORTION OF LEFT TIBIAL PLATEAU WITH ROUTINE HEALING, SUBSEQUENT ENCOUNTER: ICD-10-CM

## 2019-08-28 DIAGNOSIS — M25.572 ACUTE LEFT ANKLE PAIN: Primary | ICD-10-CM

## 2019-08-28 DIAGNOSIS — M25.462 PAIN AND SWELLING OF LEFT KNEE: ICD-10-CM

## 2019-08-28 DIAGNOSIS — Z74.09 IMPAIRED FUNCTIONAL MOBILITY, BALANCE, GAIT, AND ENDURANCE: ICD-10-CM

## 2019-08-28 DIAGNOSIS — M25.562 PAIN AND SWELLING OF LEFT KNEE: ICD-10-CM

## 2019-08-28 PROCEDURE — 97110 THERAPEUTIC EXERCISES: CPT

## 2019-08-28 NOTE — THERAPY DISCHARGE NOTE
Outpatient Physical Therapy Ortho Progress Note/Discharge Summary   Shanika     Patient Name: Rosalba Girard  : 1955  MRN: [id number]  Today's Date: 2019      Visit Date: 2019    Visit Dx:    ICD-10-CM ICD-9-CM   1. Acute left ankle pain M25.572 719.47   2. Impaired functional mobility, balance, gait, and endurance Z74.09 V49.89   3. Closed fracture of lateral portion of left tibial plateau with routine healing, subsequent encounter S82.122D V54.16   4. Pain and swelling of left knee M25.562 719.46    M25.462 719.06     Patient Active Problem List   Diagnosis   • Essential hypertension, benign   • GERD (gastroesophageal reflux disease)   • IBS (irritable bowel syndrome)   • S/P ORIF (open reduction internal fixation) fracture  left tibial plateau    • Tibial plateau fracture, left   • Leukocytosis     Past Medical History:   Diagnosis Date   • Asthma    • Chronic superficial dermatitis    • Closed fracture of left tibial plateau with routine healing    • Dietary counseling    • Encounter for routine adult health examination    • Fibromyalgia    • GERD (gastroesophageal reflux disease)    • Hair or hair follicle disorder    • History of anemia    • Hives    • Hypertension    • Irritable bowel disease    • Malaise and fatigue    • Myalgia and myositis    • PONV (postoperative nausea and vomiting)     on occasion    • Prediabetes    • Rash and nonspecific skin eruption    • Sinusitis, acute maxillary    • UTI (urinary tract infection)    • Wears glasses         Past Surgical History:   Procedure Laterality Date   • BLADDER SURGERY      Cuff   • BREAST BIOPSY Right    • CHOLECYSTECTOMY     • COLONOSCOPY W/ POLYPECTOMY     • GALLBLADDER SURGERY     • HERNIA REPAIR      umbilical    • HYSTERECTOMY     • TIBIAL PLATEAU OPEN REDUCTION INTERNAL FIXATION Left 2016    Procedure: LEFT TIBIAL PLATEAU OPEN REDUCTION INTERNAL FIXATION;  Surgeon: Constantine Durbin MD;  Location: [facility] OR;   Service:      PT Ortho     Row Name 08/28/19 0915       Subjective Pain    Post-Treatment Pain Level  8  -MM       Posture/Observations    Posture/Observations Comments  Palpation: tenderness anterior left knee around the scar  -MM       Myotomal Screen- Lower Quarter Clearing    Hip flexion (L2)  4+ (Good +)  -MM    Knee extension (L3)  4+ (Good +)  -MM    Knee flexion (S2)  4+ (Good +)  -MM       Left Lower Ext    Lt Knee Extension/Flexion AROM  0-122  -MM       MMT (Manual Muscle Testing)    General MMT Comments  some discomfort reported with resisted knee extension.  -MM       RLE Quick Girth (cm)    Smallest ankle  23 cm  -MM    Largest calf  34.7 cm  -MM    Mid patella  42 cm  -MM       LLE Quick Girth (cm)    Smallest ankle  24 cm  -MM    Largest calf  36.5 cm  -MM    Mid patella  42.9 cm  -MM       Gait/Stairs Assessment/Training    Comment (Gait/Stairs)  demonstrates normal gait pattern with slightly reduced speed walking on treadmill up to 1.2 mph for 7 minutes for .16 mile.   -MM      User Key  (r) = Recorded By, (t) = Taken By, (c) = Cosigned By    Initials Name Provider Type    MM Padilla Anderson, PT Physical Therapist          PT Assessment/Plan     Row Name 08/28/19 0915          PT Assessment    Assessment Comments  Overall progress has plateaued.  Client continues to report severe knee pain at times.  She does have mild swelling in her left knee and lower leg.  Knee range of motion improved to 0 to 122 degrees.  Left lower extremity strength is at 4+/5 for knee flexion/knee extension, and hip flexion.  Client demonstrates ability to transfer sit to stand without difficulty.  Client is able to walk on treadmill normal gait pattern except slower talisha at 1.2 mph for 7 minutes without difficulty.  Client reports walking 10 to 15 minutes is often uncomfortable.  She reports increased pain with negotiating stairs and too much squatting activity.  She is independent with home program and should  continue to make gradual progress with strength and tolerance to activity.  -MM        PT Plan    PT Plan Comments  Discharged home program due to plateau progress   Client reaching the maximal benefit of therapy.  -MM       User Key  (r) = Recorded By, (t) = Taken By, (c) = Cosigned By    Initials Name Provider Type    MM Padilla Anderson, PT Physical Therapist        Exercises     Row Name 08/28/19 0915             Subjective Comments    Subjective Comments  Client reports knee pain at 8/10 today. She feels that it may be worse due to weather.   -MM         Subjective Pain    Able to rate subjective pain?  yes  -MM      Pre-Treatment Pain Level  8  -MM      Post-Treatment Pain Level  8  -MM         Total Minutes    17664 - PT Therapeutic Exercise Minutes  45  -MM         Exercise 1    Exercise Name 1  Recumbent bike L3  -MM      Time 1  5 min  -MM         Exercise 2    Exercise Name 2  total gym squats  -MM      Reps 2  20  -MM         Exercise 3    Exercise Name 3  heel raises/toe raises  -MM      Reps 3  20/20  -MM         Exercise 4    Exercise Name 4  standing leg curls  -MM      Reps 4  20  -MM         Exercise 5    Exercise Name 5  side step/diagonal step with band  -MM      Reps 5  15/15  -MM         Exercise 6    Exercise Name 6  HS stretch/quad stretch  -MM      Time 6  2 min  -MM         Exercise 7    Exercise Name 7  gastroc stretch  -MM      Time 7  1 min  -MM         Exercise 8    Exercise Name 8  heel slides with ball  -MM      Time 8   2 min  -MM         Exercise 9    Exercise Name 9  --  -MM      Reps 9  --  -MM         Exercise 10    Exercise Name 10  SLR flexion  -MM      Reps 10  15  -MM         Exercise 11    Exercise Name 11  Nu-step crosstrainer  -MM      Time 11  5 min  -MM         Exercise 12    Exercise Name 12  TM walking 1.2 mph  -MM      Time 12  7 min  -MM      Additional Comments  0.16 miles  -MM         Exercise 13    Exercise Name 13  Updated and reviewed home program.  Exercises  included: Sit to stand, straight leg raise, standing leg curls, heel raise/toe raise, side step with band, heel slides, quad stretch, hamstring stretch, calf stretch.  -MM      Time 13  10 min  -MM        User Key  (r) = Recorded By, (t) = Taken By, (c) = Cosigned By    Initials Name Provider Type    Padilla Miller PT Physical Therapist        PT OP Goals     Row Name 08/28/19 0915          PT Short Term Goals    STG Date to Achieve  08/21/19  -MM     STG 1  Decrease L knee and ankle pain by > or = 50%.  -MM     STG 1 Progress  Not Met 30%  -MM     STG 1 Progress Comments  Left ankle pain has been much better.  Left knee pain continues to be bothersome depending on activity  -MM     STG 2  Perform independent HEP to improve ROM/mobility and manage symptoms.  -MM     STG 2 Progress  Met  -MM     STG 3  Improve L knee flexion ROM to at least 118 deg.  -MM     STG 3 Progress  Met  -MM        Long Term Goals    LTG Date to Achieve  09/11/19  -MM     LTG 1  Decrease L knee and ankle pain by > or = 75%.  -MM     LTG 1 Progress  Partially Met  -MM     LTG 1 Progress Comments  Left ankle pain has been minimal.  Left knee pain is still bothersome with activity  -MM     LTG 2  Improve LEFS to > or = 35/80 to reflect significant improvements in functional mobility.  -MM     LTG 2 Progress  Not Met  -MM     LTG 2 Progress Comments  22/80  -MM     LTG 3  Enable ability to ambulate up/down steps with minimal-no pain (< or = 3/10).  -MM     LTG 3 Progress  Not Met  -MM     LTG 4  Enable ability to perform work duties with minimal-no pain (< or = 3/10).  -MM     LTG 4 Progress  Not Met  -MM     LTG 5  Improve LE strength to grossly 4+/5.  -MM     LTG 5 Progress  Met  -MM       User Key  (r) = Recorded By, (t) = Taken By, (c) = Cosigned By    Initials Name Provider Type    Padilla Miller PT Physical Therapist        Therapy Education  Education Details: Provided and reviewed home program. Exercises included: sit to  stand, SLR flexion, leg curl standing, heel/toe raise, sidestep with band, quad stretch, hamstring stretch, and calf stretch.    Outcome Measure Options: Lower Extremity Functional Scale (LEFS)  Lower Extremity Functional Index  Any of your usual work, housework or school activities: Quite a bit of difficulty  Your usual hobbies, recreational or sporting activities: Quite a bit of difficulty  Getting into or out of the bath: Quite a bit of difficulty  Walking between rooms: Moderate difficulty  Putting on your shoes or socks: Quite a bit of difficulty  Squatting: Quite a bit of difficulty  Lifting an object, like a bag of groceries from the floor: Moderate difficulty  Performing light activities around your home: Quite a bit of difficulty  Performing heavy activities around your home: Quite a bit of difficulty  Getting into or out of a car: Quite a bit of difficulty  Walking 2 blocks: Quite a bit of difficulty  Walking a mile: Quite a bit of difficulty  Going up or down 10 stairs (about 1 flight of stairs): Quite a bit of difficulty  Standing for 1 hour: Quite a bit of difficulty  Sitting for 1 hour: Quite a bit of difficulty  Running on even ground: Quite a bit of difficulty  Running on uneven ground: Quite a bit of difficulty  Making sharp turns while running fast: Quite a bit of difficulty  Hopping: Quite a bit of difficulty  Rolling over in bed: Quite a bit of difficulty  Total: 22    Time Calculation:   Start Time: 0915  Therapy Charges for Today     Code Description Service Date Service Provider Modifiers Qty    83151012968  PT THER PROC EA 15 MIN 8/28/2019 Padilla Anderson, PT GP 3        PT G-Codes  Outcome Measure Options: Lower Extremity Functional Scale (LEFS)  Total: 22        Padilla Anderson, PT  8/28/2019

## 2019-09-08 ENCOUNTER — APPOINTMENT (OUTPATIENT)
Dept: CT IMAGING | Facility: HOSPITAL | Age: 64
End: 2019-09-08

## 2019-09-08 ENCOUNTER — HOSPITAL ENCOUNTER (EMERGENCY)
Facility: HOSPITAL | Age: 64
Discharge: HOME | End: 2019-09-08
Attending: EMERGENCY MEDICINE | Admitting: EMERGENCY MEDICINE

## 2019-09-08 VITALS
DIASTOLIC BLOOD PRESSURE: 80 MMHG | HEIGHT: 66 IN | HEART RATE: 82 BPM | TEMPERATURE: 98.4 F | WEIGHT: 207 LBS | OXYGEN SATURATION: 97 % | BODY MASS INDEX: 33.27 KG/M2 | SYSTOLIC BLOOD PRESSURE: 137 MMHG | RESPIRATION RATE: 20 BRPM

## 2019-09-08 DIAGNOSIS — R10.9 ACUTE RIGHT FLANK PAIN: ICD-10-CM

## 2019-09-08 DIAGNOSIS — N39.0 URINARY TRACT INFECTION WITHOUT HEMATURIA, SITE UNSPECIFIED: Primary | ICD-10-CM

## 2019-09-08 DIAGNOSIS — M62.830 SPASM OF THORACIC BACK MUSCLE: ICD-10-CM

## 2019-09-08 LAB
ALBUMIN SERPL-MCNC: 4.3 G/DL (ref 3.5–5.2)
ALBUMIN/GLOB SERPL: 1.7 G/DL
ALP SERPL-CCNC: 79 U/L (ref 39–117)
ALT SERPL W P-5'-P-CCNC: 17 U/L (ref 1–33)
ANION GAP SERPL CALCULATED.3IONS-SCNC: 12 MMOL/L (ref 5–15)
AST SERPL-CCNC: 22 U/L (ref 1–32)
BACTERIA UR QL AUTO: ABNORMAL /HPF
BASOPHILS # BLD AUTO: 0.03 10*3/MM3 (ref 0–0.2)
BASOPHILS NFR BLD AUTO: 0.3 % (ref 0–1.5)
BILIRUB SERPL-MCNC: 0.3 MG/DL (ref 0.2–1.2)
BILIRUB UR QL STRIP: ABNORMAL
BUN BLD-MCNC: 18 MG/DL (ref 8–23)
BUN/CREAT SERPL: 24.3 (ref 7–25)
CALCIUM SPEC-SCNC: 9.6 MG/DL (ref 8.6–10.5)
CHLORIDE SERPL-SCNC: 100 MMOL/L (ref 98–107)
CLARITY UR: CLEAR
CLUMPED PLATELETS: NORMAL
CO2 SERPL-SCNC: 29 MMOL/L (ref 22–29)
COLOR UR: ABNORMAL
CREAT BLD-MCNC: 0.74 MG/DL (ref 0.57–1)
DEPRECATED RDW RBC AUTO: 35.7 FL (ref 37–54)
EOSINOPHIL # BLD AUTO: 0.09 10*3/MM3 (ref 0–0.4)
EOSINOPHIL NFR BLD AUTO: 0.8 % (ref 0.3–6.2)
ERYTHROCYTE [DISTWIDTH] IN BLOOD BY AUTOMATED COUNT: 11.9 % (ref 12.3–15.4)
GFR SERPL CREATININE-BSD FRML MDRD: 80 ML/MIN/1.73
GLOBULIN UR ELPH-MCNC: 2.6 GM/DL
GLUCOSE BLD-MCNC: 104 MG/DL (ref 65–99)
GLUCOSE UR STRIP-MCNC: NEGATIVE MG/DL
HCT VFR BLD AUTO: 47.2 % (ref 34–46.6)
HGB BLD-MCNC: 16 G/DL (ref 12–15.9)
HGB UR QL STRIP.AUTO: NEGATIVE
HYALINE CASTS UR QL AUTO: ABNORMAL /LPF
IMM GRANULOCYTES # BLD AUTO: 0.03 10*3/MM3 (ref 0–0.05)
IMM GRANULOCYTES NFR BLD AUTO: 0.3 % (ref 0–0.5)
KETONES UR QL STRIP: NEGATIVE
LEUKOCYTE ESTERASE UR QL STRIP.AUTO: ABNORMAL
LIPASE SERPL-CCNC: 32 U/L (ref 13–60)
LYMPHOCYTES # BLD AUTO: 3.91 10*3/MM3 (ref 0.7–3.1)
LYMPHOCYTES NFR BLD AUTO: 35.1 % (ref 19.6–45.3)
MCH RBC QN AUTO: 28.1 PG (ref 26.6–33)
MCHC RBC AUTO-ENTMCNC: 33.9 G/DL (ref 31.5–35.7)
MCV RBC AUTO: 82.8 FL (ref 79–97)
MONOCYTES # BLD AUTO: 0.69 10*3/MM3 (ref 0.1–0.9)
MONOCYTES NFR BLD AUTO: 6.2 % (ref 5–12)
NEUTROPHILS # BLD AUTO: 6.4 10*3/MM3 (ref 1.7–7)
NEUTROPHILS NFR BLD AUTO: 57.3 % (ref 42.7–76)
NITRITE UR QL STRIP: POSITIVE
NRBC BLD AUTO-RTO: 0 /100 WBC (ref 0–0.2)
PH UR STRIP.AUTO: <=5 [PH] (ref 5–8)
PLAT MORPH BLD: NORMAL
PLATELET # BLD AUTO: 268 10*3/MM3 (ref 140–450)
PMV BLD AUTO: 10.8 FL (ref 6–12)
POTASSIUM BLD-SCNC: 4.3 MMOL/L (ref 3.5–5.2)
PROT SERPL-MCNC: 6.9 G/DL (ref 6–8.5)
PROT UR QL STRIP: NEGATIVE
RBC # BLD AUTO: 5.7 10*6/MM3 (ref 3.77–5.28)
RBC # UR: ABNORMAL /HPF
RBC MORPH BLD: NORMAL
REF LAB TEST METHOD: ABNORMAL
SODIUM BLD-SCNC: 141 MMOL/L (ref 136–145)
SP GR UR STRIP: 1.02 (ref 1–1.03)
SQUAMOUS #/AREA URNS HPF: ABNORMAL /HPF
TROPONIN T SERPL-MCNC: <0.01 NG/ML (ref 0–0.03)
UROBILINOGEN UR QL STRIP: ABNORMAL
WBC MORPH BLD: NORMAL
WBC NRBC COR # BLD: 11.15 10*3/MM3 (ref 3.4–10.8)
WBC UR QL AUTO: ABNORMAL /HPF

## 2019-09-08 PROCEDURE — 99284 EMERGENCY DEPT VISIT MOD MDM: CPT

## 2019-09-08 PROCEDURE — 85025 COMPLETE CBC W/AUTO DIFF WBC: CPT | Performed by: PHYSICIAN ASSISTANT

## 2019-09-08 PROCEDURE — 96365 THER/PROPH/DIAG IV INF INIT: CPT

## 2019-09-08 PROCEDURE — 25010000002 HYDROMORPHONE PER 4 MG: Performed by: EMERGENCY MEDICINE

## 2019-09-08 PROCEDURE — 93005 ELECTROCARDIOGRAM TRACING: CPT | Performed by: PHYSICIAN ASSISTANT

## 2019-09-08 PROCEDURE — 25010000002 ONDANSETRON PER 1 MG: Performed by: PHYSICIAN ASSISTANT

## 2019-09-08 PROCEDURE — 0 IOPAMIDOL PER 1 ML: Performed by: EMERGENCY MEDICINE

## 2019-09-08 PROCEDURE — 71275 CT ANGIOGRAPHY CHEST: CPT

## 2019-09-08 PROCEDURE — 80053 COMPREHEN METABOLIC PANEL: CPT | Performed by: PHYSICIAN ASSISTANT

## 2019-09-08 PROCEDURE — 84484 ASSAY OF TROPONIN QUANT: CPT | Performed by: PHYSICIAN ASSISTANT

## 2019-09-08 PROCEDURE — 81001 URINALYSIS AUTO W/SCOPE: CPT | Performed by: PHYSICIAN ASSISTANT

## 2019-09-08 PROCEDURE — 96361 HYDRATE IV INFUSION ADD-ON: CPT

## 2019-09-08 PROCEDURE — 25010000002 CEFTRIAXONE PER 250 MG: Performed by: PHYSICIAN ASSISTANT

## 2019-09-08 PROCEDURE — 85007 BL SMEAR W/DIFF WBC COUNT: CPT | Performed by: PHYSICIAN ASSISTANT

## 2019-09-08 PROCEDURE — 96375 TX/PRO/DX INJ NEW DRUG ADDON: CPT

## 2019-09-08 PROCEDURE — 83690 ASSAY OF LIPASE: CPT | Performed by: PHYSICIAN ASSISTANT

## 2019-09-08 RX ORDER — ASPIRIN 81 MG/1
81 TABLET ORAL DAILY
COMMUNITY

## 2019-09-08 RX ORDER — LIDOCAINE 50 MG/G
1 PATCH TOPICAL EVERY 24 HOURS
Qty: 30 PATCH | Refills: 0 | Status: SHIPPED | OUTPATIENT
Start: 2019-09-08 | End: 2022-05-05

## 2019-09-08 RX ORDER — CEFDINIR 300 MG/1
300 CAPSULE ORAL 2 TIMES DAILY
Qty: 14 CAPSULE | Refills: 0 | Status: SHIPPED | OUTPATIENT
Start: 2019-09-08 | End: 2019-09-17 | Stop reason: SDUPTHER

## 2019-09-08 RX ORDER — SODIUM CHLORIDE 0.9 % (FLUSH) 0.9 %
10 SYRINGE (ML) INJECTION AS NEEDED
Status: DISCONTINUED | OUTPATIENT
Start: 2019-09-08 | End: 2019-09-09 | Stop reason: HOSPADM

## 2019-09-08 RX ORDER — ONDANSETRON 4 MG/1
4 TABLET, FILM COATED ORAL EVERY 8 HOURS PRN
Qty: 20 TABLET | Refills: 0 | Status: SHIPPED | OUTPATIENT
Start: 2019-09-08 | End: 2020-07-29

## 2019-09-08 RX ORDER — ONDANSETRON 2 MG/ML
4 INJECTION INTRAMUSCULAR; INTRAVENOUS ONCE
Status: COMPLETED | OUTPATIENT
Start: 2019-09-08 | End: 2019-09-08

## 2019-09-08 RX ORDER — HYDROCODONE BITARTRATE AND ACETAMINOPHEN 5; 325 MG/1; MG/1
1 TABLET ORAL EVERY 6 HOURS PRN
Qty: 15 TABLET | Refills: 0 | Status: SHIPPED | OUTPATIENT
Start: 2019-09-08 | End: 2020-02-05

## 2019-09-08 RX ORDER — SODIUM CHLORIDE 9 MG/ML
125 INJECTION, SOLUTION INTRAVENOUS CONTINUOUS
Status: DISCONTINUED | OUTPATIENT
Start: 2019-09-08 | End: 2019-09-09 | Stop reason: HOSPADM

## 2019-09-08 RX ORDER — HYDROMORPHONE HYDROCHLORIDE 1 MG/ML
0.5 INJECTION, SOLUTION INTRAMUSCULAR; INTRAVENOUS; SUBCUTANEOUS ONCE
Status: COMPLETED | OUTPATIENT
Start: 2019-09-08 | End: 2019-09-08

## 2019-09-08 RX ADMIN — IOPAMIDOL 65 ML: 755 INJECTION, SOLUTION INTRAVENOUS at 21:28

## 2019-09-08 RX ADMIN — SODIUM CHLORIDE 125 ML/HR: 9 INJECTION, SOLUTION INTRAVENOUS at 20:42

## 2019-09-08 RX ADMIN — CEFTRIAXONE 1 G: 1 INJECTION, POWDER, FOR SOLUTION INTRAMUSCULAR; INTRAVENOUS at 22:10

## 2019-09-08 RX ADMIN — HYDROMORPHONE HYDROCHLORIDE 0.5 MG: 1 INJECTION, SOLUTION INTRAMUSCULAR; INTRAVENOUS; SUBCUTANEOUS at 20:42

## 2019-09-08 RX ADMIN — ONDANSETRON 4 MG: 2 INJECTION INTRAMUSCULAR; INTRAVENOUS at 20:42

## 2019-09-09 ENCOUNTER — OFFICE VISIT (OUTPATIENT)
Dept: FAMILY MEDICINE CLINIC | Facility: CLINIC | Age: 64
End: 2019-09-09

## 2019-09-09 VITALS
BODY MASS INDEX: 33.2 KG/M2 | DIASTOLIC BLOOD PRESSURE: 68 MMHG | OXYGEN SATURATION: 98 % | HEIGHT: 66 IN | RESPIRATION RATE: 16 BRPM | HEART RATE: 70 BPM | SYSTOLIC BLOOD PRESSURE: 126 MMHG | TEMPERATURE: 97.9 F | WEIGHT: 206.6 LBS

## 2019-09-09 DIAGNOSIS — N30.00 ACUTE CYSTITIS WITHOUT HEMATURIA: Primary | ICD-10-CM

## 2019-09-09 DIAGNOSIS — M54.50 ACUTE BILATERAL LOW BACK PAIN WITHOUT SCIATICA: ICD-10-CM

## 2019-09-09 PROCEDURE — 99214 OFFICE O/P EST MOD 30 MIN: CPT | Performed by: PHYSICIAN ASSISTANT

## 2019-09-09 RX ORDER — CEFDINIR 300 MG/1
300 CAPSULE ORAL 2 TIMES DAILY
Qty: 20 CAPSULE | Refills: 0 | Status: SHIPPED | OUTPATIENT
Start: 2019-09-09 | End: 2019-10-15

## 2019-09-09 RX ORDER — CYCLOBENZAPRINE HCL 10 MG
10 TABLET ORAL 3 TIMES DAILY PRN
Qty: 30 TABLET | Refills: 1 | Status: SHIPPED | OUTPATIENT
Start: 2019-09-09 | End: 2019-09-17

## 2019-09-09 RX ORDER — ONDANSETRON 4 MG/1
4 TABLET, FILM COATED ORAL EVERY 8 HOURS PRN
Qty: 20 TABLET | Refills: 1 | Status: SHIPPED | OUTPATIENT
Start: 2019-09-09 | End: 2020-07-29

## 2019-09-09 NOTE — DISCHARGE INSTRUCTIONS
Increase fluids, rest.  Follow-up with Dr. Suarez tomorrow.  Off work for 3 days.  Return to the emergency department immediately if any change or worsening of symptoms.

## 2019-09-09 NOTE — ED PROVIDER NOTES
"Subjective   64-year-old female presents to emergency department complaining of right sided flank pain and muscle spasm.  Patient states symptoms started 2 days ago, states she was seen at [facility], she was given Robaxin and Pyridium.  She also complains of some dysuria but no fevers chills or sweats.  She states the pain in her flank is spasmodic, nonradiating.  She also complains of some pleuritic pain, but no chest arm neck jaw or shoulder pain.  She does have a history of left lower extremity edema secondary to ORIF left tibial plateau fracture several months ago.  No prior history of DVT or PE.  No prior history of other thromboembolic disorder.    Past medical history is markable for hypertension fibromyalgia asthma UTI history of anemia\" prediabetes.  Medication list reviewed.  She is followed by Dr. Suarez            Review of Systems   Constitutional: Positive for activity change. Negative for appetite change, chills, diaphoresis, fatigue and fever.   HENT: Negative for congestion and sore throat.    Respiratory: Negative for cough, choking, chest tightness, shortness of breath, wheezing and stridor.         Pleurodynia and right flank spasms per HPI   Cardiovascular: Negative for chest pain, palpitations and leg swelling.   Gastrointestinal: Negative for abdominal distention, abdominal pain, anal bleeding, blood in stool, constipation, diarrhea, nausea and vomiting.   Genitourinary: Negative for difficulty urinating, dysuria, flank pain, frequency, hematuria and urgency.   Musculoskeletal: Positive for back pain and myalgias. Negative for arthralgias and neck stiffness.   All other systems reviewed and are negative.      Past Medical History:   Diagnosis Date   • Asthma    • Chronic superficial dermatitis    • Closed fracture of left tibial plateau with routine healing    • Dietary counseling    • Encounter for routine adult health examination    • Fibromyalgia    • GERD " (gastroesophageal reflux disease)    • Hair or hair follicle disorder    • History of anemia    • Hives    • Hypertension    • Irritable bowel disease    • Malaise and fatigue    • Myalgia and myositis    • PONV (postoperative nausea and vomiting)     on occasion    • Prediabetes    • Rash and nonspecific skin eruption    • Sinusitis, acute maxillary    • UTI (urinary tract infection)    • Wears glasses        Allergies   Allergen Reactions   • Tramadol Rash       Past Surgical History:   Procedure Laterality Date   • BLADDER SURGERY      Cuff   • BREAST BIOPSY Right    • CHOLECYSTECTOMY     • COLONOSCOPY W/ POLYPECTOMY     • GALLBLADDER SURGERY     • HERNIA REPAIR      umbilical    • HYSTERECTOMY     • TIBIAL PLATEAU OPEN REDUCTION INTERNAL FIXATION Left 2016    Procedure: LEFT TIBIAL PLATEAU OPEN REDUCTION INTERNAL FIXATION;  Surgeon: Constantine Durbin MD;  Location: [facility] OR;  Service:        Family History   Problem Relation Age of Onset   • Osteoarthritis Mother    • Cancer Father         Lung   • Heart disease Father    • Heart attack Father    • Stroke Father    • Hypertension Sister    • Hypertension Brother    • Cancer Other    • Stroke Other    • Depression Other    • Diabetes Other    • Hypertension Other    • Osteoarthritis Other    • Rheum arthritis Other    • Tuberculosis Other    • Bleeding Disorder Other    • Other Other    • Breast cancer Maternal Aunt    • Ovarian cancer Paternal Aunt        Social History     Socioeconomic History   • Marital status:      Spouse name: Not on file   • Number of children: Not on file   • Years of education: Not on file   • Highest education level: Not on file   Tobacco Use   • Smoking status: Former Smoker     Packs/day: 2.00     Years: 10.00     Pack years: 20.00     Types: Cigarettes     Last attempt to quit:      Years since quittin.7   • Smokeless tobacco: Never Used   Substance and Sexual Activity   • Alcohol use: No     Comment:  Rarely   • Drug use: No   • Sexual activity: Defer           Objective   Physical Exam   Constitutional: She is oriented to person, place, and time. She appears well-developed and well-nourished. No distress.   HENT:   Head: Normocephalic and atraumatic.   Right Ear: External ear normal.   Left Ear: External ear normal.   Nose: Nose normal.   Mouth/Throat: Oropharynx is clear and moist. No oropharyngeal exudate.   Eyes: Conjunctivae and EOM are normal. Pupils are equal, round, and reactive to light. Right eye exhibits no discharge. Left eye exhibits no discharge. No scleral icterus.   Neck: Normal range of motion. Neck supple. No JVD present. No tracheal deviation present. No thyromegaly present.   Cardiovascular: Normal rate, regular rhythm, normal heart sounds and intact distal pulses. Exam reveals no gallop and no friction rub.   No murmur heard.  Pulmonary/Chest: Effort normal and breath sounds normal. No stridor. No respiratory distress. She has no wheezes. She has no rales. She exhibits no tenderness.   Chest is clear to auscultation with no wheezes rales rhonchi tachypnea or respiratory distress or accessory muscle use.  Thoracic expansion is normal.   Abdominal: Soft. Bowel sounds are normal. She exhibits no distension and no mass. There is no tenderness. There is no rebound and no guarding.   Musculoskeletal: Normal range of motion. She exhibits tenderness. She exhibits no edema or deformity.   Tenderness to right thoracolumbar paraspinous musculature, palpation reproduces patient's symptoms, causing her to recoil.  No midline tenderness.   Neurological: She is alert and oriented to person, place, and time. No cranial nerve deficit. She exhibits normal muscle tone. Coordination normal.   Skin: Skin is warm and dry. No rash noted. She is not diaphoretic. No erythema. No pallor.   Psychiatric: She has a normal mood and affect. Her behavior is normal. Judgment and thought content normal.   Nursing note and  vitals reviewed.      Procedures           ED Course  ED Course as of Sep 09 0006   Sun Sep 08, 2019   2115 Nitrite, UA: (!) Positive [TG]   2115 WBC, UA: (!) 6-12 [TG]   2145 IMPRESSION:  Linear scarring or atelectasis in the right middle lobe. No acute findings in the chest. No evidence of pulmonary embolism  [TG]      ED Course User Index  [TG] Deshawn Islas PA-C      Recent Results (from the past 24 hour(s))   Urinalysis With Microscopic If Indicated (No Culture) - Urine, Clean Catch    Collection Time: 09/08/19  8:31 PM   Result Value Ref Range    Color, UA Orange (A) Yellow, Straw    Appearance, UA Clear Clear    pH, UA <=5.0 5.0 - 8.0    Specific Gravity, UA 1.019 1.001 - 1.030    Glucose, UA Negative Negative    Ketones, UA Negative Negative    Bilirubin, UA Small (1+) (A) Negative    Blood, UA Negative Negative    Protein, UA Negative Negative    Leuk Esterase, UA Small (1+) (A) Negative    Nitrite, UA Positive (A) Negative    Urobilinogen, UA 1.0 E.U./dL 0.2 - 1.0 E.U./dL   Urinalysis, Microscopic Only - Urine, Clean Catch    Collection Time: 09/08/19  8:31 PM   Result Value Ref Range    RBC, UA 0-2 None Seen, 0-2 /HPF    WBC, UA 6-12 (A) None Seen, 0-2 /HPF    Bacteria, UA None Seen None Seen, Trace /HPF    Squamous Epithelial Cells, UA 0-2 None Seen, 0-2 /HPF    Hyaline Casts, UA 0-6 0 - 6 /LPF    Methodology Automated Microscopy    Comprehensive Metabolic Panel    Collection Time: 09/08/19  8:32 PM   Result Value Ref Range    Glucose 104 (H) 65 - 99 mg/dL    BUN 18 8 - 23 mg/dL    Creatinine 0.74 0.57 - 1.00 mg/dL    Sodium 141 136 - 145 mmol/L    Potassium 4.3 3.5 - 5.2 mmol/L    Chloride 100 98 - 107 mmol/L    CO2 29.0 22.0 - 29.0 mmol/L    Calcium 9.6 8.6 - 10.5 mg/dL    Total Protein 6.9 6.0 - 8.5 g/dL    Albumin 4.30 3.50 - 5.20 g/dL    ALT (SGPT) 17 1 - 33 U/L    AST (SGOT) 22 1 - 32 U/L    Alkaline Phosphatase 79 39 - 117 U/L    Total Bilirubin 0.3 0.2 - 1.2 mg/dL    eGFR Non African Amer  80 >60 mL/min/1.73    Globulin 2.6 gm/dL    A/G Ratio 1.7 g/dL    BUN/Creatinine Ratio 24.3 7.0 - 25.0    Anion Gap 12.0 5.0 - 15.0 mmol/L   Lipase    Collection Time: 09/08/19  8:32 PM   Result Value Ref Range    Lipase 32 13 - 60 U/L   Troponin    Collection Time: 09/08/19  8:32 PM   Result Value Ref Range    Troponin T <0.010 0.000 - 0.030 ng/mL   CBC Auto Differential    Collection Time: 09/08/19  8:32 PM   Result Value Ref Range    WBC 11.15 (H) 3.40 - 10.80 10*3/mm3    RBC 5.70 (H) 3.77 - 5.28 10*6/mm3    Hemoglobin 16.0 (H) 12.0 - 15.9 g/dL    Hematocrit 47.2 (H) 34.0 - 46.6 %    MCV 82.8 79.0 - 97.0 fL    MCH 28.1 26.6 - 33.0 pg    MCHC 33.9 31.5 - 35.7 g/dL    RDW 11.9 (L) 12.3 - 15.4 %    RDW-SD 35.7 (L) 37.0 - 54.0 fl    MPV 10.8 6.0 - 12.0 fL    Platelets 268 140 - 450 10*3/mm3    Neutrophil % 57.3 42.7 - 76.0 %    Lymphocyte % 35.1 19.6 - 45.3 %    Monocyte % 6.2 5.0 - 12.0 %    Eosinophil % 0.8 0.3 - 6.2 %    Basophil % 0.3 0.0 - 1.5 %    Immature Grans % 0.3 0.0 - 0.5 %    Neutrophils, Absolute 6.40 1.70 - 7.00 10*3/mm3    Lymphocytes, Absolute 3.91 (H) 0.70 - 3.10 10*3/mm3    Monocytes, Absolute 0.69 0.10 - 0.90 10*3/mm3    Eosinophils, Absolute 0.09 0.00 - 0.40 10*3/mm3    Basophils, Absolute 0.03 0.00 - 0.20 10*3/mm3    Immature Grans, Absolute 0.03 0.00 - 0.05 10*3/mm3    nRBC 0.0 0.0 - 0.2 /100 WBC   Scan Slide    Collection Time: 09/08/19  8:32 PM   Result Value Ref Range    RBC Morphology Normal Normal    WBC Morphology Normal Normal    Platelet Morphology Normal Normal    Clumped Platelets       Note: In addition to lab results from this visit, the labs listed above may include labs taken at another facility or during a different encounter within the last 24 hours. Please correlate lab times with ED admission and discharge times for further clarification of the services performed during this visit.    CT Angiogram Chest With & Without Contrast   ED Interpretation   Linear scarring or  atelectasis in the right middle lobe. No acute findings in the chest. No evidence of pulmonary embolism.      Signer Name: Ricci Cassidy MD    Signed: 9/8/2019 9:37 PM    Workstation Name: [facility]      Final Result   Linear scarring or atelectasis in the right middle lobe. No acute findings in the chest. No evidence of pulmonary embolism.      Signer Name: Ricci Cassidy MD    Signed: 9/8/2019 9:37 PM    Workstation Name: [facility]        Vitals:    09/08/19 2200 09/08/19 2202 09/08/19 2300 09/08/19 2302   BP: 121/95  137/80    BP Location:       Patient Position:       Pulse:       Resp:       Temp:       TempSrc:       SpO2: 92% 94%  97%   Weight:       Height:         Medications   sodium chloride 0.9 % flush 10 mL (not administered)   sodium chloride 0.9 % infusion (0 mL/hr Intravenous Stopped 9/8/19 2322)   ondansetron (ZOFRAN) injection 4 mg (4 mg Intravenous Given 9/8/19 2042)   HYDROmorphone (DILAUDID) injection 0.5 mg (0.5 mg Intravenous Given 9/8/19 2042)   iopamidol (ISOVUE-370) 76 % injection 100 mL (65 mL Intravenous Given 9/8/19 2128)   cefTRIAXone (ROCEPHIN) 1 g/100 mL 0.9% NS (MBP) (0 g Intravenous Stopped 9/8/19 2246)     ECG/EMG Results (last 24 hours)     Procedure Component Value Units Date/Time    ECG 12 Lead [[id number]] Collected:  09/08/19 2040     Updated:  09/08/19 2214        ECG 12 Lead                         MDM    Final diagnoses:   Urinary tract infection without hematuria, site unspecified   Acute right flank pain   Spasm of thoracic back muscle              Deshawn Islas PA-C  09/09/19 0007

## 2019-09-09 NOTE — PROGRESS NOTES
Subjective   Rosalba Girard is a 64 y.o. female  Urinary Tract Infection (Seen in ED last night and prescribed cefdinir but hasn't filled yet) and Back Pain (F/U from ED-having muscle spasms)      Urinary Tract Infection    This is a new problem. The current episode started yesterday. The problem occurs every urination. The problem has been rapidly worsening. The pain is at a severity of 5/10. The pain is moderate. The maximum temperature recorded prior to her arrival was 101 - 101.9 F. The fever has been present for 3 - 4 days. She is not sexually active. There is no history of pyelonephritis. Pertinent negatives include no nausea or vomiting. The treatment provided mild relief.   Back Pain   This is a new problem. The pain is present in the sacro-iliac. The quality of the pain is described as cramping and shooting. The pain is at a severity of 8/10. The symptoms are aggravated by lying down. Associated symptoms include leg pain. Pertinent negatives include no chest pain, headaches, numbness or weakness. She has tried nothing for the symptoms. The treatment provided mild relief.       The following portions of the patient's history were reviewed and updated as appropriate: allergies, current medications, past social history and problem list    Review of Systems   Constitutional: Negative for appetite change, diaphoresis, fatigue and unexpected weight change.   Eyes: Negative for visual disturbance.   Respiratory: Negative for cough, chest tightness and shortness of breath.    Cardiovascular: Negative for chest pain, palpitations and leg swelling.   Gastrointestinal: Negative for diarrhea, nausea and vomiting.   Endocrine: Negative for polydipsia, polyphagia and polyuria.   Musculoskeletal: Positive for back pain and joint swelling.   Skin: Negative for color change and rash.   Neurological: Negative for dizziness, syncope, weakness, light-headedness, numbness and headaches.       Objective     Vitals:    09/09/19  0931   BP: 126/68   Pulse: 70   Resp: 16   Temp: 97.9 °F (36.6 °C)   SpO2: 98%       Physical Exam   Constitutional: She appears well-developed and well-nourished.   Neck: Neck supple. No JVD present. No thyromegaly present.   Cardiovascular: Normal rate, regular rhythm, normal heart sounds, intact distal pulses and normal pulses.   No murmur heard.  Pulmonary/Chest: Effort normal and breath sounds normal. No respiratory distress.   Abdominal: Soft. Bowel sounds are normal. There is no hepatosplenomegaly. There is no tenderness.   Musculoskeletal: She exhibits no edema.        Lumbar back: She exhibits decreased range of motion, tenderness and bony tenderness.   Lymphadenopathy:     She has no cervical adenopathy.   Neurological: No sensory deficit.   Skin: Skin is warm and dry. She is not diaphoretic.   Nursing note and vitals reviewed.      Assessment/Plan     Diagnoses and all orders for this visit:    Acute cystitis without hematuria  -     cefdinir (OMNICEF) 300 MG capsule; Take 1 capsule by mouth 2 (Two) Times a Day.  -     ondansetron (ZOFRAN) 4 MG tablet; Take 1 tablet by mouth Every 8 (Eight) Hours As Needed for Nausea or Vomiting.    Acute bilateral low back pain without sciatica  -     cyclobenzaprine (FLEXERIL) 10 MG tablet; Take 1 tablet by mouth 3 (Three) Times a Day As Needed for Muscle Spasms.    follow as needed

## 2019-09-16 ENCOUNTER — TELEPHONE (OUTPATIENT)
Dept: FAMILY MEDICINE CLINIC | Facility: CLINIC | Age: 64
End: 2019-09-16

## 2019-09-16 NOTE — TELEPHONE ENCOUNTER
----- Message from Cesilia Chávez sent at 9/16/2019  8:21 AM EDT -----  Contact: PT.  PT. SAW [employer] LAST WEEK.  PT. STATED HER [employer]  CO.-JOCE STATED OUR OFFICE HAS NOT RECEIVED THE PAPERWORK.  JOCE IS STATING THEY FAXED IT 3 X'S & 2 X'S ON Friday.   DO WE HAVE OR NOT; PT. IS WANTING TO KNOW.    (PT. HAS AN APPT. FOR:  09-).    PT. CAN BE REACHED @ ABOVE HOME #.

## 2019-09-16 NOTE — TELEPHONE ENCOUNTER
Patient dropped off paperwork today. She has been notified that paperwork has been completed and faxed. She has an appointment tomorrow so will get original at that time.

## 2019-09-16 NOTE — TELEPHONE ENCOUNTER
"----- Message from Cesilialinsey Chávez sent at 9/16/2019  2:33 PM EDT -----  Contact: PT.  PT. SEE'S [employer].  PT. STATED [employer] IS \"PUSHING HER\" TO GET PAPERWORK COMPLETED FOR HER FMLA.    PT. CAN BE REACHED @ HOME #.  (PT. CAN BE REACHED @ ABOVE WORK # 'TIL 4:30 PM).  "

## 2019-09-16 NOTE — TELEPHONE ENCOUNTER
Patient notified that we don't have FMLA paperwork and she is going to call HR again to see if they can email them to her and she will drop them by our office.

## 2019-09-16 NOTE — TELEPHONE ENCOUNTER
----- Message from Olive Vela sent at 9/13/2019  3:58 PM EDT -----  Contact: PATIENT  PATIENT STATES HER WORK FAXED OVER HER [place] PAPERS.  SHE WANTS US TO FILL THEM OUT & FAXED BACK TO HER

## 2019-09-17 ENCOUNTER — OFFICE VISIT (OUTPATIENT)
Dept: FAMILY MEDICINE CLINIC | Facility: CLINIC | Age: 64
End: 2019-09-17

## 2019-09-17 VITALS
DIASTOLIC BLOOD PRESSURE: 72 MMHG | HEART RATE: 80 BPM | OXYGEN SATURATION: 99 % | RESPIRATION RATE: 16 BRPM | HEIGHT: 66 IN | SYSTOLIC BLOOD PRESSURE: 118 MMHG | WEIGHT: 208.2 LBS | TEMPERATURE: 97.8 F | BODY MASS INDEX: 33.46 KG/M2

## 2019-09-17 DIAGNOSIS — G89.29 CHRONIC BILATERAL LOW BACK PAIN WITH SCIATICA, SCIATICA LATERALITY UNSPECIFIED: Primary | ICD-10-CM

## 2019-09-17 DIAGNOSIS — M54.40 CHRONIC BILATERAL LOW BACK PAIN WITH SCIATICA, SCIATICA LATERALITY UNSPECIFIED: Primary | ICD-10-CM

## 2019-09-17 DIAGNOSIS — N30.00 ACUTE CYSTITIS WITHOUT HEMATURIA: ICD-10-CM

## 2019-09-17 PROCEDURE — 99213 OFFICE O/P EST LOW 20 MIN: CPT | Performed by: PHYSICIAN ASSISTANT

## 2019-09-17 RX ORDER — METHOCARBAMOL 500 MG/1
500 TABLET, FILM COATED ORAL 4 TIMES DAILY
Qty: 28 TABLET | Refills: 1 | Status: SHIPPED | OUTPATIENT
Start: 2019-09-17 | End: 2020-02-05 | Stop reason: DRUGHIGH

## 2019-09-17 RX ORDER — CIPROFLOXACIN 500 MG/1
500 TABLET, FILM COATED ORAL 2 TIMES DAILY
Qty: 14 TABLET | Refills: 0 | Status: SHIPPED | OUTPATIENT
Start: 2019-09-17 | End: 2019-09-23 | Stop reason: SINTOL

## 2019-09-17 NOTE — PROGRESS NOTES
Subjective   Rosalba Girard is a 64 y.o. female  Back Pain (Low back pain, spasms) and Urinary Tract Infection (F/U. Still having pressure, dysuria-taking cefdinir since 9/9)      History of Present Illness  Patient is a 64-year-old white female who comes in complaining of dysuria increased frequency upon urination symptoms x3 days gallop better with Omnicef    Patient having low back pain with spasm x3 days patient has pain with flexion extension large amount of stiffness and pain describes pain sharp stabbing 9 out of 10    The following portions of the patient's history were reviewed and updated as appropriate: allergies, current medications, past social history and problem list    Review of Systems   Constitutional: Negative for appetite change, diaphoresis, fatigue and unexpected weight change.   Eyes: Negative for visual disturbance.   Respiratory: Negative for cough, chest tightness and shortness of breath.    Cardiovascular: Negative for chest pain, palpitations and leg swelling.   Gastrointestinal: Negative for diarrhea, nausea and vomiting.   Endocrine: Negative for polydipsia, polyphagia and polyuria.   Skin: Negative for color change and rash.   Neurological: Negative for dizziness, syncope, weakness, light-headedness, numbness and headaches.       Objective     Vitals:    09/17/19 0913   BP: 118/72   Pulse: 80   Resp: 16   Temp: 97.8 °F (36.6 °C)   SpO2: 99%       Physical Exam   Constitutional: She appears well-developed and well-nourished.   Neck: Neck supple. No JVD present. No thyromegaly present.   Cardiovascular: Normal rate, regular rhythm, normal heart sounds, intact distal pulses and normal pulses.   No murmur heard.  Pulmonary/Chest: Effort normal and breath sounds normal. No respiratory distress.   Abdominal: Soft. Bowel sounds are normal. There is no hepatosplenomegaly. There is no tenderness.   Musculoskeletal: She exhibits no edema.   Lymphadenopathy:     She has no cervical adenopathy.    Neurological: No sensory deficit.   Skin: Skin is warm and dry. She is not diaphoretic.   Nursing note and vitals reviewed.      Assessment/Plan     Diagnoses and all orders for this visit:    Chronic bilateral low back pain with sciatica, sciatica laterality unspecified  -     methocarbamol (ROBAXIN) 500 MG tablet; Take 1 tablet by mouth 4 (Four) Times a Day.    Acute cystitis without hematuria  -     ciprofloxacin (CIPRO) 500 MG tablet; Take 1 tablet by mouth 2 (Two) Times a Day.    Follow-up as needed

## 2019-09-23 ENCOUNTER — TELEPHONE (OUTPATIENT)
Dept: FAMILY MEDICINE CLINIC | Facility: CLINIC | Age: 64
End: 2019-09-23

## 2019-09-23 RX ORDER — NITROFURANTOIN 25; 75 MG/1; MG/1
100 CAPSULE ORAL 2 TIMES DAILY
Qty: 14 CAPSULE | Refills: 0 | Status: SHIPPED | OUTPATIENT
Start: 2019-09-23 | End: 2020-02-03

## 2019-09-23 NOTE — TELEPHONE ENCOUNTER
----- Message from Olive Vela sent at 9/23/2019  8:15 AM EDT -----  Contact: PATIENT  PATIENT STATES THE CIPROFLOXACIN TOOD PRESCRIBED FOR HER IS MAKING HER SICK.  SHE WANTS TO KNOW IF [employer] CAN PRESCRIBE A SUBSTITUTE MEDICATION.      ciprofloxacin (CIPRO) 500 MG tablet 14 tablet 0 9/17/2019    Sig - Route: Take 1 tablet by mouth 2 (Two) Times a Day. - Oral   Sent to pharmacy as: Ciprofloxacin HCl 500 MG Oral Tablet   Associated Diagnoses     Acute cystitis without hematuria     Pharmacy     [facility] - [place]

## 2019-10-15 ENCOUNTER — OFFICE VISIT (OUTPATIENT)
Dept: ORTHOPEDIC SURGERY | Facility: CLINIC | Age: 64
End: 2019-10-15

## 2019-10-15 VITALS — BODY MASS INDEX: 31.99 KG/M2 | OXYGEN SATURATION: 98 % | HEART RATE: 91 BPM | HEIGHT: 66 IN | WEIGHT: 199.08 LBS

## 2019-10-15 DIAGNOSIS — M17.32 POST-TRAUMATIC OSTEOARTHRITIS OF LEFT KNEE: ICD-10-CM

## 2019-10-15 DIAGNOSIS — S82.142D TIBIAL PLATEAU FRACTURE, LEFT, CLOSED, WITH ROUTINE HEALING, SUBSEQUENT ENCOUNTER: Primary | ICD-10-CM

## 2019-10-15 PROCEDURE — 99213 OFFICE O/P EST LOW 20 MIN: CPT | Performed by: ORTHOPAEDIC SURGERY

## 2019-10-15 RX ORDER — PHENAZOPYRIDINE HYDROCHLORIDE 100 MG/1
100 TABLET, FILM COATED ORAL 3 TIMES DAILY
Refills: 0 | COMMUNITY
Start: 2019-09-08 | End: 2020-07-29

## 2019-10-15 RX ORDER — FEXOFENADINE HCL 180 MG/1
TABLET ORAL
COMMUNITY
Start: 2010-01-29 | End: 2020-07-29

## 2019-10-15 NOTE — PROGRESS NOTES
"    [facility] Note    Subjective     CC: Pain and Leg Swelling of the Left Lower Leg      HPI    Rosalba Girard is a 64 y.o. female.  Patient is here today to follow-up of swelling in her left lower leg.  She continues to struggle with some stiffness and pain in the left knee and difficulty with overuse.      ROS:    Constiutional:Pt denies fever, chills, nausea, or vomiting.  MSK:as above    Objective      Past Medical History  Past Medical History:   Diagnosis Date   • Asthma    • Chronic superficial dermatitis    • Closed fracture of left tibial plateau with routine healing    • Dietary counseling    • Encounter for routine adult health examination    • Fibromyalgia    • GERD (gastroesophageal reflux disease)    • Hair or hair follicle disorder    • History of anemia    • Hives    • Hypertension    • Irritable bowel disease    • Malaise and fatigue    • Myalgia and myositis    • PONV (postoperative nausea and vomiting)     on occasion    • Prediabetes    • Rash and nonspecific skin eruption    • Sinusitis, acute maxillary    • UTI (urinary tract infection)    • Wears glasses          Physical Exam  Pulse 91   Ht 167.6 cm (65.98\")   Wt 90.3 kg (199 lb 1.2 oz)   LMP  (LMP Unknown)   SpO2 98%   BMI 32.15 kg/m²     Body mass index is 32.15 kg/m².    Patient is well nourished and well developed.        Ortho Exam  Left knee: Patient has range of motion 5-115 on the left side.  Well-healed lateral incision  1+ knee effusion  No crepitance with range of motion  No medial joint line tenderness  Mild lateral joint line tenderness  Negative Li's    Imaging/Labs/EMG Reviewed:  Imaging Results (last 24 hours)     ** No results found for the last 24 hours. **          Assessment:  1. Tibial plateau fracture, left, closed, with routine healing, subsequent encounter    2. Post-traumatic osteoarthritis of left knee        Plan:  1. Recommend over the counter anti-inflammatories for pain and/or " swelling  2. Status post ORIF left lateral tibial plateau fracture with posttraumatic arthritis the lateral compartment of the left knee--observe for now.  Patient to continue wearing the brace.  We have told her that there is likely going to be some long-term deficits in the knee with regards to strength, stamina, and range of motion to say the least.  A knee replacement down the road is not inconceivable but currently her last x-ray is not suggest that this is going to be eminent.  Follow-up with me as needed.  I continue to support her getting assistance with the bus to help her get to and from work.      Constantine Durbin MD  10/15/19  6:55 PM

## 2019-12-16 ENCOUNTER — TELEPHONE (OUTPATIENT)
Dept: FAMILY MEDICINE CLINIC | Facility: CLINIC | Age: 64
End: 2019-12-16

## 2019-12-16 RX ORDER — AZITHROMYCIN 250 MG/1
TABLET, FILM COATED ORAL
Qty: 6 TABLET | Refills: 0 | Status: SHIPPED | OUTPATIENT
Start: 2019-12-16 | End: 2020-02-03

## 2019-12-16 NOTE — TELEPHONE ENCOUNTER
Patient states that she has had fever, sore throat, sinus pressure and wants to see if she can get in to see somebody today or get something called in.    [facility]

## 2019-12-19 ENCOUNTER — TELEPHONE (OUTPATIENT)
Dept: FAMILY MEDICINE CLINIC | Facility: CLINIC | Age: 64
End: 2019-12-19

## 2019-12-19 RX ORDER — AMOXICILLIN 500 MG/1
500 CAPSULE ORAL 3 TIMES DAILY
Qty: 30 CAPSULE | Refills: 0 | Status: SHIPPED | OUTPATIENT
Start: 2019-12-19 | End: 2020-02-03

## 2019-12-19 RX ORDER — DEXTROMETHORPHAN HYDROBROMIDE AND PROMETHAZINE HYDROCHLORIDE 15; 6.25 MG/5ML; MG/5ML
5 SYRUP ORAL EVERY 6 HOURS PRN
Qty: 120 ML | Refills: 0 | Status: SHIPPED | OUTPATIENT
Start: 2019-12-19 | End: 2020-02-05

## 2019-12-19 NOTE — TELEPHONE ENCOUNTER
PATIENT STATES THAT HER THROAT IS RAW AND HER CHEST IS ON FIRE AND STATES THAT THE Z-PACK THAT WAS CALLED IN Monday IS NOT HELPING HER AT ALL. THE PATIENT WOULD LIKE TO HAVE AMOXICILLIN CALLED INTO HER [facility] THAT IS IN HER CHART. THE PATIENT WOULD LIKE TO GET A CALL BACK IN REGARDS TO THIS MATTER -[phone].

## 2019-12-19 NOTE — TELEPHONE ENCOUNTER
Patient is calling back stating that she really needs medicine called in as soon as possible.  Please call and let her know if this can be done.    [facility]

## 2019-12-19 NOTE — TELEPHONE ENCOUNTER
PATIENT CALLED AND STATES SHE IS IN REAL PAIN WITH HER THROAT CAN'T EAT OR DRINK, FEELS LIKE ITS BURNING.    [facility]    PATIENT CALL BACK NUMBER [phone]

## 2020-01-15 ENCOUNTER — TRANSCRIBE ORDERS (OUTPATIENT)
Dept: FAMILY MEDICINE CLINIC | Facility: CLINIC | Age: 65
End: 2020-01-15

## 2020-01-15 DIAGNOSIS — Z12.31 VISIT FOR SCREENING MAMMOGRAM: Primary | ICD-10-CM

## 2020-02-03 ENCOUNTER — TELEPHONE (OUTPATIENT)
Dept: FAMILY MEDICINE CLINIC | Facility: CLINIC | Age: 65
End: 2020-02-03

## 2020-02-03 RX ORDER — NITROFURANTOIN 25; 75 MG/1; MG/1
100 CAPSULE ORAL 2 TIMES DAILY
Qty: 14 CAPSULE | Refills: 0 | Status: SHIPPED | OUTPATIENT
Start: 2020-02-03 | End: 2020-02-21

## 2020-02-03 NOTE — TELEPHONE ENCOUNTER
PATIENT CALLED AND HAS APPOINTMENT  FOR UTI AND MED REFILL ON Wednesday 2/5/2020. CAN SOMETHING BE CALLED IN FOR UTI BEFORE APPOINTMENT    [facility]     PATIENT CALL BACK NUMBER [phone]

## 2020-02-05 ENCOUNTER — OFFICE VISIT (OUTPATIENT)
Dept: FAMILY MEDICINE CLINIC | Facility: CLINIC | Age: 65
End: 2020-02-05

## 2020-02-05 VITALS
OXYGEN SATURATION: 99 % | HEART RATE: 80 BPM | RESPIRATION RATE: 16 BRPM | DIASTOLIC BLOOD PRESSURE: 76 MMHG | HEIGHT: 66 IN | BODY MASS INDEX: 33.14 KG/M2 | WEIGHT: 206.2 LBS | SYSTOLIC BLOOD PRESSURE: 122 MMHG

## 2020-02-05 DIAGNOSIS — R30.0 DYSURIA: Primary | ICD-10-CM

## 2020-02-05 DIAGNOSIS — M25.562 CHRONIC PAIN OF LEFT KNEE: ICD-10-CM

## 2020-02-05 DIAGNOSIS — I10 ESSENTIAL HYPERTENSION: ICD-10-CM

## 2020-02-05 DIAGNOSIS — G89.29 CHRONIC PAIN OF LEFT KNEE: ICD-10-CM

## 2020-02-05 LAB
BILIRUB BLD-MCNC: NEGATIVE MG/DL
CLARITY, POC: CLEAR
COLOR UR: NORMAL
GLUCOSE UR STRIP-MCNC: NEGATIVE MG/DL
KETONES UR QL: NEGATIVE
LEUKOCYTE EST, POC: NEGATIVE
NITRITE UR-MCNC: NEGATIVE MG/ML
PH UR: 6 [PH] (ref 5–8)
PROT UR STRIP-MCNC: NEGATIVE MG/DL
RBC # UR STRIP: NEGATIVE /UL
SP GR UR: 1.02 (ref 1–1.03)
UROBILINOGEN UR QL: NORMAL

## 2020-02-05 PROCEDURE — 99214 OFFICE O/P EST MOD 30 MIN: CPT | Performed by: PHYSICIAN ASSISTANT

## 2020-02-05 PROCEDURE — 81003 URINALYSIS AUTO W/O SCOPE: CPT | Performed by: PHYSICIAN ASSISTANT

## 2020-02-05 RX ORDER — BISOPROLOL FUMARATE AND HYDROCHLOROTHIAZIDE 2.5; 6.25 MG/1; MG/1
1 TABLET ORAL DAILY
Qty: 90 TABLET | Refills: 3 | Status: SHIPPED | OUTPATIENT
Start: 2020-02-05 | End: 2021-02-17 | Stop reason: SDUPTHER

## 2020-02-05 RX ORDER — METHOCARBAMOL 750 MG/1
750 TABLET, FILM COATED ORAL 4 TIMES DAILY
Qty: 40 TABLET | Refills: 5 | Status: SHIPPED | OUTPATIENT
Start: 2020-02-05 | End: 2021-02-23 | Stop reason: SDUPTHER

## 2020-02-05 RX ORDER — TOLTERODINE 2 MG/1
2 CAPSULE, EXTENDED RELEASE ORAL DAILY
Qty: 30 CAPSULE | Refills: 11 | Status: SHIPPED | OUTPATIENT
Start: 2020-02-05 | End: 2020-07-29

## 2020-02-05 NOTE — PROGRESS NOTES
Subjective   Rosalba Girard is a 64 y.o. female  Difficulty Urinating (Frequency, back pain. Started Macrobid yesterday); Hypertension (RF Ziac); and Knee Pain (Req RF for Robaxin 750mg )      History of Present Illness  Patient is a 8-year-old white female who comes in complaining of difficulty urinating increased frequency back pain patient started Macrobid yesterday but she states is helping symptoms she states she has trouble emptying bladder her bladder feels like she has frequent urination over the last couple weeks patient trouble stopping starting urination    Patient comes in follow-up hypertension needs refill on Ziac currently stable patient also requests refill of Robaxin-750 for chronic knee pain which is working well for spasm around the kneecap patient's pain with flexion extension of the knee  The following portions of the patient's history were reviewed and updated as appropriate: allergies, current medications, past social history and problem list    Review of Systems   Constitutional: Negative for fatigue and unexpected weight change.   Respiratory: Negative for cough, chest tightness and shortness of breath.    Cardiovascular: Negative for chest pain, palpitations and leg swelling.   Gastrointestinal: Negative for nausea.   Genitourinary: Positive for flank pain, frequency and urgency.   Musculoskeletal: Positive for back pain and gait problem.   Skin: Negative for color change and rash.   Neurological: Negative for dizziness, syncope, weakness and headaches.       Objective     Vitals:    02/05/20 0946   BP: 122/76   Pulse: 80   Resp: 16   SpO2: 99%       Physical Exam   Constitutional: She appears well-developed and well-nourished.   Neck: Neck supple. No JVD present. No thyromegaly present.   Cardiovascular: Normal rate, regular rhythm, normal heart sounds, intact distal pulses and normal pulses.   No murmur heard.  Pulmonary/Chest: Effort normal and breath sounds normal. No respiratory  distress.   Abdominal: Soft. Bowel sounds are normal. There is no hepatosplenomegaly. There is no tenderness.   Musculoskeletal: She exhibits no edema.        Right knee: Tenderness found. Medial joint line tenderness noted.        Left knee: Tenderness found. Medial joint line tenderness noted.   Lymphadenopathy:     She has no cervical adenopathy.   Neurological: No sensory deficit.   Skin: Skin is warm and dry. She is not diaphoretic.   Nursing note and vitals reviewed.      Assessment/Plan     Rosalba was seen today for difficulty urinating, hypertension and knee pain.    Diagnoses and all orders for this visit:    Dysuria  -     POCT urinalysis dipstick, automated  -     tolterodine LA (DETROL LA) 2 MG 24 hr capsule; Take 1 capsule by mouth Daily.    Chronic pain of left knee  -     methocarbamol (ROBAXIN) 750 MG tablet; Take 1 tablet by mouth 4 (Four) Times a Day. As needed for knee pain    Essential hypertension  -     bisoprolol-hydrochlorothiazide (ZIAC) 2.5-6.25 MG per tablet; Take 1 tablet by mouth Daily.    Trial of Detrol LA 2 mg 1 p.o. every day follow-up in 2 weeks to recheck

## 2020-02-21 ENCOUNTER — OFFICE VISIT (OUTPATIENT)
Dept: FAMILY MEDICINE CLINIC | Facility: CLINIC | Age: 65
End: 2020-02-21

## 2020-02-21 VITALS
SYSTOLIC BLOOD PRESSURE: 132 MMHG | OXYGEN SATURATION: 99 % | HEART RATE: 68 BPM | WEIGHT: 203.8 LBS | TEMPERATURE: 98.2 F | BODY MASS INDEX: 32.75 KG/M2 | DIASTOLIC BLOOD PRESSURE: 78 MMHG | HEIGHT: 66 IN

## 2020-02-21 DIAGNOSIS — B37.2 MONILIAL INTERTRIGO: Primary | ICD-10-CM

## 2020-02-21 PROCEDURE — 99213 OFFICE O/P EST LOW 20 MIN: CPT | Performed by: PHYSICIAN ASSISTANT

## 2020-02-21 RX ORDER — NYSTATIN 100000 [USP'U]/G
POWDER TOPICAL 3 TIMES DAILY
Qty: 60 G | Refills: 5 | Status: SHIPPED | OUTPATIENT
Start: 2020-02-21 | End: 2020-07-29

## 2020-02-21 RX ORDER — FLUCONAZOLE 200 MG/1
200 TABLET ORAL
Qty: 4 TABLET | Refills: 0 | Status: SHIPPED | OUTPATIENT
Start: 2020-02-21 | End: 2020-03-12

## 2020-02-21 NOTE — PROGRESS NOTES
Subjective   Rosalba Girard is a 64 y.o. female  Rash (Severe rash under left breast with some drainage x1 week )      History of Present Illness  Patient comes in today concerned with recurrent rash under her breast.  She states she does not understand why she keeps getting this.  She is using some over-the-counter creams but it is not going away.  She is concerned it could be a drug allergy.  She states it itches a lot.  The rash is only under her breast and currently only under her left breast.  The following portions of the patient's history were reviewed and updated as appropriate: allergies, current medications, past social history and problem list    Review of Systems   Constitutional: Negative for fever.   Musculoskeletal: Negative for arthralgias.   Skin: Positive for color change and rash. Negative for pallor and wound.       Objective     Vitals:    02/21/20 1219   BP: 132/78   Pulse: 68   Temp: 98.2 °F (36.8 °C)   SpO2: 99%       Physical Exam   Constitutional: She appears well-developed and well-nourished. No distress.   Skin: Skin is warm and dry. Rash noted. She is not diaphoretic. There is erythema. No pallor.   Erythematous macular rash measuring approximately 10 cm in diameter under left breast consistent with monilial infection.  No crusts, no vesicles, non-weeping.  Nonindurated.  Nontender.   Nursing note and vitals reviewed.      Assessment/Plan     Rosalba was seen today for rash.    Diagnoses and all orders for this visit:    Monilial intertrigo    Other orders  -     fluconazole (DIFLUCAN) 200 MG tablet; Take 1 tablet by mouth Every 3 days for yeast infection  -     nystatin (MYCOSTATIN) 575468 UNIT/GM powder; Apply topically to the appropriate area as directed 3 (Three) Times a Day.

## 2020-02-22 NOTE — PROGRESS NOTES
I have reviewed the notes, assessments, and/or procedures performed by Claire Burton PA-C, I concur with her documentation of Rosalba Girard.

## 2020-03-12 ENCOUNTER — TELEPHONE (OUTPATIENT)
Dept: FAMILY MEDICINE CLINIC | Facility: CLINIC | Age: 65
End: 2020-03-12

## 2020-03-12 ENCOUNTER — OFFICE VISIT (OUTPATIENT)
Dept: FAMILY MEDICINE CLINIC | Facility: CLINIC | Age: 65
End: 2020-03-12

## 2020-03-12 VITALS
HEIGHT: 66 IN | SYSTOLIC BLOOD PRESSURE: 122 MMHG | HEART RATE: 80 BPM | OXYGEN SATURATION: 98 % | WEIGHT: 205 LBS | TEMPERATURE: 98.1 F | BODY MASS INDEX: 32.95 KG/M2 | DIASTOLIC BLOOD PRESSURE: 84 MMHG

## 2020-03-12 DIAGNOSIS — L98.491 SKIN ULCER, LIMITED TO BREAKDOWN OF SKIN (HCC): ICD-10-CM

## 2020-03-12 DIAGNOSIS — J01.90 ACUTE SINUSITIS, RECURRENCE NOT SPECIFIED, UNSPECIFIED LOCATION: ICD-10-CM

## 2020-03-12 DIAGNOSIS — R05.9 COUGH: Primary | ICD-10-CM

## 2020-03-12 DIAGNOSIS — B37.2 MONILIASIS SKIN: ICD-10-CM

## 2020-03-12 DIAGNOSIS — Z20.828 EXPOSURE TO INFLUENZA: ICD-10-CM

## 2020-03-12 DIAGNOSIS — R32 URINARY INCONTINENCE, UNSPECIFIED TYPE: ICD-10-CM

## 2020-03-12 LAB
EXPIRATION DATE: NORMAL
FLUAV AG NPH QL: NEGATIVE
FLUBV AG NPH QL: NEGATIVE
INTERNAL CONTROL: NORMAL
Lab: 6636

## 2020-03-12 PROCEDURE — 87804 INFLUENZA ASSAY W/OPTIC: CPT | Performed by: FAMILY MEDICINE

## 2020-03-12 PROCEDURE — 99214 OFFICE O/P EST MOD 30 MIN: CPT | Performed by: FAMILY MEDICINE

## 2020-03-12 RX ORDER — FLUCONAZOLE 100 MG/1
100 TABLET ORAL DAILY
Qty: 10 TABLET | Refills: 0 | Status: SHIPPED | OUTPATIENT
Start: 2020-03-12 | End: 2020-03-22

## 2020-03-12 RX ORDER — DICLOFENAC SODIUM 75 MG/1
75 TABLET, DELAYED RELEASE ORAL 2 TIMES DAILY
Qty: 60 TABLET | Refills: 2 | Status: SHIPPED | OUTPATIENT
Start: 2020-03-12 | End: 2020-07-29

## 2020-03-12 RX ORDER — CLOTRIMAZOLE AND BETAMETHASONE DIPROPIONATE 10; .64 MG/G; MG/G
CREAM TOPICAL 2 TIMES DAILY
Qty: 45 G | Refills: 3 | Status: SHIPPED | OUTPATIENT
Start: 2020-03-12 | End: 2020-07-29

## 2020-03-12 RX ORDER — CEFDINIR 300 MG/1
300 CAPSULE ORAL 2 TIMES DAILY
Qty: 20 CAPSULE | Refills: 0 | Status: SHIPPED | OUTPATIENT
Start: 2020-03-12 | End: 2020-07-29

## 2020-03-12 RX ORDER — MUPIROCIN CALCIUM 20 MG/G
CREAM TOPICAL 3 TIMES DAILY
Qty: 15 G | Refills: 0 | Status: CANCELLED | OUTPATIENT
Start: 2020-03-12

## 2020-03-12 RX ORDER — MUPIROCIN CALCIUM 20 MG/G
CREAM TOPICAL 3 TIMES DAILY
Qty: 15 G | Refills: 0 | Status: SHIPPED | OUTPATIENT
Start: 2020-03-12 | End: 2020-03-13 | Stop reason: SDUPTHER

## 2020-03-12 NOTE — TELEPHONE ENCOUNTER
Patient stated she went to the pharmacy and the mupirocin (BACTROBAN) 2 % cream was $51. She would like to know if he can send something to replace it that is cheaper.   Please call patient and advise: [phone]       Send to: [facility] [place]

## 2020-03-12 NOTE — TELEPHONE ENCOUNTER
Patient is checking to see if Dr. Disla is going to switch the Bacroban to something different.  She can be reached at [phone]

## 2020-03-12 NOTE — PROGRESS NOTES
Subjective   Rosalba Girard is a 64 y.o. female    Chief Complaint    Rash  Skin ulceration  Sinus pain and congestion  Cough  Urinary incontinence    History of Present Illness  Patient presents today with a recurring skin yeast beneath her breasts.  She had a reaction to them medication and developed an ulceration beneath her left breast which she has been unable to clear it is been quite sore.  Complains of sinus pain and congestion with postnasal drainage, headache and cough.  Cough is generally productive with usually clear but occasionally green sputum.  Patient also reports ongoing chronic problems with urinary incontinence.  Apparently she had some form of genitourinary procedure in the past.  She would like consultation with a specialist.  Medication has not been helpful for her.    The following portions of the patient's history were reviewed and updated as appropriate: allergies, current medications, past social history and problem list    Review of Systems   Constitutional: Negative for chills, fatigue and fever.   HENT: Positive for congestion, postnasal drip, rhinorrhea and sinus pressure. Negative for ear pain and sore throat.    Eyes: Negative for pain and visual disturbance.   Respiratory: Positive for cough. Negative for shortness of breath and wheezing.    Cardiovascular: Negative for chest pain and palpitations.   Gastrointestinal: Negative.    Musculoskeletal: Negative.    Skin: Positive for rash and wound.   Neurological: Positive for headaches. Negative for dizziness.   Hematological: Negative for adenopathy.   Psychiatric/Behavioral: The patient is nervous/anxious.        Objective     Vitals:    03/12/20 0840   BP: 122/84   Pulse: 80   Temp: 98.1 °F (36.7 °C)   SpO2: 98%       Physical Exam   Constitutional: She is oriented to person, place, and time. She appears well-developed and well-nourished.   HENT:   Head: Normocephalic and atraumatic.   Right Ear: Tympanic membrane and ear canal  normal.   Left Ear: Tympanic membrane and ear canal normal.   Nose: Mucosal edema, rhinorrhea and sinus tenderness present. Right sinus exhibits maxillary sinus tenderness and frontal sinus tenderness. Left sinus exhibits maxillary sinus tenderness and frontal sinus tenderness.   Mouth/Throat: Oropharynx is clear and moist. No oropharyngeal exudate.   Eyes: Pupils are equal, round, and reactive to light.   Cardiovascular: Normal rate and regular rhythm.   Pulmonary/Chest: Effort normal and breath sounds normal. She has no wheezes. She has no rales.   Neurological: She is alert and oriented to person, place, and time.   Skin: Rash noted.   1 cm ulcer inferior left breast.  No surrounding erythema, no drainage and no purulence.   Nursing note and vitals reviewed.      Assessment/Plan   Problem List Items Addressed This Visit     None      Visit Diagnoses     Cough    -  Primary    Relevant Orders    POC Influenza A / B (Completed)    Exposure to influenza        Relevant Orders    POC Influenza A / B (Completed)    Urinary incontinence, unspecified type        Relevant Orders    Ambulatory Referral to Gynecology    Skin ulcer, limited to breakdown of skin (CMS/HCC)        Relevant Medications    clotrimazole-betamethasone (LOTRISONE) 1-0.05 % cream    mupirocin (BACTROBAN) 2 % cream    cefdinir (OMNICEF) 300 MG capsule    Moniliasis skin        Relevant Medications    clotrimazole-betamethasone (LOTRISONE) 1-0.05 % cream    mupirocin (BACTROBAN) 2 % cream    fluconazole (DIFLUCAN) 100 MG tablet    Acute sinusitis, recurrence not specified, unspecified location        Relevant Medications    cefdinir (OMNICEF) 300 MG capsule

## 2020-03-19 ENCOUNTER — OFFICE VISIT (OUTPATIENT)
Dept: FAMILY MEDICINE CLINIC | Facility: CLINIC | Age: 65
End: 2020-03-19

## 2020-03-19 ENCOUNTER — APPOINTMENT (OUTPATIENT)
Dept: GENERAL RADIOLOGY | Facility: HOSPITAL | Age: 65
End: 2020-03-19

## 2020-03-19 ENCOUNTER — HOSPITAL ENCOUNTER (EMERGENCY)
Facility: HOSPITAL | Age: 65
Discharge: HOME | End: 2020-03-19
Attending: EMERGENCY MEDICINE | Admitting: EMERGENCY MEDICINE

## 2020-03-19 VITALS
TEMPERATURE: 99 F | DIASTOLIC BLOOD PRESSURE: 80 MMHG | OXYGEN SATURATION: 98 % | SYSTOLIC BLOOD PRESSURE: 122 MMHG | HEART RATE: 104 BPM

## 2020-03-19 VITALS
OXYGEN SATURATION: 97 % | DIASTOLIC BLOOD PRESSURE: 87 MMHG | RESPIRATION RATE: 18 BRPM | WEIGHT: 203 LBS | BODY MASS INDEX: 32.62 KG/M2 | SYSTOLIC BLOOD PRESSURE: 123 MMHG | TEMPERATURE: 98.7 F | HEIGHT: 66 IN | HEART RATE: 101 BPM

## 2020-03-19 DIAGNOSIS — R50.9 FEVER WITH CHILLS: ICD-10-CM

## 2020-03-19 DIAGNOSIS — B34.9 ACUTE VIRAL SYNDROME: ICD-10-CM

## 2020-03-19 DIAGNOSIS — J01.90 ACUTE SINUSITIS, RECURRENCE NOT SPECIFIED, UNSPECIFIED LOCATION: Primary | ICD-10-CM

## 2020-03-19 DIAGNOSIS — R05.9 COUGH: ICD-10-CM

## 2020-03-19 DIAGNOSIS — R05.9 COUGH: Primary | ICD-10-CM

## 2020-03-19 PROCEDURE — 71046 X-RAY EXAM CHEST 2 VIEWS: CPT

## 2020-03-19 PROCEDURE — 63710000001 PREDNISONE PER 1 MG: Performed by: EMERGENCY MEDICINE

## 2020-03-19 PROCEDURE — 99283 EMERGENCY DEPT VISIT LOW MDM: CPT

## 2020-03-19 PROCEDURE — 99213 OFFICE O/P EST LOW 20 MIN: CPT | Performed by: FAMILY MEDICINE

## 2020-03-19 RX ORDER — FLUTICASONE PROPIONATE 50 MCG
2 SPRAY, SUSPENSION (ML) NASAL DAILY
Qty: 16 G | Refills: 0 | Status: SHIPPED | OUTPATIENT
Start: 2020-03-19 | End: 2022-05-05

## 2020-03-19 RX ORDER — FLUTICASONE PROPIONATE 50 MCG
2 SPRAY, SUSPENSION (ML) NASAL DAILY
Status: DISCONTINUED | OUTPATIENT
Start: 2020-03-19 | End: 2020-03-19 | Stop reason: HOSPADM

## 2020-03-19 RX ORDER — PREDNISONE 20 MG/1
40 TABLET ORAL ONCE
Status: COMPLETED | OUTPATIENT
Start: 2020-03-19 | End: 2020-03-19

## 2020-03-19 RX ORDER — PROMETHAZINE HYDROCHLORIDE AND CODEINE PHOSPHATE 6.25; 1 MG/5ML; MG/5ML
5 SYRUP ORAL EVERY 4 HOURS PRN
Qty: 180 ML | Refills: 0 | Status: SHIPPED | OUTPATIENT
Start: 2020-03-19 | End: 2020-07-29

## 2020-03-19 RX ADMIN — FLUTICASONE PROPIONATE 2 SPRAY: 50 SPRAY, METERED NASAL at 11:25

## 2020-03-19 RX ADMIN — PREDNISONE 40 MG: 20 TABLET ORAL at 10:46

## 2020-03-19 NOTE — PROGRESS NOTES
Subjective   Rosalba Girard is a 64 y.o. female    Chief Complaint    Cough  Chest congestion  Chills    History of Present Illness  Patient was seen 1 week ago with respiratory tract symptoms. influenza testing was done and was negative.  Patient was treated with Omnicef but has now developed increased cough and chest congestion.  She reports that her chest feels tight and complains of some mild pleuritic chest discomfort.  She complains of generalized fatigue.  She reports chilling last night and her temperature here in the office is 99.  Her cough is very loose but nonproductive.  She is a [employer] employee.  She has a history of reactive airways disease/asthma.  She is asking for something to help with her cough.    The following portions of the patient's history were reviewed and updated as appropriate: allergies, current medications, past social history and problem list    Review of Systems   Constitutional: Positive for chills, fatigue and fever.   HENT: Positive for congestion. Negative for ear pain, sinus pain and sore throat.    Eyes: Negative.    Respiratory: Positive for cough, chest tightness and wheezing. Negative for shortness of breath.    Cardiovascular: Negative for chest pain and palpitations.   Gastrointestinal: Negative for diarrhea, nausea and vomiting.   Musculoskeletal: Positive for myalgias. Negative for arthralgias.   Neurological: Negative for dizziness and light-headedness.   Hematological: Negative for adenopathy.       Objective     Vitals:    03/19/20 0829   BP: 122/80   Pulse: 104   Temp: 99 °F (37.2 °C)   SpO2: 98%       Physical Exam   Constitutional: She is oriented to person, place, and time. She appears well-developed and well-nourished.   HENT:   Head: Normocephalic.   Mouth/Throat: Oropharynx is clear and moist.   Eyes: Pupils are equal, round, and reactive to light. Conjunctivae are normal.   Neck: Neck supple.   Cardiovascular: Regular rhythm. Tachycardia  present. Exam reveals no gallop.   No murmur heard.  Pulmonary/Chest: Effort normal. She has decreased breath sounds. She has no wheezes. She has no rales.   Lymphadenopathy:     She has no cervical adenopathy.   Neurological: She is alert and oriented to person, place, and time.   Skin: No rash noted.   Nursing note and vitals reviewed.      Assessment/Plan   Problem List Items Addressed This Visit     None      Visit Diagnoses     Cough    -  Primary    Relevant Medications    promethazine-codeine (PHENERGAN with CODEINE) 6.25-10 MG/5ML syrup    Fever with chills            Recommend referral to urgent care for consideration of coronavirus 19 testing.  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

## 2020-03-23 ENCOUNTER — PATIENT OUTREACH (OUTPATIENT)
Dept: CASE MANAGEMENT | Facility: OTHER | Age: 65
End: 2020-03-23

## 2020-03-23 NOTE — OUTREACH NOTE
Patient Outreach Note    Call to patient s/p ED visit 3/19/2020. Patient states she is feeling much better and has returned to work.  Pt is compliant with medications, states she does not monitor BP at home, states is only elevates when she is ill.  She knows the signs and symptoms of high BP and when to notify her PCP.  At this time her prediabetes is controlled with diet and exercise and she does not check her bs at home.  Pt states at this time she does feel CM f/u is needed.    Casi Mcgovern RN  Ambulatory     3/23/2020, 15:52

## 2020-04-30 ENCOUNTER — APPOINTMENT (OUTPATIENT)
Dept: MAMMOGRAPHY | Facility: HOSPITAL | Age: 65
End: 2020-04-30

## 2020-07-14 ENCOUNTER — HOSPITAL ENCOUNTER (OUTPATIENT)
Dept: MAMMOGRAPHY | Facility: HOSPITAL | Age: 65
Discharge: HOME | End: 2020-07-14
Admitting: FAMILY MEDICINE

## 2020-07-14 DIAGNOSIS — Z12.31 VISIT FOR SCREENING MAMMOGRAM: ICD-10-CM

## 2020-07-14 PROCEDURE — 77067 SCR MAMMO BI INCL CAD: CPT | Performed by: RADIOLOGY

## 2020-07-14 PROCEDURE — 77063 BREAST TOMOSYNTHESIS BI: CPT | Performed by: RADIOLOGY

## 2020-07-14 PROCEDURE — 77063 BREAST TOMOSYNTHESIS BI: CPT

## 2020-07-14 PROCEDURE — 77067 SCR MAMMO BI INCL CAD: CPT

## 2020-07-29 ENCOUNTER — OFFICE VISIT (OUTPATIENT)
Dept: OBSTETRICS AND GYNECOLOGY | Facility: CLINIC | Age: 65
End: 2020-07-29

## 2020-07-29 VITALS
DIASTOLIC BLOOD PRESSURE: 80 MMHG | SYSTOLIC BLOOD PRESSURE: 122 MMHG | BODY MASS INDEX: 33.15 KG/M2 | WEIGHT: 199 LBS | HEIGHT: 65 IN

## 2020-07-29 DIAGNOSIS — N39.46 MIXED INCONTINENCE: Primary | ICD-10-CM

## 2020-07-29 DIAGNOSIS — K58.2 IRRITABLE BOWEL SYNDROME WITH BOTH CONSTIPATION AND DIARRHEA: ICD-10-CM

## 2020-07-29 PROCEDURE — 99203 OFFICE O/P NEW LOW 30 MIN: CPT | Performed by: OBSTETRICS & GYNECOLOGY

## 2020-07-29 NOTE — PROGRESS NOTES
Subjective   Chief Complaint   Patient presents with   • Urinary Frequency      Rosalba Girard is a 65 y.o. year old .  No LMP recorded (lmp unknown). Patient has had a hysterectomy.  She presents to be seen because of urinary incontinence.  These issue been going on for about a year maybe gotten worse lately.  She does not recall any real change in diet or activity.  Thinks her weight is fairly stable.  She had been given looks like Detrol LA which maybe helped some but her problems persisted.  I did review records she had a urinalysis in  and  both which were normal.  She did report however that both her sister and her mother have bladder infections and she wonders if this is hereditary.  Briefly discussed that not likely.  Symptoms: positive :  both stress and urge incontinence are  present but it IS NOT effecting her ADL's and urgency                     Negative: dysuria, hematuria or hesitancy  Duration:  year(s)  Severity : moderate and severe  Associated factors: none known  Therapies: oxybutinin, which was somewhat effective    no or minimal alcohol, nonsmoker, no or mild caffeine use                          The following portions of the patient's history were reviewed and updated as appropriate:She  has a past medical history of Asthma, Chronic superficial dermatitis, Closed fracture of left tibial plateau with routine healing, Dietary counseling, Encounter for routine adult health examination, Fibromyalgia, GERD (gastroesophageal reflux disease), Hair or hair follicle disorder, History of anemia, Hives, Hypertension, Irritable bowel disease, Malaise and fatigue, Myalgia and myositis, PONV (postoperative nausea and vomiting), Prediabetes, Rash and nonspecific skin eruption, S/P ORIF (open reduction internal fixation) fracture  left tibial plateau  (2016), Sinusitis, acute maxillary, Tibial plateau fracture, left (2016), UTI (urinary tract infection), and Wears glasses.  She  does not have any pertinent problems on file.  She  has a past surgical history that includes Bladder surgery (2004); Cholecystectomy; Hernia repair; Colonoscopy w/ polypectomy (2015); Tibial Plateau Open Reduction Internal Fixation (Left, 12/21/2016); Breast biopsy (Right); Total abdominal hysterectomy w/ bilateral salpingoophorectomy (Bilateral, 1998); and Ectopic pregnancy surgery (1994).  Her family history includes Bleeding Disorder in an other family member; Breast cancer in her maternal aunt; Cancer in her father and maternal grandfather; Colon cancer in an other family member; Depression in an other family member; Diabetes in an other family member; Heart attack in her father; Heart disease in her father; Hypertension in her brother and sister; Osteoarthritis in her mother and another family member; Other in an other family member; Ovarian cancer in her paternal aunt; Rheum arthritis in an other family member; Stroke in her father; Tuberculosis in an other family member; Uterine cancer in her paternal aunt.  She  reports that she quit smoking about 30 years ago. Her smoking use included cigarettes. She has a 20.00 pack-year smoking history. She has never used smokeless tobacco. She reports that she does not drink alcohol or use drugs.  She is allergic to tramadol.    Current Outpatient Medications:   •  aspirin 81 MG EC tablet, Take 81 mg by mouth Daily., Disp: , Rfl:   •  bisoprolol-hydrochlorothiazide (ZIAC) 2.5-6.25 MG per tablet, Take 1 tablet by mouth Daily., Disp: 90 tablet, Rfl: 3  •  estradiol (MINIVELLE, VIVELLE-DOT) 0.05 MG/24HR patch, Place 1 patch on the skin 1 (one) time per week., Disp: , Rfl:   •  fluticasone (FLONASE) 50 MCG/ACT nasal spray, Instil 2 sprays into the nostril(s) as directed by provider Daily., Disp: 16 g, Rfl: 0  •  lidocaine (LIDODERM) 5 %, Place 1 patch on the skin as directed by provider Daily. Remove & Discard patch within 12 hours or as directed by MD, Disp: 30 patch, Rfl:  "0  •  methocarbamol (ROBAXIN) 750 MG tablet, Take 1 tablet by mouth 4 (Four) Times a Day. As needed for knee pain, Disp: 40 tablet, Rfl: 5  •  Mirabegron ER (Myrbetriq) 25 MG tablet sustained-release 24 hour 24 hr tablet, Take 1 tablet by mouth Daily., Disp: 30 tablet, Rfl: 2    Current Facility-Administered Medications:   •  cyanocobalamin injection 1,000 mcg, 1,000 mcg, Intramuscular, Q28 Days, Claire Burton PA-C, 1,000 mcg at 02/05/19 1058     Review of systems  Constitutional    positive:nothing reported                            Negative:anorexia, fevers, malaise or night sweats  Gastrointestinal  pos:had colonoscopy in the past 5 year - results are not in record for review, constipation (chronic) and diarrhea                             Neg:bloating, jaundice or melena  Other review of systems negative for cough fatigue chest pain rashes headaches double vision  Medical history denies diabetes seizures asthma hypertension thyroid depression arthritis or high cholesterol although she does have history of prediabetes and hypertension  GYN questionnaire was left blank for no STDs endometriosis cyst fibroids abuse or abnormal Pap smears.  She had one vaginal delivery and one ectopic pregnancy.  Social history she is single does not smoke drink or use illegal drugs.     Objective   /80   Ht 165.1 cm (65\")   Wt 90.3 kg (199 lb)   LMP  (LMP Unknown)   Breastfeeding No   BMI 33.12 kg/m²     General:  well developed; well nourished  no acute distress  appears stated age  obese - Body mass index is 33.12 kg/m².   Skin:  No suspicious lesions seen   Thyroid: not examined   Lungs:  breathing is unlabored   Heart:  Not performed.   Abdomen: soft, non-tender; no masses  no umbilical or inguinal hernias are present  no hepato-splenomegaly   Pelvis: Clinical staff was present for exam  External genitalia:  normal appearance of the external genitalia including Bartholin's and [place]'s glands.  :  urethral " meatus normal; urethra hypermobile; Was approximately 20 degree angle change with Valsalva  Vaginal:  atrophic mucosal changes are present; caliber of the introitus is Narrow;  Cervix:  absent.  Uterus:  absent.  Adnexa:  non palpable bilaterally.  Rectal:  digital rectal exam not performed; anus visually normal appearing.  Cystocele GRADE 1   She was unable to perform a Kegel upon request.     Lab Review   UA    Imaging   Mammogram report       Assessment   1. Mixed urinary incontinence.  2. Nocturia  3. Status post what sounds like anterior repair twice by retired [place] gynecologist; status post hysterectomy.  4. She reports irritable bowel symptoms and when she is constipated the urinary issues are worse.  This also alternates with diarrhea depending on food.  Have asked her to keep a food diary to see what causes of diarrhea and avoid those foods.  If she is constipated and had a bowel movement a couple of days have suggested MiraLAX over-the-counter as needed.     Plan   1.  Information regarding Kegel exercises.  May be able to do these better at home without stress.  Limit liquids after 6 PM at night to avoid nocturia  2.    Follow-up in 2 to 3 months; discussed that we will talk about options which may be a pessary versus potentially surgery if she is interested.  Discussed that I would like to make sure that she can do in and out self-catheterization preoperatively.  3.  It looks like she is probably due for repeat colonoscopy as she had a polyp about 5 years ago at the [facility].  This may need to be done here at [facility] according to the patient.      No orders of the defined types were placed in this encounter.    New Medications Ordered This Visit   Medications   • Mirabegron ER (Myrbetriq) 25 MG tablet sustained-release 24 hour 24 hr tablet     Sig: Take 1 tablet by mouth Daily.     Dispense:  30 tablet     Refill:  2              This note was electronically signed.    Ozzy Up MD  July  29, 2020

## 2020-08-18 ENCOUNTER — HOSPITAL ENCOUNTER (OUTPATIENT)
Dept: ULTRASOUND IMAGING | Facility: HOSPITAL | Age: 65
Discharge: HOME | End: 2020-08-18
Admitting: RADIOLOGY

## 2020-08-18 DIAGNOSIS — R92.8 ABNORMAL MAMMOGRAM: ICD-10-CM

## 2020-08-18 PROCEDURE — 76641 ULTRASOUND BREAST COMPLETE: CPT

## 2020-08-18 PROCEDURE — 76642 ULTRASOUND BREAST LIMITED: CPT | Performed by: RADIOLOGY

## 2020-10-14 NOTE — PROGRESS NOTES
"    [facility] Note        Subjective     CC: Pain of the Left Leg      HPI    Rosalba Girard is a 65 y.o. female.  Patient is here for swelling in her left leg.  Denies fever chills nausea vomiting.  She is undergone ORIF of the tibial plateau on 12/21/2016 by me.  She says the plate is not bothering her that much.  She just has pain and swelling.  She has not been using Jobst stockings.  She is here to get her leg strain paperwork filled out and also to evaluate the swelling.    Overall, patient's symptoms are about the same and stable.    ROS:    Constiutional:Pt denies fever, chills, nausea, or vomiting.  MSK:as above        Objective      Past Medical History  Past Medical History:   Diagnosis Date   • Asthma    • Chronic superficial dermatitis    • Closed fracture of left tibial plateau with routine healing    • Dietary counseling    • Encounter for routine adult health examination    • Fibromyalgia    • GERD (gastroesophageal reflux disease)    • Hair or hair follicle disorder    • History of anemia    • Hives    • Hypertension    • Irritable bowel disease    • Malaise and fatigue    • Myalgia and myositis    • PONV (postoperative nausea and vomiting)     on occasion    • Prediabetes    • Rash and nonspecific skin eruption    • S/P ORIF (open reduction internal fixation) fracture  left tibial plateau  12/21/2016   • Sinusitis, acute maxillary    • Tibial plateau fracture, left 12/21/2016   • UTI (urinary tract infection)    • Wears glasses          Physical Exam  Pulse 94   Ht 165.1 cm (65\")   Wt 89.8 kg (198 lb)   LMP  (LMP Unknown)   SpO2 98%   BMI 32.95 kg/m²     Body mass index is 32.95 kg/m².    Patient is well nourished and well developed.        Ortho Exam  Range of motion left knee: 0-120  Incision is healed and free of erythema or drainage  Calf is soft and nontender  Mild swelling in the left calf relative to the right calf    Imaging/Labs/EMG Reviewed:  Imaging Results " (Last 24 Hours)     Procedure Component Value Units Date/Time    XR Tibia Fibula 2 View Left [[id number]] Resulted: 10/15/20 0954     Updated: 10/15/20 0955    Narrative:      Left Tib-Fib X-Ray    Indication: Pain    Views:  AP and Lateral views    Comparison: Left tibia 9/28/2017    Findings:  Patient is status post ORIF of a tibial plateau fracture.  The fracture   appears healed.  Implant remains intact.  No bony lesion  Normal soft tissues  Normal joint spaces    Impression: Status post ORIF left tibial plateau fracture with implant   intact.  No acute bony abnormality noted in the tibia or fibula.              Assessment    Assessment:  1. Tibial plateau fracture, left, closed, with routine healing, subsequent encounter    2. Post-traumatic osteoarthritis of left knee    3. Left leg pain        Plan:  1. Recommend over the counter anti-inflammatories for pain and/or swelling  2. Left leg pain and swelling after ORIF left tibial plateau--implant looks okay.  She does have some mild posttraumatic arthritis of the lateral compartment of the knee.  I am not sure that this is the cause of her swelling.  She has had swelling in the past and we felt that this was chronic and recommended Jobst stockings.  We will again recommend that to her.  We will get her some hydrocodone for severe pain should she experience it down the road.  She will be given just a small handful of tablets.  Do not anticipate this being a chronic prescription.  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  Follow-up in a year.  We will replace her hinged neoprene brace as well.      Constantine Durbin MD  10/15/20  10:33 EDT      Dragon disclaimer:  Much of this encounter note is an electronic transcription/translation of spoken language to printed text. The electronic translation of spoken language may permit erroneous, or at times, nonsensical words or phrases to be inadvertently transcribed; Although I have reviewed the note for such errors,  some may still exist.

## 2020-10-15 ENCOUNTER — OFFICE VISIT (OUTPATIENT)
Dept: ORTHOPEDIC SURGERY | Facility: CLINIC | Age: 65
End: 2020-10-15

## 2020-10-15 VITALS — HEIGHT: 65 IN | OXYGEN SATURATION: 98 % | WEIGHT: 198 LBS | HEART RATE: 94 BPM | BODY MASS INDEX: 32.99 KG/M2

## 2020-10-15 DIAGNOSIS — S82.142D TIBIAL PLATEAU FRACTURE, LEFT, CLOSED, WITH ROUTINE HEALING, SUBSEQUENT ENCOUNTER: Primary | ICD-10-CM

## 2020-10-15 DIAGNOSIS — M79.605 LEFT LEG PAIN: ICD-10-CM

## 2020-10-15 DIAGNOSIS — M17.32 POST-TRAUMATIC OSTEOARTHRITIS OF LEFT KNEE: ICD-10-CM

## 2020-10-15 PROCEDURE — 99214 OFFICE O/P EST MOD 30 MIN: CPT | Performed by: ORTHOPAEDIC SURGERY

## 2020-10-15 RX ORDER — HYDROCODONE BITARTRATE AND ACETAMINOPHEN 5; 325 MG/1; MG/1
1 TABLET ORAL EVERY 8 HOURS PRN
Qty: 15 TABLET | Refills: 0 | Status: SHIPPED | OUTPATIENT
Start: 2020-10-15 | End: 2021-02-23

## 2021-02-17 ENCOUNTER — TELEPHONE (OUTPATIENT)
Dept: FAMILY MEDICINE CLINIC | Facility: CLINIC | Age: 66
End: 2021-02-17

## 2021-02-17 DIAGNOSIS — I10 ESSENTIAL HYPERTENSION: ICD-10-CM

## 2021-02-17 RX ORDER — BISOPROLOL FUMARATE AND HYDROCHLOROTHIAZIDE 2.5; 6.25 MG/1; MG/1
1 TABLET ORAL DAILY
Qty: 90 TABLET | Refills: 3 | Status: SHIPPED | OUTPATIENT
Start: 2021-02-17 | End: 2022-05-05 | Stop reason: SDUPTHER

## 2021-02-17 NOTE — TELEPHONE ENCOUNTER
Caller: [facility]    Relationship:     Best call back number: [phone]    Medication needed:   Requested Prescriptions     Pending Prescriptions Disp Refills   • bisoprolol-hydrochlorothiazide (ZIAC) 2.5-6.25 MG per tablet 90 tablet 3     Sig: Take 1 tablet by mouth Daily.       When do you need the refill by: SOON  What details did the patient provider - HAS 3 DAYS LEFT    Does the patient have less than a 3 day supply:  [] Yes  [x] No    What is the patient's preferred pharmacy: [facility]

## 2021-02-23 ENCOUNTER — OFFICE VISIT (OUTPATIENT)
Dept: FAMILY MEDICINE CLINIC | Facility: CLINIC | Age: 66
End: 2021-02-23

## 2021-02-23 VITALS
HEART RATE: 81 BPM | BODY MASS INDEX: 35.56 KG/M2 | DIASTOLIC BLOOD PRESSURE: 82 MMHG | RESPIRATION RATE: 14 BRPM | OXYGEN SATURATION: 98 % | WEIGHT: 213.4 LBS | SYSTOLIC BLOOD PRESSURE: 120 MMHG | HEIGHT: 65 IN | TEMPERATURE: 97 F

## 2021-02-23 DIAGNOSIS — M54.40 CHRONIC BILATERAL LOW BACK PAIN WITH SCIATICA, SCIATICA LATERALITY UNSPECIFIED: Primary | ICD-10-CM

## 2021-02-23 DIAGNOSIS — G89.29 CHRONIC BILATERAL LOW BACK PAIN WITH SCIATICA, SCIATICA LATERALITY UNSPECIFIED: Primary | ICD-10-CM

## 2021-02-23 DIAGNOSIS — R53.83 FATIGUE, UNSPECIFIED TYPE: ICD-10-CM

## 2021-02-23 PROCEDURE — 99213 OFFICE O/P EST LOW 20 MIN: CPT | Performed by: PHYSICIAN ASSISTANT

## 2021-02-23 RX ORDER — METHOCARBAMOL 750 MG/1
750 TABLET, FILM COATED ORAL 3 TIMES DAILY
Qty: 90 TABLET | Refills: 5 | OUTPATIENT
Start: 2021-02-23 | End: 2023-02-15

## 2021-02-23 NOTE — PROGRESS NOTES
Subjective   Rosalba Girard is a 65 y.o. female  Back Pain (RF Robaxin)  Video visit    History of Present Illness  Patient is a pleasant 65-year-old white female who presents consents for low back pain large amount of pain in lower back stiffness and pain radiates both right and left hips pain with movement needs refill of Robaxin also complains of fatigue no energy  The following portions of the patient's history were reviewed and updated as appropriate: allergies, current medications, past social history and problem list    Review of Systems   Constitutional: Positive for fatigue. Negative for unexpected weight change.   Respiratory: Negative for cough, chest tightness and shortness of breath.    Cardiovascular: Negative for chest pain, palpitations and leg swelling.   Gastrointestinal: Negative for nausea.   Musculoskeletal: Positive for back pain.   Skin: Negative for color change and rash.   Neurological: Negative for dizziness, syncope, weakness and headaches.       Objective     Vitals:    02/23/21 0902   BP: 120/82   Pulse: 81   Resp: 14   Temp: 97 °F (36.1 °C)   SpO2: 98%       Physical Exam  Vitals signs and nursing note reviewed.   Constitutional:       Appearance: Normal appearance. She is well-developed and normal weight. She is not diaphoretic.   Neck:      Musculoskeletal: Neck supple.      Thyroid: No thyromegaly.      Vascular: No JVD.   Cardiovascular:      Rate and Rhythm: Normal rate and regular rhythm.      Pulses: Normal pulses.      Heart sounds: Normal heart sounds. No murmur.   Pulmonary:      Effort: Pulmonary effort is normal. No respiratory distress.      Breath sounds: Normal breath sounds.   Abdominal:      General: Bowel sounds are normal.      Palpations: Abdomen is soft.      Tenderness: There is no abdominal tenderness.   Lymphadenopathy:      Cervical: No cervical adenopathy.   Skin:     General: Skin is warm and dry.   Neurological:      Sensory: No sensory deficit.    Psychiatric:         Mood and Affect: Mood normal.         Behavior: Behavior normal.         Thought Content: Thought content normal.         Judgment: Judgment normal.         Assessment/Plan     Diagnoses and all orders for this visit:    1. Chronic bilateral low back pain with sciatica, sciatica laterality unspecified (Primary)  -     methocarbamol (ROBAXIN) 750 MG tablet; Take 1 tablet by mouth 3 (Three) Times a Day. As needed for knee pain  Dispense: 90 tablet; Refill: 5    2. Fatigue, unspecified type    Talked about diet and exercise B12 etc. lose weight low-carb low calorie diet

## 2021-05-10 ENCOUNTER — TELEPHONE (OUTPATIENT)
Dept: FAMILY MEDICINE CLINIC | Facility: CLINIC | Age: 66
End: 2021-05-10

## 2021-05-10 NOTE — TELEPHONE ENCOUNTER
PATIENT SAID THAT WHEN HER [facility] PAPERWORK HAS BEEN COMPLETED SHE WOULD LIKE A COPY AND ALSO A CALLBACK TO LET HER KNOW IT IS READY TO .    CONTACT: [phone]

## 2021-05-11 ENCOUNTER — TELEPHONE (OUTPATIENT)
Dept: FAMILY MEDICINE CLINIC | Facility: CLINIC | Age: 66
End: 2021-05-11

## 2021-05-11 NOTE — TELEPHONE ENCOUNTER
Patient called to check status of FMLA paperwork, please call back at [phone] when possible. Thanks!

## 2021-05-12 ENCOUNTER — TELEPHONE (OUTPATIENT)
Dept: FAMILY MEDICINE CLINIC | Facility: CLINIC | Age: 66
End: 2021-05-12

## 2021-05-12 ENCOUNTER — CLINICAL SUPPORT (OUTPATIENT)
Dept: FAMILY MEDICINE CLINIC | Facility: CLINIC | Age: 66
End: 2021-05-12

## 2021-05-12 DIAGNOSIS — R53.83 FATIGUE, UNSPECIFIED TYPE: Primary | ICD-10-CM

## 2021-05-12 PROCEDURE — 96372 THER/PROPH/DIAG INJ SC/IM: CPT | Performed by: PHYSICIAN ASSISTANT

## 2021-05-12 RX ORDER — CYANOCOBALAMIN 1000 UG/ML
1000 INJECTION, SOLUTION INTRAMUSCULAR; SUBCUTANEOUS ONCE
Status: COMPLETED | OUTPATIENT
Start: 2021-05-12 | End: 2021-05-12

## 2021-05-12 RX ADMIN — CYANOCOBALAMIN 1000 MCG: 1000 INJECTION, SOLUTION INTRAMUSCULAR; SUBCUTANEOUS at 15:50

## 2021-05-12 NOTE — TELEPHONE ENCOUNTER
Caller: Rosalba Girard     Relationship: Self     Best call back number: [phone]      PATIENT CALLING TO CHECK THE STATUS OF HER FMLA PAPERWORK PLEASE ADVISE. PATIENT STATES THAT IS WAS FAXED OVER FOR THE SECOND TIME.

## 2021-05-12 NOTE — TELEPHONE ENCOUNTER
Caller: Rosalba Girard    Relationship: Self    Best call back number: [phone] (H)    PATIENT CALLING TO CHECK THE STATUS OF HER FMLA PAPERWORK PLEASE ADVISE

## 2021-05-12 NOTE — TELEPHONE ENCOUNTER
The first forms have been completed that were received yesterday for her back. I haven't seen the other set yet that she wants completed for fibromyalgia.

## 2021-05-14 NOTE — TELEPHONE ENCOUNTER
Patient called regarding her second form of FMLA for fibromyalgia? Wants to confirm if it was received? If not she will need to call them to resend it, She is asking if she can get a call back today

## 2021-05-14 NOTE — TELEPHONE ENCOUNTER
PATIENT CALLED BACK AND STATED THAT SHE NEEDS PAPERWORK FOR BACK PAIN AND FIBROMYALGIA AND MALAISE.  PATIENT IS ASKING THAT BOTH BE SENT TO [employer].  PATIENT STATED THAT SHE WOULD LIKE TO PICK THEM UP ON Monday.

## 2021-05-17 ENCOUNTER — OFFICE VISIT (OUTPATIENT)
Dept: OBSTETRICS AND GYNECOLOGY | Facility: CLINIC | Age: 66
End: 2021-05-17

## 2021-05-17 VITALS
WEIGHT: 211 LBS | BODY MASS INDEX: 35.11 KG/M2 | RESPIRATION RATE: 16 BRPM | DIASTOLIC BLOOD PRESSURE: 80 MMHG | SYSTOLIC BLOOD PRESSURE: 118 MMHG

## 2021-05-17 DIAGNOSIS — Z86.010 HX OF COLONIC POLYPS: ICD-10-CM

## 2021-05-17 DIAGNOSIS — Z12.11 SCREENING FOR COLON CANCER: Primary | ICD-10-CM

## 2021-05-17 DIAGNOSIS — N39.46 MIXED INCONTINENCE: ICD-10-CM

## 2021-05-17 PROBLEM — R15.9 FECAL INCONTINENCE: Status: ACTIVE | Noted: 2021-05-17

## 2021-05-17 PROCEDURE — 99213 OFFICE O/P EST LOW 20 MIN: CPT | Performed by: OBSTETRICS & GYNECOLOGY

## 2021-05-17 RX ORDER — CONJUGATED ESTROGENS 0.62 MG/G
CREAM VAGINAL
Qty: 30 G | Refills: 2 | Status: SHIPPED | OUTPATIENT
Start: 2021-05-17 | End: 2021-11-11 | Stop reason: SDUPTHER

## 2021-06-08 RX ORDER — SODIUM, POTASSIUM,MAG SULFATES 17.5-3.13G
SOLUTION, RECONSTITUTED, ORAL ORAL
Qty: 354 ML | Refills: 0 | Status: SHIPPED | OUTPATIENT
Start: 2021-06-08 | End: 2021-10-21

## 2021-06-18 ENCOUNTER — OUTSIDE FACILITY SERVICE (OUTPATIENT)
Dept: GASTROENTEROLOGY | Facility: CLINIC | Age: 66
End: 2021-06-18

## 2021-06-18 PROCEDURE — 45385 COLONOSCOPY W/LESION REMOVAL: CPT | Performed by: INTERNAL MEDICINE

## 2021-06-18 PROCEDURE — 88305 TISSUE EXAM BY PATHOLOGIST: CPT | Performed by: INTERNAL MEDICINE

## 2021-06-21 ENCOUNTER — LAB REQUISITION (OUTPATIENT)
Dept: LAB | Facility: HOSPITAL | Age: 66
End: 2021-06-21

## 2021-06-21 DIAGNOSIS — Z12.11 ENCOUNTER FOR SCREENING FOR MALIGNANT NEOPLASM OF COLON: ICD-10-CM

## 2021-06-21 DIAGNOSIS — Z83.71 FAMILY HISTORY OF COLONIC POLYPS: ICD-10-CM

## 2021-06-21 DIAGNOSIS — Z86.010 PERSONAL HISTORY OF COLONIC POLYPS: ICD-10-CM

## 2021-06-22 ENCOUNTER — TELEPHONE (OUTPATIENT)
Dept: FAMILY MEDICINE CLINIC | Facility: CLINIC | Age: 66
End: 2021-06-22

## 2021-06-22 LAB
CYTO UR: NORMAL
LAB AP CASE REPORT: NORMAL
LAB AP CLINICAL INFORMATION: NORMAL
PATH REPORT.FINAL DX SPEC: NORMAL
PATH REPORT.GROSS SPEC: NORMAL

## 2021-06-22 NOTE — TELEPHONE ENCOUNTER
Caller: Rosalba Girard    Relationship: Self    Best call back number: [phone]     What was the call regarding: PATIENT WOULD LIKE TO HAVE A COVID TEST.  PATIENT IS HAVING A EDG DONE AND SHE NEEDS TO HAVE A TEST BEFORE HER PROCEDURE.    Do you require a callback: YES

## 2021-06-22 NOTE — TELEPHONE ENCOUNTER
Patient informed that we don't do COVID testing in our office. She said that she needs it done July 20 at 1:00. I advised her to contact a local testing site to schedule an appointment.

## 2021-07-20 PROCEDURE — U0004 COV-19 TEST NON-CDC HGH THRU: HCPCS | Performed by: FAMILY MEDICINE

## 2021-07-21 ENCOUNTER — TELEPHONE (OUTPATIENT)
Dept: URGENT CARE | Facility: CLINIC | Age: 66
End: 2021-07-21

## 2021-07-21 NOTE — TELEPHONE ENCOUNTER
----- Message from Nik Huang MD sent at 7/21/2021 12:45 PM EDT -----  Please inform patient of negative Covid test    ----- Message -----  From: Lab, Background User  Sent: 7/21/2021  12:36 PM EDT  To:  [address] Covid Results

## 2021-07-23 ENCOUNTER — OUTSIDE FACILITY SERVICE (OUTPATIENT)
Dept: GASTROENTEROLOGY | Facility: CLINIC | Age: 66
End: 2021-07-23

## 2021-07-23 PROCEDURE — 88305 TISSUE EXAM BY PATHOLOGIST: CPT | Performed by: INTERNAL MEDICINE

## 2021-07-23 PROCEDURE — 43239 EGD BIOPSY SINGLE/MULTIPLE: CPT | Performed by: INTERNAL MEDICINE

## 2021-07-23 PROCEDURE — 43249 ESOPH EGD DILATION <30 MM: CPT | Performed by: INTERNAL MEDICINE

## 2021-07-26 ENCOUNTER — LAB REQUISITION (OUTPATIENT)
Dept: LAB | Facility: HOSPITAL | Age: 66
End: 2021-07-26

## 2021-07-26 DIAGNOSIS — K92.1 MELENA: ICD-10-CM

## 2021-10-21 ENCOUNTER — OFFICE VISIT (OUTPATIENT)
Dept: ORTHOPEDIC SURGERY | Facility: CLINIC | Age: 66
End: 2021-10-21

## 2021-10-21 VITALS
WEIGHT: 206.4 LBS | DIASTOLIC BLOOD PRESSURE: 76 MMHG | HEIGHT: 65 IN | BODY MASS INDEX: 34.39 KG/M2 | SYSTOLIC BLOOD PRESSURE: 134 MMHG | HEART RATE: 79 BPM

## 2021-10-21 DIAGNOSIS — S82.142D TIBIAL PLATEAU FRACTURE, LEFT, CLOSED, WITH ROUTINE HEALING, SUBSEQUENT ENCOUNTER: Primary | ICD-10-CM

## 2021-10-21 DIAGNOSIS — M17.32 POST-TRAUMATIC OSTEOARTHRITIS OF LEFT KNEE: ICD-10-CM

## 2021-10-21 PROCEDURE — 99213 OFFICE O/P EST LOW 20 MIN: CPT | Performed by: ORTHOPAEDIC SURGERY

## 2021-10-21 NOTE — PROGRESS NOTES
"    [facility] [place] [facility] Note        Subjective     CC: Follow-up (1 year follow up; Tibial plateau fracture, left, closed, with routine healing (s/p Left Tibial Plateau Open Reduction Internal Fixation 12/21/16) )      STEFANIA Girard is a 66 y.o. female.  Patient returns the office today for follow-up of her left leg.  She complains of swelling.  Denies chest pain or shortness of breath.  We have worked this up in the past and this did not end up being any type of chronic clot.  She cannot tolerate Jobst stockings she tells me.  She would like knee brace today.    Overall, patient's symptoms are as above.    ROS:    Constiutional:Pt denies fever, chills, nausea, or vomiting.  MSK:as above        Objective      Past Medical History  Past Medical History:   Diagnosis Date   • Asthma    • Chronic superficial dermatitis    • Closed fracture of left tibial plateau with routine healing    • Dietary counseling    • Encounter for routine adult health examination    • Fibromyalgia    • GERD (gastroesophageal reflux disease)    • Hair or hair follicle disorder    • History of anemia    • Hives    • Hypertension    • Irritable bowel disease    • Malaise and fatigue    • Myalgia and myositis    • PONV (postoperative nausea and vomiting)     on occasion    • Prediabetes    • Rash and nonspecific skin eruption    • S/P ORIF (open reduction internal fixation) fracture  left tibial plateau  12/21/2016   • Sinusitis, acute maxillary    • Tibial plateau fracture, left 12/21/2016   • UTI (urinary tract infection)    • Wears glasses          Physical Exam  /76   Pulse 79   Ht 165.1 cm (65\")   Wt 93.6 kg (206 lb 6.4 oz)   LMP  (LMP Unknown)   BMI 34.35 kg/m²     Body mass index is 34.35 kg/m².    Patient is well nourished and well developed.        Ortho Exam  Patient has neutral alignment  Range of motion is   Knee stable to varus and valgus force at 0 and 30 degrees  Plate is not palpable  Medial " and lateral joint line tenderness    Imaging/Labs/EMG Reviewed:  Imaging Results (Last 24 Hours)     Procedure Component Value Units Date/Time    XR Tibia Fibula 2 View Left [[id number]] Resulted: 10/21/21 1058     Updated: 10/21/21 1058    Narrative:      Left Tib-Fib X-Ray    Indication: Pain    Views:  AP and Lateral views    Comparison: Left tibia 10/15/2020    Findings:  Patient is status post ORIF of the tibial plateau.  The implants remain   intact.  Fracture has healed.  No significant irregularity is appreciated   at the lateral aspect of the proximal tibia.  No bony lesion  Normal soft tissues  Normal joint spaces    Impression: Status post ORIF left tibial plateau with intact implant.                Assessment    Assessment:  1. Tibial plateau fracture, left, closed, with routine healing, subsequent encounter    2. Post-traumatic osteoarthritis of left knee        Plan:  1. Recommend over the counter anti-inflammatories for pain and/or swelling  2. Status post ORIF left lateral tibial plateau with posttraumatic osteoarthritis of the left knee--at this point, plan will be for the lateral compartment offloading brace.  Continue to recommend Jobst stocking or some type of compression sock.  Plan will be for continued use of the wheels program to help with mobility and transportation.  Follow-up as needed going forward.  We talked briefly about the possibility taking out the plate well before any type of arthroplasty would be needed for her posttraumatic arthritis.      Constantine Durbin MD  10/21/21  11:11 EDT      Dragon disclaimer:  Much of this encounter note is an electronic transcription/translation of spoken language to printed text. The electronic translation of spoken language may permit erroneous, or at times, nonsensical words or phrases to be inadvertently transcribed; Although I have reviewed the note for such errors, some may still exist.

## 2021-11-11 ENCOUNTER — TELEPHONE (OUTPATIENT)
Dept: OBSTETRICS AND GYNECOLOGY | Facility: CLINIC | Age: 66
End: 2021-11-11

## 2021-11-11 ENCOUNTER — OFFICE VISIT (OUTPATIENT)
Dept: OBSTETRICS AND GYNECOLOGY | Facility: CLINIC | Age: 66
End: 2021-11-11

## 2021-11-11 VITALS
RESPIRATION RATE: 16 BRPM | SYSTOLIC BLOOD PRESSURE: 118 MMHG | DIASTOLIC BLOOD PRESSURE: 70 MMHG | WEIGHT: 210 LBS | BODY MASS INDEX: 34.95 KG/M2

## 2021-11-11 DIAGNOSIS — R30.0 DYSURIA: ICD-10-CM

## 2021-11-11 DIAGNOSIS — N95.2 VAGINAL ATROPHY: ICD-10-CM

## 2021-11-11 DIAGNOSIS — B37.9 CANDIDIASIS: ICD-10-CM

## 2021-11-11 DIAGNOSIS — N39.46 MIXED INCONTINENCE: Primary | ICD-10-CM

## 2021-11-11 PROCEDURE — 99213 OFFICE O/P EST LOW 20 MIN: CPT | Performed by: NURSE PRACTITIONER

## 2021-11-11 PROCEDURE — 87210 SMEAR WET MOUNT SALINE/INK: CPT | Performed by: NURSE PRACTITIONER

## 2021-11-11 RX ORDER — CONJUGATED ESTROGENS 0.62 MG/G
0.5 CREAM VAGINAL 2 TIMES WEEKLY
Qty: 30 G | Refills: 3 | Status: SHIPPED | OUTPATIENT
Start: 2021-11-11 | End: 2023-02-27 | Stop reason: SDUPTHER

## 2021-11-11 RX ORDER — FLUCONAZOLE 150 MG/1
150 TABLET ORAL
Qty: 2 TABLET | Refills: 0 | Status: SHIPPED | OUTPATIENT
Start: 2021-11-11 | End: 2021-11-16

## 2021-11-11 NOTE — PROGRESS NOTES
Problem Visit     Patient Name: Rosalba Girard  : 1955   MRN: [id number]   Care Team: Patient Care Team:  Ricci Suarez MD as PCP - General (Family Medicine)    Chief Complaint:    Chief Complaint   Patient presents with   • Follow-up   • Urinary Incontinence       HPI: Rosalba Girard is a 66 y.o. year old  presenting to be seen for 6 month f/u   Mixed urinary incontinence - doing well with Myrbetriq 25mg qd - needs refill   Has noticed some dysuria recently and wondering if she might have a UTI     Stopped using estradiol patches - she has had some hot flashes and nt sweats but didn't want to cont with ERT d/t risks   S/p total hyst with BSO     Hasn't been using the premarin vaginal cream   Has noticed some vaginal irritation and itching the last 2 wks     Has annual exams with PCP   Dr. 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      Objective     Physical Exam      Neuro: alert and oriented to person, place and time   General:  alert; cooperative; well developed; well nourished   Skin:  Not performed.   Thyroid: not examined   Lungs:  breathing is unlabored   Heart:  Not performed.   Breasts:  Not performed.   Abdomen: Not performed.   Pelvis: Clinical staff was present for exam  External genitalia:  normal appearance of the external genitalia including Bartholin's and [place]'s glands.  :  urethral meatus normal;  Vaginal:  atrophic mucosal changes are present; severe atrophy with stippling noted - scant amt thick white d/c  Cervix:  absent.  Uterus:  absent.  Adnexa:  absent, bilateral.  Rectal:  digital rectal exam not performed; anus visually normal appearing.     Suprapubic tenderness on exam     Assessment / Plan      Assessment  Problems Addressed This  Visit    ICD-10-CM ICD-9-CM   1. Mixed incontinence  N39.46 788.33   2. Vaginal atrophy  N95.2 627.3   3. Dysuria  R30.0 788.1   4. Candidiasis  B37.9 112.9       Plan    Cont with Myrbetriq 25 mg qd   UA and urine cx pending   Will call with results     Discussed severe atrophy noted on exam   Refill premarin vaginal cream for 2x/wk use     Wet mount = yeast   Discussed txment and prevention   Script for Diflucan to pharmacy  Call if sx do not resolve    F/u in 1 yr           Follow Up  No follow-ups on file.  Patient was given instructions and counseling regarding her condition or for health maintenance advice. Please see specific information pulled into the AVS if appropriate.     Venessa Franz, APRN  November 11, 2021  10:12 EST

## 2021-11-16 ENCOUNTER — TELEPHONE (OUTPATIENT)
Dept: OBSTETRICS AND GYNECOLOGY | Facility: CLINIC | Age: 66
End: 2021-11-16

## 2021-11-16 RX ORDER — FLUCONAZOLE 150 MG/1
150 TABLET ORAL DAILY
Qty: 2 TABLET | Refills: 0 | Status: SHIPPED | OUTPATIENT
Start: 2021-11-16 | End: 2022-05-05

## 2021-11-16 NOTE — TELEPHONE ENCOUNTER
PT CAME TO OFFICE TO  PREMARIN [facility] AND SHE ASKED FOR ANOTHER DIFLUCAN DOESN'T SEEM LIKE HAS GOTTEN RIDE OF SYMPTOMS- THEY ARE MILDER BUT NOT GONE - I ADVISED WOULD SEND MESSAGE TO THE NURSE AND HAVE HER CALL IF UNABLE

## 2022-01-19 DIAGNOSIS — I10 ESSENTIAL HYPERTENSION: ICD-10-CM

## 2022-01-19 RX ORDER — BISOPROLOL FUMARATE AND HYDROCHLOROTHIAZIDE 2.5; 6.25 MG/1; MG/1
1 TABLET ORAL DAILY
Qty: 90 TABLET | Refills: 3 | Status: SHIPPED | OUTPATIENT
Start: 2022-01-19 | End: 2022-12-26 | Stop reason: SDUPTHER

## 2022-01-19 NOTE — TELEPHONE ENCOUNTER
Caller: Rosalba Girard    Relationship: Self    Best call back number: [phone]    What was the call regarding: PATIENT STATES THAT SHE WOULD LIKE A CALL ABOUT THIS MEDICATION REFILL.    Do you require a callback: YES

## 2022-01-19 NOTE — TELEPHONE ENCOUNTER
Caller: Ingrid Girard    Relationship: Self    Best call back number: [phone]    Requested Prescriptions:   Requested Prescriptions     Pending Prescriptions Disp Refills   • bisoprolol-hydrochlorothiazide (ZIAC) 2.5-6.25 MG per tablet 90 tablet 3     Sig: Take 1 tablet by mouth Daily.        Pharmacy where request should be sent: [facility]     Additional details provided by patient: INGRID SAID SHE HAS 30 DAYS LEFT.  SHE WOULD LIKE A 90 DAYS SUPPLY WITH 3 REFILLS.    Does the patient have less than a 3 day supply:  [] Yes  [] No    Unruly Salas, Olive Rep   01/19/22 08:29 EST

## 2022-01-20 ENCOUNTER — TELEPHONE (OUTPATIENT)
Dept: FAMILY MEDICINE CLINIC | Facility: CLINIC | Age: 67
End: 2022-01-20

## 2022-01-20 NOTE — TELEPHONE ENCOUNTER
PATIENT REQUEST CALL BACK TO FOLLOW UP REGARDING HER MEDICATION REFILL REQUEST.      Caller: Ingrid Girard     Relationship: Self     Best call back number: [phone]     Requested Prescriptions:   Requested Prescriptions             Pending Prescriptions Disp Refills   • bisoprolol-hydrochlorothiazide (ZIAC) 2.5-6.25 MG per tablet 90 tablet 3       Sig: Take 1 tablet by mouth Daily.         Pharmacy where request should be sent: [facility]      Additional details provided by patient: INGRID SAID SHE HAS 30 DAYS LEFT.  SHE WOULD LIKE A 90 DAYS SUPPLY WITH 3 REFILLS.     Does the patient have less than a 3 day supply:  []? Yes  []? No

## 2022-03-08 PROCEDURE — U0004 COV-19 TEST NON-CDC HGH THRU: HCPCS | Performed by: FAMILY MEDICINE

## 2022-03-14 PROCEDURE — U0004 COV-19 TEST NON-CDC HGH THRU: HCPCS | Performed by: NURSE PRACTITIONER

## 2022-05-05 ENCOUNTER — TELEPHONE (OUTPATIENT)
Dept: FAMILY MEDICINE CLINIC | Facility: CLINIC | Age: 67
End: 2022-05-05

## 2022-05-05 ENCOUNTER — OFFICE VISIT (OUTPATIENT)
Dept: FAMILY MEDICINE CLINIC | Facility: CLINIC | Age: 67
End: 2022-05-05

## 2022-05-05 VITALS
HEIGHT: 65 IN | BODY MASS INDEX: 34.39 KG/M2 | TEMPERATURE: 97.1 F | DIASTOLIC BLOOD PRESSURE: 68 MMHG | SYSTOLIC BLOOD PRESSURE: 122 MMHG | HEART RATE: 98 BPM | OXYGEN SATURATION: 96 % | WEIGHT: 206.4 LBS | RESPIRATION RATE: 16 BRPM

## 2022-05-05 DIAGNOSIS — J40 BRONCHITIS: Primary | ICD-10-CM

## 2022-05-05 DIAGNOSIS — J01.01 ACUTE RECURRENT MAXILLARY SINUSITIS: ICD-10-CM

## 2022-05-05 PROCEDURE — 99213 OFFICE O/P EST LOW 20 MIN: CPT | Performed by: PHYSICIAN ASSISTANT

## 2022-05-05 RX ORDER — CEFDINIR 300 MG/1
300 CAPSULE ORAL 2 TIMES DAILY
Qty: 20 CAPSULE | Refills: 0 | Status: SHIPPED | OUTPATIENT
Start: 2022-05-05 | End: 2022-11-16

## 2022-05-05 RX ORDER — PREDNISONE 20 MG/1
20 TABLET ORAL 2 TIMES DAILY
Qty: 14 TABLET | Refills: 0 | Status: SHIPPED | OUTPATIENT
Start: 2022-05-05 | End: 2022-10-28

## 2022-05-05 RX ORDER — DEXTROMETHORPHAN HYDROBROMIDE AND PROMETHAZINE HYDROCHLORIDE 15; 6.25 MG/5ML; MG/5ML
2.5 SYRUP ORAL 4 TIMES DAILY PRN
Qty: 120 ML | Refills: 0 | OUTPATIENT
Start: 2022-05-05 | End: 2023-02-15

## 2022-05-05 RX ORDER — ALBUTEROL SULFATE 90 UG/1
2 AEROSOL, METERED RESPIRATORY (INHALATION) EVERY 4 HOURS PRN
Qty: 8.5 G | Refills: 0 | Status: SHIPPED | OUTPATIENT
Start: 2022-05-05 | End: 2022-10-30 | Stop reason: SDUPTHER

## 2022-05-05 NOTE — PROGRESS NOTES
Subjective   Rosalba Girard is a 66 y.o. female  Cough (Nasal congestion, PND x4 days. Hasn't taken OTC meds. Neg COVID test. )      History of Present Illness     The patient is presented today in office for sinus pressure, congestion, and cough. She states she is not coughing up phlegm. She reports that she has sinus drainage coming out in globs. She states when she coughs her throat is very sore. She reports that she tested for COVID-19 on 05/03/2022 when she was feeling really sick and it was negative. She states her work has been short handed so has not been able to rest like she would like. She states she needs [facility] paperwork filled out for her knee and back.    The following portions of the patient's history were reviewed and updated as appropriate: allergies, current medications, past social history and problem list    Review of Systems   Constitutional: Negative for chills, fatigue and fever.   HENT: Positive for congestion, ear pain, postnasal drip, rhinorrhea and sinus pressure. Negative for sore throat.    Eyes: Positive for pain.   Respiratory: Positive for cough, chest tightness and wheezing. Negative for shortness of breath.    Cardiovascular: Negative for chest pain.   Gastrointestinal: Negative for nausea.   Neurological: Positive for headaches. Negative for dizziness.   Hematological: Negative for adenopathy.       Objective     Vitals:    05/05/22 1418   BP: 122/68   Pulse: 98   Resp: 16   Temp: 97.1 °F (36.2 °C)   SpO2: 96%       Physical Exam  Vitals and nursing note reviewed.   Constitutional:       General: She is not in acute distress.     Appearance: She is well-developed. She is not diaphoretic.   HENT:      Head: Normocephalic and atraumatic.      Right Ear: Tympanic membrane and ear canal normal.      Left Ear: Tympanic membrane and ear canal normal.      Nose: Nose normal.      Mouth/Throat:      Pharynx: No oropharyngeal exudate.   Eyes:      Pupils: Pupils are equal, round, and  reactive to light.   Neck:      Vascular: No JVD.   Cardiovascular:      Rate and Rhythm: Normal rate and regular rhythm.      Heart sounds: Normal heart sounds. No murmur heard.  Pulmonary:      Effort: Pulmonary effort is normal. No respiratory distress.      Breath sounds: No stridor. Wheezing present.   Musculoskeletal:      Cervical back: Neck supple.   Lymphadenopathy:      Cervical: No cervical adenopathy.         Assessment/Plan     Diagnoses and all orders for this visit:    1. Bronchitis (Primary)  -     cefdinir (OMNICEF) 300 MG capsule; Take 1 capsule by mouth 2 (Two) Times a Day.  Dispense: 20 capsule; Refill: 0  -     predniSONE (DELTASONE) 20 MG tablet; Take 1 tablet by mouth 2 (Two) Times a Day.  Dispense: 14 tablet; Refill: 0  -     promethazine-dextromethorphan (PROMETHAZINE-DM) 6.25-15 MG/5ML syrup; Take 2.5 mL by mouth 4 (Four) Times a Day As Needed for Cough.  Dispense: 120 mL; Refill: 0    2. Acute recurrent maxillary sinusitis  -     promethazine-dextromethorphan (PROMETHAZINE-DM) 6.25-15 MG/5ML syrup; Take 2.5 mL by mouth 4 (Four) Times a Day As Needed for Cough.  Dispense: 120 mL; Refill: 0       1. Bronchitis  - I will prescribe the patient an antibiotic, steroids, and a cough syrup.    I spent 15 minutes in patient care: Reviewing records prior to the visit, examining the patient, entering orders and documentation    Part of this note may be an electronic transcription/translation of spoken language to printed text using the Dragon Dictation System.      Transcribed from ambient dictation for AMINAH Campos by Lashonda Navas.  05/05/22   15:15 EDT    Patient verbalized consent to the visit recording.

## 2022-05-05 NOTE — TELEPHONE ENCOUNTER
PATIENT THOUGHT THERE WAS SUPPOSE TO BE AN INHALER CALLED IN TODAY AS WELL DUE TO WHEEZE.  SAW PRATIMA TODAY.     PLEASE CALL [phone]

## 2022-05-17 ENCOUNTER — TELEPHONE (OUTPATIENT)
Dept: FAMILY MEDICINE CLINIC | Facility: CLINIC | Age: 67
End: 2022-05-17

## 2022-05-17 NOTE — TELEPHONE ENCOUNTER
Caller: Rosalba Girard    Relationship: Self    Best call back number: [phone]    What form or medical record are you requesting: [facility] PAPERWORK     Who is requesting this form or medical record from you: WORK/[facility]    How would you like to receive the form or medical records (pick-up, mail, fax):  FROM OFFICE    Timeframe paperwork needed: DEADLINE 5/28/22- BUT NEEDS TURNED IN QUICKLY    Additional notes: PLEASE CALL TO LET HER KNOW WHERE YOU ARE IN THE PROCESS.

## 2022-05-19 NOTE — TELEPHONE ENCOUNTER
Paperwork has been completed, faxed. Patient notified and she is going to pick it up here. Placed up front.

## 2022-07-29 ENCOUNTER — TELEPHONE (OUTPATIENT)
Dept: FAMILY MEDICINE CLINIC | Facility: CLINIC | Age: 67
End: 2022-07-29

## 2022-07-29 RX ORDER — NITROFURANTOIN 25; 75 MG/1; MG/1
100 CAPSULE ORAL 2 TIMES DAILY
Qty: 14 CAPSULE | Refills: 0 | Status: SHIPPED | OUTPATIENT
Start: 2022-07-29 | End: 2022-11-16

## 2022-07-29 NOTE — TELEPHONE ENCOUNTER
PATIENT HAS A UTI AND UNABLE TO COME IN.  CAN WE CALL HER IN A MEDICATION FOR THIS?    PHARMACY:  [facility]    PLEASE CALL [phone]

## 2022-10-25 ENCOUNTER — TELEPHONE (OUTPATIENT)
Dept: FAMILY MEDICINE CLINIC | Facility: CLINIC | Age: 67
End: 2022-10-25

## 2022-10-25 NOTE — TELEPHONE ENCOUNTER
Caller: Rosalba Girard    Relationship to patient: Self    Best call back number: [phone]    Date of exposure: 10/20/2022    Date of positive COVID19 test: 10/23/2022    Date if possible COVID19 exposure: 10/20/2022    COVID19 symptoms: COUGH, DIARRHEA, HEADACHE, SORE THROAT, VOICE IS SCRATCHY, & RUNNY NOSE     Date of initial quarantine: 10/23/2022    Additional information or concerns: REQUESTING THAT A PRESCRIPTION TO BE SENT IN TO LESSEN THE DURATION AND HELP WITH SYMPTOMS. PLEASE CALL AND ADVISE     What is the patients preferred pharmacy:[id number] - [place], [place] - [address] AT [facility] [address] - [phone]  - [phone] FX

## 2022-10-28 ENCOUNTER — TELEPHONE (OUTPATIENT)
Dept: URGENT CARE | Facility: CLINIC | Age: 67
End: 2022-10-28

## 2022-10-28 ENCOUNTER — TELEPHONE (OUTPATIENT)
Dept: FAMILY MEDICINE CLINIC | Facility: CLINIC | Age: 67
End: 2022-10-28

## 2022-10-28 NOTE — TELEPHONE ENCOUNTER
Patient notified that I haven't received paperwork. She has been on off with COVID and was confused as to who was supposed to complete them-her PCP or urgent care. I told her that urgent care wouldn't have time to do that for all of the patients they see and that it needs to come to our office.    She is going to call [employer] to have it sent here.     Patient said the dates that need to go on paperwork for leave is October 21-31. Return to work Nov 1.

## 2022-10-28 NOTE — TELEPHONE ENCOUNTER
Caller: Rosalba Girard    Relationship: Self    Best call back number: [phone]    What medication are you requesting: SOMETHING FOR SYMPTOMS    DOCTOR HAS PRESCRIBED HER ZPAC, BUT SHE IS STILL SICK     If a prescription is needed, what is your preferred pharmacy and phone number:      [id number] - [place], [place] - [address] AT [facility] [address] - [phone]  - [phone] FX     Additional notes:

## 2022-10-28 NOTE — TELEPHONE ENCOUNTER
Caller: Rosalba Girard    Relationship: Self    Best call back number: [phone]    What form or medical record are you requesting: OFF WORK     Who is requesting this form or medical record from you:  EMPLOYER       Timeframe paperwork needed: AS SOON AS POSSIBLE    Additional notes:      [employer] WAS SUPPOSED TO SEND PAPERWORK ABOUT BEING OFF WORK     PLEASE CALL TO LET HER KNOW IF PAPERWORK WAS RECEIVED

## 2022-10-31 ENCOUNTER — TELEPHONE (OUTPATIENT)
Dept: FAMILY MEDICINE CLINIC | Facility: CLINIC | Age: 67
End: 2022-10-31

## 2022-10-31 ENCOUNTER — TELEPHONE (OUTPATIENT)
Dept: URGENT CARE | Facility: CLINIC | Age: 67
End: 2022-10-31

## 2022-10-31 NOTE — TELEPHONE ENCOUNTER
Patient said she was seen at  and is still positive for COVID. She was seen there Oct 23 as well for COVID. They have put her off work until Nov 3 and she will return to work November 4.     I told her that we haven't received her paperwork yet but once it is received I will complete it and let her know.

## 2022-10-31 NOTE — TELEPHONE ENCOUNTER
Hub staff attempted to follow warm transfer process and was unsuccessful     Caller: Rosalba Girard    Relationship to patient: Self    Best call back number: [phone]  Patient is needing: PATIENT IS REQUESTING TO SPEAK WITH DEVIN CONCERNING HER FMLA PAPERWOK. PATIENT HAS ADDITIONAL TIME OFF WORK SHE NEEDS TO ADD. PLEASE CALL

## 2022-11-01 ENCOUNTER — TELEPHONE (OUTPATIENT)
Dept: FAMILY MEDICINE CLINIC | Facility: CLINIC | Age: 67
End: 2022-11-01

## 2022-11-01 NOTE — TELEPHONE ENCOUNTER
I spoke with patient and told her that I haven't received any FMLA forms yet. She is going to contact [employer] to have them sent.

## 2022-11-01 NOTE — TELEPHONE ENCOUNTER
Caller: Rosalba Girard    Relationship to patient: Self    Best call back number: [phone]    Patient is needing: UPDATE ON [facility] PAPERWORK        .”

## 2022-11-02 ENCOUNTER — TELEPHONE (OUTPATIENT)
Dept: FAMILY MEDICINE CLINIC | Facility: CLINIC | Age: 67
End: 2022-11-02

## 2022-11-02 NOTE — TELEPHONE ENCOUNTER
[facility] received a fax that requires your attention. The document has been indexed to the patient’s chart for your review.      Reason for sending: [facility] PAPERWORK    Documents Description: [facility]_10.27.22    Name of Sender: JOCE    Date Indexed: 11.02.2022

## 2022-11-08 ENCOUNTER — TELEPHONE (OUTPATIENT)
Dept: FAMILY MEDICINE CLINIC | Facility: CLINIC | Age: 67
End: 2022-11-08

## 2022-11-08 NOTE — TELEPHONE ENCOUNTER
Caller: Rosalba Girard    Relationship: Self    Best call back number: [phone]    What medication are you requesting: ANTIBIOTIC     What are your current symptoms: TIRED, COUGH, SORE THROAT, WHEEZING    How long have you been experiencing symptoms: 11.6.22    Have you had these symptoms before:    [x] Yes  [] No    Have you been treated for these symptoms before:   [x] Yes  [] No    If a prescription is needed, what is your preferred pharmacy and phone number: [facility]

## 2022-11-08 NOTE — TELEPHONE ENCOUNTER
Pt calling again to check on status of request. Advised it can take a day or so for the provider to get back with us. States that she thinks she has a sinus infection, but does not have anymore pto days to take off for work. Please call her back once there is a reply.

## 2022-11-09 RX ORDER — AZITHROMYCIN 250 MG/1
TABLET, FILM COATED ORAL
Qty: 6 TABLET | Refills: 0 | Status: SHIPPED | OUTPATIENT
Start: 2022-11-09 | End: 2022-11-16

## 2022-11-15 ENCOUNTER — OFFICE VISIT (OUTPATIENT)
Dept: ORTHOPEDIC SURGERY | Facility: CLINIC | Age: 67
End: 2022-11-15

## 2022-11-15 VITALS
SYSTOLIC BLOOD PRESSURE: 120 MMHG | BODY MASS INDEX: 31.98 KG/M2 | HEIGHT: 66 IN | WEIGHT: 199 LBS | DIASTOLIC BLOOD PRESSURE: 60 MMHG

## 2022-11-15 DIAGNOSIS — S82.142D TIBIAL PLATEAU FRACTURE, LEFT, CLOSED, WITH ROUTINE HEALING, SUBSEQUENT ENCOUNTER: Primary | ICD-10-CM

## 2022-11-15 DIAGNOSIS — M17.32 POST-TRAUMATIC OSTEOARTHRITIS OF LEFT KNEE: ICD-10-CM

## 2022-11-15 PROCEDURE — 99213 OFFICE O/P EST LOW 20 MIN: CPT | Performed by: ORTHOPAEDIC SURGERY

## 2022-11-15 NOTE — PROGRESS NOTES
"    [facility] Note        Subjective     CC: Follow-up (1 year F/U; Tibial plateau fracture, left, closed, with routine healing, subsequent encounter. )      STEFANIA Girard is a 67 y.o. female.  Patient is here today for evaluation of her left knee.  She has had ORIF of the left tibial plateau in December 2016.  She is doing relatively well overall.  Considering slowing down working in the next few years.  She tells me that she is having some increased popping and grinding in the knee.  Seems to be having more pain at night than anything else.  Requesting something for discomfort.    Overall, patient's symptoms are as above.    ROS:    Constiutional:Pt denies fever, chills, nausea, or vomiting.  MSK:as above        Objective      Past Medical History  Past Medical History:   Diagnosis Date   • Asthma    • Chronic superficial dermatitis    • Closed fracture of left tibial plateau with routine healing    • Dietary counseling    • Encounter for routine adult health examination    • Fibromyalgia    • GERD (gastroesophageal reflux disease)    • Hair or hair follicle disorder    • History of anemia    • Hives    • Hypertension    • Irritable bowel disease    • Malaise and fatigue    • Myalgia and myositis    • PONV (postoperative nausea and vomiting)     on occasion    • Prediabetes    • Rash and nonspecific skin eruption    • S/P ORIF (open reduction internal fixation) fracture  left tibial plateau  12/21/2016   • Sinusitis, acute maxillary    • Tibial plateau fracture, left 12/21/2016   • UTI (urinary tract infection)    • Wears glasses          Physical Exam  /60   Ht 167.6 cm (65.98\")   Wt 90.3 kg (199 lb)   LMP  (LMP Unknown)   BMI 32.14 kg/m²     Body mass index is 32.14 kg/m².    Patient is well nourished and well developed.        Ortho Exam      Musculoskeletal:  Global Assessment:  Overall assessment of Lower Extremity Muscle Strength and Tone:  Left " quadriceps--5/5   Left hamstrings--5/5       Left tibialis anterior--5/5  Left gastroc-soleus--5/5  Left EHL --5/5    Lower Extremity:    Inspection and Palpation:  Left knee:  Tenderness:  Over the lateral joint line and moderate severity  Effusion:  1+  Crepitus:  Positive  Pulses:  2+  Ecchymosis:  None  Warmth:  None     ROM:  Left:  Extension: 5     Flexion:120    Instability:    Left:    Lachman Test:  Negative   Varus stress test negative  Valgus stress test negative    Deformities/Malalignments/Discrepancies:    Left:  Genu Varum     Functional Testing:  Li's test:  Negative  Patella grind test:  Positive  Q-angle:  normal      Imaging/Labs/EMG Reviewed:  Imaging Results (Last 24 Hours)     ** No results found for the last 24 hours. **        X-rays of the left knee are taken in the office today.  They show mild early moderate lateral and patellofemoral compartment disease.  On the skiers view, however, there is excellent maintenance of the medial and lateral joint space and joint lines.    Assessment    Assessment:  1. Tibial plateau fracture, left, closed, with routine healing, subsequent encounter    2. Post-traumatic osteoarthritis of left knee        Plan:  1. Recommend over the counter anti-inflammatories for pain and/or swelling  2. Posttraumatic arthritis left knee after ORIF left tibial plateau--patient's knee overall seems very symmetric to the contralateral side.  I will see her back in a year with x-rays or sooner if necessary.  She was offered a prescription for tramadol but she is allergic and gets a rash.  I recommended she get through her PCP if chronic pain medication is needed.  [facility] paperwork was filled out today for her.      Constantine Durbin MD  11/15/22  08:52 EST      Dictated Utilizing Dragon Dictation.

## 2022-11-16 ENCOUNTER — HOSPITAL ENCOUNTER (EMERGENCY)
Facility: HOSPITAL | Age: 67
Discharge: HOME | End: 2022-11-16
Attending: EMERGENCY MEDICINE | Admitting: EMERGENCY MEDICINE

## 2022-11-16 ENCOUNTER — APPOINTMENT (OUTPATIENT)
Dept: CT IMAGING | Facility: HOSPITAL | Age: 67
End: 2022-11-16

## 2022-11-16 VITALS
RESPIRATION RATE: 18 BRPM | TEMPERATURE: 98 F | BODY MASS INDEX: 31.98 KG/M2 | OXYGEN SATURATION: 99 % | HEART RATE: 90 BPM | SYSTOLIC BLOOD PRESSURE: 130 MMHG | WEIGHT: 199 LBS | HEIGHT: 66 IN | DIASTOLIC BLOOD PRESSURE: 60 MMHG

## 2022-11-16 DIAGNOSIS — K57.92 ACUTE DIVERTICULITIS: Primary | ICD-10-CM

## 2022-11-16 LAB
ALBUMIN SERPL-MCNC: 3.9 G/DL (ref 3.5–5.2)
ALBUMIN/GLOB SERPL: 1.2 G/DL
ALP SERPL-CCNC: 95 U/L (ref 39–117)
ALT SERPL W P-5'-P-CCNC: 22 U/L (ref 1–33)
ANION GAP SERPL CALCULATED.3IONS-SCNC: 11 MMOL/L (ref 5–15)
AST SERPL-CCNC: 23 U/L (ref 1–32)
BACTERIA UR QL AUTO: ABNORMAL /HPF
BASOPHILS # BLD AUTO: 0.01 10*3/MM3 (ref 0–0.2)
BASOPHILS NFR BLD AUTO: 0.1 % (ref 0–1.5)
BILIRUB SERPL-MCNC: 0.8 MG/DL (ref 0–1.2)
BILIRUB UR QL STRIP: NEGATIVE
BUN SERPL-MCNC: 12 MG/DL (ref 8–23)
BUN/CREAT SERPL: 19 (ref 7–25)
CALCIUM SPEC-SCNC: 8.7 MG/DL (ref 8.6–10.5)
CHLORIDE SERPL-SCNC: 106 MMOL/L (ref 98–107)
CK SERPL-CCNC: 52 U/L (ref 20–180)
CLARITY UR: ABNORMAL
CO2 SERPL-SCNC: 21 MMOL/L (ref 22–29)
COLOR UR: YELLOW
CREAT SERPL-MCNC: 0.63 MG/DL (ref 0.57–1)
D-LACTATE SERPL-SCNC: 1.1 MMOL/L (ref 0.5–2)
DEPRECATED RDW RBC AUTO: 37.9 FL (ref 37–54)
EGFRCR SERPLBLD CKD-EPI 2021: 98.6 ML/MIN/1.73
EOSINOPHIL # BLD AUTO: 0.08 10*3/MM3 (ref 0–0.4)
EOSINOPHIL NFR BLD AUTO: 0.7 % (ref 0.3–6.2)
ERYTHROCYTE [DISTWIDTH] IN BLOOD BY AUTOMATED COUNT: 12.6 % (ref 12.3–15.4)
GLOBULIN UR ELPH-MCNC: 3.3 GM/DL
GLUCOSE SERPL-MCNC: 117 MG/DL (ref 65–99)
GLUCOSE UR STRIP-MCNC: NEGATIVE MG/DL
HCT VFR BLD AUTO: 41.7 % (ref 34–46.6)
HGB BLD-MCNC: 14.8 G/DL (ref 12–15.9)
HGB UR QL STRIP.AUTO: NEGATIVE
HOLD SPECIMEN: NORMAL
HYALINE CASTS UR QL AUTO: ABNORMAL /LPF
IMM GRANULOCYTES # BLD AUTO: 0.04 10*3/MM3 (ref 0–0.05)
IMM GRANULOCYTES NFR BLD AUTO: 0.4 % (ref 0–0.5)
KETONES UR QL STRIP: NEGATIVE
LEUKOCYTE ESTERASE UR QL STRIP.AUTO: NEGATIVE
LIPASE SERPL-CCNC: 32 U/L (ref 13–60)
LYMPHOCYTES # BLD AUTO: 2.29 10*3/MM3 (ref 0.7–3.1)
LYMPHOCYTES NFR BLD AUTO: 20.4 % (ref 19.6–45.3)
MCH RBC QN AUTO: 30.1 PG (ref 26.6–33)
MCHC RBC AUTO-ENTMCNC: 35.5 G/DL (ref 31.5–35.7)
MCV RBC AUTO: 84.8 FL (ref 79–97)
MONOCYTES # BLD AUTO: 0.99 10*3/MM3 (ref 0.1–0.9)
MONOCYTES NFR BLD AUTO: 8.8 % (ref 5–12)
NEUTROPHILS NFR BLD AUTO: 69.6 % (ref 42.7–76)
NEUTROPHILS NFR BLD AUTO: 7.84 10*3/MM3 (ref 1.7–7)
NITRITE UR QL STRIP: NEGATIVE
NRBC BLD AUTO-RTO: 0 /100 WBC (ref 0–0.2)
PH UR STRIP.AUTO: 7 [PH] (ref 5–8)
PLATELET # BLD AUTO: 227 10*3/MM3 (ref 140–450)
PMV BLD AUTO: 9.9 FL (ref 6–12)
POTASSIUM SERPL-SCNC: 3.9 MMOL/L (ref 3.5–5.2)
PROT SERPL-MCNC: 7.2 G/DL (ref 6–8.5)
PROT UR QL STRIP: ABNORMAL
RBC # BLD AUTO: 4.92 10*6/MM3 (ref 3.77–5.28)
RBC # UR STRIP: ABNORMAL /HPF
REF LAB TEST METHOD: ABNORMAL
SODIUM SERPL-SCNC: 138 MMOL/L (ref 136–145)
SP GR UR STRIP: 1.02 (ref 1–1.03)
SQUAMOUS #/AREA URNS HPF: ABNORMAL /HPF
UROBILINOGEN UR QL STRIP: ABNORMAL
WBC # UR STRIP: ABNORMAL /HPF
WBC NRBC COR # BLD: 11.25 10*3/MM3 (ref 3.4–10.8)
WHOLE BLOOD HOLD COAG: NORMAL
WHOLE BLOOD HOLD SPECIMEN: NORMAL

## 2022-11-16 PROCEDURE — 81001 URINALYSIS AUTO W/SCOPE: CPT | Performed by: EMERGENCY MEDICINE

## 2022-11-16 PROCEDURE — 85025 COMPLETE CBC W/AUTO DIFF WBC: CPT | Performed by: EMERGENCY MEDICINE

## 2022-11-16 PROCEDURE — 83690 ASSAY OF LIPASE: CPT | Performed by: EMERGENCY MEDICINE

## 2022-11-16 PROCEDURE — 82550 ASSAY OF CK (CPK): CPT | Performed by: NURSE PRACTITIONER

## 2022-11-16 PROCEDURE — 74176 CT ABD & PELVIS W/O CONTRAST: CPT

## 2022-11-16 PROCEDURE — 99283 EMERGENCY DEPT VISIT LOW MDM: CPT

## 2022-11-16 PROCEDURE — 83605 ASSAY OF LACTIC ACID: CPT | Performed by: EMERGENCY MEDICINE

## 2022-11-16 PROCEDURE — 80053 COMPREHEN METABOLIC PANEL: CPT | Performed by: EMERGENCY MEDICINE

## 2022-11-16 RX ORDER — CIPROFLOXACIN 500 MG/1
500 TABLET, FILM COATED ORAL EVERY 12 HOURS
Qty: 20 TABLET | Refills: 0 | OUTPATIENT
Start: 2022-11-16 | End: 2023-02-15

## 2022-11-16 RX ORDER — ONDANSETRON 4 MG/1
4 TABLET, ORALLY DISINTEGRATING ORAL EVERY 6 HOURS PRN
Qty: 12 TABLET | Refills: 0 | OUTPATIENT
Start: 2022-11-16 | End: 2023-02-15

## 2022-11-16 RX ORDER — METRONIDAZOLE 500 MG/1
500 TABLET ORAL 3 TIMES DAILY
Qty: 30 TABLET | Refills: 0 | Status: SHIPPED | OUTPATIENT
Start: 2022-11-16 | End: 2023-02-27

## 2022-11-16 RX ORDER — SODIUM CHLORIDE 9 MG/ML
10 INJECTION INTRAVENOUS AS NEEDED
Status: DISCONTINUED | OUTPATIENT
Start: 2022-11-16 | End: 2022-11-16 | Stop reason: HOSPADM

## 2022-11-16 RX ADMIN — SODIUM CHLORIDE 1000 ML: 9 INJECTION, SOLUTION INTRAVENOUS at 12:00

## 2022-11-16 NOTE — ED PROVIDER NOTES
Subjective   History of Present Illness  Pleasant patient presents the ER for left lower quadrant abdominal pain.  She has also had yellow soft stools.  She does report having COVID several weeks ago was on several antibiotics at that time.  She denies any chest pain or difficulty breathing.  She is concerned about a urinary tract infection or bowel obstruction.    Abdominal Pain  Pain location:  LLQ  Pain quality: aching and cramping    Pain severity:  Moderate  Onset quality:  Gradual  Duration:  2 days  Timing:  Constant  Progression:  Waxing and waning  Chronicity:  New  Relieved by:  Nothing  Worsened by:  Nothing  Associated symptoms: nausea and vomiting    Associated symptoms: no chest pain, no chills, no constipation, no diarrhea, no dysuria, no fever, no hematuria and no shortness of breath        Review of Systems   Constitutional: Negative for chills and fever.   Respiratory: Negative for shortness of breath.    Cardiovascular: Negative for chest pain.   Gastrointestinal: Positive for abdominal pain, nausea and vomiting. Negative for anal bleeding, blood in stool, constipation and diarrhea.   Genitourinary: Negative for difficulty urinating, dysuria, flank pain, frequency, hematuria and urgency.   All other systems reviewed and are negative.      Past Medical History:   Diagnosis Date   • Asthma    • Chronic superficial dermatitis    • Closed fracture of left tibial plateau with routine healing    • Dietary counseling    • Encounter for routine adult health examination    • Fibromyalgia    • GERD (gastroesophageal reflux disease)    • Hair or hair follicle disorder    • History of anemia    • Hives    • Hypertension    • Irritable bowel disease    • Malaise and fatigue    • Myalgia and myositis    • PONV (postoperative nausea and vomiting)     on occasion    • Prediabetes    • Rash and nonspecific skin eruption    • S/P ORIF (open reduction internal fixation) fracture  left tibial plateau  12/21/2016   •  Sinusitis, acute maxillary    • Tibial plateau fracture, left 2016   • UTI (urinary tract infection)    • Wears glasses        Allergies   Allergen Reactions   • Tramadol Rash       Past Surgical History:   Procedure Laterality Date   • BLADDER SURGERY      Probable anterior repair x 2 ( Dr. Noble)   • BREAST BIOPSY Right    • CHOLECYSTECTOMY     • COLONOSCOPY W/ POLYPECTOMY      [facility]   • ECTOPIC PREGNANCY SURGERY     • HERNIA REPAIR      umbilical    • TIBIAL PLATEAU OPEN REDUCTION INTERNAL FIXATION Left 2016    LEFT TIBIAL PLATEAU OPEN REDUCTION INT FIXATION;  Surgeon: Constantine Durbin MD;  Location: [facility] OR;  Service:    • TOTAL ABDOMINAL HYSTERECTOMY WITH SALPINGO OOPHORECTOMY Bilateral 1998    bleeding/pain Dr Noble       Family History   Problem Relation Age of Onset   • Osteoarthritis Mother    • Cancer Father         Lung   • Heart disease Father    • Heart attack Father    • Stroke Father    • Hypertension Sister    • Hypertension Brother    • Depression Other    • Diabetes Other    • Osteoarthritis Other    • Rheum arthritis Other    • Tuberculosis Other    • Bleeding Disorder Other    • Other Other    • Breast cancer Maternal Aunt    • Ovarian cancer Paternal Aunt    • Uterine cancer Paternal Aunt    • Colon cancer Other    • Cancer Maternal Grandfather        Social History     Socioeconomic History   • Marital status:    Tobacco Use   • Smoking status: Former     Packs/day: 2.00     Years: 10.00     Pack years: 20.00     Types: Cigarettes     Quit date:      Years since quittin.8   • Smokeless tobacco: Never   Vaping Use   • Vaping Use: Never used   Substance and Sexual Activity   • Alcohol use: No     Comment: Rarely   • Drug use: No   • Sexual activity: Not Currently           Objective   Physical Exam  Constitutional:       Appearance: She is well-developed.   HENT:      Head: Normocephalic and atraumatic.      Right Ear: External ear  normal.      Left Ear: External ear normal.      Nose: Nose normal.   Eyes:      Conjunctiva/sclera: Conjunctivae normal.      Pupils: Pupils are equal, round, and reactive to light.   Cardiovascular:      Rate and Rhythm: Normal rate and regular rhythm.      Heart sounds: Normal heart sounds.   Pulmonary:      Effort: Pulmonary effort is normal.      Breath sounds: Normal breath sounds.   Abdominal:      General: Bowel sounds are normal.      Palpations: Abdomen is soft.   Musculoskeletal:         General: Normal range of motion.      Cervical back: Normal range of motion and neck supple.   Skin:     General: Skin is warm and dry.   Neurological:      Mental Status: She is alert and oriented to person, place, and time.   Psychiatric:         Behavior: Behavior normal.         Thought Content: Thought content normal.         Judgment: Judgment normal.         Procedures           ED Course  ED Course as of 11/16/22 1331   Wed Nov 16, 2022   1317 Patient resting comfortably.  Diverticulitis on the CAT scan.  Unable to give a stool sample here.  I will treat with Flagyl and Cipro.  All thankful and agreeable well aware the signs of worse condition. [JM]      ED Course User Index  [JM] Jon Leon APRN                CT Abdomen Pelvis Without Contrast   Final Result   Acute uncomplicated diverticulitis of the distal descending colon.       Hepatic steatosis.       This report was finalized on 11/16/2022 12:30 PM by Jacob Rodriguez.                                       MDM    Final diagnoses:   Acute diverticulitis       ED Disposition  ED Disposition     ED Disposition   Discharge    Condition   Stable    Comment   --             Ricci Suarez MD  [address] [facility] [address]  [facility] [address]  [phone]    Schedule an appointment as soon as possible for a visit       [facility] Emergency Department  [address] [facility] [address] [phone]    If  symptoms worsen         Medication List      New Prescriptions    ciprofloxacin 500 MG tablet  Commonly known as: CIPRO  Take 1 tablet by mouth Every 12 (Twelve) Hours.     metroNIDAZOLE 500 MG tablet  Commonly known as: FLAGYL  Take 1 tablet by mouth 3 (Three) Times a Day.        Changed    * ondansetron ODT 4 MG disintegrating tablet  Commonly known as: ZOFRAN-ODT  Place 1 tablet on the tongue Every 8 (Eight) Hours As Needed for Nausea or Vomiting.  What changed: Another medication with the same name was added. Make sure you understand how and when to take each.     * ondansetron ODT 4 MG disintegrating tablet  Commonly known as: ZOFRAN-ODT  Place 1 tablet on the tongue Every 6 (Six) Hours As Needed for Nausea or Vomiting.  What changed: You were already taking a medication with the same name, and this prescription was added. Make sure you understand how and when to take each.         * This list has 2 medication(s) that are the same as other medications prescribed for you. Read the directions carefully, and ask your doctor or other care provider to review them with you.               Where to Get Your Medications      These medications were sent to [facility] [place] [address]    Hours: 7:00 AM-5:30 PM M-F, 8:00 AM-4:30 PM Sat-Sun Phone: [phone]   · ciprofloxacin 500 MG tablet  · metroNIDAZOLE 500 MG tablet  · ondansetron ODT 4 MG disintegrating tablet          Jon Leon APRN  11/16/22 1324       Jon Leon APRN  11/16/22 8787

## 2022-11-18 ENCOUNTER — TELEPHONE (OUTPATIENT)
Dept: FAMILY MEDICINE CLINIC | Facility: CLINIC | Age: 67
End: 2022-11-18

## 2022-11-18 NOTE — TELEPHONE ENCOUNTER
PATIENT WENT TO [facility] ER ON 11/16/22 FOR DIVERTICULITIS FLARE UP AND WAS PUT OFF WORK UNTIL 11/18/22. GILMA PAPERWORK WILL BE SENT HERE AND SHE WANTED TO KNOW IF SHE NEEDS TO COME UN BEFORE THAT CAN BE COMPLETED. SHE DOES NOT WANT TO HAVE AN APPOINTMENT IF NOT NEEDED.    THANK YOU

## 2022-11-21 ENCOUNTER — TELEPHONE (OUTPATIENT)
Dept: GASTROENTEROLOGY | Facility: CLINIC | Age: 67
End: 2022-11-21

## 2022-11-21 NOTE — TELEPHONE ENCOUNTER
Hub staff attempted to follow warm transfer process and was unsuccessful     Caller: Rosalba Girard    Relationship to patient: Self    Best call back number: [phone]    Patient is needing: PT SAID SHE MAY BE ALLERGIC TO PROTONIX AND WOULD LIKE TO SPEAK WITH THE DR ABOUT A DIFFERENT MEDICATION. PT WAS IN EMERGENCY DEPARTMENT ON 11/16/22. THERE ARE NO APPTS UNTIL February FOR DR. MONZON.

## 2022-11-21 NOTE — TELEPHONE ENCOUNTER
PT CALLED AND WOULD LIKE TO KNOW IF PAPERWORK FROM [employer] HAVE BEEN RECEIVED.    PLEASE ADVISE,CALL BACK: [phone]

## 2022-11-22 RX ORDER — OMEPRAZOLE 40 MG/1
40 CAPSULE, DELAYED RELEASE ORAL DAILY
Qty: 90 CAPSULE | Refills: 3 | OUTPATIENT
Start: 2022-11-22 | End: 2023-02-15

## 2022-11-22 NOTE — TELEPHONE ENCOUNTER
Spoke with patient regarding the protonix.  She states that it is causing itching and she needs a different medication.  She said that she has never tried any other PPI's.  We will switch the protonix to omeprazole and see if that helps her.  She was seen in the ER for diverticulitis and is currently on antibiotics.  She will let us know if she is not improving.

## 2022-11-23 ENCOUNTER — TELEPHONE (OUTPATIENT)
Dept: EMERGENCY DEPT | Facility: HOSPITAL | Age: 67
End: 2022-11-23

## 2022-11-23 ENCOUNTER — LAB (OUTPATIENT)
Dept: LAB | Facility: HOSPITAL | Age: 67
End: 2022-11-23

## 2022-11-23 LAB
ADV 40+41 DNA STL QL NAA+NON-PROBE: NOT DETECTED
ASTRO TYP 1-8 RNA STL QL NAA+NON-PROBE: NOT DETECTED
C CAYETANENSIS DNA STL QL NAA+NON-PROBE: NOT DETECTED
C COLI+JEJ+UPSA DNA STL QL NAA+NON-PROBE: NOT DETECTED
CRYPTOSP DNA STL QL NAA+NON-PROBE: DETECTED
E HISTOLYT DNA STL QL NAA+NON-PROBE: NOT DETECTED
EAEC PAA PLAS AGGR+AATA ST NAA+NON-PRB: NOT DETECTED
EC STX1+STX2 GENES STL QL NAA+NON-PROBE: NOT DETECTED
EPEC EAE GENE STL QL NAA+NON-PROBE: NOT DETECTED
ETEC LTA+ST1A+ST1B TOX ST NAA+NON-PROBE: NOT DETECTED
G LAMBLIA DNA STL QL NAA+NON-PROBE: NOT DETECTED
NOROVIRUS GI+II RNA STL QL NAA+NON-PROBE: NOT DETECTED
P SHIGELLOIDES DNA STL QL NAA+NON-PROBE: NOT DETECTED
RVA RNA STL QL NAA+NON-PROBE: NOT DETECTED
S ENT+BONG DNA STL QL NAA+NON-PROBE: NOT DETECTED
SAPO I+II+IV+V RNA STL QL NAA+NON-PROBE: NOT DETECTED
SHIGELLA SP+EIEC IPAH ST NAA+NON-PROBE: NOT DETECTED
V CHOL+PARA+VUL DNA STL QL NAA+NON-PROBE: NOT DETECTED
V CHOLERAE DNA STL QL NAA+NON-PROBE: NOT DETECTED
Y ENTEROCOL DNA STL QL NAA+NON-PROBE: NOT DETECTED

## 2022-11-23 PROCEDURE — 87507 IADNA-DNA/RNA PROBE TQ 12-25: CPT | Performed by: NURSE PRACTITIONER

## 2022-12-02 ENCOUNTER — TELEPHONE (OUTPATIENT)
Dept: FAMILY MEDICINE CLINIC | Facility: CLINIC | Age: 67
End: 2022-12-02

## 2022-12-02 NOTE — TELEPHONE ENCOUNTER
PATIENT CALLED, WANTING TO CHECK THAT HER [place] PAPERWORK FROM [employer] WAS RECEIVED, AND IF IT HAS BEEN COMPLETED.

## 2022-12-15 ENCOUNTER — TELEPHONE (OUTPATIENT)
Dept: FAMILY MEDICINE CLINIC | Facility: CLINIC | Age: 67
End: 2022-12-15

## 2022-12-15 NOTE — TELEPHONE ENCOUNTER
Caller: Rosalba Girard    Relationship: Self    Best call back number:     [phone]    What medication are you requesting:     PATIENT REQUESTED AN ANTIBIOTIC    What are your current symptoms:     SINUS PRESSURE  DRAINAGE  COUGH  SORE THROAT    TESTED NEGATIVE FOR FLU    How long have you been experiencing symptoms:     SINCE YESTERDAY, 12/14/22    Have you had these symptoms before:    [x] Yes  [] No    Have you been treated for these symptoms before:   [x] Yes  [] No    If a prescription is needed, what is your preferred pharmacy and phone number:      [facility] - [place], [place]    TELEPHONE CONTACT:    [phone]

## 2022-12-16 RX ORDER — AZITHROMYCIN 250 MG/1
TABLET, FILM COATED ORAL
Qty: 6 TABLET | Refills: 0 | Status: SHIPPED | OUTPATIENT
Start: 2022-12-16 | End: 2022-12-24

## 2022-12-16 NOTE — TELEPHONE ENCOUNTER
Caller: Rosalba Girard    Relationship: Self    Best call back number: [phone]    What is the best time to reach you: ANY TIME    Who are you requesting to speak with (clinical staff, provider,  specific staff member): PRATIMA TREVINO    Do you know the name of the person who called: SELF    What was the call regarding: PATIENT CALLED CHECKING THE STATUS OF MEDICATION REQUEST FOR HER SYMPTOMS SINCE SHE IS UNABLE TO MAKE AN APPOINTMENT. PLEASE ADVISE    Do you require a callback: YES

## 2022-12-26 DIAGNOSIS — I10 ESSENTIAL HYPERTENSION: ICD-10-CM

## 2022-12-27 RX ORDER — BISOPROLOL FUMARATE AND HYDROCHLOROTHIAZIDE 2.5; 6.25 MG/1; MG/1
1 TABLET ORAL DAILY
Qty: 90 TABLET | Refills: 3 | Status: SHIPPED | OUTPATIENT
Start: 2022-12-27

## 2023-02-15 ENCOUNTER — APPOINTMENT (OUTPATIENT)
Dept: CT IMAGING | Facility: HOSPITAL | Age: 68
End: 2023-02-15
Payer: COMMERCIAL

## 2023-02-15 ENCOUNTER — HOSPITAL ENCOUNTER (EMERGENCY)
Facility: HOSPITAL | Age: 68
Discharge: HOME | End: 2023-02-15
Attending: EMERGENCY MEDICINE | Admitting: EMERGENCY MEDICINE
Payer: COMMERCIAL

## 2023-02-15 VITALS
SYSTOLIC BLOOD PRESSURE: 145 MMHG | OXYGEN SATURATION: 96 % | HEIGHT: 67 IN | RESPIRATION RATE: 20 BRPM | HEART RATE: 90 BPM | DIASTOLIC BLOOD PRESSURE: 106 MMHG | WEIGHT: 210 LBS | BODY MASS INDEX: 32.96 KG/M2 | TEMPERATURE: 99.1 F

## 2023-02-15 DIAGNOSIS — I10 ELEVATED BLOOD PRESSURE READING WITH DIAGNOSIS OF HYPERTENSION: ICD-10-CM

## 2023-02-15 DIAGNOSIS — N23 RENAL COLIC ON LEFT SIDE: ICD-10-CM

## 2023-02-15 DIAGNOSIS — N20.1 URETERAL CALCULUS, LEFT: Primary | ICD-10-CM

## 2023-02-15 LAB
ALBUMIN SERPL-MCNC: 4.4 G/DL (ref 3.5–5.2)
ALBUMIN/GLOB SERPL: 1.5 G/DL
ALP SERPL-CCNC: 95 U/L (ref 39–117)
ALT SERPL W P-5'-P-CCNC: 16 U/L (ref 1–33)
ANION GAP SERPL CALCULATED.3IONS-SCNC: 14 MMOL/L (ref 5–15)
AST SERPL-CCNC: 21 U/L (ref 1–32)
BACTERIA UR QL AUTO: ABNORMAL /HPF
BASOPHILS # BLD AUTO: 0.04 10*3/MM3 (ref 0–0.2)
BASOPHILS NFR BLD AUTO: 0.4 % (ref 0–1.5)
BILIRUB SERPL-MCNC: 0.4 MG/DL (ref 0–1.2)
BILIRUB UR QL STRIP: NEGATIVE
BUN SERPL-MCNC: 14 MG/DL (ref 8–23)
BUN/CREAT SERPL: 25.5 (ref 7–25)
CALCIUM SPEC-SCNC: 9.4 MG/DL (ref 8.6–10.5)
CHLORIDE SERPL-SCNC: 105 MMOL/L (ref 98–107)
CLARITY UR: CLEAR
CO2 SERPL-SCNC: 21 MMOL/L (ref 22–29)
COLOR UR: YELLOW
CREAT BLDA-MCNC: 0.7 MG/DL (ref 0.6–1.3)
CREAT SERPL-MCNC: 0.55 MG/DL (ref 0.57–1)
D-LACTATE SERPL-SCNC: 2.7 MMOL/L (ref 0.5–2)
DEPRECATED RDW RBC AUTO: 37 FL (ref 37–54)
EGFRCR SERPLBLD CKD-EPI 2021: 101.9 ML/MIN/1.73
EOSINOPHIL # BLD AUTO: 0.13 10*3/MM3 (ref 0–0.4)
EOSINOPHIL NFR BLD AUTO: 1.2 % (ref 0.3–6.2)
ERYTHROCYTE [DISTWIDTH] IN BLOOD BY AUTOMATED COUNT: 12.3 % (ref 12.3–15.4)
GLOBULIN UR ELPH-MCNC: 2.9 GM/DL
GLUCOSE SERPL-MCNC: 158 MG/DL (ref 65–99)
GLUCOSE UR STRIP-MCNC: NEGATIVE MG/DL
HCT VFR BLD AUTO: 44.8 % (ref 34–46.6)
HGB BLD-MCNC: 15.7 G/DL (ref 12–15.9)
HGB UR QL STRIP.AUTO: ABNORMAL
HOLD SPECIMEN: NORMAL
HYALINE CASTS UR QL AUTO: ABNORMAL /LPF
IMM GRANULOCYTES # BLD AUTO: 0.08 10*3/MM3 (ref 0–0.05)
IMM GRANULOCYTES NFR BLD AUTO: 0.8 % (ref 0–0.5)
KETONES UR QL STRIP: NEGATIVE
LEUKOCYTE ESTERASE UR QL STRIP.AUTO: NEGATIVE
LIPASE SERPL-CCNC: 28 U/L (ref 13–60)
LYMPHOCYTES # BLD AUTO: 3.26 10*3/MM3 (ref 0.7–3.1)
LYMPHOCYTES NFR BLD AUTO: 31.3 % (ref 19.6–45.3)
MCH RBC QN AUTO: 29.5 PG (ref 26.6–33)
MCHC RBC AUTO-ENTMCNC: 35 G/DL (ref 31.5–35.7)
MCV RBC AUTO: 84.1 FL (ref 79–97)
MONOCYTES # BLD AUTO: 0.59 10*3/MM3 (ref 0.1–0.9)
MONOCYTES NFR BLD AUTO: 5.7 % (ref 5–12)
NEUTROPHILS NFR BLD AUTO: 6.32 10*3/MM3 (ref 1.7–7)
NEUTROPHILS NFR BLD AUTO: 60.6 % (ref 42.7–76)
NITRITE UR QL STRIP: NEGATIVE
NRBC BLD AUTO-RTO: 0 /100 WBC (ref 0–0.2)
PH UR STRIP.AUTO: 6 [PH] (ref 5–8)
PLATELET # BLD AUTO: 326 10*3/MM3 (ref 140–450)
PMV BLD AUTO: 10 FL (ref 6–12)
POTASSIUM SERPL-SCNC: 3.8 MMOL/L (ref 3.5–5.2)
PROT SERPL-MCNC: 7.3 G/DL (ref 6–8.5)
PROT UR QL STRIP: ABNORMAL
RBC # BLD AUTO: 5.33 10*6/MM3 (ref 3.77–5.28)
RBC # UR STRIP: ABNORMAL /HPF
REF LAB TEST METHOD: ABNORMAL
SODIUM SERPL-SCNC: 140 MMOL/L (ref 136–145)
SP GR UR STRIP: 1.02 (ref 1–1.03)
SQUAMOUS #/AREA URNS HPF: ABNORMAL /HPF
UROBILINOGEN UR QL STRIP: ABNORMAL
WBC # UR STRIP: ABNORMAL /HPF
WBC NRBC COR # BLD: 10.42 10*3/MM3 (ref 3.4–10.8)
WHOLE BLOOD HOLD COAG: NORMAL
WHOLE BLOOD HOLD SPECIMEN: NORMAL

## 2023-02-15 PROCEDURE — 25010000002 MORPHINE PER 10 MG: Performed by: EMERGENCY MEDICINE

## 2023-02-15 PROCEDURE — 85025 COMPLETE CBC W/AUTO DIFF WBC: CPT | Performed by: EMERGENCY MEDICINE

## 2023-02-15 PROCEDURE — 96376 TX/PRO/DX INJ SAME DRUG ADON: CPT

## 2023-02-15 PROCEDURE — 82565 ASSAY OF CREATININE: CPT

## 2023-02-15 PROCEDURE — 83690 ASSAY OF LIPASE: CPT | Performed by: EMERGENCY MEDICINE

## 2023-02-15 PROCEDURE — 99284 EMERGENCY DEPT VISIT MOD MDM: CPT

## 2023-02-15 PROCEDURE — 36415 COLL VENOUS BLD VENIPUNCTURE: CPT

## 2023-02-15 PROCEDURE — 25010000002 ONDANSETRON PER 1 MG: Performed by: EMERGENCY MEDICINE

## 2023-02-15 PROCEDURE — 80053 COMPREHEN METABOLIC PANEL: CPT | Performed by: EMERGENCY MEDICINE

## 2023-02-15 PROCEDURE — 96374 THER/PROPH/DIAG INJ IV PUSH: CPT

## 2023-02-15 PROCEDURE — 81001 URINALYSIS AUTO W/SCOPE: CPT | Performed by: EMERGENCY MEDICINE

## 2023-02-15 PROCEDURE — 25010000002 DROPERIDOL PER 5 MG: Performed by: EMERGENCY MEDICINE

## 2023-02-15 PROCEDURE — 96375 TX/PRO/DX INJ NEW DRUG ADDON: CPT

## 2023-02-15 PROCEDURE — 83605 ASSAY OF LACTIC ACID: CPT | Performed by: EMERGENCY MEDICINE

## 2023-02-15 PROCEDURE — 74176 CT ABD & PELVIS W/O CONTRAST: CPT

## 2023-02-15 RX ORDER — SODIUM CHLORIDE 9 MG/ML
10 INJECTION INTRAVENOUS AS NEEDED
Status: DISCONTINUED | OUTPATIENT
Start: 2023-02-15 | End: 2023-02-15

## 2023-02-15 RX ORDER — MORPHINE SULFATE 4 MG/ML
4 INJECTION, SOLUTION INTRAMUSCULAR; INTRAVENOUS ONCE
Status: COMPLETED | OUTPATIENT
Start: 2023-02-15 | End: 2023-02-15

## 2023-02-15 RX ORDER — KETOROLAC TROMETHAMINE 10 MG/1
10 TABLET, FILM COATED ORAL EVERY 6 HOURS PRN
Qty: 20 TABLET | Refills: 0 | Status: SHIPPED | OUTPATIENT
Start: 2023-02-15

## 2023-02-15 RX ORDER — ACETAMINOPHEN 500 MG
1000 TABLET ORAL ONCE
Status: COMPLETED | OUTPATIENT
Start: 2023-02-15 | End: 2023-02-15

## 2023-02-15 RX ORDER — ONDANSETRON 2 MG/ML
4 INJECTION INTRAMUSCULAR; INTRAVENOUS ONCE
Status: COMPLETED | OUTPATIENT
Start: 2023-02-15 | End: 2023-02-15

## 2023-02-15 RX ORDER — ONDANSETRON 8 MG/1
8 TABLET, ORALLY DISINTEGRATING ORAL EVERY 8 HOURS PRN
Qty: 15 TABLET | Refills: 0 | Status: SHIPPED | OUTPATIENT
Start: 2023-02-15

## 2023-02-15 RX ORDER — DROPERIDOL 2.5 MG/ML
2.5 INJECTION, SOLUTION INTRAMUSCULAR; INTRAVENOUS ONCE
Status: COMPLETED | OUTPATIENT
Start: 2023-02-15 | End: 2023-02-15

## 2023-02-15 RX ORDER — IBUPROFEN 400 MG/1
800 TABLET ORAL ONCE
Status: COMPLETED | OUTPATIENT
Start: 2023-02-15 | End: 2023-02-15

## 2023-02-15 RX ORDER — ACETAMINOPHEN 500 MG
500 TABLET ORAL EVERY 4 HOURS PRN
Qty: 30 TABLET | Refills: 0 | Status: SHIPPED | OUTPATIENT
Start: 2023-02-15

## 2023-02-15 RX ORDER — HYDROCODONE BITARTRATE AND ACETAMINOPHEN 7.5; 325 MG/1; MG/1
1 TABLET ORAL EVERY 6 HOURS PRN
Qty: 12 TABLET | Refills: 0 | Status: SHIPPED | OUTPATIENT
Start: 2023-02-15 | End: 2023-02-27

## 2023-02-15 RX ORDER — PHENAZOPYRIDINE HYDROCHLORIDE 100 MG/1
200 TABLET, FILM COATED ORAL ONCE
Status: COMPLETED | OUTPATIENT
Start: 2023-02-15 | End: 2023-02-15

## 2023-02-15 RX ADMIN — MORPHINE SULFATE 4 MG: 4 INJECTION, SOLUTION INTRAMUSCULAR; INTRAVENOUS at 16:04

## 2023-02-15 RX ADMIN — ACETAMINOPHEN 1000 MG: 500 TABLET ORAL at 14:30

## 2023-02-15 RX ADMIN — IBUPROFEN 800 MG: 400 TABLET, FILM COATED ORAL at 14:30

## 2023-02-15 RX ADMIN — ONDANSETRON 4 MG: 2 INJECTION INTRAMUSCULAR; INTRAVENOUS at 14:14

## 2023-02-15 RX ADMIN — SODIUM CHLORIDE, POTASSIUM CHLORIDE, SODIUM LACTATE AND CALCIUM CHLORIDE 1000 ML: 600; 310; 30; 20 INJECTION, SOLUTION INTRAVENOUS at 14:18

## 2023-02-15 RX ADMIN — MORPHINE SULFATE 4 MG: 4 INJECTION, SOLUTION INTRAMUSCULAR; INTRAVENOUS at 14:30

## 2023-02-15 RX ADMIN — LIDOCAINE HYDROCHLORIDE 150 MG: 10 INJECTION, SOLUTION EPIDURAL; INFILTRATION; INTRACAUDAL; PERINEURAL at 16:31

## 2023-02-15 RX ADMIN — PHENAZOPYRIDINE 200 MG: 100 TABLET ORAL at 15:41

## 2023-02-15 RX ADMIN — DROPERIDOL 2.5 MG: 2.5 INJECTION, SOLUTION INTRAMUSCULAR; INTRAVENOUS at 16:04

## 2023-02-15 NOTE — ED PROVIDER NOTES
Subjective   History of Present Illness  Patient 67-year-old female presenting to the emergency department with significant left lower quadrant and left flank pain which is throbbing and radiating through to the back.  Patient reports nausea and vomiting associated with the symptoms as well.  Past medical history is significant for diagnosis of diverticulitis back in November 2022.  Patient reports the symptoms today are worse.    History provided by:  Patient      Review of Systems    Past Medical History:   Diagnosis Date   • Asthma    • Chronic superficial dermatitis    • Closed fracture of left tibial plateau with routine healing    • Dietary counseling    • Encounter for routine adult health examination    • Fibromyalgia    • GERD (gastroesophageal reflux disease)    • Hair or hair follicle disorder    • History of anemia    • Hives    • Hypertension    • Irritable bowel disease    • Malaise and fatigue    • Myalgia and myositis    • PONV (postoperative nausea and vomiting)     on occasion    • Prediabetes    • Rash and nonspecific skin eruption    • S/P ORIF (open reduction internal fixation) fracture  left tibial plateau  12/21/2016   • Sinusitis, acute maxillary    • Tibial plateau fracture, left 12/21/2016   • UTI (urinary tract infection)    • Wears glasses        Allergies   Allergen Reactions   • Tramadol Rash       Past Surgical History:   Procedure Laterality Date   • BLADDER SURGERY  2004    Probable anterior repair x 2 ( Dr. Noble)   • BREAST BIOPSY Right    • CHOLECYSTECTOMY  2012   • COLONOSCOPY W/ POLYPECTOMY  2015    [facility]   • ECTOPIC PREGNANCY SURGERY  1994   • HERNIA REPAIR      umbilical    • TIBIAL PLATEAU OPEN REDUCTION INTERNAL FIXATION Left 12/21/2016    LEFT TIBIAL PLATEAU OPEN REDUCTION INT FIXATION;  Surgeon: Constantine Durbin MD;  Location: [facility];  Service:    • TOTAL ABDOMINAL HYSTERECTOMY WITH SALPINGO OOPHORECTOMY Bilateral 1998    bleeding/pain Dr Noble       Family  History   Problem Relation Age of Onset   • Osteoarthritis Mother    • Cancer Father         Lung   • Heart disease Father    • Heart attack Father    • Stroke Father    • Hypertension Sister    • Hypertension Brother    • Depression Other    • Diabetes Other    • Osteoarthritis Other    • Rheum arthritis Other    • Tuberculosis Other    • Bleeding Disorder Other    • Other Other    • Breast cancer Maternal Aunt    • Ovarian cancer Paternal Aunt    • Uterine cancer Paternal Aunt    • Colon cancer Other    • Cancer Maternal Grandfather        Social History     Socioeconomic History   • Marital status:    Tobacco Use   • Smoking status: Former     Packs/day: 2.00     Years: 10.00     Pack years: 20.00     Types: Cigarettes     Quit date:      Years since quittin.1   • Smokeless tobacco: Never   Vaping Use   • Vaping Use: Never used   Substance and Sexual Activity   • Alcohol use: No     Comment: Rarely   • Drug use: No   • Sexual activity: Not Currently           Objective   Physical Exam  Vitals and nursing note reviewed.   Constitutional:       Appearance: She is well-developed. She is obese. She is ill-appearing.      Comments: Patient appears uncomfortable.    Cardiovascular:      Rate and Rhythm: Normal rate and regular rhythm.   Pulmonary:      Effort: Pulmonary effort is normal. No respiratory distress.      Breath sounds: Normal breath sounds.   Abdominal:      Palpations: Abdomen is soft.      Tenderness: There is abdominal tenderness in the left upper quadrant and left lower quadrant. There is guarding.   Skin:     General: Skin is warm and dry.   Neurological:      Mental Status: She is alert and oriented to person, place, and time.   Psychiatric:         Mood and Affect: Mood normal.         Behavior: Behavior normal.         Procedures           ED Course  ED Course as of 02/15/23 1704   Wed Feb 15, 2023   1531 CT Abdomen Pelvis Without Contrast  Personally reviewed CT scan of the  abdomen and pelvis demonstrating 2 left ureteral calculi measuring 2 mm and 3 mm respectively.  See report radiology for details. [RS]   1551 Findings with plan discussed with the patient.  Patient reports mild improvement in her symptoms but does continue to have pain and vomiting.  Further medications ordered. [RS]   1627 Patient resting comfortably.  Patient still reports she has significant pain however, when he went in to talk with the patient she was resting comfortably and asleep.  Patient states she feels she is not ready to go home as long as she gets a note for work tomorrow. I had a discussion with the patient/family regarding diagnosis, diagnostic results, treatment plan, and medications.  The patient/family indicated understanding of these instructions.  I spent adequate time at the bedside prior to discharge necessary to discuss the aftercare instructions, giving patient education, providing explanations of the results of our evaluations/findings, and my decision making to assure that the patient/family understand the plan of care.  Time was allotted to answer questions at that time and throughout the ED course.  Emphasis was placed on timely follow-up after discharge.  I also discussed the potential for the development of an acute emergent condition requiring further evaluation, return to the ER, admission, or even surgical intervention. I discussed that we found nothing during the visit today indicating the need for further ER workup at this time, admission to the hospital, or the presence of an acute unstable medical condition.  I encouraged the patient to return to the emergency department immediately for ANY concerns, worsening, new complaints, or if symptoms persist and unable to seek follow-up in a timely fashion.  The patient/family expressed understanding and agreement with this plan.  [RS]      ED Course User Index  [RS] Carlos Stark MD                                            Medical Decision Making  Elevated blood pressure reading with diagnosis of hypertension: acute illness or injury  Renal colic on left side: acute illness or injury  Ureteral calculus, left: acute illness or injury  Amount and/or Complexity of Data Reviewed  External Data Reviewed: notes.  Labs: ordered.  Radiology: ordered. Decision-making details documented in ED Course.      Risk  OTC drugs.  Prescription drug management.          Final diagnoses:   Ureteral calculus, left   Renal colic on left side   Elevated blood pressure reading with diagnosis of hypertension       ED Disposition  ED Disposition     ED Disposition   Discharge    Condition   Stable    Comment   --             Ricci Suarez MD  [address] [facility]    [place] [address]  [phone]          [facility] Emergency Department  [address] [facility] [id number] [phone]    As needed, If symptoms worsen or ANY concerns.    Davi Faulkner MD  [address] [facility] [address]  [place] [address]  [phone]    Call            Medication List      New Prescriptions    acetaminophen 500 MG tablet  Commonly known as: TYLENOL  Take 1 tablet by mouth Every 4 (Four) Hours As Needed for Mild Pain or Moderate Pain.     HYDROcodone-acetaminophen 7.5-325 MG per tablet  Commonly known as: NORCO  Take 1 tablet by mouth Every 6 (Six) Hours As Needed for Moderate Pain.     ketorolac 10 MG tablet  Commonly known as: TORADOL  Take 1 tablet by mouth Every 6 (Six) Hours As Needed for Moderate Pain. Received a dose in the ER.        Changed    ondansetron ODT 8 MG disintegrating tablet  Commonly known as: ZOFRAN-ODT  Place 1 tablet on the tongue Every 8 (Eight) Hours As Needed for Nausea or Vomiting.  What changed:   · medication strength  · how much to take  · Another medication with the same name was removed. Continue taking this medication, and follow the directions you see here.        Stop     ciprofloxacin 500 MG tablet  Commonly known as: CIPRO     methocarbamol 750 MG tablet  Commonly known as: ROBAXIN     omeprazole 40 MG capsule  Commonly known as: priLOSEC     promethazine-dextromethorphan 6.25-15 MG/5ML syrup  Commonly known as: PROMETHAZINE-DM           Where to Get Your Medications      These medications were sent to [facility] - [place] [address]    Hours: 7:00 AM-5:30 PM M-F, 8:00 AM-4:30 PM Sat-Sun Phone: [phone]   · acetaminophen 500 MG tablet  · HYDROcodone-acetaminophen 7.5-325 MG per tablet  · ketorolac 10 MG tablet  · ondansetron ODT 8 MG disintegrating tablet          Carlos Stark MD  02/15/23 3274

## 2023-02-21 ENCOUNTER — TRANSCRIBE ORDERS (OUTPATIENT)
Dept: FAMILY MEDICINE CLINIC | Facility: CLINIC | Age: 68
End: 2023-02-21
Payer: COMMERCIAL

## 2023-02-21 ENCOUNTER — TELEPHONE (OUTPATIENT)
Dept: FAMILY MEDICINE CLINIC | Facility: CLINIC | Age: 68
End: 2023-02-21

## 2023-02-21 DIAGNOSIS — N20.0 KIDNEY STONES: Primary | ICD-10-CM

## 2023-02-21 NOTE — TELEPHONE ENCOUNTER
Caller: Rosalba Girard    Relationship: Self    Best call back number: [phone]    What is the medical concern/diagnosis: KIDNEY STONES    What specialty or service is being requested: NA    What is the provider, practice or medical service name: DR DORMAN    Any additional details: PATIENT WAS IN THE ER 02/15/23 FOR KIDNEY STONES

## 2023-02-27 ENCOUNTER — OFFICE VISIT (OUTPATIENT)
Dept: OBSTETRICS AND GYNECOLOGY | Facility: CLINIC | Age: 68
End: 2023-02-27
Payer: COMMERCIAL

## 2023-02-27 VITALS — DIASTOLIC BLOOD PRESSURE: 70 MMHG | WEIGHT: 201.8 LBS | BODY MASS INDEX: 31.61 KG/M2 | SYSTOLIC BLOOD PRESSURE: 112 MMHG

## 2023-02-27 DIAGNOSIS — N39.46 MIXED INCONTINENCE: ICD-10-CM

## 2023-02-27 DIAGNOSIS — Z78.0 POSTMENOPAUSAL: ICD-10-CM

## 2023-02-27 DIAGNOSIS — N95.2 VAGINAL ATROPHY: ICD-10-CM

## 2023-02-27 DIAGNOSIS — Z01.411 ENCOUNTER FOR GYNECOLOGICAL EXAMINATION WITH ABNORMAL FINDING: Primary | ICD-10-CM

## 2023-02-27 DIAGNOSIS — Z12.31 ENCOUNTER FOR SCREENING MAMMOGRAM FOR MALIGNANT NEOPLASM OF BREAST: ICD-10-CM

## 2023-02-27 PROCEDURE — 99397 PER PM REEVAL EST PAT 65+ YR: CPT | Performed by: NURSE PRACTITIONER

## 2023-02-27 RX ORDER — CONJUGATED ESTROGENS 0.62 MG/G
0.5 CREAM VAGINAL 2 TIMES WEEKLY
Qty: 30 G | Refills: 3 | Status: SHIPPED | OUTPATIENT
Start: 2023-02-27

## 2023-02-27 RX ORDER — OXYBUTYNIN CHLORIDE 10 MG/1
10 TABLET, EXTENDED RELEASE ORAL DAILY
Qty: 30 TABLET | Refills: 1 | Status: SHIPPED | OUTPATIENT
Start: 2023-02-27

## 2023-02-27 NOTE — PROGRESS NOTES
Annual Visit     Patient Name: Rosalba Girard  : 1955   MRN: [id number]   Care Team: Patient Care Team:  Ricci Suarez MD as PCP - General (Family Medicine)  Venessa Franz APRN as Nurse Practitioner (Nurse Practitioner)    Chief Complaint:    Annual exam   Vaginal atrophy   Mixed UI     HPI: Rosalba Girard is a 67 y.o. year old  presenting to be seen for her gynecologic exam.   S/p total hyst with BSO   Stopped ERT - doing well without it     Mixed urinary incontinence - stopped Mrybetriq, states she developed itching with it and once she stopped medication, itching resolved   Would like to discuss other options     Recently dx with renal calculi in ER 2 wks ago   Planned f/u with urology      Hasn't been using the premarin vaginal cream   Vaginal dryness/irritation is bothersome      Mammogram 2020 birads 0   Right breast u/s done birads 2 - return to routine screening     Colonoscopy 2021 rpt 5 yrs     Hasn't had a DEXA in many yrs   Takes a daily multivitamin      Works in medical records here at [employer]      I have reviewed the patients family history, social history, past medical history, past surgical history and have updated it as appropriate.    /70 (BP Location: Left arm, Patient Position: Sitting, Cuff Size: Adult)   Wt 91.5 kg (201 lb 12.8 oz)   LMP  (LMP Unknown)   BMI 31.61 kg/m²     BMI reviewed: Body mass index is 31.61 kg/m².      Objective     Physical Exam    Neuro: alert and oriented to person, place and time   General:  alert; cooperative; well developed; well nourished   Skin:  No suspicious lesions seen   Thyroid: normal to inspection and palpation   Lungs:  breathing is unlabored  clear to auscultation bilaterally   Heart:  regular rate and rhythm, S1, S2 normal, no murmur, click, rub or gallop  normal apical impulse   Breasts:  Examined in supine position  Symmetric without masses or skin dimpling  Nipples normal  without inversion, lesions or discharge  There are no palpable axillary nodes  prominent inframmary ridge bilaterally    Abdomen: soft, non-tender; no masses  no umbilical or inguinal hernias are present  no hepato-splenomegaly   Pelvis: Clinical staff was present for exam  External genitalia:  normal appearance of the external genitalia including Bartholin's and Bodcaw's glands.  :  urethral meatus normal;  Vaginal:  atrophic mucosal changes are present; severe atrophy with stippling noted  Cervix:  absent.  Uterus:  absent.  Adnexa:  absent, bilateral.  Rectal:  digital rectal exam not performed; anus visually normal appearing.         Assessment / Plan      Assessment  Problems Addressed This Visit    ICD-10-CM ICD-9-CM   1. Encounter for gynecological examination with abnormal finding  Z01.411 V72.31   2. Mixed incontinence  N39.46 788.33   3. Vaginal atrophy  N95.2 627.3   4. Postmenopausal  Z78.0 V49.81   5. Encounter for screening mammogram for malignant neoplasm of breast  Z12.31 V76.12       Plan    Discussed monthly SBEs and importance of annual breast imaging   Mammogram ordered     Trial of oxybutynin 10mg qd - will call in 6-8 wks to f/u   Discussed common side effects   Monitor BP with medication   Stressed importance of f/u with urology re: renal calculi     Discussed vaginal atrophy on exam - stressed importance of premarin vaginal cream 2x/wk at bedtime   Refills to pharmacy     DEXA ordered   Discussed calcium 600mg and Vit d 400 IUS daily for bone health     AV 1 yr       Follow Up  Return in about 1 year (around 2/27/2024) for Annual physical.  Patient was given instructions and counseling regarding her condition or for health maintenance advice. Please see specific information pulled into the AVS if appropriate.     Venessa Franz, FIORELLA  February 27, 2023  09:01 EST

## 2023-03-13 ENCOUNTER — OFFICE VISIT (OUTPATIENT)
Dept: UROLOGY | Facility: CLINIC | Age: 68
End: 2023-03-13
Payer: COMMERCIAL

## 2023-03-13 VITALS
HEIGHT: 67 IN | HEART RATE: 85 BPM | SYSTOLIC BLOOD PRESSURE: 132 MMHG | DIASTOLIC BLOOD PRESSURE: 78 MMHG | OXYGEN SATURATION: 99 % | BODY MASS INDEX: 31.77 KG/M2 | WEIGHT: 202.4 LBS

## 2023-03-13 DIAGNOSIS — N20.0 KIDNEY STONE: ICD-10-CM

## 2023-03-13 DIAGNOSIS — N20.1 LEFT URETERAL STONE: Primary | ICD-10-CM

## 2023-03-13 LAB
BILIRUB BLD-MCNC: NEGATIVE MG/DL
CLARITY, POC: CLEAR
COLOR UR: YELLOW
EXPIRATION DATE: NORMAL
GLUCOSE UR STRIP-MCNC: NEGATIVE MG/DL
KETONES UR QL: NEGATIVE
LEUKOCYTE EST, POC: NEGATIVE
Lab: NORMAL
NITRITE UR-MCNC: NEGATIVE MG/ML
PH UR: 6.5 [PH] (ref 5–8)
PROT UR STRIP-MCNC: NEGATIVE MG/DL
RBC # UR STRIP: NEGATIVE /UL
SP GR UR: 1.02 (ref 1–1.03)
UROBILINOGEN UR QL: NORMAL

## 2023-03-13 PROCEDURE — 99204 OFFICE O/P NEW MOD 45 MIN: CPT | Performed by: STUDENT IN AN ORGANIZED HEALTH CARE EDUCATION/TRAINING PROGRAM

## 2023-03-13 PROCEDURE — 81003 URINALYSIS AUTO W/O SCOPE: CPT | Performed by: STUDENT IN AN ORGANIZED HEALTH CARE EDUCATION/TRAINING PROGRAM

## 2023-03-13 NOTE — PROGRESS NOTES
Office Note Kidney Stone      Patient Name: Rosalba Girard  : 1955   MRN: [id number]     Chief Complaint: History of Kidney Stones.    Chief Complaint   Patient presents with   • Kidney stone       Referring Provider: Ricci Suarez*    History of Present Illness: Rosalba Girard is a 67 y.o. female who presents today for history of left ureteral stones.  The patient has a history of asthma, GERD, hypertension, IBS, reported previous anterior prolapse repair , status post previous hysterectomy for bleeding , she presented to the [facility] emergency department 2/15/2023, she was reporting left-sided flank and abdominal pain throbbing, she also reported nausea and vomiting.  A CT scan demonstrated 2 proximal ureteral stones measuring 2 and 3 mm in size.  She had mild to moderate hydronephrosis.  Her urinalysis was not concerning for infection.  She was discharged on medical trial of passage.  Patient presents today for urologic establishing care.  She has never had a kidney stone episode prior.  She reports the vast majority of her left-sided flank abdominal pain have resolved but she has some intermittent left-sided crampy abdominal pain every few hours.  She denies nausea or vomiting.  She is eating and drinking well.  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  Her urinalysis is totally negative today.  She is not sure if she has passed her stone.  She has been straining her urine.    Renal function most recently 2/15/2023 demonstrated normal creatinine 0.55, GFR normal at that time.    Review of CT scan demonstrates no other kidney stones bilaterally.        Subjective      Review of System: Review of Systems   Genitourinary: Positive for flank pain, frequency and urgency.      I have reviewed the ROS documented by my clinical staff, I have updated appropriately and I agree. Mik Robbins MD    Past Medical History:   Past Medical History:   Diagnosis Date   • Asthma    •  Chronic superficial dermatitis    • Closed fracture of left tibial plateau with routine healing    • Dietary counseling    • Encounter for routine adult health examination    • Fibromyalgia    • GERD (gastroesophageal reflux disease)    • Hair or hair follicle disorder    • History of anemia    • Hives    • Hypertension    • Irritable bowel disease    • Malaise and fatigue    • Myalgia and myositis    • PONV (postoperative nausea and vomiting)     on occasion    • Prediabetes    • Rash and nonspecific skin eruption    • S/P ORIF (open reduction internal fixation) fracture  left tibial plateau  12/21/2016   • Sinusitis, acute maxillary    • Tibial plateau fracture, left 12/21/2016   • UTI (urinary tract infection)    • Wears glasses        Past Surgical History:   Past Surgical History:   Procedure Laterality Date   • BLADDER SURGERY  2004    Probable anterior repair x 2 ( Dr. Noble)   • BREAST BIOPSY Right    • CHOLECYSTECTOMY  2012   • COLONOSCOPY W/ POLYPECTOMY  2015    [facility]   • ECTOPIC PREGNANCY SURGERY  1994   • HERNIA REPAIR      umbilical    • TIBIAL PLATEAU OPEN REDUCTION INTERNAL FIXATION Left 12/21/2016    LEFT TIBIAL PLATEAU OPEN REDUCTION INT FIXATION;  Surgeon: Constantine Durbin MD;  Location: [facility];  Service:    • TOTAL ABDOMINAL HYSTERECTOMY WITH SALPINGO OOPHORECTOMY Bilateral 1998    bleeding/pain Dr Noble       Family History:   Family History   Problem Relation Age of Onset   • Osteoarthritis Mother    • Aneurysm Mother    • Cancer Father         Lung   • Heart disease Father    • Heart attack Father    • Stroke Father    • Hypertension Sister    • Hypertension Brother    • Breast cancer Maternal Aunt    • Ovarian cancer Paternal Aunt    • Uterine cancer Paternal Aunt    • Cancer Maternal Grandfather    • Depression Other    • Diabetes Other    • Osteoarthritis Other    • Rheum arthritis Other    • Tuberculosis Other    • Bleeding Disorder Other    • Other Other    • Colon cancer  Other        Social History:   Social History     Socioeconomic History   • Marital status:    Tobacco Use   • Smoking status: Former     Packs/day: 2.00     Years: 10.00     Pack years: 20.00     Types: Cigarettes     Quit date:      Years since quittin.2   • Smokeless tobacco: Never   Vaping Use   • Vaping Use: Never used   Substance and Sexual Activity   • Alcohol use: No     Comment: Rarely   • Drug use: No   • Sexual activity: Not Currently       Medications:     Current Outpatient Medications:   •  acetaminophen (TYLENOL) 500 MG tablet, Take 1 tablet by mouth Every 4 (Four) Hours As Needed for Mild Pain or Moderate Pain., Disp: 30 tablet, Rfl: 0  •  albuterol sulfate  (90 Base) MCG/ACT inhaler, Inhale 2 puffs Every 4 (Four) Hours As Needed for Wheezing., Disp: 8.5 g, Rfl: 0  •  aspirin 81 MG EC tablet, Take 1 tablet by mouth Daily., Disp: , Rfl:   •  bisoprolol-hydrochlorothiazide (ZIAC) 2.5-6.25 MG per tablet, Take 1 tablet by mouth Daily., Disp: 90 tablet, Rfl: 3  •  Estrogens Conjugated (Premarin) 0.625 MG/GM vaginal cream, Insert 0.5 g into the vagina 2 (Two) Times a Week., Disp: 30 g, Rfl: 3  •  ketorolac (TORADOL) 10 MG tablet, Take 1 tablet by mouth Every 6 (Six) Hours As Needed for Moderate Pain. Received a dose in the ER., Disp: 20 tablet, Rfl: 0  •  ondansetron ODT (ZOFRAN-ODT) 8 MG disintegrating tablet, Place 1 tablet on the tongue Every 8 (Eight) Hours As Needed for Nausea or Vomiting., Disp: 15 tablet, Rfl: 0  •  oxybutynin XL (Ditropan XL) 10 MG 24 hr tablet, Take 1 tablet by mouth Daily., Disp: 30 tablet, Rfl: 1    Current Facility-Administered Medications:   •  cyanocobalamin injection 1,000 mcg, 1,000 mcg, Intramuscular, Q28 Days, Claire Burton PA-C, 1,000 mcg at 19 1058    Allergies:   Allergies   Allergen Reactions   • Myrbetriq [Mirabegron] Itching   • Tramadol Rash         Objective     Physical Exam:   Vital Signs:   Vitals:    23 0827   BP:  "132/78   Pulse: 85   SpO2: 99%   Weight: 91.8 kg (202 lb 6.4 oz)   Height: 170.2 cm (67\")     Body mass index is 31.7 kg/m².     Physical Exam  Constitutional:       Appearance: Normal appearance.   HENT:      Head: Normocephalic and atraumatic.      Nose: Nose normal.      Mouth/Throat:      Mouth: Mucous membranes are moist.      Pharynx: Oropharynx is clear.   Eyes:      Extraocular Movements: Extraocular movements intact.      Pupils: Pupils are equal, round, and reactive to light.   Pulmonary:      Effort: Pulmonary effort is normal. No respiratory distress.   Musculoskeletal:         General: No swelling or deformity. Normal range of motion.      Cervical back: Normal range of motion and neck supple.   Skin:     General: Skin is warm and dry.   Neurological:      General: No focal deficit present.      Mental Status: She is alert and oriented to person, place, and time. Mental status is at baseline.   Psychiatric:         Mood and Affect: Mood normal.         Behavior: Behavior normal.         Labs:   Brief Urine Lab Results  (Last result in the past 365 days)      Color   Clarity   Blood   Leuk Est   Nitrite   Protein   CREAT   Urine HCG        03/13/23 0843 Yellow   Clear   Negative   Negative   Negative   Negative                      Color, UA   Date Value Ref Range Status   02/15/2023 Yellow Yellow, Straw Final     Color   Date Value Ref Range Status   03/13/2023 Yellow Yellow, Straw, Dark Yellow, Lanie Final     Bilirubin, UA   Date Value Ref Range Status   02/15/2023 Negative Negative Final     Bilirubin   Date Value Ref Range Status   03/13/2023 Negative Negative Final     Urobilinogen, UA   Date Value Ref Range Status   03/13/2023 Normal Normal, 0.2 E.U./dL Final   02/15/2023 0.2 E.U./dL 0.2 - 1.0 E.U./dL Final     Blood, UA   Date Value Ref Range Status   03/13/2023 Negative Negative Final     Leuk Esterase, UA   Date Value Ref Range Status   02/15/2023 Negative Negative Final     Leukocytes   Date " Value Ref Range Status   03/13/2023 Negative Negative Final       Lab Results   Component Value Date    GLUCOSE 158 (H) 02/15/2023    CALCIUM 9.4 02/15/2023     02/15/2023    K 3.8 02/15/2023    CO2 21.0 (L) 02/15/2023     02/15/2023    BUN 14 02/15/2023    CREATININE 0.70 02/15/2023    EGFRIFNONA 80 09/08/2019    BCR 25.5 (H) 02/15/2023    ANIONGAP 14.0 02/15/2023       Lab Results   Component Value Date    WBC 10.42 02/15/2023    HGB 15.7 02/15/2023    HCT 44.8 02/15/2023    MCV 84.1 02/15/2023     02/15/2023       Stone Analysis  No components found for: GVPME1MOCZYM, DVVPB2IRXBJZ, LHOSY6ZBHRZZ    Images:   CT Abdomen Pelvis Without Contrast    Result Date: 2/15/2023  Impression: 2 small proximal left ureteral calculi, 3 mm and 2 mm in diameter respectively, with mild to moderate left-sided obstructive uropathy. No other evidence of acute intra-abdominal or intrapelvic disease. Electronically Signed: Irvin Jonathan  2/15/2023 2:21 PM EST  Workstation ID: [id number]    CT Abdomen Pelvis Without Contrast    Result Date: 11/16/2022  Acute uncomplicated diverticulitis of the distal descending colon.  Hepatic steatosis.  This report was finalized on 11/16/2022 12:30 PM by Jacob Rodriguez.        Measures:   Tobacco:   Rosalba Girard  reports that she quit smoking about 33 years ago. Her smoking use included cigarettes. She has a 20.00 pack-year smoking history. She has never used smokeless tobacco.      Urine Incontinence: ( NOUI)  Patient reports that she is not currently experiencing any symptoms of urinary incontinence.      Assessment / Plan      Assessment/Plan:   Rosalba Girard is a 67 y.o. female who presented today for kidney stones.  Patient has 2 separate proximal ureteral stones measuring 2 and 3 mm in size.  She presented to [facility] ER 2/15/2023.  It has been 1 month since she has tried to pass her stone.  She needs to repeat abdominal imaging with CT to make sure she has passed the  stones.  I discussed that the stones are still present on CT I will recommend endoscopic intervention with ureteroscopy and laser lithotripsy.  Patient reports understanding.  We discussed risk benefits and alternatives.  We discussed kidney stone preventative strategies, she needs to increase her water intake, reduce her sodium and animal protein intake, she needs a healthy daily intake of calcium 1 to 2 g.  Patient was given educational materials.  We discussed return precautions to the emergency department.  I will call her or see her back in clinic to review her CT next available.    Diagnoses and all orders for this visit:    1. Left ureteral stone (Primary)  -     CT Abdomen Pelvis Stone Protocol; Future    2. Kidney stone  -     POC Urinalysis Dipstick, Automated          Follow Up:   Return in about 2 weeks (around 3/27/2023) for Follow Up after Imaging.    I spent approximately 30 minutes providing clinical care for this patient; including review of patient's chart and provider documentation, face to face time spent with patient in examination room (obtaining history, performing physical exam, discussing diagnosis and management options), placing orders, and completing patient documentation.     Mik Robbins MD  [facility] [place]

## 2023-03-22 ENCOUNTER — HOSPITAL ENCOUNTER (OUTPATIENT)
Dept: CT IMAGING | Facility: HOSPITAL | Age: 68
Discharge: HOME | End: 2023-03-22
Admitting: STUDENT IN AN ORGANIZED HEALTH CARE EDUCATION/TRAINING PROGRAM
Payer: COMMERCIAL

## 2023-03-22 DIAGNOSIS — N20.1 LEFT URETERAL STONE: ICD-10-CM

## 2023-03-22 PROCEDURE — 74176 CT ABD & PELVIS W/O CONTRAST: CPT

## 2023-03-23 ENCOUNTER — TELEPHONE (OUTPATIENT)
Dept: UROLOGY | Facility: CLINIC | Age: 68
End: 2023-03-23
Payer: COMMERCIAL

## 2023-03-23 NOTE — TELEPHONE ENCOUNTER
"I called the patient and left a voicemail for her to call us back. She needs to know     \"Please let patient know that she has passed her left ureteral stones, she has no additional kidney stones in either kidney and everything looks good, no dilation or obstruction of her kidneys.  She does not need to keep her follow-up with me on 3/29.  , please cancel her scheduled follow-up with me, she can follow-up as needed\"            "

## 2023-03-23 NOTE — PROGRESS NOTES
Please let patient know that she has passed her left ureteral stones, she has no additional kidney stones in either kidney and everything looks good, no dilation or obstruction of her kidneys.  She does not need to keep her follow-up with me on 3/29.  , please cancel her scheduled follow-up with me, she can follow-up as needed.    Mik Robbins MD

## 2023-03-23 NOTE — TELEPHONE ENCOUNTER
Spoke to PT and gave her your message. She also read her report, and was concerned about the stable nonspecific mildly enlarged lymph node at the portacaval region.Please advise, thank you as she is concerned that this may mean she has lymphoma.

## 2023-03-24 ENCOUNTER — TELEPHONE (OUTPATIENT)
Dept: UROLOGY | Facility: CLINIC | Age: 68
End: 2023-03-24

## 2023-03-24 NOTE — TELEPHONE ENCOUNTER
Hub staff attempted to follow warm transfer process and was unsuccessful     Caller: Rosalba Girard    Relationship to patient: Self    Best call back number: [phone]    Patient is needing: WOULD LIKE TO SPEAK WITH NURSE/PROVIDER ABOUT CT SCAN RESULTS

## 2023-03-25 ENCOUNTER — TELEPHONE (OUTPATIENT)
Dept: UROLOGY | Facility: CLINIC | Age: 68
End: 2023-03-25
Payer: COMMERCIAL

## 2023-03-25 NOTE — TELEPHONE ENCOUNTER
Patient is concerned about reading her CT scan results which demonstrates a small enlarged lymph node at the portacaval region, and some hazy mesenteric fat stranding, radiology mentions that this could also be potentially related to lymphoma but is less likely.  CT scan was performed to make sure that the patient had passed her left ureteral stone, she has successfully passed her left ureteral stone and there is no additional stones in both kidneys.  She will be following up with urology on an as needed basis.      I tried to call the patient today to discuss her CT scan results but I was unable to reach her so I left her a voicemail indicating that this is beyond my level of expertise and I would defer to the patient's primary care doctor regarding further work-up for her incidental CT findings. Dicussed over voicemail I am less concerned about the possibility of lymphoma but this would require work up and monitoring.     Mik Robbins MD  [facility]

## 2023-03-27 DIAGNOSIS — R93.5 ABNORMAL ABDOMINAL CT SCAN: Primary | ICD-10-CM

## 2023-03-27 NOTE — TELEPHONE ENCOUNTER
I recommend we refer her to oncology to be worked up for possible lymphoma.  Contact the patient and see if she has a preference of providers and let me know and then I will make a referral.

## 2023-03-27 NOTE — TELEPHONE ENCOUNTER
Called pt 3 different times, no answer, left vm that we were going to start referral process and to call us back; please go ahead and start referral. Looks like pt works at  so a provider with  would probably be best.

## 2023-03-28 ENCOUNTER — TRANSCRIBE ORDERS (OUTPATIENT)
Dept: FAMILY MEDICINE CLINIC | Facility: CLINIC | Age: 68
End: 2023-03-28
Payer: COMMERCIAL

## 2023-03-28 ENCOUNTER — TELEPHONE (OUTPATIENT)
Dept: FAMILY MEDICINE CLINIC | Facility: CLINIC | Age: 68
End: 2023-03-28
Payer: COMMERCIAL

## 2023-03-28 DIAGNOSIS — C85.99 LYMPHOMA OF BODY OF STOMACH: Primary | ICD-10-CM

## 2023-03-28 DIAGNOSIS — D72.829 LEUKOCYTOSIS, UNSPECIFIED TYPE: ICD-10-CM

## 2023-05-01 ENCOUNTER — TELEPHONE (OUTPATIENT)
Dept: FAMILY MEDICINE CLINIC | Facility: CLINIC | Age: 68
End: 2023-05-01
Payer: COMMERCIAL

## 2023-05-01 NOTE — TELEPHONE ENCOUNTER
Caller: Rosalba Girard    Relationship: Self    Best call back number: [phone]    What form or medical record are you requesting: FMLA FORMS FOR FIBROMYALGIA AND BACK ISSUES     Who is requesting this form or medical record from you: PATIENT    How would you like to receive the form or medical records (pick-up, mail, fax): PATIENT WOULD LIKE TO  TOMORROW IF POSSIBLE       Additional notes: THE PATIENT STATES THAT HER FMLA IS UP AND SHE NEEDS TO GET IT REFILLED OUT THE PATIENT WOULD LIKE TO  COPIES OF THE PREVIOUS FMLA PAPER WORK SHE WOULD LIKE TO PICK THAT UP TOMORROW IF POSSIBLE

## 2023-05-02 NOTE — TELEPHONE ENCOUNTER
I spoke with patient yesterday afternoon and placed copies of both sets of FMLA up front for . They are due to be renewed and she said I should be getting new forms faxed over.

## 2023-05-09 ENCOUNTER — CONSULT (OUTPATIENT)
Dept: ONCOLOGY | Facility: CLINIC | Age: 68
End: 2023-05-09
Payer: COMMERCIAL

## 2023-05-09 VITALS
OXYGEN SATURATION: 98 % | SYSTOLIC BLOOD PRESSURE: 130 MMHG | BODY MASS INDEX: 32.18 KG/M2 | HEIGHT: 67 IN | TEMPERATURE: 96.6 F | RESPIRATION RATE: 18 BRPM | WEIGHT: 205 LBS | DIASTOLIC BLOOD PRESSURE: 71 MMHG | HEART RATE: 82 BPM

## 2023-05-09 DIAGNOSIS — R93.5 ABNORMAL COMPUTED TOMOGRAPHY ANGIOGRAPHY (CTA) OF ABDOMEN AND PELVIS: Primary | ICD-10-CM

## 2023-05-09 PROCEDURE — 99204 OFFICE O/P NEW MOD 45 MIN: CPT | Performed by: INTERNAL MEDICINE

## 2023-05-09 NOTE — PROGRESS NOTES
DATE OF CONSULTATION: 5/9/2023    REFERRING PHYSICIAN: Ricci Suarez MD    Dear Ricci Gray MD  Thank you for asking for my medical advice on this patient. I saw her in the  [place] office on 5/9/2023    REASON FOR CONSULTATION: Abnormal CT abdomen pelvis    PROBLEM LIST:   1.  Mesenteric fat stranding:  A.  Incidentally noted on CT abdomen pelvis done March 2023 for kidney stone  2.  Morbid obesity, BMI 32  3.  Hypertension    HISTORY OF PRESENT ILLNESS: The patient is a very pleasant 67 y.o.  female   who was in her usual state of health until March 2023.  The patient had CT abdomen pelvis as a follow-up with her urologist for kidney stone.  Incidental finding on the scan mentioned mesenteric fat stranding.  Radiologist mentioned the read cannot rule out early lymphoma.  The patient was referred to me for further recommendations.    SUBJECTIVE: When I saw the patient today she is here by herself.  She is anxious at today's visit.  She denies any weight loss no fever chills but she has mild on and off night sweats those are chronic since she stopped her estrogen replacement.  Never been diagnosed with cancer.  No immediate family history of cancer.    Review of Systems   Constitutional: Positive for fatigue.   Eyes: Negative.    Respiratory: Negative.    Cardiovascular: Negative.    Endocrine: Positive for heat intolerance.   Genitourinary: Negative.    Musculoskeletal: Positive for arthralgias.   Allergic/Immunologic: Negative.    Neurological: Negative.    Hematological: Negative.    Psychiatric/Behavioral: The patient is nervous/anxious.        Past Medical History:   Diagnosis Date   • Asthma    • Chronic superficial dermatitis    • Closed fracture of left tibial plateau with routine healing    • Dietary counseling    • Encounter for routine adult health examination    • Fibromyalgia    • GERD (gastroesophageal reflux disease)    • Hair or hair follicle disorder    • History  of anemia    • Hives    • Hypertension    • Irritable bowel disease    • Malaise and fatigue    • Myalgia and myositis    • PONV (postoperative nausea and vomiting)     on occasion    • Prediabetes    • Rash and nonspecific skin eruption    • S/P ORIF (open reduction internal fixation) fracture  left tibial plateau  2016   • Sinusitis, acute maxillary    • Tibial plateau fracture, left 2016   • UTI (urinary tract infection)    • Wears glasses        Social History     Socioeconomic History   • Marital status:    Tobacco Use   • Smoking status: Former     Packs/day: 2.00     Years: 10.00     Pack years: 20.00     Types: Cigarettes     Quit date:      Years since quittin.3   • Smokeless tobacco: Never   Vaping Use   • Vaping Use: Never used   Substance and Sexual Activity   • Alcohol use: No     Comment: Rarely   • Drug use: No   • Sexual activity: Not Currently       Family History   Problem Relation Age of Onset   • Osteoarthritis Mother    • Aneurysm Mother    • Cancer Father         Lung   • Heart disease Father    • Heart attack Father    • Stroke Father    • Hypertension Sister    • Hypertension Brother    • Breast cancer Maternal Aunt    • Ovarian cancer Paternal Aunt    • Uterine cancer Paternal Aunt    • Cancer Maternal Grandfather    • Depression Other    • Diabetes Other    • Osteoarthritis Other    • Rheum arthritis Other    • Tuberculosis Other    • Bleeding Disorder Other    • Other Other    • Colon cancer Other        Past Surgical History:   Procedure Laterality Date   • BLADDER SURGERY  2004    Probable anterior repair x 2 ( Dr. Noble)   • BREAST BIOPSY Right    • CHOLECYSTECTOMY     • COLONOSCOPY W/ POLYPECTOMY      [facility]   • ECTOPIC PREGNANCY SURGERY     • HERNIA REPAIR      umbilical    • TIBIAL PLATEAU OPEN REDUCTION INTERNAL FIXATION Left 2016    LEFT TIBIAL PLATEAU OPEN REDUCTION INT FIXATION;  Surgeon: Constantine Durbin MD;  Location:   "[facility];  Service:    • TOTAL ABDOMINAL HYSTERECTOMY WITH SALPINGO OOPHORECTOMY Bilateral 1998    bleeding/pain Dr Noble       Allergies   Allergen Reactions   • Myrbetriq [Mirabegron] Itching   • Tramadol Rash          Current Outpatient Medications:   •  acetaminophen (TYLENOL) 500 MG tablet, Take 1 tablet by mouth Every 4 (Four) Hours As Needed for Mild Pain or Moderate Pain., Disp: 30 tablet, Rfl: 0  •  albuterol sulfate  (90 Base) MCG/ACT inhaler, Inhale 2 puffs Every 4 (Four) Hours As Needed for Wheezing., Disp: 8.5 g, Rfl: 0  •  aspirin 81 MG EC tablet, Take 1 tablet by mouth Daily., Disp: , Rfl:   •  bisoprolol-hydrochlorothiazide (ZIAC) 2.5-6.25 MG per tablet, Take 1 tablet by mouth Daily., Disp: 90 tablet, Rfl: 3  •  Estrogens Conjugated (Premarin) 0.625 MG/GM vaginal cream, Insert 0.5 grams into the vagina as directed by provider 2 (Two) Times a Week., Disp: 30 g, Rfl: 3  •  ketorolac (TORADOL) 10 MG tablet, Take 1 tablet by mouth Every 6 (Six) Hours As Needed for Moderate Pain. Received a dose in the ER., Disp: 20 tablet, Rfl: 0  •  ondansetron ODT (ZOFRAN-ODT) 8 MG disintegrating tablet, Place 1 tablet on the tongue Every 8 (Eight) Hours As Needed for Nausea or Vomiting., Disp: 15 tablet, Rfl: 0  •  oxybutynin XL (Ditropan XL) 10 MG 24 hr tablet, Take 1 tablet by mouth Daily., Disp: 30 tablet, Rfl: 1    Current Facility-Administered Medications:   •  cyanocobalamin injection 1,000 mcg, 1,000 mcg, Intramuscular, Q28 Days, Claire Burton PA-C, 1,000 mcg at 02/05/19 1058    PHYSICAL EXAMINATION:   /71   Pulse 82   Temp 96.6 °F (35.9 °C) (Infrared)   Resp 18   Ht 170.2 cm (67.01\")   Wt 93 kg (205 lb)   LMP  (LMP Unknown)   SpO2 98%   BMI 32.10 kg/m²   Pain Score    05/09/23 1059   PainSc: 0-No pain      ECOG score: 0            ECOG Performance Status: 1 - Symptomatic but completely ambulatory  General Appearance:  alert, cooperative, no apparent distress and appears stated age "   Neurologic/Psychiatric: A&O x 3, gait steady, appropriate affect, strength 5/5 in all muscle groups   HEENT:  Normocephalic, without obvious abnormality, mucous membranes moist   Neck: Supple, symmetrical, trachea midline, no adenopathy;  No thyromegaly, masses, or tenderness   Lungs:   Clear to auscultation bilaterally; respirations regular, even, and unlabored bilaterally   Heart:  Regular rate and rhythm, no murmurs appreciated   Abdomen:   Soft, non-tender, non-distended and no organomegaly   Lymph nodes: No cervical, supraclavicular, inguinal or axillary adenopathy noted   Extremities: Normal, atraumatic; no clubbing, cyanosis, or edema    Skin: No rashes, ulcers, or suspicious lesions noted       No visits with results within 2 Week(s) from this visit.   Latest known visit with results is:   Office Visit on 03/13/2023   Component Date Value Ref Range Status   • Color 03/13/2023 Yellow  Yellow, Straw, Dark Yellow, Lanie Final   • Clarity, UA 03/13/2023 Clear  Clear Final   • Specific Gravity  03/13/2023 1.025  1.005 - 1.030 Final   • pH, Urine 03/13/2023 6.5  5.0 - 8.0 Final   • Leukocytes 03/13/2023 Negative  Negative Final   • Nitrite, UA 03/13/2023 Negative  Negative Final   • Protein, POC 03/13/2023 Negative  Negative mg/dL Final   • Glucose, UA 03/13/2023 Negative  Negative mg/dL Final   • Ketones, UA 03/13/2023 Negative  Negative Final   • Urobilinogen, UA 03/13/2023 Normal  Normal, 0.2 E.U./dL Final   • Bilirubin 03/13/2023 Negative  Negative Final   • Blood, UA 03/13/2023 Negative  Negative Final   • Lot Number 03/13/2023 [id number]   Final   • Expiration Date 03/13/2023 07/13/24   Final        No results found.      DIAGNOSTIC DATA:   1.  Extensive patient medical records including doctor notes blood work results and imaging reports reviewed by me and documented in the patient chart.    ASSESSMENT: The patient is a very pleasant 67 y.o.  female  with mesenteric fat stranding    PLAN:     1.   Mesenteric fat stranding:  A.  I had a long discussion today with the patient about her  new diagnosis of abnormal CT abdomen pelvis. I reviewed the patient's documents including refereing provider's notes, lab results, imaging, and path report.   B.  I reviewed the patient's CT scan films myself.  I discussed the case with Dr. Kobi Castillo from radiology over the phone today to coordinate patient's care.  Her fat stranding is minimum and could be easily explained by obesity or pain from previous kidney stone.  C.  The patient will have a 6-month follow-up CT abdomen pelvis with IV contrast.  D.  If her next scan is stable or normal she will does not require follow-up appointment with me.    2.  Hypertension:  A.  The patient will continue taking Ziac    3.  Morbid obesity, BMI 32:  A.  This could explain her mesenteric fat stranding.      FOLLOW UP: 6 months with scan.    Alexey Ocapmo MD  5/9/2023

## 2023-05-10 ENCOUNTER — TELEPHONE (OUTPATIENT)
Dept: FAMILY MEDICINE CLINIC | Facility: CLINIC | Age: 68
End: 2023-05-10

## 2023-05-10 ENCOUNTER — OFFICE VISIT (OUTPATIENT)
Dept: FAMILY MEDICINE CLINIC | Facility: CLINIC | Age: 68
End: 2023-05-10
Payer: COMMERCIAL

## 2023-05-10 VITALS
SYSTOLIC BLOOD PRESSURE: 118 MMHG | DIASTOLIC BLOOD PRESSURE: 72 MMHG | BODY MASS INDEX: 32.46 KG/M2 | WEIGHT: 206.8 LBS | OXYGEN SATURATION: 99 % | HEART RATE: 75 BPM | RESPIRATION RATE: 16 BRPM | HEIGHT: 67 IN

## 2023-05-10 DIAGNOSIS — M79.7 FIBROMYALGIA: ICD-10-CM

## 2023-05-10 DIAGNOSIS — I10 ESSENTIAL HYPERTENSION: ICD-10-CM

## 2023-05-10 DIAGNOSIS — J01.00 ACUTE NON-RECURRENT MAXILLARY SINUSITIS: Primary | ICD-10-CM

## 2023-05-10 DIAGNOSIS — Z12.31 ENCOUNTER FOR SCREENING MAMMOGRAM FOR MALIGNANT NEOPLASM OF BREAST: ICD-10-CM

## 2023-05-10 PROCEDURE — 99214 OFFICE O/P EST MOD 30 MIN: CPT | Performed by: PHYSICIAN ASSISTANT

## 2023-05-10 RX ORDER — DEXTROMETHORPHAN HYDROBROMIDE AND PROMETHAZINE HYDROCHLORIDE 15; 6.25 MG/5ML; MG/5ML
2.5 SYRUP ORAL 4 TIMES DAILY PRN
Qty: 120 ML | Refills: 0 | Status: SHIPPED | OUTPATIENT
Start: 2023-05-10

## 2023-05-10 RX ORDER — PREDNISONE 10 MG/1
10 TABLET ORAL DAILY
Qty: 14 TABLET | Refills: 0 | Status: SHIPPED | OUTPATIENT
Start: 2023-05-10

## 2023-05-10 RX ORDER — BISOPROLOL FUMARATE AND HYDROCHLOROTHIAZIDE 2.5; 6.25 MG/1; MG/1
1 TABLET ORAL DAILY
Qty: 90 TABLET | Refills: 3 | Status: SHIPPED | OUTPATIENT
Start: 2023-05-10

## 2023-05-10 RX ORDER — AZITHROMYCIN 250 MG/1
TABLET, FILM COATED ORAL
Qty: 6 TABLET | Refills: 0 | Status: SHIPPED | OUTPATIENT
Start: 2023-05-10

## 2023-05-10 NOTE — TELEPHONE ENCOUNTER
Caller: Rosalba Girard    Relationship: Self    Best call back number: [phone]    What medication are you requesting: COUGH MEDICATION    What are your current symptoms: BAD HACKY LOOSE COUGH    How long have you been experiencing symptoms: COUPLE OF DAYS    Have you had these symptoms before:  [x] Yes  [] No    Have you been treated for these symptoms before: [x] Yes  [] No    If a prescription is needed, what is your preferred pharmacy and phone number: [facility]     Additional notes: SHE WAS IN TODAY FOR A VISIT AND GOT DIAGNOSED WITH A SINUS INFECTION.  PRATIMA TREVINO SAID HE WOULD SEND IN A COUGH MEDICINE.

## 2023-05-10 NOTE — PROGRESS NOTES
Subjective   Rosalba Girard is a 67 y.o. female  Fibromyalgia (Fatigue, malaise. Needs updated FMLA forms completed), Back Pain (F/U on low back. Needs updated FMLA forms completed.), Nasal Congestion (Sinus pressure, Lt ear pain, fatigue x1 week), and Wheezing (Since having COVID one year ago and still has occ cough)      History of Present Illness  Patient is a pleasant 67-year-old white female who comes in complaining of fibromyalgia fatigue malaise need updated for FMLA forms, patient has chronic back pain no energy.  New onset of sinus pressure sinus congestion blowing green-yellow drainage nose some wheezing cough congestion patient states she has issues with coughing since having COVID.  Patient's past due on screening mammogram      The following portions of the patient's history were reviewed and updated as appropriate: allergies, current medications, past social history and problem list    Review of Systems   Constitutional: Negative for chills, fatigue and fever.   HENT: Positive for congestion, ear pain, postnasal drip, rhinorrhea and sinus pressure. Negative for sore throat.    Eyes: Positive for discharge and itching. Negative for pain.   Respiratory: Positive for cough. Negative for shortness of breath.    Gastrointestinal: Negative.    Genitourinary: Negative.    Musculoskeletal: Negative for myalgias.   Neurological: Positive for light-headedness and headaches. Negative for dizziness.   Hematological: Negative for adenopathy.   Psychiatric/Behavioral: Positive for sleep disturbance.       Objective     Vitals:    05/10/23 0942   BP: 118/72   Pulse: 75   Resp: 16   SpO2: 99%       Physical Exam  Vitals and nursing note reviewed.   Constitutional:       Appearance: She is well-developed.   HENT:      Head: Normocephalic and atraumatic.      Right Ear: Tympanic membrane and ear canal normal.      Left Ear: Tympanic membrane and ear canal normal.      Nose: Mucosal edema and rhinorrhea present.       Right Sinus: Maxillary sinus tenderness and frontal sinus tenderness present.      Left Sinus: Maxillary sinus tenderness and frontal sinus tenderness present.      Mouth/Throat:      Pharynx: No oropharyngeal exudate.   Eyes:      Pupils: Pupils are equal, round, and reactive to light.   Cardiovascular:      Rate and Rhythm: Normal rate and regular rhythm.   Pulmonary:      Effort: Pulmonary effort is normal.      Breath sounds: Normal breath sounds.         Assessment & Plan     Diagnoses and all orders for this visit:    1. Acute non-recurrent maxillary sinusitis (Primary)  -     azithromycin (Zithromax Z-Aramis) 250 MG tablet; Take 2 tablets the first day, then 1 tablet daily for 4 days.  Dispense: 6 tablet; Refill: 0  -     predniSONE (DELTASONE) 10 MG tablet; Take 1 tablet by mouth Daily.  Dispense: 14 tablet; Refill: 0    2. Encounter for screening mammogram for malignant neoplasm of breast  -     Mammo Screening Digital Tomosynthesis Bilateral With CAD    3. Essential hypertension  -     bisoprolol-hydrochlorothiazide (ZIAC) 2.5-6.25 MG per tablet; Take 1 tablet by mouth Daily.  Dispense: 90 tablet; Refill: 3    4. Fibromyalgia        I spent 15 minutes in patient care: Reviewing records prior to the visit, examining the patient, entering orders and documentation    Part of this note may be an electronic transcription/translation of spoken language to printed text using the Dragon Dictation System.

## 2023-05-12 ENCOUNTER — TELEPHONE (OUTPATIENT)
Dept: FAMILY MEDICINE CLINIC | Facility: CLINIC | Age: 68
End: 2023-05-12
Payer: COMMERCIAL

## 2023-05-12 NOTE — TELEPHONE ENCOUNTER
Paperwork completed, signed, faxed, and placed up front for patient. She will pick it up this afternoon.

## 2023-08-10 NOTE — TELEPHONE ENCOUNTER
Patient called to check on the status of the prescription request that she placed this morning (2/3/20) with Ankita (see note below).    The patient said that she is scheduled to come in office for an appointment on Wednesday (2/5/20) at 9:45 AM to see Deshawn Bazzi for symptoms that she is experiencing that she believes are related to a bladder infection as well as for medication refills.     The patient requested for a prescription to be called in [facility] to treat the following symptoms: burning when urinating, frequency of urination, and back pain. The patient is particularly concerned about the back pain, as it caused her to be off of work for an extended period of time when it occurred before.    The patient stated that she cannot take sulfa medications because it hurts her stomach.    I confirmed the correct pharmacy with the patient to be [facility]. If there are any questions or concerns please call the patient at [phone] or [facility] at [phone].   Bilateral Helical Rim Advancement Flap Text: The defect edges were debeveled with a #15 blade scalpel.  Given the location of the defect and the proximity to free margins (helical rim) a bilateral helical rim advancement flap was deemed most appropriate.  Using a sterile surgical marker, the appropriate advancement flaps were drawn incorporating the defect and placing the expected incisions between the helical rim and antihelix where possible.  The area thus outlined was incised through and through with a #15 scalpel blade.  With a skin hook and iris scissors, the flaps were gently and sharply undermined and freed up.

## 2023-08-16 ENCOUNTER — HOSPITAL ENCOUNTER (OUTPATIENT)
Dept: MAMMOGRAPHY | Facility: HOSPITAL | Age: 68
Discharge: HOME | End: 2023-08-16
Payer: COMMERCIAL

## 2023-08-16 ENCOUNTER — HOSPITAL ENCOUNTER (OUTPATIENT)
Dept: ULTRASOUND IMAGING | Facility: HOSPITAL | Age: 68
Discharge: HOME | End: 2023-08-16
Payer: COMMERCIAL

## 2023-08-16 ENCOUNTER — TRANSCRIBE ORDERS (OUTPATIENT)
Dept: MAMMOGRAPHY | Facility: HOSPITAL | Age: 68
End: 2023-08-16
Payer: COMMERCIAL

## 2023-08-16 DIAGNOSIS — R92.8 ABNORMAL MAMMOGRAM: ICD-10-CM

## 2023-08-16 PROCEDURE — 76642 ULTRASOUND BREAST LIMITED: CPT | Performed by: RADIOLOGY

## 2023-08-16 PROCEDURE — G0279 TOMOSYNTHESIS, MAMMO: HCPCS

## 2023-08-16 PROCEDURE — 77062 BREAST TOMOSYNTHESIS BI: CPT | Performed by: RADIOLOGY

## 2023-08-16 PROCEDURE — 77066 DX MAMMO INCL CAD BI: CPT

## 2023-08-16 PROCEDURE — 77066 DX MAMMO INCL CAD BI: CPT | Performed by: RADIOLOGY

## 2023-08-16 PROCEDURE — 76642 ULTRASOUND BREAST LIMITED: CPT

## 2023-09-08 ENCOUNTER — HOSPITAL ENCOUNTER (OUTPATIENT)
Dept: CT IMAGING | Facility: HOSPITAL | Age: 68
Discharge: HOME | End: 2023-09-08
Admitting: INTERNAL MEDICINE
Payer: COMMERCIAL

## 2023-09-08 DIAGNOSIS — R93.5 ABNORMAL COMPUTED TOMOGRAPHY ANGIOGRAPHY (CTA) OF ABDOMEN AND PELVIS: ICD-10-CM

## 2023-09-08 PROCEDURE — 25510000001 IOPAMIDOL 61 % SOLUTION: Performed by: INTERNAL MEDICINE

## 2023-09-08 PROCEDURE — 74177 CT ABD & PELVIS W/CONTRAST: CPT

## 2023-09-08 RX ADMIN — IOPAMIDOL 85 ML: 612 INJECTION, SOLUTION INTRAVENOUS at 16:20

## 2023-09-12 ENCOUNTER — OFFICE VISIT (OUTPATIENT)
Dept: ONCOLOGY | Facility: CLINIC | Age: 68
End: 2023-09-12
Payer: COMMERCIAL

## 2023-09-12 ENCOUNTER — TELEPHONE (OUTPATIENT)
Dept: ORTHOPEDIC SURGERY | Facility: CLINIC | Age: 68
End: 2023-09-12
Payer: COMMERCIAL

## 2023-09-12 VITALS
HEIGHT: 67 IN | OXYGEN SATURATION: 97 % | TEMPERATURE: 96.9 F | SYSTOLIC BLOOD PRESSURE: 137 MMHG | BODY MASS INDEX: 33.59 KG/M2 | RESPIRATION RATE: 16 BRPM | HEART RATE: 92 BPM | DIASTOLIC BLOOD PRESSURE: 63 MMHG | WEIGHT: 214 LBS

## 2023-09-12 DIAGNOSIS — R93.5 ABNORMAL COMPUTED TOMOGRAPHY ANGIOGRAPHY (CTA) OF ABDOMEN AND PELVIS: Primary | ICD-10-CM

## 2023-09-12 PROCEDURE — 99214 OFFICE O/P EST MOD 30 MIN: CPT | Performed by: INTERNAL MEDICINE

## 2023-09-12 NOTE — PROGRESS NOTES
DATE OF VISIT: 9/12/2023    REASON FOR VISIT: Followup for abnormal CT scan     PROBLEM LIST:  1.  Mesenteric fat stranding:  A.  Incidentally noted on CT abdomen pelvis done March 2023 for kidney stone  2.  Morbid obesity, BMI 32  3.  Hypertension    HISTORY OF PRESENT ILLNESS: The patient is a very pleasant 68 y.o. female  with past medical history significant for abnormal CT abdomen pelvis diagnosed March 2023 showing abdominal fat stranding felt to be reactive. The  patient is here today for scheduled follow-up visit.    SUBJECTIVE: The patient is here today by herself.  She is active with no abnormalities noted on the most recent mammogram.  Denies any abdominal pain no weight loss.    Past History:  Medical History: has a past medical history of Asthma, Chronic superficial dermatitis, Closed fracture of left tibial plateau with routine healing, Dietary counseling, Encounter for routine adult health examination, Fibromyalgia, GERD (gastroesophageal reflux disease), Hair or hair follicle disorder, History of anemia, Hives, Hypertension, Irritable bowel disease, Malaise and fatigue, Myalgia and myositis, PONV (postoperative nausea and vomiting), Prediabetes, Rash and nonspecific skin eruption, S/P ORIF (open reduction internal fixation) fracture  left tibial plateau  (12/21/2016), Sinusitis, acute maxillary, Tibial plateau fracture, left (12/21/2016), UTI (urinary tract infection), and Wears glasses.   Surgical History: has a past surgical history that includes Bladder surgery (2004); Cholecystectomy (2012); Hernia repair; Colonoscopy w/ polypectomy (2015); Tibial Plateau Open Reduction Internal Fixation (Left, 12/21/2016); Breast biopsy (Right); Total abdominal hysterectomy w/ bilateral salpingoophorectomy (Bilateral, 1998); and Ectopic pregnancy surgery (1994).   Family History: family history includes Aneurysm in her mother; Bleeding Disorder in an other family member; Breast cancer in her maternal aunt;  "Cancer in her father and maternal grandfather; Colon cancer in an other family member; Depression in an other family member; Diabetes in an other family member; Heart attack in her father; Heart disease in her father; Hypertension in her brother and sister; Osteoarthritis in her mother and another family member; Other in an other family member; Ovarian cancer in her paternal aunt; Rheum arthritis in an other family member; Stroke in her father; Tuberculosis in an other family member; Uterine cancer in her paternal aunt.   Social History: reports that she quit smoking about 33 years ago. Her smoking use included cigarettes. She has a 20.00 pack-year smoking history. She has never used smokeless tobacco. She reports that she does not drink alcohol and does not use drugs.    (Not in a hospital admission)     Allergies: Myrbetriq [mirabegron] and Tramadol     Review of Systems    PHYSICAL EXAMINATION:   /63   Pulse 92   Temp 96.9 °F (36.1 °C) (Infrared)   Resp 16   Ht 170.2 cm (67.01\")   Wt 97.1 kg (214 lb)   LMP  (LMP Unknown)   SpO2 97%   BMI 33.51 kg/m²    Pain Score    09/12/23 0925   PainSc: 0-No pain        ECOG score: 0            ECOG Performance Status: 1 - Symptomatic but completely ambulatory      General Appearance:      alert, cooperative, no apparent distress and appears stated age   Lungs:   Clear to auscultation bilaterally; respirations regular, even, and unlabored bilaterally   Heart:  Regular rate and rhythm, no murmurs appreciated   Abdomen:   Soft, non-tender, non-distended, and no organomegaly                 No visits with results within 2 Week(s) from this visit.   Latest known visit with results is:   Office Visit on 03/13/2023   Component Date Value Ref Range Status    Color 03/13/2023 Yellow  Yellow, Straw, Dark Yellow, Lanie Final    Clarity, UA 03/13/2023 Clear  Clear Final    Specific Gravity  03/13/2023 1.025  1.005 - 1.030 Final    pH, Urine 03/13/2023 6.5  5.0 - 8.0 Final "    Leukocytes 03/13/2023 Negative  Negative Final    Nitrite, UA 03/13/2023 Negative  Negative Final    Protein, POC 03/13/2023 Negative  Negative mg/dL Final    Glucose, UA 03/13/2023 Negative  Negative mg/dL Final    Ketones, UA 03/13/2023 Negative  Negative Final    Urobilinogen, UA 03/13/2023 Normal  Normal, 0.2 E.U./dL Final    Bilirubin 03/13/2023 Negative  Negative Final    Blood, UA 03/13/2023 Negative  Negative Final    Lot Number 03/13/2023 [id number]   Final    Expiration Date 03/13/2023 07/13/24   Final        CT Abdomen Pelvis With Contrast    Result Date: 9/9/2023  Narrative: CT ABDOMEN PELVIS W CONTRAST Date of Exam: 9/8/2023 4:04 PM EDT Indication: f/u scans. Comparison: CT of the abdomen and pelvis dated 3/22/2023 Technique: Axial CT images were obtained of the abdomen and pelvis following the uneventful intravenous administration of 85 mL Isovue-300. Reconstructed coronal and sagittal images were also obtained. Automated exposure control and iterative construction methods were used. Findings: Liver: The liver is unremarkable in morphology and decreased in attenuation. No focal liver lesion is seen. No biliary dilation is seen. Gallbladder: Surgically absent. Pancreas: Unremarkable. Spleen: The spleen is enlarged, measuring 16 cm in length. Adrenal glands: Unremarkable. Genitourinary tract: Small hypodensity at the lower pole of the left kidney, too small to characterize. Kidneys are otherwise unremarkable. No hydronephrosis is seen. No urinary tract calculi are seen. The ureters and urinary bladder appear unremarkable.  Patient is status post hysterectomy. Gastrointestinal tract: Colonic diverticulosis is present. Hollow viscera appears otherwise unremarkable. There is no evidence of bowel obstruction. Appendix: No findings to suggest acute appendicitis. Other findings: No free air or free fluid is identified. Unchanged mildly prominent portacaval lymph node. Vascular calcifications are  present. The IVC is unremarkable. Stranding within the small bowel mesenteric fat, similar since 3/22/2023. Bones and soft tissues: No acute or suspicious osseous or soft tissue lesion is identified. Lung bases: The visualized lung bases are clear.     Impression: Impression: 1.No acute abnormality identified within the abdomen or pelvis. 2.Hepatic steatosis. 3.Splenomegaly. 4.Mild stranding within the small bowel mesenteric fat appears similar since 3/22/2023. 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 5.Unchanged mildly prominent portacaval lymph node. Similar finding dates back to 1/26/2009. 6.Additional findings as detailed above. Electronically Signed: John Wilburn MD  9/9/2023 9:38 AM EDT  Workstation ID: [id number]    Mammo Diagnostic Digital Tomosynthesis Bilateral With CAD, US Breast Bilateral Limited    Result Date: 8/16/2023  Narrative: EXAM: MAMMO DIAGNOSTIC DIGITAL TOMOSYNTHESIS BILATERAL W CAD, Limited bilateral breast ultrasound-  DATE:8/16/2023 9:21 AM  INDICATION: Patient is a 68-year-old female who presents for diagnostic work-up of questionable calcifications within a low-lying right axillary lymph node as well as nodular appearance in the subareolar left breast.  COMPARISON: Comparison is made to multiple prior studies dating back to 5/8/2014.  TECHNIQUE: 2D/3D bilateral true lateral images as well as magnification right CC and ML views as well as 2D/3D spot left CC was obtained.  FINDINGS:  There are scattered fibroglandular densities.  Right breast: On today's diagnostic imaging with magnification views multiple amorphous calcifications are noted in association with a low-lying intramammary lymph node in the 9:00 axillary tail region.  Left breast: Nodular appearance in the subareolar left breast persists on today's spot compression left CC and localizes to the 8/9:00 region on today's left true lateral.  Targeted sonographic imaging of each breast was performed by the technologist and  myself.  Right breast: In the 9:00 right breast 16 cm from the nipple there is a gently lobulated 15 mm lymph node with echogenic foci consistent with calcifications, thought to represent the correlate for the lymph node with calcifications on recent screening mammogram, best seen on ultrasound series 7. Additional nonenlarged lymph node is noted in the 10:00 right breast 11 cm from the nipple.  Left breast: Sonographic imaging centered about the 8:00 left breast 3 cm from the nipple demonstrates arborizing ducts, some of which are focally ectatic with areas of hypoechogenicity. This is thought to correlate to mammographic finding.      Impression: 1. Right breast: Lymph node with amorphous calcifications in the 9:00 right breast nearly 16 cm from the nipple is thought to represent the correlate for the mammographic finding. Recommendation at this time is for ultrasound-guided core needle biopsy and/or FNA for sampling. 2. Left breast: Focally ectatic and hypoechoic duct centered about the 8:00 left breast 3 cm from the nipple thought to represent the correlate for asymmetry is noted in this region. Recommendation at this time is for ultrasound-guided core needle biopsy for tissue sampling. 3. Today's findings and recommendations were discussed with the patient at the time of the examination by myself.  OVERALL ASSESSMENT: BI-RADS Category 4: Suspicious, biopsy is recommended.  ________________________________________________________________________ _______ Physicians Order  Bilateral ultrasound-guided biopsies.  Diagnosis: Abnormal Mammogram  This report was finalized on 8/16/2023 11:36 AM by Jennifer Jacome MD.       ASSESSMENT: The patient is a very pleasant 68 y.o. female  with abdominal fat stranding      PLAN:    1.  Abdominal fat stranding:  A.  I did go over the scan results with the patient from September 9, 2023.  It did reveal stability of her abdominal fat stranding.  No evidence of new or progressive  abnormalities.  She did have a small prominent portacaval lymph node which is essentially stable compared to 2009.  B.  At this point the patient will follow-up with me in 1 year with repeated CT scan.    2.  Splenomegaly:  A.  Spleen size 16 cm.  B.  Most likely induced by her fatty liver.    FOLLOW UP: 1 year with CT abdomen pelvis.    Alexey Ocampo MD  9/12/2023

## 2023-09-12 NOTE — TELEPHONE ENCOUNTER
Caller: Rosalba Girard    Relationship: Self    Best call back number: [phone]    What form or medical record are you requesting: [employer] PAPERWORK DROPPED OFF LAST WEEK    Who is requesting this form or medical record from you: [employer]    How would you like to receive the form or medical records (pick-up, mail, fax):     If pick-up, provide patient with address and location details    Timeframe paperwork needed: ASAP    Additional notes: PLEASE CALL WHEN READY, OKAY TO LVM

## 2023-09-12 NOTE — TELEPHONE ENCOUNTER
Called Patient to let her know that her [facility] paper work is done. Left voicemail for her to call back.

## 2023-09-18 LAB — CREAT BLDA-MCNC: 0.6 MG/DL (ref 0.6–1.3)

## 2023-09-21 ENCOUNTER — TELEPHONE (OUTPATIENT)
Dept: FAMILY MEDICINE CLINIC | Facility: CLINIC | Age: 68
End: 2023-09-21
Payer: COMMERCIAL

## 2023-09-21 NOTE — TELEPHONE ENCOUNTER
PATIENT CALLED IN AT 2:48 P.M. TO CHECK THE STATUS OF THIS REQUEST. SHE IS ASKING FOR A CALL BACK WITH A DECISION EITHER WAY.

## 2023-09-21 NOTE — TELEPHONE ENCOUNTER
Pt calling to get an appointment for tomorrow since she has to take the DEQ  bus. Advised that there were no openings with anyone currently. She would like to be worked in tomorrow for a runny nose, drainage, neck pain, right ear pain, headache, and sinus pressure. If she can't be seen, can she have medications sent to the [facility]. Please advise.

## 2023-09-22 RX ORDER — AZITHROMYCIN 250 MG/1
TABLET, FILM COATED ORAL
Qty: 6 TABLET | Refills: 0 | Status: SHIPPED | OUTPATIENT
Start: 2023-09-22

## 2023-10-06 ENCOUNTER — HOSPITAL ENCOUNTER (OUTPATIENT)
Dept: ULTRASOUND IMAGING | Facility: HOSPITAL | Age: 68
Discharge: HOME | End: 2023-10-06
Payer: COMMERCIAL

## 2023-10-06 ENCOUNTER — HOSPITAL ENCOUNTER (OUTPATIENT)
Dept: MAMMOGRAPHY | Facility: HOSPITAL | Age: 68
Discharge: HOME | End: 2023-10-06
Payer: COMMERCIAL

## 2023-10-06 DIAGNOSIS — R92.8 ABNORMAL MAMMOGRAM: ICD-10-CM

## 2023-10-06 PROCEDURE — 0 LIDOCAINE 1 % SOLUTION: Performed by: RADIOLOGY

## 2023-10-06 PROCEDURE — A4648 IMPLANTABLE TISSUE MARKER: HCPCS

## 2023-10-06 RX ORDER — LIDOCAINE HYDROCHLORIDE AND EPINEPHRINE 10; 10 MG/ML; UG/ML
8 INJECTION, SOLUTION INFILTRATION; PERINEURAL ONCE
Status: COMPLETED | OUTPATIENT
Start: 2023-10-06 | End: 2023-10-06

## 2023-10-06 RX ORDER — LIDOCAINE HYDROCHLORIDE 10 MG/ML
5 INJECTION, SOLUTION INFILTRATION; PERINEURAL ONCE
Status: COMPLETED | OUTPATIENT
Start: 2023-10-06 | End: 2023-10-06

## 2023-10-06 RX ADMIN — Medication 3 ML: at 15:01

## 2023-10-06 RX ADMIN — LIDOCAINE HYDROCHLORIDE,EPINEPHRINE BITARTRATE 2 ML: 10; .01 INJECTION, SOLUTION INFILTRATION; PERINEURAL at 15:05

## 2023-10-06 RX ADMIN — Medication 5 ML: at 14:49

## 2023-10-06 RX ADMIN — LIDOCAINE HYDROCHLORIDE,EPINEPHRINE BITARTRATE 1 ML: 10; .01 INJECTION, SOLUTION INFILTRATION; PERINEURAL at 14:52

## 2023-10-06 NOTE — PROGRESS NOTES
Alert and orientated. Denies discomfort, no active bleeding, steri-strips not visualized, gauze dressing intact x 2 sites. Cold packs given. Verbalizes and demonstrates understanding of post-care instructions, written copy given.

## 2023-10-10 ENCOUNTER — TELEPHONE (OUTPATIENT)
Dept: MAMMOGRAPHY | Facility: HOSPITAL | Age: 68
End: 2023-10-10
Payer: COMMERCIAL

## 2023-10-10 ENCOUNTER — TELEPHONE (OUTPATIENT)
Dept: FAMILY MEDICINE CLINIC | Facility: CLINIC | Age: 68
End: 2023-10-10
Payer: COMMERCIAL

## 2023-10-10 LAB
CYTO UR: NORMAL
CYTO UR: NORMAL
LAB AP CASE REPORT: NORMAL
LAB AP CASE REPORT: NORMAL
LAB AP CLINICAL INFORMATION: NORMAL
LAB AP CLINICAL INFORMATION: NORMAL
LAB AP DIAGNOSIS COMMENT: NORMAL
LAB AP DIAGNOSIS COMMENT: NORMAL
LAB AP SPECIAL STAINS: NORMAL
PATH REPORT.FINAL DX SPEC: NORMAL
PATH REPORT.FINAL DX SPEC: NORMAL
PATH REPORT.GROSS SPEC: NORMAL
PATH REPORT.GROSS SPEC: NORMAL

## 2023-10-10 NOTE — TELEPHONE ENCOUNTER
Returned patient call, left message to call back. Asked patient to call back in the morning and let us know when she has lunch break so we can discuss her questions.

## 2023-10-10 NOTE — TELEPHONE ENCOUNTER
Referring provider notified via Epic basket message  pathology, cytology returned as cancer and patient will be notified.     Attempted to notify patient of pathology results and recommendation.  A message ws left on her voicemail to return my call.

## 2023-10-10 NOTE — TELEPHONE ENCOUNTER
Caller: Rosalba Girard    Relationship: Self    Best call back number: [phone]     Caller requesting test results: PATIENT    What test was performed: ULTRASOUND X 2    When was the test performed: 10/6    Where was the test performed: [address]    Additional notes: PATHOLOGY IS OFF, WHAT IS NEXT?  PATIENT WOULD LIKE TO DISCUSS RESULTS.

## 2023-10-11 ENCOUNTER — TELEPHONE (OUTPATIENT)
Dept: MAMMOGRAPHY | Facility: HOSPITAL | Age: 68
End: 2023-10-11
Payer: COMMERCIAL

## 2023-10-11 NOTE — TELEPHONE ENCOUNTER
Patient notified of surgical consult appointment with Dr. Alejandro Biggs on 11/8/23 @ 9362. Patient verbalized understanding.    Patient given office contact & location information. Told to bring photo ID, list of prescription & OTC medications and insurance information. May be accompanied by family member or friend for support.

## 2023-10-11 NOTE — TELEPHONE ENCOUNTER
Patient returned call and was notified of pathology results and recommendation. Verbalizes understanding. Denies discomfort.. Denies signs and symptoms of infection.     Patient desires Dr MOO Biggs for surgical consult. Patient will be notified of appointment. Patient verbalized understanding.    Reviewed what would be discussed at surgical consult visit, including detailed explanation of pathology report & imaging reports; treatment options & pros/cons, availability of breast nurse navigator. Patient encouraged to call back or contact breast nurse navigator with questions or concerns. Patient verbalized understanding. Breast cancer information packet offered and accepted. Patient information sent to breast nurse navigator for evaluation.

## 2023-10-17 ENCOUNTER — OFFICE VISIT (OUTPATIENT)
Dept: ORTHOPEDIC SURGERY | Facility: CLINIC | Age: 68
End: 2023-10-17
Payer: COMMERCIAL

## 2023-10-17 VITALS
DIASTOLIC BLOOD PRESSURE: 84 MMHG | BODY MASS INDEX: 35.22 KG/M2 | HEIGHT: 65 IN | WEIGHT: 211.4 LBS | SYSTOLIC BLOOD PRESSURE: 136 MMHG

## 2023-10-17 DIAGNOSIS — M17.32 POST-TRAUMATIC OSTEOARTHRITIS OF LEFT KNEE: Primary | ICD-10-CM

## 2023-10-17 PROCEDURE — 99213 OFFICE O/P EST LOW 20 MIN: CPT | Performed by: ORTHOPAEDIC SURGERY

## 2023-10-17 NOTE — PROGRESS NOTES
"    [facility] Note        Subjective     CC: Follow-up (1 year follow up -- Tibial plateau fracture, left; Post-traumatic osteoarthritis of left knee)      HPI    Rosalba Girard is a 68 y.o. female.  Patient is here today for follow-up of her posttraumatic left knee arthritis.  She says her last 6 months, she has been doing more walking at work and as a result has had increased pain anterior laterally.  She is using a cane for ambulation.    Overall, patient's symptoms are as above    ROS:    Constiutional:Pt denies fever, chills, nausea, or vomiting.  MSK:as above        Objective      Past Medical History  Past Medical History:   Diagnosis Date    Asthma     Chronic superficial dermatitis     Closed fracture of left tibial plateau with routine healing     Dietary counseling     Encounter for routine adult health examination     Fibromyalgia     GERD (gastroesophageal reflux disease)     Hair or hair follicle disorder     History of anemia     Hives     Hypertension     Irritable bowel disease     Malaise and fatigue     Myalgia and myositis     PONV (postoperative nausea and vomiting)     on occasion     Prediabetes     Rash and nonspecific skin eruption     S/P ORIF (open reduction internal fixation) fracture  left tibial plateau  2016    Sinusitis, acute maxillary     Tibial plateau fracture, left 2016    UTI (urinary tract infection)     Wears glasses      Social History     Socioeconomic History    Marital status:    Tobacco Use    Smoking status: Former     Packs/day: 2.00     Years: 10.00     Additional pack years: 0.00     Total pack years: 20.00     Types: Cigarettes     Quit date:      Years since quittin.8    Smokeless tobacco: Never   Vaping Use    Vaping Use: Never used   Substance and Sexual Activity    Alcohol use: No     Comment: Rarely    Drug use: No    Sexual activity: Not Currently          Physical Exam  /84   Ht 165 cm (64.96\")   " Wt 95.9 kg (211 lb 6.4 oz)   LMP  (LMP Unknown)   BMI 35.22 kg/m²     Body mass index is 35.22 kg/m².    Patient is well nourished and well developed.        Ortho Exam  Patient has full knee range of motion.  She has lateral joint line tenderness.  There are some crepitance with flexion.  She has some pain with ballottement of her patella.    Imaging/Labs/EMG Reviewed:  Imaging Results (Last 24 Hours)       Procedure Component Value Units Date/Time    XR Knee 4+ View Left [[id number]] Resulted: 10/17/23 0921     Updated: 10/17/23 0923    Narrative:      Left knee X-Ray    Indication: Pain    Study:  Upright AP, Skiers, Lateral, and Sunrise views of Left knee(s)    Comparison: Left knee 11/15/2022    Findings:  Patient is status post ORIF of the left proximal tibia.  Implants remain   intact.  Fracture appears healed radiographically.  Patient appears to have mild degenerative changes in the medial   compartment.    There are early moderate degenerative changes in the lateral compartment.      There are early moderate changes in the patellofemoral compartment.    Patient has overall varus alignment.    Kellgren-Tono Grade: 2-3      Impression:   Mild medial compartment and early moderate lateral and patellofemoral   compartment degnerative changes of the left knee status post ORIF tibial   plateau.              Assessment    Assessment:  1. Post-traumatic osteoarthritis of left knee        Plan:  Recommend over the counter anti-inflammatories for pain and/or swelling  Posttraumatic arthritis left knee, lateral compartment and patellofemoral compartment--at this point, patient is facing surgery for breast cancer potentially.  She should benefit from the time off work with regards to the condition of her knee.  If she experiences a flareup, have asked her to call back and we can consider an injection to help.  I will see her back in 12 months with an x-ray or sooner if necessary.      Constantine Durbin,  MD  10/17/23  09:31 EDT      Dictated Utilizing Dragon Dictation.

## 2023-11-07 ENCOUNTER — TRANSCRIBE ORDERS (OUTPATIENT)
Dept: PHYSICAL THERAPY | Facility: HOSPITAL | Age: 68
End: 2023-11-07
Payer: COMMERCIAL

## 2023-11-07 DIAGNOSIS — C50.912 MALIGNANT NEOPLASM OF LEFT FEMALE BREAST, UNSPECIFIED ESTROGEN RECEPTOR STATUS, UNSPECIFIED SITE OF BREAST: Primary | ICD-10-CM

## 2023-11-08 ENCOUNTER — HOSPITAL ENCOUNTER (OUTPATIENT)
Dept: PHYSICAL THERAPY | Facility: HOSPITAL | Age: 68
Setting detail: THERAPIES SERIES
Discharge: HOME | End: 2023-11-08
Payer: COMMERCIAL

## 2023-11-08 ENCOUNTER — PATIENT OUTREACH (OUTPATIENT)
Dept: ONCOLOGY | Facility: CLINIC | Age: 68
End: 2023-11-08
Payer: COMMERCIAL

## 2023-11-08 DIAGNOSIS — C50.911 MALIGNANT NEOPLASM OF RIGHT BREAST IN FEMALE, ESTROGEN RECEPTOR POSITIVE, UNSPECIFIED SITE OF BREAST: Primary | ICD-10-CM

## 2023-11-08 DIAGNOSIS — Z17.0 MALIGNANT NEOPLASM OF RIGHT BREAST IN FEMALE, ESTROGEN RECEPTOR POSITIVE, UNSPECIFIED SITE OF BREAST: Primary | ICD-10-CM

## 2023-11-08 DIAGNOSIS — C50.912 MALIGNANT NEOPLASM OF LEFT FEMALE BREAST, UNSPECIFIED ESTROGEN RECEPTOR STATUS, UNSPECIFIED SITE OF BREAST: Primary | ICD-10-CM

## 2023-11-08 LAB
CYTO UR: NORMAL
LAB AP CASE REPORT: NORMAL
LAB AP CLINICAL INFORMATION: NORMAL
LAB AP DIAGNOSIS COMMENT: NORMAL
LAB AP SPECIAL STAINS: NORMAL
PATH REPORT.FINAL DX SPEC: NORMAL
PATH REPORT.GROSS SPEC: NORMAL

## 2023-11-08 PROCEDURE — 93702 BIS XTRACELL FLUID ANALYSIS: CPT

## 2023-11-08 PROCEDURE — 97162 PT EVAL MOD COMPLEX 30 MIN: CPT
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
## 2023-11-10 ENCOUNTER — OFFICE VISIT (OUTPATIENT)
Dept: FAMILY MEDICINE CLINIC | Facility: CLINIC | Age: 68
End: 2023-11-10
Payer: COMMERCIAL

## 2023-11-10 VITALS
DIASTOLIC BLOOD PRESSURE: 74 MMHG | RESPIRATION RATE: 16 BRPM | OXYGEN SATURATION: 100 % | HEART RATE: 82 BPM | BODY MASS INDEX: 35.99 KG/M2 | HEIGHT: 65 IN | SYSTOLIC BLOOD PRESSURE: 132 MMHG | WEIGHT: 216 LBS | TEMPERATURE: 97.3 F

## 2023-11-10 DIAGNOSIS — I10 ESSENTIAL HYPERTENSION: ICD-10-CM

## 2023-11-10 DIAGNOSIS — Z00.00 ROUTINE GENERAL MEDICAL EXAMINATION AT A HEALTH CARE FACILITY: Primary | ICD-10-CM

## 2023-11-10 PROCEDURE — 99397 PER PM REEVAL EST PAT 65+ YR: CPT | Performed by: PHYSICIAN ASSISTANT

## 2023-11-10 RX ORDER — BISOPROLOL FUMARATE AND HYDROCHLOROTHIAZIDE 2.5; 6.25 MG/1; MG/1
1 TABLET ORAL DAILY
Qty: 90 TABLET | Refills: 3 | Status: SHIPPED | OUTPATIENT
Start: 2023-11-10

## 2023-11-10 NOTE — PROGRESS NOTES
Subjective   Rosalba Girard is a 68 y.o. female  Annual Exam (Not fasting. ), Back Pain (Needs updated [facility] paperwork completed), and Fibromyalgia (And malaise. Needs updated [facility] paperwork completed)      History of Present Illness  Patient is a pleasant six 8-year-old female who comes in for preventive medical examination has chronic back pain and fibromyalgia needs [facility] paperwork filled out  The following portions of the patient's history were reviewed and updated as appropriate: allergies, current medications, past social history and problem list    Review of Systems   Constitutional: Negative.    HENT: Negative.     Eyes: Negative.    Respiratory: Negative.     Cardiovascular: Negative.    Gastrointestinal: Negative.    Endocrine: Negative.    Genitourinary: Negative.    Musculoskeletal: Negative.    Skin: Negative.    Allergic/Immunologic: Negative.    Neurological: Negative.    Hematological: Negative.    Psychiatric/Behavioral: Negative.     All other systems reviewed and are negative.      Objective     Vitals:    11/10/23 1022   BP: 132/74   Pulse: 82   Resp: 16   Temp: 97.3 °F (36.3 °C)   SpO2: 100%       Physical Exam  Vitals and nursing note reviewed.   Constitutional:       General: She is not in acute distress.     Appearance: Normal appearance. She is well-developed. She is not ill-appearing, toxic-appearing or diaphoretic.   HENT:      Head: Normocephalic and atraumatic.      Right Ear: External ear normal.      Left Ear: External ear normal.   Eyes:      Conjunctiva/sclera: Conjunctivae normal.      Pupils: Pupils are equal, round, and reactive to light.   Neck:      Thyroid: No thyromegaly.      Vascular: No carotid bruit or JVD.   Cardiovascular:      Rate and Rhythm: Normal rate and regular rhythm.      Pulses: Normal pulses.      Heart sounds: Normal heart sounds. No murmur heard.  Pulmonary:      Effort: Pulmonary effort is normal. No respiratory distress.      Breath sounds: Normal  breath sounds.   Abdominal:      General: Bowel sounds are normal.      Palpations: Abdomen is soft. There is no mass.      Tenderness: There is no abdominal tenderness.   Musculoskeletal:         General: No swelling. Normal range of motion.      Cervical back: Normal range of motion and neck supple.   Lymphadenopathy:      Cervical: No cervical adenopathy.   Skin:     General: Skin is warm and dry.      Findings: No lesion or rash.   Neurological:      Mental Status: She is alert and oriented to person, place, and time.      Cranial Nerves: No cranial nerve deficit.      Sensory: No sensory deficit.      Motor: No weakness.      Coordination: Coordination normal.      Gait: Gait normal.      Deep Tendon Reflexes: Reflexes are normal and symmetric.   Psychiatric:         Mood and Affect: Mood normal.         Behavior: Behavior normal.         Thought Content: Thought content normal.         Judgment: Judgment normal.         Assessment & Plan     Diagnoses and all orders for this visit:    1. Routine general medical examination at a health care facility (Primary)  -     Comprehensive Metabolic Panel; Future  -     CBC & Differential; Future  -     Lipid Panel; Future  -     TSH; Future    2. Essential hypertension  -     bisoprolol-hydrochlorothiazide (ZIAC) 2.5-6.25 MG per tablet; Take 1 tablet by mouth Daily.  Dispense: 90 tablet; Refill: 3     Preventive medicine discussed, diet, exercise, healthy living discussed at length.  Discussed nutrition, physical activity, healthy weight, injury prevention, misuse of tobacco, alcohol and drugs, dental health, mental health, immunizations, screening    Part of this note may be an electronic transcription/translation of spoken language to printed text using the Dragon Dictation System.

## 2023-11-20 ENCOUNTER — HOSPITAL ENCOUNTER (OUTPATIENT)
Dept: MRI IMAGING | Facility: HOSPITAL | Age: 68
Discharge: HOME | End: 2023-11-20
Admitting: SURGERY
Payer: COMMERCIAL

## 2023-11-20 DIAGNOSIS — C50.312 MALIGNANT NEOPLASM OF LOWER-INNER QUADRANT OF LEFT FEMALE BREAST, UNSPECIFIED ESTROGEN RECEPTOR STATUS: ICD-10-CM

## 2023-11-20 LAB — CREAT BLDA-MCNC: 0.6 MG/DL (ref 0.6–1.3)

## 2023-11-20 PROCEDURE — A9577 INJ MULTIHANCE: HCPCS | Performed by: SURGERY

## 2023-11-20 PROCEDURE — 77049 MRI BREAST C-+ W/CAD BI: CPT

## 2023-11-20 PROCEDURE — 77049 MRI BREAST C-+ W/CAD BI: CPT | Performed by: RADIOLOGY

## 2023-11-20 PROCEDURE — 82565 ASSAY OF CREATININE: CPT

## 2023-11-20 PROCEDURE — 0 GADOBENATE DIMEGLUMINE 529 MG/ML SOLUTION: Performed by: SURGERY

## 2023-11-20 RX ADMIN — GADOBENATE DIMEGLUMINE 19 ML: 529 INJECTION, SOLUTION INTRAVENOUS at 14:48

## 2023-11-21 ENCOUNTER — TELEPHONE (OUTPATIENT)
Dept: MRI IMAGING | Facility: HOSPITAL | Age: 68
End: 2023-11-21
Payer: COMMERCIAL

## 2023-11-21 NOTE — TELEPHONE ENCOUNTER
Called patient with MRI Breast results. Recommended surgical follow up. Pt didn't want to be transferred to the HELP line after questions were asked and answered. An email was sent to the Nurse Navigator. Pt expressed understanding and was encouraged to call with any further questions or concerns.

## 2023-11-29 ENCOUNTER — TELEPHONE (OUTPATIENT)
Dept: MRI IMAGING | Facility: HOSPITAL | Age: 68
End: 2023-11-29
Payer: COMMERCIAL

## 2023-11-29 NOTE — TELEPHONE ENCOUNTER
Patient called on 11/27/2023 requesting further assistance after completing her breast MRI. An EPIC email sent to the nurse navigators. Patient called again this morning so I have sent an email to José at Dr. DAMON's office also requesting help for this patient. Patient provided HELP line number and [place] [facility] number again.

## 2023-12-01 ENCOUNTER — TELEPHONE (OUTPATIENT)
Dept: FAMILY MEDICINE CLINIC | Facility: CLINIC | Age: 68
End: 2023-12-01

## 2023-12-01 RX ORDER — AZITHROMYCIN 250 MG/1
TABLET, FILM COATED ORAL
Qty: 6 TABLET | Refills: 0 | Status: SHIPPED | OUTPATIENT
Start: 2023-12-01

## 2023-12-01 NOTE — TELEPHONE ENCOUNTER
Caller: Rosalba Girard    Relationship to patient: Self    Best call back number: [phone]     Characteristics of symptom/severity: HEADACHE, RUNNY NOSE, EARACHE, SORE THROAT    Where are you experiencing symptoms: CHEST AND HEAD    How long have you been experiencing symptoms: ONE WEEK    When have you experienced or been treated for these symptoms before: YES    Have you had any recent surgeries, procedures or injections: [x] Yes  [] No   If yes, explain: BREAST MRI    Is it the symptoms constant or intermittent: [x] Constant  [] Intermittent   What makes it worse:     What makes it better:     What therapies/medications have you tried: NOTHING    If a prescription is needed, what is your preferred pharmacy:   [facility] - [place] [address]  Phone: [phone] Fax: [phone]

## 2023-12-04 ENCOUNTER — TELEPHONE (OUTPATIENT)
Dept: FAMILY MEDICINE CLINIC | Facility: CLINIC | Age: 68
End: 2023-12-04
Payer: COMMERCIAL

## 2023-12-04 ENCOUNTER — TELEPHONE (OUTPATIENT)
Dept: FAMILY MEDICINE CLINIC | Facility: CLINIC | Age: 68
End: 2023-12-04

## 2023-12-04 RX ORDER — NITROFURANTOIN 25; 75 MG/1; MG/1
100 CAPSULE ORAL 2 TIMES DAILY
Qty: 14 CAPSULE | Refills: 0 | Status: SHIPPED | OUTPATIENT
Start: 2023-12-04

## 2023-12-04 NOTE — TELEPHONE ENCOUNTER
Caller: Rosalba Girard    Relationship: Self    Best call back number: [phone]     What medication are you requesting: FOR UTI    What are your current symptoms: burning urination    How long have you been experiencing symptoms:      Have you had these symptoms before:    [x] Yes  [] No    Have you been treated for these symptoms before:   [x] Yes  [] No    If a prescription is needed, what is your preferred pharmacy and phone number: [facility]     Additional notes:  IS POST COVID, THINKS MEDS MAY HAVE CAUSED IT .  MAY HAVE HAD BEFORE THEN.

## 2023-12-04 NOTE — TELEPHONE ENCOUNTER
PATIENT WAS HAVING COUGH, SIRE THROAT AND EAR PAIN LAST WEEK, A [facility]PAK WAS SENT IN FOR HER ON FRIDAY,SHE HAS ONE DOSE LEFT AND WANTED TO TAKE IT AND HAVE A DAY IN BETWEEN BEFORE RETURNING TO WORK,  CAN SHE GET A NOTE SAYING SHE CAN RETURN TO WORK ON WEDNESDAY 12/5/23.    PLEASE LET HER KNOW ASAP, SHE SAID SHE HAS TO LET HER BOSS KNOW.

## 2023-12-05 ENCOUNTER — TRANSCRIBE ORDERS (OUTPATIENT)
Dept: ADMINISTRATIVE | Facility: HOSPITAL | Age: 68
End: 2023-12-05
Payer: COMMERCIAL

## 2023-12-05 ENCOUNTER — HOSPITAL ENCOUNTER (EMERGENCY)
Facility: HOSPITAL | Age: 68
Discharge: HOME | End: 2023-12-05
Attending: EMERGENCY MEDICINE | Admitting: EMERGENCY MEDICINE
Payer: COMMERCIAL

## 2023-12-05 ENCOUNTER — APPOINTMENT (OUTPATIENT)
Dept: CT IMAGING | Facility: HOSPITAL | Age: 68
End: 2023-12-05
Payer: COMMERCIAL

## 2023-12-05 ENCOUNTER — APPOINTMENT (OUTPATIENT)
Dept: GENERAL RADIOLOGY | Facility: HOSPITAL | Age: 68
End: 2023-12-05
Payer: COMMERCIAL

## 2023-12-05 VITALS
TEMPERATURE: 98 F | HEART RATE: 76 BPM | BODY MASS INDEX: 36.7 KG/M2 | DIASTOLIC BLOOD PRESSURE: 66 MMHG | HEIGHT: 64 IN | RESPIRATION RATE: 18 BRPM | OXYGEN SATURATION: 99 % | WEIGHT: 215 LBS | SYSTOLIC BLOOD PRESSURE: 127 MMHG

## 2023-12-05 DIAGNOSIS — R10.84 GENERALIZED ABDOMINAL PAIN: ICD-10-CM

## 2023-12-05 DIAGNOSIS — K62.5 RECTAL BLEEDING: Primary | ICD-10-CM

## 2023-12-05 DIAGNOSIS — C50.312 MALIGNANT NEOPLASM OF LOWER-INNER QUADRANT OF LEFT FEMALE BREAST, UNSPECIFIED ESTROGEN RECEPTOR STATUS: Primary | ICD-10-CM

## 2023-12-05 DIAGNOSIS — K76.0 HEPATIC STEATOSIS: ICD-10-CM

## 2023-12-05 DIAGNOSIS — U07.1 COVID-19: ICD-10-CM

## 2023-12-05 LAB
ALBUMIN SERPL-MCNC: 3.9 G/DL (ref 3.5–5.2)
ALBUMIN/GLOB SERPL: 1.2 G/DL
ALP SERPL-CCNC: 87 U/L (ref 39–117)
ALT SERPL W P-5'-P-CCNC: 28 U/L (ref 1–33)
ANION GAP SERPL CALCULATED.3IONS-SCNC: 8 MMOL/L (ref 5–15)
AST SERPL-CCNC: 32 U/L (ref 1–32)
BASOPHILS # BLD AUTO: 0.02 10*3/MM3 (ref 0–0.2)
BASOPHILS NFR BLD AUTO: 0.4 % (ref 0–1.5)
BILIRUB SERPL-MCNC: 0.4 MG/DL (ref 0–1.2)
BILIRUB UR QL STRIP: NEGATIVE
BUN BLDA-MCNC: 16 MG/DL (ref 8–26)
BUN SERPL-MCNC: 15 MG/DL (ref 8–23)
BUN/CREAT SERPL: 25.4 (ref 7–25)
CA-I BLDA-SCNC: 1.18 MMOL/L (ref 1.2–1.32)
CALCIUM SPEC-SCNC: 9 MG/DL (ref 8.6–10.5)
CHLORIDE BLDA-SCNC: 107 MMOL/L (ref 98–109)
CHLORIDE SERPL-SCNC: 108 MMOL/L (ref 98–107)
CLARITY UR: CLEAR
CO2 BLDA-SCNC: 37 MMOL/L (ref 24–29)
CO2 SERPL-SCNC: 27 MMOL/L (ref 22–29)
COLOR UR: YELLOW
CREAT BLDA-MCNC: 0.6 MG/DL
CREAT BLDA-MCNC: 0.6 MG/DL (ref 0.6–1.3)
CREAT SERPL-MCNC: 0.59 MG/DL (ref 0.57–1)
D-LACTATE SERPL-SCNC: 0.8 MMOL/L (ref 0.5–2)
DEPRECATED RDW RBC AUTO: 38.9 FL (ref 37–54)
EGFRCR SERPLBLD CKD-EPI 2021: 99.1 ML/MIN/1.73
EGFRCR SERPLBLD CKD-EPI 2021: 99.5 ML/MIN/1.73
EOSINOPHIL # BLD AUTO: 0.07 10*3/MM3 (ref 0–0.4)
EOSINOPHIL NFR BLD AUTO: 1.3 % (ref 0.3–6.2)
ERYTHROCYTE [DISTWIDTH] IN BLOOD BY AUTOMATED COUNT: 12.1 % (ref 12.3–15.4)
FLUAV SUBTYP SPEC NAA+PROBE: NOT DETECTED
FLUBV RNA ISLT QL NAA+PROBE: NOT DETECTED
GLOBULIN UR ELPH-MCNC: 3.3 GM/DL
GLUCOSE BLDC GLUCOMTR-MCNC: 99 MG/DL (ref 70–130)
GLUCOSE SERPL-MCNC: 104 MG/DL (ref 65–99)
GLUCOSE UR STRIP-MCNC: NEGATIVE MG/DL
HCT VFR BLD AUTO: 44.7 % (ref 34–46.6)
HCT VFR BLDA CALC: 41 % (ref 38–51)
HGB BLD-MCNC: 15.5 G/DL (ref 12–15.9)
HGB BLDA-MCNC: 13.9 G/DL (ref 12–17)
HGB UR QL STRIP.AUTO: NEGATIVE
HOLD SPECIMEN: NORMAL
IMM GRANULOCYTES # BLD AUTO: 0.02 10*3/MM3 (ref 0–0.05)
IMM GRANULOCYTES NFR BLD AUTO: 0.4 % (ref 0–0.5)
KETONES UR QL STRIP: NEGATIVE
LEUKOCYTE ESTERASE UR QL STRIP.AUTO: NEGATIVE
LIPASE SERPL-CCNC: 31 U/L (ref 13–60)
LYMPHOCYTES # BLD AUTO: 2.7 10*3/MM3 (ref 0.7–3.1)
LYMPHOCYTES NFR BLD AUTO: 48.3 % (ref 19.6–45.3)
MCH RBC QN AUTO: 30.2 PG (ref 26.6–33)
MCHC RBC AUTO-ENTMCNC: 34.7 G/DL (ref 31.5–35.7)
MCV RBC AUTO: 87.1 FL (ref 79–97)
MONOCYTES # BLD AUTO: 0.39 10*3/MM3 (ref 0.1–0.9)
MONOCYTES NFR BLD AUTO: 7 % (ref 5–12)
NEUTROPHILS NFR BLD AUTO: 2.39 10*3/MM3 (ref 1.7–7)
NEUTROPHILS NFR BLD AUTO: 42.6 % (ref 42.7–76)
NITRITE UR QL STRIP: NEGATIVE
NRBC BLD AUTO-RTO: 0 /100 WBC (ref 0–0.2)
PH UR STRIP.AUTO: 7 [PH] (ref 5–8)
PLATELET # BLD AUTO: 260 10*3/MM3 (ref 140–450)
PMV BLD AUTO: 10.7 FL (ref 6–12)
POTASSIUM BLDA-SCNC: 4.5 MMOL/L (ref 3.5–4.9)
POTASSIUM SERPL-SCNC: 4.5 MMOL/L (ref 3.5–5.2)
PROT SERPL-MCNC: 7.2 G/DL (ref 6–8.5)
PROT UR QL STRIP: NEGATIVE
RBC # BLD AUTO: 5.13 10*6/MM3 (ref 3.77–5.28)
SARS-COV-2 RNA RESP QL NAA+PROBE: DETECTED
SODIUM BLD-SCNC: 145 MMOL/L (ref 138–146)
SODIUM SERPL-SCNC: 143 MMOL/L (ref 136–145)
SP GR UR STRIP: 1.02 (ref 1–1.03)
UROBILINOGEN UR QL STRIP: NORMAL
WBC NRBC COR # BLD AUTO: 5.59 10*3/MM3 (ref 3.4–10.8)
WHOLE BLOOD HOLD COAG: NORMAL
WHOLE BLOOD HOLD SPECIMEN: NORMAL

## 2023-12-05 PROCEDURE — 85014 HEMATOCRIT: CPT

## 2023-12-05 PROCEDURE — 80053 COMPREHEN METABOLIC PANEL: CPT | Performed by: PHYSICIAN ASSISTANT

## 2023-12-05 PROCEDURE — 99285 EMERGENCY DEPT VISIT HI MDM: CPT

## 2023-12-05 PROCEDURE — 83690 ASSAY OF LIPASE: CPT | Performed by: PHYSICIAN ASSISTANT

## 2023-12-05 PROCEDURE — 85025 COMPLETE CBC W/AUTO DIFF WBC: CPT | Performed by: PHYSICIAN ASSISTANT

## 2023-12-05 PROCEDURE — 81003 URINALYSIS AUTO W/O SCOPE: CPT | Performed by: PHYSICIAN ASSISTANT

## 2023-12-05 PROCEDURE — 25510000001 IOPAMIDOL 61 % SOLUTION: Performed by: EMERGENCY MEDICINE

## 2023-12-05 PROCEDURE — 36415 COLL VENOUS BLD VENIPUNCTURE: CPT

## 2023-12-05 PROCEDURE — 74177 CT ABD & PELVIS W/CONTRAST: CPT

## 2023-12-05 PROCEDURE — 71045 X-RAY EXAM CHEST 1 VIEW: CPT

## 2023-12-05 PROCEDURE — 83605 ASSAY OF LACTIC ACID: CPT | Performed by: PHYSICIAN ASSISTANT

## 2023-12-05 PROCEDURE — 87636 SARSCOV2 & INF A&B AMP PRB: CPT | Performed by: PHYSICIAN ASSISTANT

## 2023-12-05 PROCEDURE — 80047 BASIC METABLC PNL IONIZED CA: CPT

## 2023-12-05 RX ORDER — SODIUM CHLORIDE 0.9 % (FLUSH) 0.9 %
10 SYRINGE (ML) INJECTION AS NEEDED
Status: DISCONTINUED | OUTPATIENT
Start: 2023-12-05 | End: 2023-12-05 | Stop reason: HOSPADM

## 2023-12-05 RX ADMIN — IOPAMIDOL 80 ML: 612 INJECTION, SOLUTION INTRAVENOUS at 17:22

## 2023-12-05 NOTE — Clinical Note
[facility] EMERGENCY DEPARTMENT  [address]  [facility] [address]  Phone: [phone]    Rosalba Girard was seen and treated in our emergency department on 12/5/2023.  She may return to work on 12/08/2023.         Thank you for choosing [facility].    Shy Briseno, PA

## 2023-12-05 NOTE — ED PROVIDER NOTES
Subjective   History of Present Illness  Pt is a 69 yo female presenting to ED with complaints of of rectal bleeding and abdominal pain. PMHx significant for IBS, HTN, Asthma, Anemia and Fibromyalgia. Pt reports developing cough, congestion and headache with sore throat last week. She tested positive for Covid 4 days ago. She reports today had several bowel movements that were hard and she noticed bright red blood. She denies abdominal pain. She denies hx of GI bleed and does not take blood thinners. She reports having a colonoscopy with Dr. Andrews about a year ago that had polyps. She denies dizziness, syncope, fever, CP, SOB or urinary sx. She denies tobacco, drug or ETOH use.     History provided by:  Patient and medical records      Review of Systems   Constitutional:  Positive for fatigue. Negative for chills and fever.   HENT:  Positive for congestion, ear pain and sore throat (resolved). Negative for trouble swallowing.    Eyes:  Negative for visual disturbance.   Respiratory:  Positive for cough. Negative for shortness of breath.    Cardiovascular:  Negative for chest pain and leg swelling.   Gastrointestinal:  Positive for blood in stool and constipation. Negative for abdominal pain, diarrhea, nausea and vomiting.   Genitourinary:  Negative for difficulty urinating, dysuria, flank pain and hematuria.   Musculoskeletal:  Negative for arthralgias and back pain.   Skin:  Negative for rash and wound.   Neurological:  Positive for headaches. Negative for dizziness, syncope, speech difficulty, weakness and numbness.   Psychiatric/Behavioral:  Negative for confusion.    All other systems reviewed and are negative.      Past Medical History:   Diagnosis Date    Asthma     Chronic superficial dermatitis     Closed fracture of left tibial plateau with routine healing     Dietary counseling     Encounter for routine adult health examination     Fibromyalgia     GERD (gastroesophageal reflux disease)     Hair or hair  follicle disorder     History of anemia     Hives     Hypertension     Irritable bowel disease     Malaise and fatigue     Myalgia and myositis     PONV (postoperative nausea and vomiting)     on occasion     Prediabetes     Rash and nonspecific skin eruption     S/P ORIF (open reduction internal fixation) fracture  left tibial plateau  12/21/2016    Sinusitis, acute maxillary     Tibial plateau fracture, left 12/21/2016    UTI (urinary tract infection)     Wears glasses        Allergies   Allergen Reactions    Myrbetriq [Mirabegron] Itching    Tramadol Rash       Past Surgical History:   Procedure Laterality Date    BLADDER SURGERY  2004    Probable anterior repair x 2 ( Dr. Noble)    BREAST BIOPSY Right     CHOLECYSTECTOMY  2012    COLONOSCOPY W/ POLYPECTOMY  2015    [facility]    ECTOPIC PREGNANCY SURGERY  1994    HERNIA REPAIR      umbilical     TIBIAL PLATEAU OPEN REDUCTION INTERNAL FIXATION Left 12/21/2016    LEFT TIBIAL PLATEAU OPEN REDUCTION INT FIXATION;  Surgeon: Constantine Durbin MD;  Location: [facility];  Service:     TOTAL ABDOMINAL HYSTERECTOMY WITH SALPINGO OOPHORECTOMY Bilateral 1998    bleeding/pain Dr Noble       Family History   Problem Relation Age of Onset    Osteoarthritis Mother     Aneurysm Mother     Cancer Father         Lung    Heart disease Father     Heart attack Father     Stroke Father     Hypertension Sister     Hypertension Brother     Breast cancer Maternal Aunt     Ovarian cancer Paternal Aunt     Uterine cancer Paternal Aunt     Cancer Maternal Grandfather     Depression Other     Diabetes Other     Osteoarthritis Other     Rheum arthritis Other     Tuberculosis Other     Bleeding Disorder Other     Other Other     Colon cancer Other        Social History     Socioeconomic History    Marital status:    Tobacco Use    Smoking status: Former     Packs/day: 2.00     Years: 10.00     Additional pack years: 0.00     Total pack years: 20.00     Types: Cigarettes     Quit  date:      Years since quittin.9    Smokeless tobacco: Never   Vaping Use    Vaping Use: Never used   Substance and Sexual Activity    Alcohol use: No     Comment: Rarely    Drug use: No    Sexual activity: Not Currently           Objective   Physical Exam  Vitals and nursing note reviewed.   Constitutional:       Appearance: She is well-developed. She is obese.   HENT:      Head: Atraumatic.      Nose: Nose normal.   Eyes:      General: Lids are normal.      Conjunctiva/sclera: Conjunctivae normal.      Pupils: Pupils are equal, round, and reactive to light.   Cardiovascular:      Rate and Rhythm: Normal rate and regular rhythm.      Heart sounds: Normal heart sounds.   Pulmonary:      Effort: Pulmonary effort is normal.      Breath sounds: Normal breath sounds. No wheezing.   Abdominal:      General: There is no distension.      Palpations: Abdomen is soft.      Tenderness: There is no abdominal tenderness. There is no guarding or rebound.   Musculoskeletal:         General: No tenderness. Normal range of motion.      Cervical back: Normal range of motion and neck supple.   Skin:     General: Skin is warm and dry.      Findings: No erythema or rash.   Neurological:      Mental Status: She is alert and oriented to person, place, and time.      Sensory: No sensory deficit.   Psychiatric:         Speech: Speech normal.         Behavior: Behavior normal.         Procedures           ED Course  ED Course as of 12/05/23 2323   Tue Dec 05, 2023   1744 COVID19(!!): Detected [RT]      ED Course User Index  [RT] Shy Briseno PA      Recent Results (from the past 24 hour(s))   Urinalysis With Microscopic If Indicated (No Culture) - Urine, Clean Catch    Collection Time: 23  3:22 PM    Specimen: Urine, Clean Catch   Result Value Ref Range    Color, UA Yellow Yellow, Straw    Appearance, UA Clear Clear    pH, UA 7.0 5.0 - 8.0    Specific Gravity, UA 1.020 1.005 - 1.030    Glucose, UA Negative Negative     Ketones, UA Negative Negative    Bilirubin, UA Negative Negative    Blood, UA Negative Negative    Protein, UA Negative Negative    Leuk Esterase, UA Negative Negative    Nitrite, UA Negative Negative    Urobilinogen, UA 0.2 E.U./dL 0.2 - 1.0 E.U./dL   COVID-19 and FLU A/B PCR, 1 HR TAT - Swab, Nasopharynx    Collection Time: 12/05/23  3:23 PM    Specimen: Nasopharynx; Swab   Result Value Ref Range    COVID19 Detected (C) Not Detected - Ref. Range    Influenza A PCR Not Detected Not Detected    Influenza B PCR Not Detected Not Detected   Comprehensive Metabolic Panel    Collection Time: 12/05/23  3:41 PM    Specimen: Blood   Result Value Ref Range    Glucose 104 (H) 65 - 99 mg/dL    BUN 15 8 - 23 mg/dL    Creatinine 0.59 0.57 - 1.00 mg/dL    Sodium 143 136 - 145 mmol/L    Potassium 4.5 3.5 - 5.2 mmol/L    Chloride 108 (H) 98 - 107 mmol/L    CO2 27.0 22.0 - 29.0 mmol/L    Calcium 9.0 8.6 - 10.5 mg/dL    Total Protein 7.2 6.0 - 8.5 g/dL    Albumin 3.9 3.5 - 5.2 g/dL    ALT (SGPT) 28 1 - 33 U/L    AST (SGOT) 32 1 - 32 U/L    Alkaline Phosphatase 87 39 - 117 U/L    Total Bilirubin 0.4 0.0 - 1.2 mg/dL    Globulin 3.3 gm/dL    A/G Ratio 1.2 g/dL    BUN/Creatinine Ratio 25.4 (H) 7.0 - 25.0    Anion Gap 8.0 5.0 - 15.0 mmol/L    eGFR 99.5 >60.0 mL/min/1.73   Lipase    Collection Time: 12/05/23  3:41 PM    Specimen: Blood   Result Value Ref Range    Lipase 31 13 - 60 U/L   Lactic Acid, Plasma    Collection Time: 12/05/23  3:41 PM    Specimen: Blood   Result Value Ref Range    Lactate 0.8 0.5 - 2.0 mmol/L   Green Top (Gel)    Collection Time: 12/05/23  3:41 PM   Result Value Ref Range    Extra Tube Hold for add-ons.    Lavender Top    Collection Time: 12/05/23  3:41 PM   Result Value Ref Range    Extra Tube hold for add-on    Gold Top - SST    Collection Time: 12/05/23  3:41 PM   Result Value Ref Range    Extra Tube Hold for add-ons.    Gray Top    Collection Time: 12/05/23  3:41 PM   Result Value Ref Range    Extra Tube Hold  for add-ons.    Light Blue Top    Collection Time: 12/05/23  3:41 PM   Result Value Ref Range    Extra Tube Hold for add-ons.    CBC Auto Differential    Collection Time: 12/05/23  3:41 PM    Specimen: Blood   Result Value Ref Range    WBC 5.59 3.40 - 10.80 10*3/mm3    RBC 5.13 3.77 - 5.28 10*6/mm3    Hemoglobin 15.5 12.0 - 15.9 g/dL    Hematocrit 44.7 34.0 - 46.6 %    MCV 87.1 79.0 - 97.0 fL    MCH 30.2 26.6 - 33.0 pg    MCHC 34.7 31.5 - 35.7 g/dL    RDW 12.1 (L) 12.3 - 15.4 %    RDW-SD 38.9 37.0 - 54.0 fl    MPV 10.7 6.0 - 12.0 fL    Platelets 260 140 - 450 10*3/mm3    Neutrophil % 42.6 (L) 42.7 - 76.0 %    Lymphocyte % 48.3 (H) 19.6 - 45.3 %    Monocyte % 7.0 5.0 - 12.0 %    Eosinophil % 1.3 0.3 - 6.2 %    Basophil % 0.4 0.0 - 1.5 %    Immature Grans % 0.4 0.0 - 0.5 %    Neutrophils, Absolute 2.39 1.70 - 7.00 10*3/mm3    Lymphocytes, Absolute 2.70 0.70 - 3.10 10*3/mm3    Monocytes, Absolute 0.39 0.10 - 0.90 10*3/mm3    Eosinophils, Absolute 0.07 0.00 - 0.40 10*3/mm3    Basophils, Absolute 0.02 0.00 - 0.20 10*3/mm3    Immature Grans, Absolute 0.02 0.00 - 0.05 10*3/mm3    nRBC 0.0 0.0 - 0.2 /100 WBC   POC CHEM 8    Collection Time: 12/05/23  3:45 PM    Specimen: Blood   Result Value Ref Range    Glucose 99 70 - 130 mg/dL    BUN 16 8 - 26 mg/dL    Creatinine 0.60 0.60 - 1.30 mg/dL    Sodium 145 138 - 146 mmol/L    POC Potassium 4.5 3.5 - 4.9 mmol/L    Chloride 107 98 - 109 mmol/L    Total CO2 37 (H) 24 - 29 mmol/L    Hemoglobin 13.9 12.0 - 17.0 g/dL    Hematocrit 41 38 - 51 %    Ionized Calcium 1.18 (L) 1.20 - 1.32 mmol/L    eGFR 99.1 >60.0 mL/min/1.73   POC Creatinine    Collection Time: 12/05/23  3:47 PM    Specimen: Blood   Result Value Ref Range    Creatinine 0.60 mg/dL     Note: In addition to lab results from this visit, the labs listed above may include labs taken at another facility or during a different encounter within the last 24 hours. Please correlate lab times with ED admission and discharge times for  "further clarification of the services performed during this visit.    CT Abdomen Pelvis With Contrast   Final Result   Impression:      No evidence of acute abnormality within the abdomen or pelvis.      Hepatic steatosis.      Persistent splenomegaly.      Similar additional chronic/ancillary findings, as above.      Electronically Signed: Jasson Monreal MD     12/5/2023 5:38 PM EST     Workstation ID: [id number]      XR Chest 1 View   Final Result   Impression:   Mild pulmonary vascular congestion         Electronically Signed: Brian Lazcano MD     12/5/2023 2:03 PM CST     Workstation ID: [id number]        Vitals:    12/05/23 1246 12/05/23 1736 12/05/23 1800   BP: 137/81 122/72 127/66   BP Location: Left arm     Patient Position: Sitting     Pulse: 86 78 76   Resp: 18     Temp: 98 °F (36.7 °C)     TempSrc: Oral     SpO2: 97% 100% 99%   Weight: 97.5 kg (215 lb)     Height: 162.6 cm (64\")       Medications   iopamidol (ISOVUE-300) 61 % injection 100 mL (80 mL Intravenous Given 12/5/23 1722)     ECG/EMG Results (last 24 hours)       ** No results found for the last 24 hours. **          No orders to display       DISCHARGE    Patient discharged in stable condition.    Reviewed implications of results, diagnosis, meds, responsibility to follow up, warning signs and symptoms of possible worsening, potential complications and reasons to return to ER.    Patient/Family voiced understanding of above instructions.    Discussed plan for discharge, as there is no emergent indication for admission.  Pt/family is agreeable and understands need for follow up and possible repeat testing.  Pt/family is aware that discharge does not mean that nothing is wrong but that it indicates no emergency is currently present that requires admission and they must continue care with follow-up as given below or with a physician of their choice.     FOLLOW-UP  Laureano Bazzi PA  [address] [facility] [address]  formerly [facility] " [id number]  [phone]    Schedule an appointment as soon as possible for a visit       Nik Andrews MD  [address] [facility]  [address]  [place] [id number] [phone]    Schedule an appointment as soon as possible for a visit       [facility] EMERGENCY DEPARTMENT  [address] [facility] [id number] [phone]    If symptoms worsen         Medication List      No changes were made to your prescriptions during this visit.                                              Medical Decision Making  Pt is a 67 yo female presenting to ED with complaints of bloody stool, cough and recently positive for Covid. Labs in ED notable for WBC 5.5, Lactic 0.8, Cr 0.59, Gluose 104, K 4.5, Na 143, H and H 15.5 and 44.7. Covid positive. CXR mild vascular congestion. CT abd/pelvis negative for acute findings. Discussed results and tx plan with patient. Will dc home and she will f/u with PCP and GI.     DDx  GI bleed, Hemorrhoids, Colon mass, Anemia, Sepsis, Covid, Flu, Pneumonia, ROCIO        Problems Addressed:  COVID-19: complicated acute illness or injury  Generalized abdominal pain: complicated acute illness or injury  Hepatic steatosis: complicated acute illness or injury  Rectal bleeding: complicated acute illness or injury    Amount and/or Complexity of Data Reviewed  External Data Reviewed: labs, radiology and notes.     Details: PCP  Labs: ordered. Decision-making details documented in ED Course.  Radiology: ordered. Decision-making details documented in ED Course.    Risk  Prescription drug management.        Final diagnoses:   Rectal bleeding   Generalized abdominal pain   COVID-19   Hepatic steatosis       ED Disposition  ED Disposition       ED Disposition   Discharge    Condition   Stable    Comment   --               Laureano Bazzi PA  [address] [facility]  [address]  [facility] [address] [phone]    Schedule an appointment as soon as possible for a visit       Nik Andrews  MD Lsat  [address]  [facility] [address]  [facility] [address]  [phone]    Schedule an appointment as soon as possible for a visit       [facility] EMERGENCY DEPARTMENT  [address]  [facility] [address] [phone]    If symptoms worsen         Medication List      No changes were made to your prescriptions during this visit.            Shy Briseno PA  12/05/23 [id number]

## 2023-12-06 ENCOUNTER — TELEMEDICINE (OUTPATIENT)
Dept: FAMILY MEDICINE CLINIC | Facility: CLINIC | Age: 68
End: 2023-12-06
Payer: COMMERCIAL

## 2023-12-06 DIAGNOSIS — U07.1 COVID-19: Primary | ICD-10-CM

## 2023-12-06 PROCEDURE — 99213 OFFICE O/P EST LOW 20 MIN: CPT | Performed by: PHYSICIAN ASSISTANT

## 2023-12-06 RX ORDER — DEXTROMETHORPHAN HYDROBROMIDE AND PROMETHAZINE HYDROCHLORIDE 15; 6.25 MG/5ML; MG/5ML
2.5 SYRUP ORAL 4 TIMES DAILY PRN
Qty: 120 ML | Refills: 0 | Status: SHIPPED | OUTPATIENT
Start: 2023-12-06

## 2023-12-06 RX ORDER — PREDNISONE 20 MG/1
20 TABLET ORAL 2 TIMES DAILY
Qty: 14 TABLET | Refills: 0 | Status: SHIPPED | OUTPATIENT
Start: 2023-12-06

## 2023-12-06 NOTE — PROGRESS NOTES
Subjective   Rosalba Girard is a 68 y.o. female  Covid-19    Patient presents and consents for telehealth/video visit examination.   History of Present Illness  Patient is a 68-year-old white female who comes in complaining of COVID symptoms sinus pressure sinus Dacian blowing yellow drainage from nose feeling better still having cough no nausea vomiting  The following portions of the patient's history were reviewed and updated as appropriate: allergies, current medications, past social history and problem list    Review of Systems   Constitutional:  Negative for fatigue and unexpected weight change.   Respiratory:  Negative for cough, chest tightness and shortness of breath.    Cardiovascular:  Negative for chest pain, palpitations and leg swelling.   Gastrointestinal:  Negative for nausea.   Skin:  Negative for color change and rash.   Neurological:  Negative for dizziness, syncope, weakness and headaches.       Objective     There were no vitals filed for this visit.    Physical Exam  Constitutional:       Appearance: Normal appearance. She is normal weight.   Neurological:      Mental Status: She is alert.   Psychiatric:         Mood and Affect: Mood normal.         Behavior: Behavior normal.         Thought Content: Thought content normal.         Judgment: Judgment normal.         Assessment & Plan     Diagnoses and all orders for this visit:    1. COVID-19 (Primary)  -     predniSONE (DELTASONE) 20 MG tablet; Take 1 tablet by mouth 2 (Two) Times a Day.  Dispense: 14 tablet; Refill: 0  -     promethazine-dextromethorphan (PROMETHAZINE-DM) 6.25-15 MG/5ML syrup; Take 2.5 mL by mouth 4 (Four) Times a Day As Needed for Cough.  Dispense: 120 mL; Refill: 0     I spent 15 minutes in patient care: Reviewing records prior to the visit, examining the patient, entering orders and documentation    Part of this note may be an electronic transcription/translation of spoken language to printed text using the Dragon  Dictation System.

## 2023-12-07 ENCOUNTER — TRANSCRIBE ORDERS (OUTPATIENT)
Dept: ADMINISTRATIVE | Facility: HOSPITAL | Age: 68
End: 2023-12-07
Payer: COMMERCIAL

## 2023-12-07 ENCOUNTER — TELEPHONE (OUTPATIENT)
Dept: FAMILY MEDICINE CLINIC | Facility: CLINIC | Age: 68
End: 2023-12-07
Payer: COMMERCIAL

## 2023-12-07 DIAGNOSIS — C50.312 MALIGNANT NEOPLASM OF LOWER-INNER QUADRANT OF LEFT FEMALE BREAST, UNSPECIFIED ESTROGEN RECEPTOR STATUS: Primary | ICD-10-CM

## 2023-12-07 NOTE — TELEPHONE ENCOUNTER
I spoke with patient and she needs FMLA start date to be December 4 and end date December 7. She said the return to work date may need to be extended due to her symptoms/COVID test results.

## 2023-12-07 NOTE — TELEPHONE ENCOUNTER
Caller: Rosalba Girard    Relationship: Self    Best call back number: [phone]    Who are you requesting to speak with (clinical staff, provider,  specific staff member): JAN OR WHEREVER FILLS OUT FMLA PAPER WORK FOR PCP    What was the call regarding: STATED THAT SHE WILL NEED OFF THIS WEEK MONDAY THROUGH THURSDAY FROM WORK  AND NOT SURE ABOUT NEXT WEEK PT TEST POSITIVE FOR COVID AND WANTED TO MAKE SURE THAT THE DAYS WERE LISTED  ON FMLA PAPERWORK.

## 2023-12-08 ENCOUNTER — TELEPHONE (OUTPATIENT)
Dept: FAMILY MEDICINE CLINIC | Facility: CLINIC | Age: 68
End: 2023-12-08
Payer: COMMERCIAL

## 2023-12-08 NOTE — TELEPHONE ENCOUNTER
Caller: Rosalba Girard    Relationship: Self    Best call back number:  [phone]     Who are you requesting to speak with (clinical staff, provider,  specific staff member): JAN     What was the call regarding: FMLA PAPERWORK

## 2023-12-08 NOTE — TELEPHONE ENCOUNTER
I spoke with patient and she said [employer] didn't receive FMLA paperwork so I am going to fax it again.

## 2023-12-13 ENCOUNTER — TELEPHONE (OUTPATIENT)
Dept: FAMILY MEDICINE CLINIC | Facility: CLINIC | Age: 68
End: 2023-12-13
Payer: COMMERCIAL

## 2023-12-13 NOTE — TELEPHONE ENCOUNTER
Spoke with patient about her [facility] paperwork. I let her know that Elvie said she was going to fax it again to [facility] Friday 12/8/23. She would like to  a copy of it tomorrow for her records. I advised she come after lunch to give you time to see this.Thank you.

## 2023-12-15 ENCOUNTER — PRE-ADMISSION TESTING (OUTPATIENT)
Dept: PREADMISSION TESTING | Facility: HOSPITAL | Age: 68
End: 2023-12-15
Payer: COMMERCIAL

## 2023-12-15 LAB
ALBUMIN SERPL-MCNC: 4 G/DL (ref 3.5–5.2)
ALBUMIN/GLOB SERPL: 1.5 G/DL
ALP SERPL-CCNC: 93 U/L (ref 39–117)
ALT SERPL W P-5'-P-CCNC: 25 U/L (ref 1–33)
ANION GAP SERPL CALCULATED.3IONS-SCNC: 10 MMOL/L (ref 5–15)
AST SERPL-CCNC: 20 U/L (ref 1–32)
BILIRUB SERPL-MCNC: 0.4 MG/DL (ref 0–1.2)
BUN SERPL-MCNC: 25 MG/DL (ref 8–23)
BUN/CREAT SERPL: 34.7 (ref 7–25)
CALCIUM SPEC-SCNC: 9.1 MG/DL (ref 8.6–10.5)
CHLORIDE SERPL-SCNC: 102 MMOL/L (ref 98–107)
CO2 SERPL-SCNC: 28 MMOL/L (ref 22–29)
CREAT SERPL-MCNC: 0.72 MG/DL (ref 0.57–1)
DEPRECATED RDW RBC AUTO: 37.7 FL (ref 37–54)
EGFRCR SERPLBLD CKD-EPI 2021: 92.3 ML/MIN/1.73
ERYTHROCYTE [DISTWIDTH] IN BLOOD BY AUTOMATED COUNT: 12.3 % (ref 12.3–15.4)
GLOBULIN UR ELPH-MCNC: 2.7 GM/DL
GLUCOSE SERPL-MCNC: 102 MG/DL (ref 65–99)
HCT VFR BLD AUTO: 44.4 % (ref 34–46.6)
HGB BLD-MCNC: 15.4 G/DL (ref 12–15.9)
MCH RBC QN AUTO: 29.6 PG (ref 26.6–33)
MCHC RBC AUTO-ENTMCNC: 34.7 G/DL (ref 31.5–35.7)
MCV RBC AUTO: 85.4 FL (ref 79–97)
PLATELET # BLD AUTO: 306 10*3/MM3 (ref 140–450)
PMV BLD AUTO: 10.2 FL (ref 6–12)
POTASSIUM SERPL-SCNC: 3.7 MMOL/L (ref 3.5–5.2)
PROT SERPL-MCNC: 6.7 G/DL (ref 6–8.5)
RBC # BLD AUTO: 5.2 10*6/MM3 (ref 3.77–5.28)
SODIUM SERPL-SCNC: 140 MMOL/L (ref 136–145)
WBC NRBC COR # BLD AUTO: 13.49 10*3/MM3 (ref 3.4–10.8)

## 2023-12-15 PROCEDURE — 80053 COMPREHEN METABOLIC PANEL: CPT

## 2023-12-15 PROCEDURE — 85027 COMPLETE CBC AUTOMATED: CPT

## 2023-12-15 PROCEDURE — 93005 ELECTROCARDIOGRAM TRACING: CPT

## 2023-12-15 PROCEDURE — 36415 COLL VENOUS BLD VENIPUNCTURE: CPT

## 2023-12-16 LAB
QT INTERVAL: 382 MS
QTC INTERVAL: 438 MS

## 2023-12-18 ENCOUNTER — HOSPITAL ENCOUNTER (OUTPATIENT)
Dept: MAMMOGRAPHY | Facility: HOSPITAL | Age: 68
Discharge: HOME | End: 2023-12-18
Payer: COMMERCIAL

## 2023-12-18 ENCOUNTER — LAB REQUISITION (OUTPATIENT)
Dept: LAB | Facility: HOSPITAL | Age: 68
End: 2023-12-18
Payer: COMMERCIAL

## 2023-12-18 ENCOUNTER — HOSPITAL ENCOUNTER (OUTPATIENT)
Dept: NUCLEAR MEDICINE | Facility: HOSPITAL | Age: 68
Discharge: HOME | End: 2023-12-18

## 2023-12-18 DIAGNOSIS — C50.312 MALIGNANT NEOPLASM OF LOWER-INNER QUADRANT OF LEFT FEMALE BREAST: ICD-10-CM

## 2023-12-18 DIAGNOSIS — C50.312 MALIGNANT NEOPLASM OF LOWER-INNER QUADRANT OF LEFT FEMALE BREAST, UNSPECIFIED ESTROGEN RECEPTOR STATUS: ICD-10-CM

## 2023-12-18 PROCEDURE — 76098 X-RAY EXAM SURGICAL SPECIMEN: CPT

## 2023-12-18 PROCEDURE — 0 TECHNETIUM FILTERED SULFUR COLLOID: Performed by: SURGERY

## 2023-12-18 PROCEDURE — 38792 RA TRACER ID OF SENTINL NODE: CPT

## 2023-12-18 PROCEDURE — 19281 PERQ DEVICE BREAST 1ST IMAG: CPT | Performed by: RADIOLOGY

## 2023-12-18 PROCEDURE — A9541 TC99M SULFUR COLLOID: HCPCS | Performed by: SURGERY

## 2023-12-18 PROCEDURE — 76098 X-RAY EXAM SURGICAL SPECIMEN: CPT | Performed by: RADIOLOGY

## 2023-12-18 PROCEDURE — 25010000002 LIDOCAINE 1 % SOLUTION: Performed by: RADIOLOGY

## 2023-12-18 RX ORDER — LIDOCAINE HYDROCHLORIDE 10 MG/ML
5 INJECTION, SOLUTION INFILTRATION; PERINEURAL ONCE
Status: COMPLETED | OUTPATIENT
Start: 2023-12-18 | End: 2023-12-18

## 2023-12-18 RX ADMIN — TECHNETIUM TC 99M SULFUR COLLOID 1 DOSE: KIT at 11:35

## 2023-12-18 RX ADMIN — Medication 2.5 ML: at 08:59

## 2023-12-20 PROCEDURE — A9541 TC99M SULFUR COLLOID: HCPCS | Performed by: SURGERY

## 2023-12-20 PROCEDURE — 0 TECHNETIUM FILTERED SULFUR COLLOID: Performed by: SURGERY

## 2023-12-20 RX ADMIN — TECHNETIUM TC 99M SULFUR COLLOID 1 DOSE: KIT at 15:55

## 2023-12-21 ENCOUNTER — HOSPITAL ENCOUNTER (OUTPATIENT)
Dept: NUCLEAR MEDICINE | Facility: HOSPITAL | Age: 68
Discharge: HOME | End: 2023-12-21

## 2023-12-21 LAB — REF LAB TEST METHOD: NORMAL

## 2024-01-04 ENCOUNTER — LAB REQUISITION (OUTPATIENT)
Dept: LAB | Facility: HOSPITAL | Age: 69
End: 2024-01-04
Payer: COMMERCIAL

## 2024-01-04 DIAGNOSIS — C50.312 MALIGNANT NEOPLASM OF LOWER-INNER QUADRANT OF LEFT FEMALE BREAST: ICD-10-CM

## 2024-01-04 PROCEDURE — 88307 TISSUE EXAM BY PATHOLOGIST: CPT | Performed by: SURGERY

## 2024-01-04 PROCEDURE — 88341 IMHCHEM/IMCYTCHM EA ADD ANTB: CPT | Performed by: SURGERY

## 2024-01-04 PROCEDURE — 88342 IMHCHEM/IMCYTCHM 1ST ANTB: CPT | Performed by: SURGERY

## 2024-01-05 NOTE — PROGRESS NOTES
DATE OF VISIT: 1/8/2024    REASON FOR VISIT: Followup for abnormal CT scan     PROBLEM LIST:  1.  Mesenteric fat stranding:  A.  Incidentally noted on CT abdomen pelvis done March 2023 for kidney stone  2.  Morbid obesity, BMI 32  3.  Hypertension  4.  Left lower inner quadrant invasive ductal carcinoma, T1 a N0 M0 stage Ia:  A.  Presented with abnormal screening mammogram July 2023.  B.  Diagnosed after ultrasound-guided biopsy done October 2023  C.  Status post lumpectomy with sentinel lymph node biopsy done by Dr. DAMON December 18, 2023  D.  Final pathology revealed low-grade ductal carcinoma ER +95% WV +90% continue -0 by IHC.    HISTORY OF PRESENT ILLNESS: The patient is a very pleasant 68 y.o. female  with past medical history significant for abnormal CT abdomen pelvis diagnosed March 2023 showing abdominal fat stranding felt to be reactive. The  patient is here today for scheduled follow-up visit.    SUBJECTIVE: Maria Esther is here today by herself.  She is doing fairly well.  She is recovering from her second lumpectomy.    Past History:  Medical History: has a past medical history of Asthma, Chronic superficial dermatitis, Closed fracture of left tibial plateau with routine healing, Dietary counseling, Encounter for routine adult health examination, Fibromyalgia, GERD (gastroesophageal reflux disease), Hair or hair follicle disorder, History of anemia, Hives, Hypertension, Irritable bowel disease, Malaise and fatigue, Myalgia and myositis, PONV (postoperative nausea and vomiting), Prediabetes, Rash and nonspecific skin eruption, S/P ORIF (open reduction internal fixation) fracture  left tibial plateau  (12/21/2016), Sinusitis, acute maxillary, Tibial plateau fracture, left (12/21/2016), UTI (urinary tract infection), and Wears glasses.   Surgical History: has a past surgical history that includes Bladder surgery (2004); Cholecystectomy (2012); Hernia repair; Colonoscopy w/ polypectomy (2015); Tibial Plateau Open  "Reduction Internal Fixation (Left, 12/21/2016); Breast biopsy (Right); Total abdominal hysterectomy w/ bilateral salpingoophorectomy (Bilateral, 1998); and Ectopic pregnancy surgery (1994).   Family History: family history includes Aneurysm in her mother; Bleeding Disorder in an other family member; Breast cancer in her maternal aunt; Cancer in her father and maternal grandfather; Colon cancer in an other family member; Depression in an other family member; Diabetes in an other family member; Heart attack in her father; Heart disease in her father; Hypertension in her brother and sister; Osteoarthritis in her mother and another family member; Other in an other family member; Ovarian cancer in her paternal aunt; Rheum arthritis in an other family member; Stroke in her father; Tuberculosis in an other family member; Uterine cancer in her paternal aunt.   Social History: reports that she quit smoking about 34 years ago. Her smoking use included cigarettes. She has a 20.00 pack-year smoking history. She has never used smokeless tobacco. She reports that she does not drink alcohol and does not use drugs.    (Not in a hospital admission)     Allergies: Myrbetriq [mirabegron] and Tramadol     Review of Systems   Constitutional:  Positive for fatigue.   Psychiatric/Behavioral:  The patient is nervous/anxious.        PHYSICAL EXAMINATION:   /59   Pulse 93   Temp 97 °F (36.1 °C) (Temporal)   Resp 18   Ht 162.6 cm (64.02\")   Wt 98.4 kg (217 lb)   LMP  (LMP Unknown)   SpO2 96%   BMI 37.23 kg/m²    Pain Score    01/08/24 1358   PainSc:   5          ECOG score: 0            ECOG Performance Status: 1 - Symptomatic but completely ambulatory      General Appearance:      alert, cooperative, no apparent distress and appears stated age   Lungs:   Clear to auscultation bilaterally; respirations regular, even, and unlabored bilaterally   Heart:  Regular rate and rhythm, no murmurs appreciated   Abdomen:   Soft, " non-tender, non-distended, and no organomegaly                 No visits with results within 2 Week(s) from this visit.   Latest known visit with results is:   Lab Requisition on 2023   Component Date Value Ref Range Status    Reference Lab Report 2023    Final                    Value:[facility]  [address] [place] [address]  Phone: [phone] or [phone]  Fax: [phone]  Laureano Jauregui M.D., Medical Director    PATIENT NAME                           LABORATORY NO.  INGRID VOGEL.                  [id number]  [id number]                         AGE              SEX  SSN           CLIENT REF #  [facility]           68      1955  F    [id number]   [id number]    [address]              REQUESTING M.D.     ATTENDING [place] [address]                ROMYELAINEMINESHID  DATE COLLECTED      DATE RECEIVED      DATE REPORTED  2023    DIAGNOSIS:  A.   BREAST, LUMPECTOMY, LEFT:  Mucinous carcinoma, Rhodelia grade 1 (of 3), measuring 3 mm in  greatest dimension, pT1a, pN0(sn), see checklist  Ductal carcinoma in situ, low nuclear grade, involving medial margin focally  Intraductal papillomatosis  Margins negative for                           invasive carcinoma  B.   SENTINEL NODE, LEFT:  One benign lymph node (0/1)  C.   BREAST MARGIN, EXTENDED INFERIOR:  Benign breast parenchyma with cautery artifact; no evidence of  malignancy identified    AVH    INVASIVE CARCINOMA OF THE BREAST    SPECIMEN:  Procedure: Excision (less than total mastectomy)  Specimen Laterality: Left    TUMOR:  Histologic Type: Mucinous carcinoma  Histologic Grade (Michelle Histologic Score):  Glandular (Acinar) / Tubular Differentiation: Score 2  Nuclear Pleomorphism: Score 1  Mitotic Rate: Score 1  Overall Grade: Grade 1 (scores of 3, 4 or 5)  Tumor Size: 3 mm  Ductal Carcinoma In  "Situ (DCIS): Present  Lymphovascular Invasion: Not identified  Treatment Effect in the Breast: No known presurgical therapy    MARGINS:  Margin Status for Invasive Carcinoma:  - All margins negative for invasive carcinoma  Distance from Invasive Carcinoma to Closest Margin: 1.5 mm  Margin Status for DCIS:  - DCIS present at margin  Margin(s) Involved by DCIS:                           Medial    REGIONAL LYMPH NODES:  Regional Lymph Node Status:  All regional lymph nodes negative for tumor  Total Number of Lymph Nodes Examined (sentinel and non-sentinel): 1  Number of Diamond Nodes Examined: 1    PATHOLOGIC STAGE CLASSIFICATION (pTNM, AJCC 8th Edition):  Reporting of pT, pN, and (when applicable) pM categories is based on  information available to the pathologist at the time the report is issued. As per  the AJCC (Chapter 1, 8th Ed.) it is the managing physician-s responsibility to  establish the final pathologic stage based upon all pertinent information,  including but potentially not limited to this pathology report.  pT Category: pT1a  Regional Lymph Nodes Modifier: (sn): Diamond node(s) evaluated.  pN Category: pN0    HORMONE RECEPTOR STATUS (performed on previous biopsy [id number]):  Estrogen receptor: Positive, 95%, 3+  Progesterone receptor: Positive, 90%, 3+  HER2/willy oncoprotein: Negative, score 0    CLINICAL HISTORY:  Malignant neoplasm of lower-inner                           quadrant of left female breast      SPECIMENS RECEIVED:  A.  BREAST, LUMPECTOMY , LEFT  B.  SENTINEL NODE , LEFT  C.  BREAST MARGIN , EXTENDED INFERIOR    MICROSCOPIC DESCRIPTION:  Tissue blocks are prepared and slides are examined microscopically on all  specimens. See diagnosis for details.    Professional interpretation rendered by Le Alanis D.O. at [facility], [place], [address] [place] [address].    GROSS DESCRIPTION:  A.  Received in form labeled \"left breast lumpectomy\" and consists of an  oriented breast " "lumpectomy with a short stitch designating superior and a  long stitch designating anterior.  The breast specimen weighs 14.9 g and  measures 5.2 cm superior to inferior, 4.5 cm anterior to posterior, and 1.4  cm medial to lateral.  A needle localization wire is inserting medially.  The  specimen is inked as follows: Superior-red, inferior-blue, lateral-orange,  medial-yellow, anterior-green and posterior-black.  The specimen is  serially sectioned from superior                           to inferior into 13 slices to reveal a silver  metallic coil biopsy clip embedded in slice 6 which measures 0.5 cm from  the lateral margin, 0.6 cm from the medial margin, 1.1 cm from the anterior  margin, 1.5 cm from the superior margin, 1.7 cm from the inferior margin,  and 2.0 cm from the deep margin.  The remaining cut surfaces are  comprised of 85% tan-yellow, lobulated adipose tissue and 15% tan-white,  dense to stringy fibrous tissue.  Representative sections are submitted as  follows:    A1: Superior margin (slice 1), perpendicular sections  A2-A3: Slice 3, bisected  A4-A5: Slice 5, bisected  A6: Slice 6 to include biopsy clip site, bisected  A7-A8: Slice 7, bisected  A9-A10: Slice 9, bisected  A11: Inferior margin, perpendicularly sectioned    Cold ischemia time: 21 minutes  Total time in formalin: 27 hours and 10 minutes  JORDAN ZULETA  Received in formalin labeled \"right sentinel node biopsy\" and consists of  an irregular fragment of tan-yellow, lobulated adipose tissue that                           measures  3.0 x 2.0 x 1.2 cm.  Upon palpation, is an intact, tan-pink lymph node that  measures 2.2 x 1.0 x 0.8 cm.  The lymph node is serially sectioned  perpendicular to the longest axis in 2 to 3 mm increments to reveal smooth  and homogenous cut surfaces.  The lymph node is entirely submitted in 2  cassettes labeled B1-B2.  Cold ischemia time: Not provided; total time in  formalin: 27 hours.  JORDAN HAMMONDS  Received in formalin " "labeled \"extended inferior and posterior margin\" and  consists of an irregular fragment of fibroadipose tissue that measures 4.2 x  3.1 x 1.7 cm in thickness.  A stitch is present on one surface designating  the presumed new margin, which is inked blue.  The opposing surface is  inked orange.  The specimen is serially sectioned perpendicular to the  longest axis to reveal 85% tan-yellow, lobulated adipose tissue and 15%  tan-white, stringy fibrous tissue with no discrete masses or lesions  identified.  The specimen is entirely and sequentially submitted in                           6  cassettes labeled C1-C6.  Cold ischemic time: Not provided; total time in  formalin: 28 hours.  MJM    REVIEWED, DIAGNOSED AND ELECTRONICALLY  SIGNED BY:    Le Alanis D.O.  CPT CODES:  88307x3          NM [place] Node Injection Only    Result Date: 12/20/2023  Narrative: This procedure was auto-finalized with no dictation required.    Mammo Breast Placement Device Initial Without Biopsy, Mammo Breast Specimen    Result Date: 12/18/2023  Narrative: LEFT BREAST NEEDLE LOCALIZATION  CLINICAL HISTORY: 68-year-old patient presents for preoperative wire localization of a biopsy-proven malignancy in the left 8:00 region  TECHNIQUE:  After obtaining informed consent and performing a \"time-out\" procedure, the left breast was breast was positioned in the alphanumeric grid compression paddle from a medial approach.  A ML view was obtained.  The lesion undergoing localization was identified.  The breast was prepped in the usual sterile fashion and anesthestized with 2.5 cc of 1% Lidocaine without epinephrine.  Next, a 7 cm Bard needle was placed into the breast from a medial approach to the appropriate depth and orthogonal views were performed.  After confirming accurate positioning of the needle, a wire was placed through the needle and the needle was removed with the wire remaining in place.  Routine left CC and ML digital " mammographic images were obtained with a BB placed on the medial skin edge. The clip is located at the middle of the thick part of the wire, 1.7 cm from the medial skin edge. Mammographic images were sent along with the patient to the operating room. No complications occurred during this procedure.      Impression: Successful needle localization of a biopsy-proven malignancy in the left 8:00 region.  Surgical excision will be performed by Dr. Biggs.  Specimen radiograph was obtained of tissue obtained from the left breast. This confirmed the postbiopsy marking clip to be present centrally within the radiographed tissue. Findings were relayed to the operating room.  This report was finalized on 12/18/2023 12:08 PM by Dr. Maureen Peña MD.       ASSESSMENT: The patient is a very pleasant 68 y.o. female  with abdominal fat stranding      PLAN:    1.  Abdominal fat stranding:  A.  I did go over the scan results with the patient from September 9, 2023.  It did reveal stability of her abdominal fat stranding.  No evidence of new or progressive abnormalities.  She did have a small prominent portacaval lymph node which is essentially stable compared to 2009.  B.  At this point the patient will follow-up with me in 1 year with repeated CT scan.    2.  Splenomegaly:  A.  Spleen size 16 cm.  B.  Most likely induced by her fatty liver.    3.  Left inner lower quadrant breast cancer T1 a N0 M0 stage Ia:  A.  I did go over the final pathology report in details with the patient from December 18, 2023.  The patient has 4 mm low-grade invasive ductal carcinoma ER/OH strongly +95 and 90% with HER2/willy negative IHC score of 0.  Focal positive margin for DCIS.  I will follow-up on the final pathology report from the second lumpectomy.  B.  The patient will be treated with adjuvant hormonal therapy using anastrozole 1 mg daily.  C. We reviewed the potential side effects of anastrozole including hot flashes, vaginal dryness,  joint pain, decrease in bone density, and mood or sleep disturbance.  D.  She will follow-up with me in 3 months to evaluate for treatment tolerance.  E.  I will set her up to meet with radiation oncology to have further discussion about adjuvant radiotherapy.      4.  Bone health:  A.  I will obtain baseline bone density.  B.  I will start the patient on calcium vitamin D daily.    FOLLOW UP: 3 months with labs.    Alexey Ocampo MD  1/8/2024

## 2024-01-08 ENCOUNTER — OFFICE VISIT (OUTPATIENT)
Dept: ONCOLOGY | Facility: CLINIC | Age: 69
End: 2024-01-08
Payer: COMMERCIAL

## 2024-01-08 VITALS
DIASTOLIC BLOOD PRESSURE: 59 MMHG | OXYGEN SATURATION: 96 % | WEIGHT: 217 LBS | SYSTOLIC BLOOD PRESSURE: 137 MMHG | TEMPERATURE: 97 F | RESPIRATION RATE: 18 BRPM | HEART RATE: 93 BPM | BODY MASS INDEX: 37.05 KG/M2 | HEIGHT: 64 IN

## 2024-01-08 DIAGNOSIS — Z13.820 ENCOUNTER FOR SCREENING FOR OSTEOPOROSIS: ICD-10-CM

## 2024-01-08 DIAGNOSIS — C50.911 MALIGNANT NEOPLASM OF RIGHT BREAST IN FEMALE, ESTROGEN RECEPTOR POSITIVE, UNSPECIFIED SITE OF BREAST: ICD-10-CM

## 2024-01-08 DIAGNOSIS — Z17.0 MALIGNANT NEOPLASM OF RIGHT BREAST IN FEMALE, ESTROGEN RECEPTOR POSITIVE, UNSPECIFIED SITE OF BREAST: ICD-10-CM

## 2024-01-08 DIAGNOSIS — D72.823 LEUKEMOID REACTION: Primary | ICD-10-CM

## 2024-01-08 DIAGNOSIS — R93.5 ABNORMAL COMPUTED TOMOGRAPHY ANGIOGRAPHY (CTA) OF ABDOMEN AND PELVIS: ICD-10-CM

## 2024-01-08 PROCEDURE — 99214 OFFICE O/P EST MOD 30 MIN: CPT | Performed by: INTERNAL MEDICINE

## 2024-01-08 RX ORDER — ASPIRIN 81 MG
1 TABLET, DELAYED RELEASE (ENTERIC COATED) ORAL DAILY
Qty: 30 TABLET | Refills: 5 | Status: SHIPPED | OUTPATIENT
Start: 2024-01-08

## 2024-01-08 RX ORDER — ANASTROZOLE 1 MG/1
1 TABLET ORAL DAILY
Qty: 30 TABLET | Refills: 5 | Status: SHIPPED | OUTPATIENT
Start: 2024-01-08

## 2024-01-09 ENCOUNTER — PATIENT OUTREACH (OUTPATIENT)
Dept: CASE MANAGEMENT | Facility: OTHER | Age: 69
End: 2024-01-09
Payer: COMMERCIAL

## 2024-01-09 LAB
CYTO UR: NORMAL
LAB AP CASE REPORT: NORMAL
LAB AP CLINICAL INFORMATION: NORMAL
LAB AP SPECIAL STAINS: NORMAL
PATH REPORT.FINAL DX SPEC: NORMAL
PATH REPORT.GROSS SPEC: NORMAL

## 2024-01-09 NOTE — OUTREACH NOTE
AMBULATORY CASE MANAGEMENT NOTE    Name and Relationship of Patient/Support Person: Shaji Rosalba Bisi - Self    Patient Outreach    Patient reports she is feeling well.  Had initial consultation with Dr. Ocampo yesterday. Awaiting appointment with radiation oncology.  Patient had multiple questions regarding disease state, type of treatments, FMLA. Patient assured that radiation oncology will reach out for appointment, it may take a few days.  Once she meets with Dr. Wilson he will elaborate on treatment and treatment schedule.  I also assured patient that Nury Ocampo and Steve will collaborate to determine best course of action for her.  Education provided on actions she can take prior to treatment to included proper nutrition and hydration and adequate rest.        Education Documentation  Fluid/Food Intake, taught by Judy Mireles, RN at 1/9/2024  5:01 PM.  Learner: Patient  Readiness: Acceptance  Method: Explanation  Response: Verbalizes Understanding          Judy RIVAS  Ambulatory Case Management    1/9/2024, 17:01 EST

## 2024-01-10 ENCOUNTER — HOSPITAL ENCOUNTER (OUTPATIENT)
Dept: RADIATION ONCOLOGY | Facility: HOSPITAL | Age: 69
Setting detail: RADIATION/ONCOLOGY SERIES
Discharge: HOME | End: 2024-01-10
Payer: COMMERCIAL

## 2024-01-10 ENCOUNTER — OFFICE VISIT (OUTPATIENT)
Dept: RADIATION ONCOLOGY | Facility: HOSPITAL | Age: 69
End: 2024-01-10
Payer: COMMERCIAL

## 2024-01-10 VITALS
RESPIRATION RATE: 20 BRPM | DIASTOLIC BLOOD PRESSURE: 59 MMHG | BODY MASS INDEX: 36.89 KG/M2 | HEIGHT: 64 IN | SYSTOLIC BLOOD PRESSURE: 139 MMHG | WEIGHT: 216.1 LBS | HEART RATE: 89 BPM

## 2024-01-10 DIAGNOSIS — C50.312 MALIGNANT NEOPLASM OF LOWER-INNER QUADRANT OF LEFT BREAST IN FEMALE, ESTROGEN RECEPTOR POSITIVE: Primary | ICD-10-CM

## 2024-01-10 DIAGNOSIS — Z17.0 MALIGNANT NEOPLASM OF LOWER-INNER QUADRANT OF LEFT BREAST IN FEMALE, ESTROGEN RECEPTOR POSITIVE: Primary | ICD-10-CM

## 2024-01-10 NOTE — PROGRESS NOTES
New Patient Office Visit      Encounter Date: 01/10/2024   Patient Name: Rosalba Girard  YOB: 1955   Medical Record Number: [id number]   Primary Diagnosis: No primary diagnosis found.   Cancer Staging: Cancer Staging   No matching staging information was found for the patient.      Chief Complaint:    Chief Complaint   Patient presents with    Breast Cancer       History of Present Illness: Rosalba Girard is a 68 y.o. female who is here for consultation regarding her invasive carcinoma of the left breast. This was identified following an abnormality identified on screening mammography.     Screening mammography 7/2023 - microcalcifications over a low lying lymph node in UOQ of right breast, oval asymmetries in left breast  Diagnostic mammogram/US 8/16/23 - Right breast - 9:00 position of right breast 16 cfn: lobulated lymph node felt to represent mammogram correlate. Recommendation for US guided core needle biopsy and FNA. Left breast - hypoechoic duct at 8:00 3cfn with recommendation for US guided core needle biopsy  US guided core biopsies 10/6/23 - left breast specimen: +/+/- invasive carcinoma with limited DCIS. Right breast specimen benign.   Left lumpectomy and SLN evaluation - grade 1 mucinous carcinoma measuring 3mm with associated DCIS involving the medial margin focally. Margins negative for invasive carcinoma. 0/1 SLN. pT1a pN0   Repeat excision for extended medial margin 1/4/24 - residual low grade DCIS resected with margins negative by 7 mm.     She is on anastrazole, and follows with Dr. Ocampo.     Age at menarche:  20  Age at menopause:  51  Hormone replacement therapy:  Yes, pt reports patch for 20 years but stopped 10 years ago  Personal history of breast cancer:  No  Family history of breast cancer:  Maternal Aunt  Radiation to chest before age of 32:  No  Age of first live birth:  37    Prior Radiation History: no  Pacemaker or ICD:  no  Pregnant or Nursing: no    Subjective      Review of Systems: Review of Systems   Constitutional:  Positive for fatigue.   Respiratory:  Positive for wheezing.         Pt reports history of Asthma, but has had increased wheezing since COVID in beginning of 12/2023   Gastrointestinal:  Positive for diarrhea and nausea.        Pt reports having IBS & has had a Cholecystectomy, so she does have int diarrhea if she does not watch what she eats    Pt reports recent nausea; pt has PRN Zofran   Musculoskeletal:  Positive for arthralgias and gait problem.        Pt reports walking w/ cane and stiffness after left tibia surgery d/t broken tibia after a fall; pt also reports fibromyalgia.   Neurological:  Positive for headaches.        Pt reports chronic Migraines w/ vision changes - generally will 1-2 each month       Past Medical History:   Past Medical History:   Diagnosis Date    Asthma     Breast cancer     Chronic superficial dermatitis     Closed fracture of left tibial plateau with routine healing     Dietary counseling     Encounter for routine adult health examination     Fibromyalgia     GERD (gastroesophageal reflux disease)     Hair or hair follicle disorder     History of anemia     Hives     Hypertension     Irritable bowel disease     Malaise and fatigue     Myalgia and myositis     PONV (postoperative nausea and vomiting)     on occasion     Prediabetes     Rash and nonspecific skin eruption     S/P ORIF (open reduction internal fixation) fracture  left tibial plateau  12/21/2016    Sinusitis, acute maxillary     Tibial plateau fracture, left 12/21/2016    UTI (urinary tract infection)     Wears glasses        Past Surgical History:   Past Surgical History:   Procedure Laterality Date    BLADDER SURGERY  2004    Probable anterior repair x 2 ( Dr. Noble)    BREAST BIOPSY Right     BREAST LUMPECTOMY Right 12/18/2023    Re-Excision - 1/4/24    CHOLECYSTECTOMY  2012    COLONOSCOPY W/ POLYPECTOMY  2015    [facility]    ECTOPIC PREGNANCY SURGERY      HERNIA REPAIR      umbilical     TIBIAL PLATEAU OPEN REDUCTION INTERNAL FIXATION Left 2016    LEFT TIBIAL PLATEAU OPEN REDUCTION INT FIXATION;  Surgeon: Constantine Durbin MD;  Location: [facility];  Service:     TOTAL ABDOMINAL HYSTERECTOMY WITH SALPINGO OOPHORECTOMY Bilateral 1998    bleeding/pain Dr Noble       Family History:   Family History   Problem Relation Age of Onset    Osteoarthritis Mother     Aneurysm Mother     Cancer Father         Lung    Heart disease Father     Heart attack Father     Stroke Father     Hypertension Sister     Hypertension Brother     Breast cancer Maternal Aunt     Ovarian cancer Paternal Aunt     Uterine cancer Paternal Aunt     Cancer Maternal Grandfather     Depression Other     Diabetes Other     Osteoarthritis Other     Rheum arthritis Other     Tuberculosis Other     Bleeding Disorder Other     Other Other     Colon cancer Other        Social History:   Social History     Socioeconomic History    Marital status:    Tobacco Use    Smoking status: Former     Packs/day: 2.00     Years: 10.00     Additional pack years: 0.00     Total pack years: 20.00     Types: Cigarettes     Quit date:      Years since quittin.0    Smokeless tobacco: Never   Vaping Use    Vaping Use: Never used   Substance and Sexual Activity    Alcohol use: No     Comment: Rarely    Drug use: No    Sexual activity: Not Currently       Medications:     Current Outpatient Medications:     albuterol sulfate  (90 Base) MCG/ACT inhaler, Inhale 2 puffs Every 4 (Four) Hours As Needed for Wheezing or Shortness of Air., Disp: 8.5 g, Rfl: 1    anastrozole (ARIMIDEX) 1 MG tablet, Take 1 tablet by mouth Daily., Disp: 30 tablet, Rfl: 5    aspirin 81 MG EC tablet, Take 1 tablet by mouth Daily., Disp: , Rfl:     bisoprolol-hydrochlorothiazide (ZIAC) 2.5-6.25 MG per tablet, Take 1 tablet by mouth Daily., Disp: 90 tablet, Rfl: 3    Calcium  Carb-Cholecalciferol 600-5 MG-MCG tablet, Take 1 tablet by mouth Daily., Disp: 30 tablet, Rfl: 5    ondansetron ODT (ZOFRAN-ODT) 8 MG disintegrating tablet, Place 1 tablet on the tongue Every 8 (Eight) Hours As Needed for Nausea or Vomiting., Disp: 15 tablet, Rfl: 0    oxybutynin XL (Ditropan XL) 10 MG 24 hr tablet, Take 1 tablet by mouth Daily., Disp: 30 tablet, Rfl: 1    azithromycin (Zithromax Z-Aramis) 250 MG tablet, Take 2 tablets the first day, then 1 tablet daily for 4 days. (Patient not taking: Reported on 1/10/2024), Disp: 6 tablet, Rfl: 0    HYDROcodone-acetaminophen (NORCO) 5-325 MG per tablet, Take 1 tablet by mouth Every 6 (Six) Hours As Needed. (Patient not taking: Reported on 1/10/2024), Disp: 7 tablet, Rfl: 0    nitrofurantoin, macrocrystal-monohydrate, (Macrobid) 100 MG capsule, Take 1 capsule by mouth 2 (Two) Times a Day. (Patient not taking: Reported on 1/10/2024), Disp: 14 capsule, Rfl: 0    predniSONE (DELTASONE) 20 MG tablet, Take 1 tablet by mouth 2 (Two) Times a Day. (Patient not taking: Reported on 1/10/2024), Disp: 14 tablet, Rfl: 0    promethazine-dextromethorphan (PROMETHAZINE-DM) 6.25-15 MG/5ML syrup, Take 2.5 mL by mouth 4 (Four) Times a Day As Needed for Cough. (Patient not taking: Reported on 1/10/2024), Disp: 120 mL, Rfl: 0    Current Facility-Administered Medications:     cyanocobalamin injection 1,000 mcg, 1,000 mcg, Intramuscular, Q28 Days, Claire Burton PA-C, 1,000 mcg at 02/05/19 1058    Facility-Administered Medications Ordered in Other Visits:     piflufolastat F 18 (PYLARIFY) injection 1 dose, 1 dose, Intravenous, Once in imaging, Alejandro Biggs MD    Allergies:   Allergies   Allergen Reactions    Myrbetriq [Mirabegron] Itching    Tramadol Rash       Measures:   Advanced Care Plan: N Advanced Care Planning was discussed. The patient does not have a living will documented in the medical record and declined to perform one today.  KPS/Quality of Life: 80 - Restricted  "Physical Activity  ECOG: (1) Restricted in physically strenuous activity, ambulatory and able to do work of light nature      Objective     Physical Exam:   Vital Signs:   Vitals:    01/10/24 1412   BP: 139/59   Pulse: 89   Resp: 20   Weight: 98 kg (216 lb 1.6 oz)   Height: 162.6 cm (64\")   PainSc: 0-No pain     Body mass index is 37.09 kg/m².     Constitutional: The patient is a well-developed, well-nourished female  in no acute distress.  Alert and oriented ×3.  General: NAD, sitting comfortably  Eye: EOMI, anicteric sclerae  HENT: NC/AT, MMM  Neck: No JVD or cervical lymphadenopathy  Respiratory: Symmetric expansion, nonlabored respiration  Cardiovascular: Regular rate and rhythm.  No murmurs, rubs, or gallops are appreciated.  Abdomen: nontender, nondistended.   Musculoskeletal: No obvious joint deformities, range of motion intact in all 4 extremities  Neuro: Alert oriented x3, cranial nerves III through XII are grossly intact, with no focal neurological deficits noted on exam.  Psych: Mood and affect appropriate  Breast - left breast incision and left axillary incision healing well      Assessment / Plan      Assessment/Plan:   Rosalba Girard is a pleasant 68 y.o. 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 Her tumor measured 3 mm and there was no LVI present. Margins were negative for invasive tumor. She had focally positive DCIS involving the medial margin with the closest distance from invasive cancer being 1.5 cm. On 1/4/24, she had a repeat excision for extended medial margins, at which residual low grade DCIS was identified and extended margins were negative by 7 mm.     She understands that the role of radiation therapy after a lumpectomy is to decrease the risk of an ipsilateral breast tumor recurrence. She has established care with Dr. Ocampo and is on anastrazole.    Thankfully, due to her " favorable  characteristics, she has many options for radiation.   We discussed options including whole breast treatment and partial breast treatment (accelerated or moderately hypofractionated). Given her age and favorable tumor characteristics, we also discussed literature surrounding omission of radiation treatment.     She is interested in treatment and is leaning most towards 5 fraction APBI. I would like to wait until she is ~5-6 weeks from surgery to allow time for wound healing. She will return for a re-evaluation with same day CT simulation if appropriate.             There are no diagnoses linked to this encounter.     Follow Up:  No follow-ups on file.      Time:   I spent 65 minutes on this encounter today, 01/10/24. Activities that took place during this time include: preparing to see the patient, obtaining history, reviewing separately obtained history, performing a medically appropriate examination and evaluation, counseling and educating the patient, ordering medications/tests/procedures, communicating with other healthcare providers, documenting clinical information in the health record, and coordinating care for this patient.     Sincerely,        Melissa Dey MD  Radiation Oncology  This document has been signed by Melissa Dey MD on January 10, 2024 16:02 EST     NOTICE TO PATIENTS:   At [facility], we believe that sharing information builds trust and better relationships. You are receiving this note because you recently visited [facility]. It is possible you will see health information before a provider has talked with you about it. This kind of information can be easy to misunderstand. To help you fully understand what it means for your health, we urge you to discuss this note with your provider.

## 2024-01-11 ENCOUNTER — DOCUMENTATION (OUTPATIENT)
Dept: NUTRITION | Facility: HOSPITAL | Age: 69
End: 2024-01-11
Payer: COMMERCIAL

## 2024-01-11 ENCOUNTER — TELEPHONE (OUTPATIENT)
Dept: RADIATION ONCOLOGY | Facility: HOSPITAL | Age: 69
End: 2024-01-11
Payer: COMMERCIAL

## 2024-01-11 NOTE — PROGRESS NOTES
[facility] Nutrition    Referral received per patient request.  Patient requesting information regarding nutritional recommendations with breast cancer diagnosis, survivorship and weight management.    1/18/24  Phone consult attempted to address patient's request for nutritional recommendations with a breast cancer diagnosis and weight management. No answer; VM left requesting either a return phone call or availability of patient for consultation when she begins radiation.

## 2024-01-11 NOTE — TELEPHONE ENCOUNTER
Called patient and left a VM to schedule Re-Evaluation with Dr. Dey for Monday, 2/12/24 at 1:00 pm with CT Sim to follow at 2:00 pm.  Contact information provided and requested that patient return my call to confirm or re-schedule appointments.

## 2024-01-12 ENCOUNTER — TELEPHONE (OUTPATIENT)
Dept: ONCOLOGY | Facility: CLINIC | Age: 69
End: 2024-01-12

## 2024-01-12 ENCOUNTER — TELEPHONE (OUTPATIENT)
Dept: RADIATION ONCOLOGY | Facility: HOSPITAL | Age: 69
End: 2024-01-12
Payer: COMMERCIAL

## 2024-01-12 NOTE — TELEPHONE ENCOUNTER
Returned patient's call to clarify FMLA paperwork.  Will complete paperwork next week and call patient for confirmation before sending.  Patient is agreeable to this plan.

## 2024-01-23 ENCOUNTER — TELEPHONE (OUTPATIENT)
Dept: RADIATION ONCOLOGY | Facility: HOSPITAL | Age: 69
End: 2024-01-23
Payer: COMMERCIAL

## 2024-01-23 NOTE — TELEPHONE ENCOUNTER
Called patient to review [place] paperwork.  Will fax documents to number provided and will mail a copy to patient.  Patient has my contact information and will call with additional questions or concerns should they arise.

## 2024-01-29 ENCOUNTER — TELEPHONE (OUTPATIENT)
Dept: RADIATION ONCOLOGY | Facility: HOSPITAL | Age: 69
End: 2024-01-29
Payer: COMMERCIAL

## 2024-01-29 NOTE — TELEPHONE ENCOUNTER
Called patient to re-schedule Re-Evaluation, to allow patient to adequately heal, with Dr. Dey for Monday, 2/26/24 at 1:00 pm with CT Sim to follow at 2:00 pm.  Patient verbalized an understanding of date and time change of appointments.  Contact information provided and patient will call with any questions or concerns should they arise.

## 2024-02-26 ENCOUNTER — HOSPITAL ENCOUNTER (OUTPATIENT)
Dept: RADIATION ONCOLOGY | Facility: HOSPITAL | Age: 69
Setting detail: RADIATION/ONCOLOGY SERIES
Discharge: HOME | End: 2024-02-26
Payer: COMMERCIAL

## 2024-02-26 ENCOUNTER — HOSPITAL ENCOUNTER (OUTPATIENT)
Dept: RADIATION ONCOLOGY | Facility: HOSPITAL | Age: 69
Discharge: HOME | End: 2024-02-26
Payer: COMMERCIAL

## 2024-02-26 ENCOUNTER — OFFICE VISIT (OUTPATIENT)
Dept: RADIATION ONCOLOGY | Facility: HOSPITAL | Age: 69
End: 2024-02-26
Payer: COMMERCIAL

## 2024-02-26 VITALS
OXYGEN SATURATION: 97 % | DIASTOLIC BLOOD PRESSURE: 85 MMHG | TEMPERATURE: 97.7 F | WEIGHT: 213.5 LBS | BODY MASS INDEX: 36.45 KG/M2 | HEIGHT: 64 IN | SYSTOLIC BLOOD PRESSURE: 140 MMHG | RESPIRATION RATE: 20 BRPM | HEART RATE: 109 BPM

## 2024-02-26 DIAGNOSIS — C50.312 MALIGNANT NEOPLASM OF LOWER-INNER QUADRANT OF LEFT BREAST IN FEMALE, ESTROGEN RECEPTOR POSITIVE: Primary | ICD-10-CM

## 2024-02-26 DIAGNOSIS — Z17.0 MALIGNANT NEOPLASM OF LOWER-INNER QUADRANT OF LEFT BREAST IN FEMALE, ESTROGEN RECEPTOR POSITIVE: Primary | ICD-10-CM

## 2024-02-26 PROCEDURE — G0463 HOSPITAL OUTPT CLINIC VISIT: HCPCS

## 2024-02-26 PROCEDURE — 77290 THER RAD SIMULAJ FIELD CPLX: CPT | Performed by: RADIOLOGY

## 2024-02-26 PROCEDURE — 77333 RADIATION TREATMENT AID(S): CPT | Performed by: RADIOLOGY

## 2024-02-26 RX ORDER — MULTIPLE VITAMINS W/ MINERALS TAB 9MG-400MCG
1 TAB ORAL DAILY
COMMUNITY

## 2024-02-26 NOTE — PROGRESS NOTES
Follow Up Office Visit      Encounter Date: 02/26/2024   Patient Name: Rosalba Girard  YOB: 1955   Medical Record Number: [id number]   Primary Diagnosis: No primary diagnosis found.   Cancer Staging: Cancer Staging   No matching staging information was found for the patient.                Cancer Staging   No matching staging information was found for the patient.          Chief Complaint:    Chief Complaint   Patient presents with    Breast Cancer       Oncologic History: Rosalba Girard is a 68 y.o. female here for follow up regarding her invasive carcinoma of the left breast. This was identified following an abnormality identified on screening mammography.      Screening mammography 7/2023 - microcalcifications over a low lying lymph node in UOQ of right breast, oval asymmetries in left breast  Diagnostic mammogram/US 8/16/23 - Right breast - 9:00 position of right breast 16 cfn: lobulated lymph node felt to represent mammogram correlate. Recommendation for US guided core needle biopsy and FNA. Left breast - hypoechoic duct at 8:00 3cfn with recommendation for US guided core needle biopsy  US guided core biopsies 10/6/23 - left breast specimen: +/+/- invasive carcinoma with limited DCIS. Right breast specimen benign.   Left lumpectomy and SLN evaluation - grade 1 mucinous carcinoma measuring 3mm with associated DCIS involving the medial margin focally. Margins negative for invasive carcinoma. 0/1 SLN. pT1a pN0   Repeat excision for extended medial margin 1/4/24 - residual low grade DCIS resected with margins negative by 7 mm.      She is on anastrazole, and follows with Dr. Ocampo.     Interval History: Has had time to think about radiation options. Here for re-eval and would like to move forward. No issues w wound discharge, incisions healed well. On anastrazole, tolerating it well    Subjective      Review of Systems: Review of Systems  "  Constitutional:  Positive for fatigue.   Respiratory:  Positive for shortness of breath and wheezing.         Pt reports chronic SOA w/ exertion w/ int wheezing; this generally resolves on it's own.   Cardiovascular:  Positive for palpitations.        Pt reports having heart palpitations when she drinks caffeine; pt reports most recent episode this past Thurs, 2/22/24; pt is followed by a Cardiologist.   Gastrointestinal:  Positive for nausea.        Pt reports that she continues to have int nausea that resolves w/ Zofran; pt reports that she must eat small, frequent meals d/t hiatal hernia.   Genitourinary:  Positive for frequency.        Pt reports chronic int urinary incontinence; pt was seeing a Urologist but the office cancelled her last appt & she has not yet re-scheduled; pt reports having new onset bladder spasms; she will f/u w/ Urology.   Neurological:  Positive for dizziness, weakness and light-headedness.        Pt reports that she continues to have int episodes of dizziness/lightheadedness w/ generalized weakness, but this does not occur as often as it used to.       The following portions of the patient's history were reviewed and updated as appropriate: allergies, current medications, past family history, past medical history, past social history, past surgical history and problem list.    Measures:   KPS/Quality of Life: 80 - Restricted Physical Activity      Objective     Physical Exam:   Vital Signs:   Vitals:    02/26/24 1311   BP: 140/85   Pulse: 109   Resp: 20   Temp: 97.7 °F (36.5 °C)   TempSrc: Temporal   SpO2: 97%   Weight: 96.8 kg (213 lb 8 oz)   Height: 162.6 cm (64\")   PainSc: 0-No pain     Body mass index is 36.65 kg/m².     Constitutional: No acute distress, sitting comfortably  Eye: EOMI, anicteric sclerae  HENT: NC/AT, MMM   Respiratory: Symmetric expansion, nonlabored respiration  MSK: ROM intact in all four extremities, no obvious deformities  Neuro: Alert, oriented x3, CN3-12 " grossly intact.   Psych: Appropriate mood and affect.  Breast - L breast incisions healed well          Assessment / Plan        Assessment/Plan:     There are no diagnoses linked to this encounter.    Rosalba Girard is a pleasant 68 y.o. 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 Her tumor measured 3 mm and there was no LVI present. Margins were negative for invasive tumor. She had focally positive DCIS involving the medial margin with the closest distance from invasive cancer being 1.5 cm. On 1/4/24, she had a repeat excision for extended medial margins, at which residual low grade DCIS was identified and extended margins were negative by 7 mm.      She understands that the role of radiation therapy after a lumpectomy is to decrease the risk of an ipsilateral breast tumor recurrence. She has established care with Dr. Ocampo and is on anastrazole. Thankfully, due to her favorable  characteristics, she has many options for radiation. We discussed options including whole breast treatment and partial breast treatment (accelerated or moderately hypofractionated). Given her age and favorable tumor characteristics, we also discussed literature surrounding omission of radiation treatment.    She has chosen to have moderately hypofractionated whole breast radiation therapy. She will have her CT simulation for treatment planning today.     Follow Up:   Return today (on 2/26/2024) for CT SIM next visit.        Time:   I spent 35 minutes on this encounter today, 02/26/24. Activities that took place during this time include:   - preparing to see the patient  - obtaining and reviewing separately obtained history  - performing a medically appropriate examination and evaluation  - counseling and educating the patient  - ordering medications/tests/procedures  - communicating with other healthcare providers  -  documenting clinical information in the health record  - coordinating care for this patient.     Sincerely,        Melissa Dey MD  Radiation Oncology  This document has been signed by Melissa Dey MD on February 26, 2024 14:14 EST           NOTICE TO PATIENTS  At [facility], we believe that sharing information builds trust and better relationships. You are receiving this note because you recently visited [facility]. It is possible you will see health information before a provider has talked with you about it. This kind of information can be easy to misunderstand. To help you fully understand what it means for your health, we urge you to discuss this note with your provider.

## 2024-03-01 ENCOUNTER — HOSPITAL ENCOUNTER (OUTPATIENT)
Dept: RADIATION ONCOLOGY | Facility: HOSPITAL | Age: 69
Setting detail: RADIATION/ONCOLOGY SERIES
Discharge: HOME | End: 2024-03-01
Payer: COMMERCIAL

## 2024-03-01 ENCOUNTER — PATIENT OUTREACH (OUTPATIENT)
Dept: CASE MANAGEMENT | Facility: OTHER | Age: 69
End: 2024-03-01
Payer: COMMERCIAL

## 2024-03-01 PROCEDURE — 77334 RADIATION TREATMENT AID(S): CPT | Performed by: RADIOLOGY

## 2024-03-01 PROCEDURE — 77295 3-D RADIOTHERAPY PLAN: CPT | Performed by: RADIOLOGY

## 2024-03-01 PROCEDURE — 77300 RADIATION THERAPY DOSE PLAN: CPT | Performed by: RADIOLOGY

## 2024-03-01 NOTE — OUTREACH NOTE
AMBULATORY CASE MANAGEMENT NOTE    Name and Relationship of Patient/Support Person: Rosalba Girard - Self  Self    Patient Outreach    Patient reports she is fatigued, continues to work.  Quality of sleep assessed, patient states she is often awakened by neighbor's music.  Medications reviewed with patient, no longer taking pain medication.  Is requesting B12 injections, states her PCP does not have them.  RN-ACM explained that B12 injections are not normally used in clinic but to ask at her next visit. Patient reports she is anxious regarding her illness and upcoming treatments. Empathy and active listening provided.    Judy RIVAS  Ambulatory Case Management    3/1/2024, 14:42 EST

## 2024-03-11 ENCOUNTER — HOSPITAL ENCOUNTER (OUTPATIENT)
Dept: RADIATION ONCOLOGY | Facility: HOSPITAL | Age: 69
Discharge: HOME | End: 2024-03-11
Payer: COMMERCIAL

## 2024-03-11 PROCEDURE — 77280 THER RAD SIMULAJ FIELD SMPL: CPT | Performed by: RADIOLOGY

## 2024-03-12 ENCOUNTER — HOSPITAL ENCOUNTER (OUTPATIENT)
Dept: RADIATION ONCOLOGY | Facility: HOSPITAL | Age: 69
Discharge: HOME | End: 2024-03-12

## 2024-03-12 VITALS — BODY MASS INDEX: 36.92 KG/M2 | WEIGHT: 215.1 LBS

## 2024-03-12 PROCEDURE — 77412 RADIATION TX DELIVERY LVL 3: CPT | Performed by: RADIOLOGY

## 2024-03-12 PROCEDURE — 77387 GUIDANCE FOR RADJ TX DLVR: CPT | Performed by: RADIOLOGY

## 2024-03-13 ENCOUNTER — HOSPITAL ENCOUNTER (OUTPATIENT)
Dept: RADIATION ONCOLOGY | Facility: HOSPITAL | Age: 69
Discharge: HOME | End: 2024-03-13

## 2024-03-13 PROCEDURE — 77387 GUIDANCE FOR RADJ TX DLVR: CPT | Performed by: RADIOLOGY

## 2024-03-13 PROCEDURE — 77412 RADIATION TX DELIVERY LVL 3: CPT | Performed by: RADIOLOGY

## 2024-03-14 ENCOUNTER — HOSPITAL ENCOUNTER (OUTPATIENT)
Dept: RADIATION ONCOLOGY | Facility: HOSPITAL | Age: 69
Discharge: HOME | End: 2024-03-14

## 2024-03-14 PROCEDURE — 77387 GUIDANCE FOR RADJ TX DLVR: CPT | Performed by: RADIOLOGY

## 2024-03-14 PROCEDURE — 77412 RADIATION TX DELIVERY LVL 3: CPT | Performed by: RADIOLOGY

## 2024-03-15 ENCOUNTER — HOSPITAL ENCOUNTER (OUTPATIENT)
Dept: RADIATION ONCOLOGY | Facility: HOSPITAL | Age: 69
Discharge: HOME | End: 2024-03-15

## 2024-03-15 PROCEDURE — 77387 GUIDANCE FOR RADJ TX DLVR: CPT | Performed by: RADIOLOGY

## 2024-03-15 PROCEDURE — 77412 RADIATION TX DELIVERY LVL 3: CPT | Performed by: RADIOLOGY

## 2024-03-15 PROCEDURE — 77336 RADIATION PHYSICS CONSULT: CPT | Performed by: RADIOLOGY

## 2024-03-18 ENCOUNTER — HOSPITAL ENCOUNTER (OUTPATIENT)
Dept: RADIATION ONCOLOGY | Facility: HOSPITAL | Age: 69
Discharge: HOME | End: 2024-03-18
Payer: COMMERCIAL

## 2024-03-18 PROCEDURE — 77387 GUIDANCE FOR RADJ TX DLVR: CPT | Performed by: RADIOLOGY

## 2024-03-18 PROCEDURE — 77412 RADIATION TX DELIVERY LVL 3: CPT | Performed by: RADIOLOGY

## 2024-03-19 ENCOUNTER — DOCUMENTATION (OUTPATIENT)
Dept: NUTRITION | Facility: HOSPITAL | Age: 69
End: 2024-03-19
Payer: COMMERCIAL

## 2024-03-19 ENCOUNTER — HOSPITAL ENCOUNTER (OUTPATIENT)
Dept: RADIATION ONCOLOGY | Facility: HOSPITAL | Age: 69
Discharge: HOME | End: 2024-03-19

## 2024-03-19 VITALS — BODY MASS INDEX: 36.84 KG/M2 | WEIGHT: 214.6 LBS

## 2024-03-19 PROCEDURE — 77387 GUIDANCE FOR RADJ TX DLVR: CPT | Performed by: RADIOLOGY

## 2024-03-19 PROCEDURE — 77412 RADIATION TX DELIVERY LVL 3: CPT | Performed by: RADIOLOGY

## 2024-03-19 NOTE — PROGRESS NOTES
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 Margins negative for invasive carcinoma. 0/1 SLN. pT1a pN0 / Repeat excision for extended medial margin 24 - residual low grade DCIS resected with margins negative by 7 mm     Chemotherapy: Anastrazole     Radiation:  Patient has completed 6/15 treatments    Patient Active Problem List   Diagnosis    Essential hypertension, benign    GERD (gastroesophageal reflux disease)    IBS (irritable bowel syndrome)    Leukocytosis    Mixed incontinence    Fecal incontinence    Abnormal computed tomography angiography (CTA) of abdomen and pelvis     Food / Nutrition Related History       Hydration Status     Goal:  100+ ounces    Enteral Feeding       Anthropometric Measurements     Height:    Ht Readings from Last 1 Encounters:   24 162.6 cm (64\")       Weight:    Wt Readings from Last 1 Encounters:   24 97.3 kg (214 lb 9.6 oz)       BMI:  36.84 / morbid obesity    Weight Change: 12 lbs weight gain past year    Review of Lab Data (Time Frame - 1 month / 2 month)               Component  Ref Range & Units 3 mo ago  (12/15/23) 3 mo ago  (23) 3 mo ago  (23) 3 mo ago  (23) 4 mo ago  (23) 6 mo ago  (23) 1 yr ago  (2/15/23)   Glucose  65 - 99 mg/dL 102 High   99 R 104 High       BUN  8 - 23 mg/dL 25 High    15      Creatinine  0.57 - 1.00 mg/dL 0.72 0.60 R 0.60 R 0.59 0.60 R, CM 0.60 R, CM 0.70 R, CM   Sodium  136 - 145 mmol/L 140   143      Potassium  3.5 - 5.2 mmol/L 3.7   4.5 CM      Chloride  98 - 107 mmol/L 102   108 High       CO2  22.0 - 29.0 mmol/L 28.0   " 27.0      Calcium  8.6 - 10.5 mg/dL 9.1   9.0      Total Protein  6.0 - 8.5 g/dL 6.7   7.2      Albumin  3.5 - 5.2 g/dL 4.0   3.9      ALT (SGPT)  1 - 33 U/L 25   28      AST (SGOT)  1 - 32 U/L 20   32      Alkaline Phosphatase  39 - 117 U/L 93   87      Total Bilirubin  0.0 - 1.2 mg/dL 0.4   0.4      Globulin  gm/dL 2.7   3.3 CM      Comment: Calculated Result   A/G Ratio  g/dL 1.5   1.2      BUN/Creatinine Ratio  7.0 - 25.0 34.7 High    25.4 High       Anion Gap  5.0 - 15.0 mmol/L 10.0   8.0      eGFR  >60.0 mL/min/1.73 92.3  99.1 99.5      Resulting Agency  [facility]  [facility]  [facility]  [facility]  [facility]  [facility]                   Medication Review       Nutrition Focused Physical Findings       Nutrition Impact Symptoms   Fatigue    Physical Activity      Not my normal self, but able to be up and about with fairly normal activities    Current Nutritional Intake     Oral diet:      Oral nutritional supplements:    Intake:    Malnutrition Risk Assessment     Recent weight loss over the past 6 months:  0 = No    How much weight loss:      Eating poorly because of a decreased appetite:  0 = No    Malnutrition Screening Score:     MST = 0 or 1 Patient not at risk for malnutrition    Nutrition Diagnosis     Problem    Etiology    Signs / Symptoms      Nutrition Intervention   Follow up with patient during RAD ONC status checks.  Patient is feeling fatigued; discussed tips for management including adequate hydration, high protein and physical activity as patient is able.    Prior referral received for weight loss; patient contacted; return call not received.  During radiation, discussed the weight loss options available; patient states that work is very busy at the present time and that she will reach out when time allows her to follow up.    Goal       Monitoring / Evaluation

## 2024-03-20 ENCOUNTER — HOSPITAL ENCOUNTER (OUTPATIENT)
Dept: RADIATION ONCOLOGY | Facility: HOSPITAL | Age: 69
Discharge: HOME | End: 2024-03-20

## 2024-03-20 PROCEDURE — 77387 GUIDANCE FOR RADJ TX DLVR: CPT | Performed by: RADIOLOGY

## 2024-03-20 PROCEDURE — 77412 RADIATION TX DELIVERY LVL 3: CPT | Performed by: RADIOLOGY

## 2024-03-21 ENCOUNTER — DOCUMENTATION (OUTPATIENT)
Dept: OTHER | Facility: HOSPITAL | Age: 69
End: 2024-03-21
Payer: COMMERCIAL

## 2024-03-21 ENCOUNTER — HOSPITAL ENCOUNTER (OUTPATIENT)
Dept: RADIATION ONCOLOGY | Facility: HOSPITAL | Age: 69
Discharge: HOME | End: 2024-03-21

## 2024-03-21 PROCEDURE — 77336 RADIATION PHYSICS CONSULT: CPT | Performed by: RADIOLOGY

## 2024-03-21 PROCEDURE — 77387 GUIDANCE FOR RADJ TX DLVR: CPT | Performed by: RADIOLOGY

## 2024-03-21 PROCEDURE — 77412 RADIATION TX DELIVERY LVL 3: CPT | Performed by: RADIOLOGY

## 2024-03-21 NOTE — PROGRESS NOTES
Distress Screening Follow-up    Name: Rosalba Girard    : 1955    Diagnosis: Malignant neoplasm of lower-inner quadrant of left breast in female, estrogen receptor positive     Location of Distress Screening: Radiation Oncology    Distress Level: 6 (3/19/2024  9:00 AM)     Interventions: n/a      Comments:  SW contacted pt to follow up on Distress Screen from recent visit. SW provided introductions and explained nature of the call. Pt communicated distress is due to side effects of chemotherapy, but communicated she is doing okay. Pt stated she has been fatigued, but is resting and drinking a lot of water. SW provided understanding and normalized her emotions. SW educated pt on role and services, and encouraged pt to reach out should needs arise. PT thanked SYLVIE and was agreeable to do so.

## 2024-03-22 ENCOUNTER — HOSPITAL ENCOUNTER (OUTPATIENT)
Dept: RADIATION ONCOLOGY | Facility: HOSPITAL | Age: 69
Discharge: HOME | End: 2024-03-22

## 2024-03-22 PROCEDURE — 77387 GUIDANCE FOR RADJ TX DLVR: CPT | Performed by: RADIOLOGY

## 2024-03-22 PROCEDURE — 77412 RADIATION TX DELIVERY LVL 3: CPT | Performed by: RADIOLOGY

## 2024-03-25 ENCOUNTER — HOSPITAL ENCOUNTER (OUTPATIENT)
Dept: RADIATION ONCOLOGY | Facility: HOSPITAL | Age: 69
Discharge: HOME | End: 2024-03-25
Payer: COMMERCIAL

## 2024-03-25 PROCEDURE — 77387 GUIDANCE FOR RADJ TX DLVR: CPT | Performed by: RADIOLOGY

## 2024-03-25 PROCEDURE — 77412 RADIATION TX DELIVERY LVL 3: CPT | Performed by: RADIOLOGY

## 2024-03-26 ENCOUNTER — HOSPITAL ENCOUNTER (OUTPATIENT)
Dept: RADIATION ONCOLOGY | Facility: HOSPITAL | Age: 69
Discharge: HOME | End: 2024-03-26

## 2024-03-26 VITALS — BODY MASS INDEX: 36.73 KG/M2 | WEIGHT: 214 LBS

## 2024-03-26 PROCEDURE — 77412 RADIATION TX DELIVERY LVL 3: CPT | Performed by: RADIOLOGY

## 2024-03-26 PROCEDURE — 77387 GUIDANCE FOR RADJ TX DLVR: CPT | Performed by: RADIOLOGY

## 2024-03-27 ENCOUNTER — HOSPITAL ENCOUNTER (OUTPATIENT)
Dept: RADIATION ONCOLOGY | Facility: HOSPITAL | Age: 69
Discharge: HOME | End: 2024-03-27

## 2024-03-27 PROCEDURE — 77387 GUIDANCE FOR RADJ TX DLVR: CPT | Performed by: RADIOLOGY

## 2024-03-27 PROCEDURE — 77412 RADIATION TX DELIVERY LVL 3: CPT | Performed by: RADIOLOGY

## 2024-03-28 ENCOUNTER — HOSPITAL ENCOUNTER (OUTPATIENT)
Dept: RADIATION ONCOLOGY | Facility: HOSPITAL | Age: 69
Discharge: HOME | End: 2024-03-28

## 2024-03-28 PROCEDURE — 77412 RADIATION TX DELIVERY LVL 3: CPT | Performed by: RADIOLOGY

## 2024-03-28 PROCEDURE — 77387 GUIDANCE FOR RADJ TX DLVR: CPT | Performed by: RADIOLOGY

## 2024-03-29 ENCOUNTER — HOSPITAL ENCOUNTER (OUTPATIENT)
Dept: RADIATION ONCOLOGY | Facility: HOSPITAL | Age: 69
Discharge: HOME | End: 2024-03-29

## 2024-03-29 ENCOUNTER — PATIENT OUTREACH (OUTPATIENT)
Dept: CASE MANAGEMENT | Facility: OTHER | Age: 69
End: 2024-03-29
Payer: COMMERCIAL

## 2024-03-29 PROCEDURE — 77387 GUIDANCE FOR RADJ TX DLVR: CPT | Performed by: RADIOLOGY

## 2024-03-29 PROCEDURE — 77412 RADIATION TX DELIVERY LVL 3: CPT | Performed by: RADIOLOGY

## 2024-03-29 PROCEDURE — 77336 RADIATION PHYSICS CONSULT: CPT | Performed by: RADIOLOGY

## 2024-03-29 NOTE — OUTREACH NOTE
AMBULATORY CASE MANAGEMENT NOTE    Name and Relationship of Patient/Support Person: Rosalba Girard - Self  Self    Patient Outreach  Patient reports she is more fatigued today than after other treatments, education provided on energy conservation, maintaining diet rich in nutrients and adequate hydration.  Patient complains of discomfort left chest and axillary area. Taking hydrocodone 5-325 as needed. Advised to discuss at XRT treatment on Monday - last XRT treatment. Advised  to report swelling or edema, avoid heavy lifting. States she dose have headache after every treatment, taking tylenol PRN.  Denies needs or concerns at this time.      Education Documentation  Energy Conservation- fatigue, taught by Judy Mireles, RN at 3/29/2024 12:56 PM.  Learner: Patient  Readiness: Acceptance  Method: Explanation  Response: Verbalizes Understanding    Symptom Management - pain/discomfort, taught by Judy Mireles, RN at 3/29/2024 12:56 PM.  Learner: Patient  Readiness: Acceptance  Method: Explanation  Response: Verbalizes Understanding    Fluid/Food Intake - nutritional shakes, taught by Judy Mireles, RN at 3/29/2024 12:56 PM.  Learner: Patient  Readiness: Acceptance  Method: Explanation  Response: Verbalizes Understanding          Judy RIVAS  Ambulatory Case Management    3/29/2024, 12:57 EDT

## 2024-03-31 ENCOUNTER — APPOINTMENT (OUTPATIENT)
Dept: GENERAL RADIOLOGY | Facility: HOSPITAL | Age: 69
End: 2024-03-31
Payer: COMMERCIAL

## 2024-03-31 ENCOUNTER — APPOINTMENT (OUTPATIENT)
Dept: CT IMAGING | Facility: HOSPITAL | Age: 69
End: 2024-03-31
Payer: COMMERCIAL

## 2024-03-31 ENCOUNTER — HOSPITAL ENCOUNTER (EMERGENCY)
Facility: HOSPITAL | Age: 69
Discharge: HOME | End: 2024-03-31
Attending: EMERGENCY MEDICINE | Admitting: EMERGENCY MEDICINE
Payer: COMMERCIAL

## 2024-03-31 VITALS
TEMPERATURE: 98.2 F | WEIGHT: 214 LBS | SYSTOLIC BLOOD PRESSURE: 144 MMHG | DIASTOLIC BLOOD PRESSURE: 84 MMHG | BODY MASS INDEX: 35.65 KG/M2 | OXYGEN SATURATION: 94 % | HEART RATE: 111 BPM | HEIGHT: 65 IN | RESPIRATION RATE: 22 BRPM

## 2024-03-31 DIAGNOSIS — R09.1 PLEURISY: Primary | ICD-10-CM

## 2024-03-31 DIAGNOSIS — R07.9 CHEST PAIN, UNSPECIFIED TYPE: ICD-10-CM

## 2024-03-31 DIAGNOSIS — T66.XXXA RADIATION ADVERSE EFFECT, INITIAL ENCOUNTER: ICD-10-CM

## 2024-03-31 LAB
ALBUMIN SERPL-MCNC: 4.3 G/DL (ref 3.5–5.2)
ALBUMIN/GLOB SERPL: 1.4 G/DL
ALP SERPL-CCNC: 99 U/L (ref 39–117)
ALT SERPL W P-5'-P-CCNC: 28 U/L (ref 1–33)
ANION GAP SERPL CALCULATED.3IONS-SCNC: 11 MMOL/L (ref 5–15)
AST SERPL-CCNC: 31 U/L (ref 1–32)
BASOPHILS # BLD AUTO: 0.02 10*3/MM3 (ref 0–0.2)
BASOPHILS NFR BLD AUTO: 0.1 % (ref 0–1.5)
BILIRUB SERPL-MCNC: 0.6 MG/DL (ref 0–1.2)
BUN SERPL-MCNC: 15 MG/DL (ref 8–23)
BUN/CREAT SERPL: 25 (ref 7–25)
CALCIUM SPEC-SCNC: 9.6 MG/DL (ref 8.6–10.5)
CHLORIDE SERPL-SCNC: 99 MMOL/L (ref 98–107)
CO2 SERPL-SCNC: 26 MMOL/L (ref 22–29)
CREAT SERPL-MCNC: 0.6 MG/DL (ref 0.57–1)
DEPRECATED RDW RBC AUTO: 37.3 FL (ref 37–54)
EGFRCR SERPLBLD CKD-EPI 2021: 99.1 ML/MIN/1.73
EOSINOPHIL # BLD AUTO: 0.01 10*3/MM3 (ref 0–0.4)
EOSINOPHIL NFR BLD AUTO: 0.1 % (ref 0.3–6.2)
ERYTHROCYTE [DISTWIDTH] IN BLOOD BY AUTOMATED COUNT: 12.2 % (ref 12.3–15.4)
GEN 5 2HR TROPONIN T REFLEX: 10 NG/L
GLOBULIN UR ELPH-MCNC: 3.1 GM/DL
GLUCOSE SERPL-MCNC: 134 MG/DL (ref 65–99)
HCT VFR BLD AUTO: 46.8 % (ref 34–46.6)
HGB BLD-MCNC: 16 G/DL (ref 12–15.9)
HOLD SPECIMEN: NORMAL
IMM GRANULOCYTES # BLD AUTO: 0.08 10*3/MM3 (ref 0–0.05)
IMM GRANULOCYTES NFR BLD AUTO: 0.6 % (ref 0–0.5)
LIPASE SERPL-CCNC: 27 U/L (ref 13–60)
LYMPHOCYTES # BLD AUTO: 0.68 10*3/MM3 (ref 0.7–3.1)
LYMPHOCYTES NFR BLD AUTO: 4.9 % (ref 19.6–45.3)
MCH RBC QN AUTO: 29.2 PG (ref 26.6–33)
MCHC RBC AUTO-ENTMCNC: 34.2 G/DL (ref 31.5–35.7)
MCV RBC AUTO: 85.4 FL (ref 79–97)
MONOCYTES # BLD AUTO: 1.1 10*3/MM3 (ref 0.1–0.9)
MONOCYTES NFR BLD AUTO: 7.9 % (ref 5–12)
NEUTROPHILS NFR BLD AUTO: 12.06 10*3/MM3 (ref 1.7–7)
NEUTROPHILS NFR BLD AUTO: 86.4 % (ref 42.7–76)
NRBC BLD AUTO-RTO: 0 /100 WBC (ref 0–0.2)
NT-PROBNP SERPL-MCNC: 105.8 PG/ML (ref 0–900)
PLATELET # BLD AUTO: 219 10*3/MM3 (ref 140–450)
PMV BLD AUTO: 10.1 FL (ref 6–12)
POTASSIUM SERPL-SCNC: 4.2 MMOL/L (ref 3.5–5.2)
PROT SERPL-MCNC: 7.4 G/DL (ref 6–8.5)
QT INTERVAL: 328 MS
QT INTERVAL: 334 MS
QTC INTERVAL: 449 MS
QTC INTERVAL: 455 MS
RBC # BLD AUTO: 5.48 10*6/MM3 (ref 3.77–5.28)
SODIUM SERPL-SCNC: 136 MMOL/L (ref 136–145)
TROPONIN T DELTA: 1 NG/L
TROPONIN T SERPL HS-MCNC: 9 NG/L
WBC NRBC COR # BLD AUTO: 13.95 10*3/MM3 (ref 3.4–10.8)
WHOLE BLOOD HOLD COAG: NORMAL
WHOLE BLOOD HOLD SPECIMEN: NORMAL

## 2024-03-31 PROCEDURE — 83690 ASSAY OF LIPASE: CPT | Performed by: EMERGENCY MEDICINE

## 2024-03-31 PROCEDURE — 85025 COMPLETE CBC W/AUTO DIFF WBC: CPT | Performed by: EMERGENCY MEDICINE

## 2024-03-31 PROCEDURE — 96375 TX/PRO/DX INJ NEW DRUG ADDON: CPT

## 2024-03-31 PROCEDURE — 84484 ASSAY OF TROPONIN QUANT: CPT | Performed by: EMERGENCY MEDICINE

## 2024-03-31 PROCEDURE — 93005 ELECTROCARDIOGRAM TRACING: CPT | Performed by: EMERGENCY MEDICINE

## 2024-03-31 PROCEDURE — 71045 X-RAY EXAM CHEST 1 VIEW: CPT

## 2024-03-31 PROCEDURE — 71275 CT ANGIOGRAPHY CHEST: CPT

## 2024-03-31 PROCEDURE — 25510000001 IOPAMIDOL PER 1 ML: Performed by: EMERGENCY MEDICINE

## 2024-03-31 PROCEDURE — 99285 EMERGENCY DEPT VISIT HI MDM: CPT

## 2024-03-31 PROCEDURE — 96374 THER/PROPH/DIAG INJ IV PUSH: CPT

## 2024-03-31 PROCEDURE — 36415 COLL VENOUS BLD VENIPUNCTURE: CPT

## 2024-03-31 PROCEDURE — 25810000003 SODIUM CHLORIDE 0.9 % SOLUTION: Performed by: EMERGENCY MEDICINE

## 2024-03-31 PROCEDURE — 25010000002 ONDANSETRON PER 1 MG: Performed by: EMERGENCY MEDICINE

## 2024-03-31 PROCEDURE — 80053 COMPREHEN METABOLIC PANEL: CPT | Performed by: EMERGENCY MEDICINE

## 2024-03-31 PROCEDURE — 25010000002 MORPHINE PER 10 MG: Performed by: EMERGENCY MEDICINE

## 2024-03-31 PROCEDURE — 83880 ASSAY OF NATRIURETIC PEPTIDE: CPT | Performed by: EMERGENCY MEDICINE

## 2024-03-31 RX ORDER — KETOROLAC TROMETHAMINE 10 MG/1
10 TABLET, FILM COATED ORAL EVERY 6 HOURS PRN
Qty: 15 TABLET | Refills: 0 | Status: SHIPPED | OUTPATIENT
Start: 2024-03-31 | End: 2024-03-31

## 2024-03-31 RX ORDER — ASPIRIN 81 MG/1
324 TABLET, CHEWABLE ORAL ONCE
Status: COMPLETED | OUTPATIENT
Start: 2024-03-31 | End: 2024-03-31

## 2024-03-31 RX ORDER — KETOROLAC TROMETHAMINE 10 MG/1
10 TABLET, FILM COATED ORAL EVERY 6 HOURS PRN
Qty: 15 TABLET | Refills: 0 | Status: SHIPPED | OUTPATIENT
Start: 2024-03-31 | End: 2024-04-05

## 2024-03-31 RX ORDER — MORPHINE SULFATE 4 MG/ML
4 INJECTION, SOLUTION INTRAMUSCULAR; INTRAVENOUS ONCE
Status: COMPLETED | OUTPATIENT
Start: 2024-03-31 | End: 2024-03-31

## 2024-03-31 RX ORDER — ONDANSETRON 2 MG/ML
4 INJECTION INTRAMUSCULAR; INTRAVENOUS ONCE
Status: COMPLETED | OUTPATIENT
Start: 2024-03-31 | End: 2024-03-31

## 2024-03-31 RX ORDER — SODIUM CHLORIDE 0.9 % (FLUSH) 0.9 %
10 SYRINGE (ML) INJECTION AS NEEDED
Status: DISCONTINUED | OUTPATIENT
Start: 2024-03-31 | End: 2024-03-31 | Stop reason: HOSPADM

## 2024-03-31 RX ADMIN — MORPHINE SULFATE 4 MG: 4 INJECTION, SOLUTION INTRAMUSCULAR; INTRAVENOUS at 14:39

## 2024-03-31 RX ADMIN — ONDANSETRON 4 MG: 2 INJECTION INTRAMUSCULAR; INTRAVENOUS at 14:38

## 2024-03-31 RX ADMIN — SODIUM CHLORIDE 1000 ML: 9 INJECTION, SOLUTION INTRAVENOUS at 15:22

## 2024-03-31 RX ADMIN — IOPAMIDOL 75 ML: 755 INJECTION, SOLUTION INTRAVENOUS at 14:50

## 2024-03-31 RX ADMIN — ASPIRIN 162 MG: 81 TABLET, CHEWABLE ORAL at 15:42

## 2024-03-31 NOTE — Clinical Note
[facility] EMERGENCY DEPARTMENT  [address]  [facility] [address]  Phone: [phone]    Rosalba Girard was seen and treated in our emergency department on 3/31/2024.  She may return to work on 04/03/2024.         Thank you for choosing [facility].    Tawny Ward MD

## 2024-03-31 NOTE — Clinical Note
[facility] EMERGENCY DEPARTMENT  [address]  [facility] [address]  Phone: [phone]    Rosalba Girard was seen and treated in our emergency department on 3/31/2024.  She may return to work on 04/03/2024.         Thank you for choosing [facility].    Tawny Ward MD

## 2024-03-31 NOTE — DISCHARGE INSTRUCTIONS
Drink plenty of fluids.    Take Indocin for treatment of pleurisy and chest pain.    Follow-up with the chest pain clinic for outpatient evaluation.

## 2024-03-31 NOTE — Clinical Note
[facility] EMERGENCY DEPARTMENT  [address]  [facility] [address]  Phone: [phone]    Rosalba Girard was seen and treated in our emergency department on 3/31/2024.  She may return to work on 04/03/2024.  Pt is being dx with pleurisy due to radiation exposure.       Thank you for choosing [facility].    Tawny Ward MD

## 2024-03-31 NOTE — ED PROVIDER NOTES
Subjective   History of Present Illness  68-year-old female who presents for evaluation of chest pain.  She describes pain across the entire anterior chest with radiation into the upper back.  The patient reports that she has underlying breast cancer and is currently receiving radiation treatment.  After she has received this radiation treatment she has had headache, fatigue, and began to develop the a forementioned chest pain.  She states that the pain does radiate into the shoulders and into the neck.  She denies a history of coronary artery disease.  No previous history of DVT or PE.  She does not currently take anticoagulation.  She is awake and alert with normal mentation.  No abdominal pain.  No nausea or vomiting.  No change in bowel or urinary function.  No other acute complaints.      Review of Systems   Constitutional:  Negative for chills, fatigue and fever.   HENT:  Negative for congestion, ear pain, postnasal drip, sinus pressure and sore throat.    Eyes:  Negative for pain, redness and visual disturbance.   Respiratory:  Negative for cough, chest tightness and shortness of breath.    Cardiovascular:  Positive for chest pain. Negative for palpitations and leg swelling.   Gastrointestinal:  Negative for abdominal pain, anal bleeding, blood in stool, diarrhea, nausea and vomiting.   Endocrine: Negative for polydipsia and polyuria.   Genitourinary:  Negative for difficulty urinating, dysuria, frequency and urgency.   Musculoskeletal:  Negative for arthralgias, back pain and neck pain.   Skin:  Negative for pallor and rash.   Allergic/Immunologic: Negative for environmental allergies and immunocompromised state.   Neurological:  Negative for dizziness, weakness and headaches.   Hematological:  Negative for adenopathy.   Psychiatric/Behavioral:  Negative for confusion, self-injury and suicidal ideas. The patient is not nervous/anxious.    All other systems reviewed and are negative.      Past Medical  History:   Diagnosis Date    Asthma     Breast cancer     Chronic superficial dermatitis     Closed fracture of left tibial plateau with routine healing     Dietary counseling     Encounter for routine adult health examination     Fibromyalgia     GERD (gastroesophageal reflux disease)     Hair or hair follicle disorder     History of anemia     Hives     Hypertension     Irritable bowel disease     Malaise and fatigue     Myalgia and myositis     PONV (postoperative nausea and vomiting)     on occasion     Prediabetes     Rash and nonspecific skin eruption     S/P ORIF (open reduction internal fixation) fracture  left tibial plateau  12/21/2016    Sinusitis, acute maxillary     Tibial plateau fracture, left 12/21/2016    UTI (urinary tract infection)     Wears glasses        Allergies   Allergen Reactions    Myrbetriq [Mirabegron] Itching    Tramadol Rash       Past Surgical History:   Procedure Laterality Date    BLADDER SURGERY  2004    Probable anterior repair x 2 ( Dr. Noble)    BREAST BIOPSY Right     BREAST LUMPECTOMY Right 12/18/2023    Re-Excision - 1/4/24    CHOLECYSTECTOMY  2012    COLONOSCOPY W/ POLYPECTOMY  2015    [facility]    ECTOPIC PREGNANCY SURGERY  1994    HERNIA REPAIR      umbilical     TIBIAL PLATEAU OPEN REDUCTION INTERNAL FIXATION Left 12/21/2016    LEFT TIBIAL PLATEAU OPEN REDUCTION INT FIXATION;  Surgeon: Constantine Durbin MD;  Location: [facility];  Service:     TOTAL ABDOMINAL HYSTERECTOMY WITH SALPINGO OOPHORECTOMY Bilateral 1998    bleeding/pain Dr Noble       Family History   Problem Relation Age of Onset    Osteoarthritis Mother     Aneurysm Mother     Cancer Father         Lung    Heart disease Father     Heart attack Father     Stroke Father     Hypertension Sister     Hypertension Brother     Breast cancer Maternal Aunt     Ovarian cancer Paternal Aunt     Uterine cancer Paternal Aunt     Cancer Maternal Grandfather     Depression Other     Diabetes Other     Osteoarthritis  Other     Rheum arthritis Other     Tuberculosis Other     Bleeding Disorder Other     Other Other     Colon cancer Other        Social History     Socioeconomic History    Marital status:    Tobacco Use    Smoking status: Former     Current packs/day: 0.00     Average packs/day: 2.0 packs/day for 10.0 years (20.0 ttl pk-yrs)     Types: Cigarettes     Start date:      Quit date:      Years since quittin.2    Smokeless tobacco: Never   Vaping Use    Vaping status: Never Used   Substance and Sexual Activity    Alcohol use: No     Comment: Rarely    Drug use: No    Sexual activity: Not Currently           Objective   Physical Exam  Vitals and nursing note reviewed.   Constitutional:       General: She is not in acute distress.     Appearance: Normal appearance. She is well-developed. She is not toxic-appearing or diaphoretic.   HENT:      Head: Normocephalic and atraumatic.      Right Ear: External ear normal.      Left Ear: External ear normal.      Nose: Nose normal.   Eyes:      General: Lids are normal.      Pupils: Pupils are equal, round, and reactive to light.   Neck:      Trachea: No tracheal deviation.   Cardiovascular:      Rate and Rhythm: Normal rate and regular rhythm.      Pulses: No decreased pulses.      Heart sounds: Normal heart sounds. No murmur heard.     No friction rub. No gallop.   Pulmonary:      Effort: Pulmonary effort is normal. No respiratory distress.      Breath sounds: Normal breath sounds. No decreased breath sounds, wheezing, rhonchi or rales.      Comments: Lungs clear to auscultation diffusely.  Abdominal:      General: Bowel sounds are normal.      Palpations: Abdomen is soft.      Tenderness: There is no abdominal tenderness. There is no guarding or rebound.   Musculoskeletal:         General: No deformity. Normal range of motion.      Cervical back: Normal range of motion and neck supple.   Lymphadenopathy:      Cervical: No cervical adenopathy.   Skin:      General: Skin is warm and dry.      Findings: No rash.   Neurological:      Mental Status: She is alert and oriented to person, place, and time.      Cranial Nerves: No cranial nerve deficit.      Sensory: No sensory deficit.   Psychiatric:         Speech: Speech normal.         Behavior: Behavior normal.         Thought Content: Thought content normal.         Judgment: Judgment normal.         Procedures           ED Course  ED Course as of 04/02/24 1521   Sun Mar 31, 2024   1510 CT Angiogram Chest [NS]      ED Course User Index  [NS] Tawny Ward MD                                             Medical Decision Making  Differential diagnosis includes radiation pneumonitis, radiation induced pleurisy, pleurisy, acute coronary syndrome, pulmonary embolism, pneumonia, viral illness, other unspecified etiology.    Labs show leukocytosis, normal kidney function with no significant electrolyte abnormalities, normal BNP, normal troponin x 2, and normal lipase.    Chest x-ray shows no acute disease.  CT angiogram of the chest is negative for pulmonary embolism or other acute findings.    Patient reports feeling better after receiving pain medication, nausea medication and IV fluids.    Her heart rate has remained tachycardic consistent with sinus tachycardia but this is unchanged relative to previous baseline.    I will discharge the patient with a course of Indocin for treatment of pleurisy and will advise good fluid intake and outpatient follow-up and discussion with radiation oncology to determine if further radiation is needed.    Problems Addressed:  Chest pain, unspecified type: complicated acute illness or injury with systemic symptoms  Pleurisy: complicated acute illness or injury with systemic symptoms that poses a threat to life or bodily functions  Radiation adverse effect, initial encounter: complicated acute illness or injury with systemic symptoms that poses a threat to life or bodily  functions    Amount and/or Complexity of Data Reviewed  External Data Reviewed: labs, radiology and ECG.  Labs: ordered. Decision-making details documented in ED Course.  Radiology: ordered and independent interpretation performed. Decision-making details documented in ED Course.  ECG/medicine tests: ordered and independent interpretation performed. Decision-making details documented in ED Course.    Risk  OTC drugs.  Prescription drug management.        Final diagnoses:   Pleurisy   Chest pain, unspecified type   Radiation adverse effect, initial encounter       ED Disposition  ED Disposition       ED Disposition   Discharge    Condition   Stable    Comment   --               Laureano Bazzi PA  [address]    [place] [address]  [phone]    In 1 week      [facility]  [address]  [facility] [id number] [phone]             Medication List        New Prescriptions      ketorolac 10 MG tablet  Commonly known as: TORADOL  Take 1 tablet by mouth Every 6 (Six) Hours As Needed for Moderate Pain for up to 5 days. Patient received a dose in the ED.               Where to Get Your Medications        These medications were sent to [facility]/pharmacy #[id number] - [place], [place] - [address] [phone] [facility] [phone]   [address] [place] [id number]      Phone: [phone]   ketorolac 10 MG tablet            Tawny Ward MD  04/02/24 9082

## 2024-03-31 NOTE — Clinical Note
[facility] EMERGENCY DEPARTMENT  [address]  [facility] [address]  Phone: [phone]    Rosalba Girard was seen and treated in our emergency department on 3/31/2024.  She may return to work on 04/03/2024.  Pt is being dx with pleurisy due to radiation exposure.       Thank you for choosing [facility].    Tawny Ward MD

## 2024-03-31 NOTE — Clinical Note
[facility] EMERGENCY DEPARTMENT  [address]  [facility] [address]  Phone: [phone]    Rosalba Girard was seen and treated in our emergency department on 3/31/2024.  She may return to work on 04/03/2024.         Thank you for choosing [facility].    Tawny Ward MD

## 2024-04-01 ENCOUNTER — HOSPITAL ENCOUNTER (OUTPATIENT)
Dept: RADIATION ONCOLOGY | Facility: HOSPITAL | Age: 69
Setting detail: RADIATION/ONCOLOGY SERIES
End: 2024-04-01
Payer: COMMERCIAL

## 2024-04-01 ENCOUNTER — TELEPHONE (OUTPATIENT)
Dept: RADIATION ONCOLOGY | Facility: HOSPITAL | Age: 69
End: 2024-04-01
Payer: COMMERCIAL

## 2024-04-01 ENCOUNTER — APPOINTMENT (OUTPATIENT)
Dept: RADIATION ONCOLOGY | Facility: HOSPITAL | Age: 69
End: 2024-04-01
Payer: COMMERCIAL

## 2024-04-01 NOTE — TELEPHONE ENCOUNTER
After discussing with Dr. Wilson, returned patient's call regarding diagnosis of Pleurisy from ED visit on Sunday, 3/31/24.  Dr. Wilson recommends that patient complete her last radiation treatment, but may take a 1 - 2 day break.  Patient is agreeable to this plan and will come for her last treatment on Wednesday, 4/3/24 at 8:30 am.  Contact information provided and patient will call with any questions or concerns should they arise.

## 2024-04-03 ENCOUNTER — HOSPITAL ENCOUNTER (OUTPATIENT)
Dept: RADIATION ONCOLOGY | Facility: HOSPITAL | Age: 69
Setting detail: RADIATION/ONCOLOGY SERIES
Discharge: HOME | End: 2024-04-03
Payer: COMMERCIAL

## 2024-04-03 VITALS — WEIGHT: 213.2 LBS | BODY MASS INDEX: 35.48 KG/M2

## 2024-04-03 DIAGNOSIS — C50.312 MALIGNANT NEOPLASM OF LOWER-INNER QUADRANT OF LEFT BREAST IN FEMALE, ESTROGEN RECEPTOR POSITIVE: Primary | ICD-10-CM

## 2024-04-03 DIAGNOSIS — Z17.0 MALIGNANT NEOPLASM OF LOWER-INNER QUADRANT OF LEFT BREAST IN FEMALE, ESTROGEN RECEPTOR POSITIVE: Primary | ICD-10-CM

## 2024-04-03 PROCEDURE — 77412 RADIATION TX DELIVERY LVL 3: CPT | Performed by: RADIOLOGY

## 2024-04-03 PROCEDURE — 77387 GUIDANCE FOR RADJ TX DLVR: CPT | Performed by: RADIOLOGY

## 2024-04-04 ENCOUNTER — TRANSCRIBE ORDERS (OUTPATIENT)
Dept: ADMINISTRATIVE | Facility: HOSPITAL | Age: 69
End: 2024-04-04
Payer: COMMERCIAL

## 2024-04-04 DIAGNOSIS — C50.312 MALIGNANT NEOPLASM OF LOWER-INNER QUADRANT OF LEFT FEMALE BREAST, UNSPECIFIED ESTROGEN RECEPTOR STATUS: Primary | ICD-10-CM

## 2024-04-09 ENCOUNTER — OFFICE VISIT (OUTPATIENT)
Dept: ONCOLOGY | Facility: CLINIC | Age: 69
End: 2024-04-09
Payer: COMMERCIAL

## 2024-04-09 VITALS
HEART RATE: 74 BPM | HEIGHT: 65 IN | BODY MASS INDEX: 35.32 KG/M2 | WEIGHT: 212 LBS | TEMPERATURE: 97.1 F | SYSTOLIC BLOOD PRESSURE: 129 MMHG | OXYGEN SATURATION: 96 % | DIASTOLIC BLOOD PRESSURE: 81 MMHG | RESPIRATION RATE: 18 BRPM

## 2024-04-09 DIAGNOSIS — Z17.0 CARCINOMA OF LOWER-INNER QUADRANT OF LEFT BREAST IN FEMALE, ESTROGEN RECEPTOR POSITIVE: Primary | ICD-10-CM

## 2024-04-09 DIAGNOSIS — C50.312 CARCINOMA OF LOWER-INNER QUADRANT OF LEFT BREAST IN FEMALE, ESTROGEN RECEPTOR POSITIVE: Primary | ICD-10-CM

## 2024-04-09 PROBLEM — C50.912 CARCINOMA OF LEFT BREAST IN FEMALE, ESTROGEN RECEPTOR POSITIVE: Status: ACTIVE | Noted: 2024-04-09

## 2024-04-09 PROCEDURE — 99214 OFFICE O/P EST MOD 30 MIN: CPT | Performed by: INTERNAL MEDICINE

## 2024-04-09 NOTE — PROGRESS NOTES
Rosalba Girard completed whole breast radiation therapy. Details are as follow:    Site: left breast  Dose: 40 Gy/15 fractions directed towards the breast  with a 10 Gy/5 fraction boost.  Dates: 3/12/24 - 3/29/24  Technique: Treatment was planned using a 3D technique with daily CT image guidance and deep inspiratory breath hold with whole breast treatments to minimize dose to the heart.   Toxicities were assessed and managed on a weekly basis. She experienced fatigue and dermatitis, which were managed supportively.  KPS 80  She will return to clinic in 1 month

## 2024-04-09 NOTE — PROGRESS NOTES
DATE OF VISIT: 4/9/2024    REASON FOR VISIT: Followup for abnormal CT scan     PROBLEM LIST:  1.  Mesenteric fat stranding:  A.  Incidentally noted on CT abdomen pelvis done March 2023 for kidney stone  2.  Morbid obesity, BMI 32  3.  Hypertension  4.  Left lower inner quadrant invasive ductal carcinoma, T1 a N0 M0 stage Ia:  A.  Presented with abnormal screening mammogram July 2023.  B.  Diagnosed after ultrasound-guided biopsy done October 2023  C.  Status post lumpectomy with sentinel lymph node biopsy done by Dr. DAMON December 18, 2023  D.  Final pathology revealed low-grade ductal carcinoma ER +95% AL +90% continue -0 by IHC.  E.completed adjuvant radiation March 2024.  F.  Started adjuvant anastrozole January 8, 2024    HISTORY OF PRESENT ILLNESS: The patient is a very pleasant 68 y.o. female  with past medical history significant for abnormal CT abdomen pelvis diagnosed March 2023 showing abdominal fat stranding felt to be reactive. The  patient is here today for scheduled follow-up visit.    SUBJECTIVE: Rosalba is here by herself.  She is complaining of some pain in her left shoulder.  She was in the emergency room this weekend.  Her pain is better today.  She is excited to be done with radiation.    Past History:  Medical History: has a past medical history of Asthma, Breast cancer, Chronic superficial dermatitis, Closed fracture of left tibial plateau with routine healing, Dietary counseling, Encounter for routine adult health examination, Fibromyalgia, GERD (gastroesophageal reflux disease), Hair or hair follicle disorder, History of anemia, Hives, Hypertension, Irritable bowel disease, Malaise and fatigue, Myalgia and myositis, PONV (postoperative nausea and vomiting), Prediabetes, Rash and nonspecific skin eruption, S/P ORIF (open reduction internal fixation) fracture  left tibial plateau  (12/21/2016), Sinusitis, acute maxillary, Tibial plateau fracture, left (12/21/2016), UTI (urinary tract infection),  "and Wears glasses.   Surgical History: has a past surgical history that includes Bladder surgery (2004); Cholecystectomy (2012); Hernia repair; Colonoscopy w/ polypectomy (2015); Tibial Plateau Open Reduction Internal Fixation (Left, 12/21/2016); Breast biopsy (Right); Total abdominal hysterectomy w/ bilateral salpingoophorectomy (Bilateral, 1998); Ectopic pregnancy surgery (1994); and Breast lumpectomy (Right, 12/18/2023).   Family History: family history includes Aneurysm in her mother; Bleeding Disorder in an other family member; Breast cancer in her maternal aunt; Cancer in her father and maternal grandfather; Colon cancer in an other family member; Depression in an other family member; Diabetes in an other family member; Heart attack in her father; Heart disease in her father; Hypertension in her brother and sister; Osteoarthritis in her mother and another family member; Other in an other family member; Ovarian cancer in her paternal aunt; Rheum arthritis in an other family member; Stroke in her father; Tuberculosis in an other family member; Uterine cancer in her paternal aunt.   Social History: reports that she quit smoking about 34 years ago. Her smoking use included cigarettes. She started smoking about 44 years ago. She has a 20 pack-year smoking history. She has never used smokeless tobacco. She reports that she does not drink alcohol and does not use drugs.    (Not in a hospital admission)     Allergies: Myrbetriq [mirabegron] and Tramadol     Review of Systems   Constitutional:  Positive for fatigue.   Psychiatric/Behavioral:  The patient is nervous/anxious.        PHYSICAL EXAMINATION:   /81   Pulse 74   Temp 97.1 °F (36.2 °C) (Temporal)   Resp 18   Ht 165.1 cm (65\")   Wt 96.2 kg (212 lb)   LMP  (LMP Unknown)   SpO2 96%   BMI 35.28 kg/m²    Pain Score    04/09/24 0937   PainSc: 0-No pain                       ECOG Performance Status: 1 - Symptomatic but completely ambulatory      General " Appearance:      alert, cooperative, no apparent distress and appears stated age   Lungs:   Clear to auscultation bilaterally; respirations regular, even, and unlabored bilaterally   Heart:  Regular rate and rhythm, no murmurs appreciated   Abdomen:   Soft, non-tender, non-distended, and no organomegaly                 Admission on 03/31/2024, Discharged on 03/31/2024   Component Date Value Ref Range Status    QT Interval 03/31/2024 334  ms Preliminary    QTC Interval 03/31/2024 455  ms Preliminary    QT Interval 03/31/2024 328  ms Preliminary    QTC Interval 03/31/2024 449  ms Preliminary    HS Troponin T 03/31/2024 9  <14 ng/L Final    Specimen hemolyzed.  Results may be falsely decreased.    Glucose 03/31/2024 134 (H)  65 - 99 mg/dL Final    BUN 03/31/2024 15  8 - 23 mg/dL Final    Creatinine 03/31/2024 0.60  0.57 - 1.00 mg/dL Final    Sodium 03/31/2024 136  136 - 145 mmol/L Final    Potassium 03/31/2024 4.2  3.5 - 5.2 mmol/L Final    Slight hemolysis detected by analyzer. Result may be falsely elevated.    Chloride 03/31/2024 99  98 - 107 mmol/L Final    CO2 03/31/2024 26.0  22.0 - 29.0 mmol/L Final    Calcium 03/31/2024 9.6  8.6 - 10.5 mg/dL Final    Total Protein 03/31/2024 7.4  6.0 - 8.5 g/dL Final    Albumin 03/31/2024 4.3  3.5 - 5.2 g/dL Final    ALT (SGPT) 03/31/2024 28  1 - 33 U/L Final    AST (SGOT) 03/31/2024 31  1 - 32 U/L Final    Alkaline Phosphatase 03/31/2024 99  39 - 117 U/L Final    Total Bilirubin 03/31/2024 0.6  0.0 - 1.2 mg/dL Final    Globulin 03/31/2024 3.1  gm/dL Final    Calculated Result    A/G Ratio 03/31/2024 1.4  g/dL Final    BUN/Creatinine Ratio 03/31/2024 25.0  7.0 - 25.0 Final    Anion Gap 03/31/2024 11.0  5.0 - 15.0 mmol/L Final    eGFR 03/31/2024 99.1  >60.0 mL/min/1.73 Final    Lipase 03/31/2024 27  13 - 60 U/L Final    proBNP 03/31/2024 105.8  0.0 - 900.0 pg/mL Final    Extra Tube 03/31/2024 Hold for add-ons.   Final    Auto resulted.    Extra Tube 03/31/2024 hold for add-on    Final    Auto resulted    Extra Tube 03/31/2024 Hold for add-ons.   Final    Auto resulted.    Extra Tube 03/31/2024 Hold for add-ons.   Final    Auto resulted.    Extra Tube 03/31/2024 Hold for add-ons.   Final    Auto resulted    WBC 03/31/2024 13.95 (H)  3.40 - 10.80 10*3/mm3 Final    RBC 03/31/2024 5.48 (H)  3.77 - 5.28 10*6/mm3 Final    Hemoglobin 03/31/2024 16.0 (H)  12.0 - 15.9 g/dL Final    Hematocrit 03/31/2024 46.8 (H)  34.0 - 46.6 % Final    MCV 03/31/2024 85.4  79.0 - 97.0 fL Final    MCH 03/31/2024 29.2  26.6 - 33.0 pg Final    MCHC 03/31/2024 34.2  31.5 - 35.7 g/dL Final    RDW 03/31/2024 12.2 (L)  12.3 - 15.4 % Final    RDW-SD 03/31/2024 37.3  37.0 - 54.0 fl Final    MPV 03/31/2024 10.1  6.0 - 12.0 fL Final    Platelets 03/31/2024 219  140 - 450 10*3/mm3 Final    Neutrophil % 03/31/2024 86.4 (H)  42.7 - 76.0 % Final    Lymphocyte % 03/31/2024 4.9 (L)  19.6 - 45.3 % Final    Monocyte % 03/31/2024 7.9  5.0 - 12.0 % Final    Eosinophil % 03/31/2024 0.1 (L)  0.3 - 6.2 % Final    Basophil % 03/31/2024 0.1  0.0 - 1.5 % Final    Immature Grans % 03/31/2024 0.6 (H)  0.0 - 0.5 % Final    Neutrophils, Absolute 03/31/2024 12.06 (H)  1.70 - 7.00 10*3/mm3 Final    Lymphocytes, Absolute 03/31/2024 0.68 (L)  0.70 - 3.10 10*3/mm3 Final    Monocytes, Absolute 03/31/2024 1.10 (H)  0.10 - 0.90 10*3/mm3 Final    Eosinophils, Absolute 03/31/2024 0.01  0.00 - 0.40 10*3/mm3 Final    Basophils, Absolute 03/31/2024 0.02  0.00 - 0.20 10*3/mm3 Final    Immature Grans, Absolute 03/31/2024 0.08 (H)  0.00 - 0.05 10*3/mm3 Final    nRBC 03/31/2024 0.0  0.0 - 0.2 /100 WBC Final    HS Troponin T 03/31/2024 10  <14 ng/L Final    Troponin T Delta 03/31/2024 1  >=-4 - <+4 ng/L Final        CT Angiogram Chest    Result Date: 3/31/2024  Narrative: CT ANGIOGRAM CHEST Date of Exam: 3/31/2024 2:02 PM EDT Indication: chest pain. Comparison: None available. Technique: CTA of the chest was performed after the uneventful intravenous  administration of 80 mL Isovue-370. Reconstructed coronal and sagittal images were also obtained. In addition, a 3-D volume rendered image was created for interpretation. Automated exposure control and iterative reconstruction methods were used. Findings: There is no worrisome thoracic lymphadenopathy. Mildly atherosclerotic, nonaneurysmal thoracic aorta. The pulmonary arteries appear well-opacified without evidence of focal filling defect. The lung fields demonstrate no evidence of acute infectious process, with some scattered lower lobe atelectasis present. There is no suspicious pulmonary nodularity. Central airways are clear. There is no pleural or pericardial effusion. The osseous structures demonstrate no evidence of acute fracture or aggressive osseous lesion.     Impression: Impression: No pulmonary embolus or other acute finding in the chest. Electronically Signed: Ron Phillips MD  3/31/2024 3:00 PM EDT  Workstation ID: [id number]    XR Chest 1 View    Result Date: 3/31/2024  Narrative: XR CHEST 1 VW Date of Exam: 3/31/2024 12:09 PM EDT Indication: Chest Pain Triage Protocol Comparison: 12/5/2023. FINDINGS: No focal airspace opacity. No pleural effusion or pneumothorax. Normal heart and mediastinal contours.     Impression: No evidence of acute disease in the chest. Electronically Signed: Ron Phillips MD  3/31/2024 12:50 PM EDT  Workstation ID: [id number]     ASSESSMENT: The patient is a very pleasant 68 y.o. female  with abdominal fat stranding      PLAN:    1.  Abdominal fat stranding:  A.  I did go over the scan results with the patient from September 9, 2023.  It did reveal stability of her abdominal fat stranding.  No evidence of new or progressive abnormalities.  She did have a small prominent portacaval lymph node which is essentially stable compared to 2009.  B.  At this point the patient will follow-up with me in 1 year with repeated CT scan.  This will be due December 2024.    2.   Splenomegaly:  A.  Spleen size 16 cm.  B.  Most likely induced by her fatty liver.    3.  Left inner lower quadrant breast cancer T1 a N0 M0 stage Ia:  A.  I will continue anastrozole 1 mg daily.  The patient will complete 5 years of treatment January 2029.  B.  The patient has completed adjuvant radiation March 2024.  She will need to have 6-month follow-up mammogram which will be due March 2024.  C. We reviewed the potential side effects of anastrozole including hot flashes, vaginal dryness, joint pain, decrease in bone density, and mood or sleep disturbance.  D.  She will follow-up with me in 3 months to evaluate for treatment tolerance.    4.  Bone health:  A.  I will obtain baseline bone density.  Has been ordered but not done yet.  B.  I will start the patient on calcium vitamin D daily.    FOLLOW UP: 3 months with labs.    Alexey Ocampo MD  4/9/2024

## 2024-04-10 ENCOUNTER — OFFICE VISIT (OUTPATIENT)
Dept: FAMILY MEDICINE CLINIC | Facility: CLINIC | Age: 69
End: 2024-04-10
Payer: COMMERCIAL

## 2024-04-10 VITALS
SYSTOLIC BLOOD PRESSURE: 132 MMHG | DIASTOLIC BLOOD PRESSURE: 76 MMHG | RESPIRATION RATE: 18 BRPM | HEIGHT: 65 IN | HEART RATE: 78 BPM | TEMPERATURE: 97.6 F | WEIGHT: 214 LBS | OXYGEN SATURATION: 100 % | BODY MASS INDEX: 35.65 KG/M2

## 2024-04-10 DIAGNOSIS — R05.1 ACUTE COUGH: ICD-10-CM

## 2024-04-10 DIAGNOSIS — R09.1 PLEURISY: Primary | ICD-10-CM

## 2024-04-10 RX ORDER — AZITHROMYCIN 250 MG/1
TABLET, FILM COATED ORAL
Qty: 6 TABLET | Refills: 0 | Status: SHIPPED | OUTPATIENT
Start: 2024-04-10

## 2024-04-10 RX ORDER — METHYLPREDNISOLONE 4 MG/1
TABLET ORAL
Qty: 21 TABLET | Refills: 0 | Status: SHIPPED | OUTPATIENT
Start: 2024-04-10

## 2024-04-10 RX ORDER — DEXTROMETHORPHAN HYDROBROMIDE AND PROMETHAZINE HYDROCHLORIDE 15; 6.25 MG/5ML; MG/5ML
2.5 SYRUP ORAL 4 TIMES DAILY PRN
Qty: 120 ML | Refills: 0 | Status: SHIPPED | OUTPATIENT
Start: 2024-04-10

## 2024-04-10 NOTE — PROGRESS NOTES
Subjective   Rosalba Girard is a 68 y.o. female  Neck Pain (Lt side of neck/Mid-back, dx'd with pleurisy March 31 but sx's haven't resolved) and Cough (Sinus HA, dx'd with pleurisy March 31 and was prescribed indomethacin )      Neck Pain   Pertinent negatives include no chest pain, headaches or weakness.   Cough  Pertinent negatives include no chest pain, headaches, rash or shortness of breath.   Patient is a pleasant six 8-year-old white female who comes in plaint of productive cough with some yellow sputum patient had neck pain secondary to coughing patient was diagnosed for pleurisy symptoms is not resolved patient has wheezing and chest tightness pain when coughing    The following portions of the patient's history were reviewed and updated as appropriate: allergies, current medications, past social history and problem list    Review of Systems   Constitutional:  Negative for fatigue and unexpected weight change.   Respiratory:  Positive for cough. Negative for chest tightness and shortness of breath.    Cardiovascular:  Negative for chest pain, palpitations and leg swelling.   Gastrointestinal:  Negative for nausea.   Musculoskeletal:  Positive for neck pain.   Skin:  Negative for color change and rash.   Neurological:  Negative for dizziness, syncope, weakness and headaches.       Objective     Vitals:    04/10/24 1032   BP: 132/76   Pulse: 78   Resp: 18   Temp: 97.6 °F (36.4 °C)   SpO2: 100%       Physical Exam  Vitals and nursing note reviewed.   Constitutional:       General: She is not in acute distress.     Appearance: Normal appearance. She is well-developed. She is not ill-appearing, toxic-appearing or diaphoretic.   Neck:      Vascular: No carotid bruit or JVD.   Cardiovascular:      Rate and Rhythm: Normal rate and regular rhythm.      Pulses: Normal pulses.      Heart sounds: Normal heart sounds. No murmur heard.  Pulmonary:      Effort: Pulmonary effort is normal. No respiratory distress.       Breath sounds: Normal breath sounds.   Abdominal:      Palpations: Abdomen is soft.      Tenderness: There is no abdominal tenderness.   Musculoskeletal:      Cervical back: Tenderness and bony tenderness present.   Skin:     General: Skin is warm and dry.   Neurological:      Mental Status: She is alert.         Assessment & Plan     Diagnoses and all orders for this visit:    1. Pleurisy (Primary)  -     azithromycin (Zithromax Z-Aramis) 250 MG tablet; Take 2 tablets the first day, then 1 tablet daily for 4 days.  Dispense: 6 tablet; Refill: 0  -     methylPREDNISolone (MEDROL) 4 MG dose pack; Take as directed on package instructions.  Dispense: 21 tablet; Refill: 0  -     promethazine-dextromethorphan (PROMETHAZINE-DM) 6.25-15 MG/5ML syrup; Take 2.5 mL by mouth 4 (Four) Times a Day As Needed for Cough.  Dispense: 120 mL; Refill: 0    2. Acute cough  -     azithromycin (Zithromax Z-Raamis) 250 MG tablet; Take 2 tablets the first day, then 1 tablet daily for 4 days.  Dispense: 6 tablet; Refill: 0  -     methylPREDNISolone (MEDROL) 4 MG dose pack; Take as directed on package instructions.  Dispense: 21 tablet; Refill: 0  -     promethazine-dextromethorphan (PROMETHAZINE-DM) 6.25-15 MG/5ML syrup; Take 2.5 mL by mouth 4 (Four) Times a Day As Needed for Cough.  Dispense: 120 mL; Refill: 0     I spent 15 minutes in patient care: Reviewing records prior to the visit, examining the patient, entering orders and documentation    Part of this note may be an electronic transcription/translation of spoken language to printed text using the Dragon Dictation System.

## 2024-04-11 ENCOUNTER — TELEPHONE (OUTPATIENT)
Dept: FAMILY MEDICINE CLINIC | Facility: CLINIC | Age: 69
End: 2024-04-11
Payer: COMMERCIAL

## 2024-04-11 NOTE — TELEPHONE ENCOUNTER
Caller: Rosalba Girard    Relationship: Self    Best call back [phone]     What form or medical record are you requesting: WORK EXCUSE    Who is requesting this form or medical record from you: PATIENT    How would you like to receive the form or medical records (pick-up, mail, fax): MAIL    [id number]   [facility] [id number]      Timeframe paperwork needed: ASAP    Additional notes: PATIENT WAS IN ON 4/10/24. SHE HAS PLEURISY AND DIDN'T REALIZE TO WOULD BE IN SO MUCH PAIN. SHE IS  REQUESTING A NOTE TO COVER FRIDAY AND TO RETURN TO WORK ON MONDAY  PATIENT WANTS IT MAILED TO HER AT THE ABOVE ADDRESS BUT ALSO WANTS A CALLBACK ONCE THAT HAS BEEN COMPLETED

## 2024-04-15 ENCOUNTER — OFFICE VISIT (OUTPATIENT)
Dept: CARDIOLOGY | Facility: HOSPITAL | Age: 69
End: 2024-04-15
Payer: COMMERCIAL

## 2024-04-15 VITALS
HEART RATE: 95 BPM | BODY MASS INDEX: 34.32 KG/M2 | HEIGHT: 65 IN | WEIGHT: 206 LBS | OXYGEN SATURATION: 96 % | DIASTOLIC BLOOD PRESSURE: 60 MMHG | SYSTOLIC BLOOD PRESSURE: 136 MMHG

## 2024-04-15 DIAGNOSIS — R07.89 OTHER CHEST PAIN: Primary | ICD-10-CM

## 2024-04-15 DIAGNOSIS — R06.09 DYSPNEA ON EXERTION: ICD-10-CM

## 2024-04-15 DIAGNOSIS — I10 ESSENTIAL HYPERTENSION, BENIGN: ICD-10-CM

## 2024-04-15 PROCEDURE — 99213 OFFICE O/P EST LOW 20 MIN: CPT | Performed by: NURSE PRACTITIONER

## 2024-04-15 NOTE — PROGRESS NOTES
Chief Complaint  Chest Pain    Subjective      History of Present Illness {  Problem List  Visit  Diagnosis   Encounters  Notes  Medications  Labs  Result Review Imaging  Media :23}     Rosalba Girard, 68 y.o. female with past medical history significant for asthma, breast cancer with current radiation, fibromyalgia, GERD, anemia, hypertension, IBS, and prediabetes, who presents to [facility] for Chest Pain  Patient presented to the ED on 3/31/2024 with chief complaint of chest pain.  At that time, patient stated she was having pain across entire anterior chest with radiation into the upper back.  Patient endorsed ongoing current radiation treatment.  After she has received radiation treatment, she admitted to having headache, fatigue, and chest discomfort.  Twelve-lead EKG showed sinus tachycardia, rate 112, normal EKG.  Chest x-ray showed no evidence of acute disease in the chest.  CTA of the chest showed no pulmonary embolus, or other acute findings in the chest.  CBC revealed leukocytosis at 13.95.  Other lab work including a CMP, lipase, BNP, and high-sensitivity troponins x 2 noted to be unremarkable.  Patient was referred to heart and valve for further evaluation of chest pain.    Since time of evaluation in ED, patient has followed up with her primary care provider on 4/10/2024.  At that time, patient endorsed pleuritic  pain, neck pain, and cough.  Patient was prescribed Z-Aramis, Medrol Dosepak, and promethazine cough syrup.  Patient was also recently seen by hematology and oncology on 4/9/2024.  At time of today's visit, chest pain is improved.  Most recently, discomfort is only in her back and between shoulder blades.  She had recently endorsed shortness of air which is now better after initiating Z-Aramis, and Medrol Dosepak.  Patient endorses palpitations only when she has increased amounts of caffeine.  She denies syncope, but endorses dizziness with position  "changes.  Patient also endorses some mild left lower extremity edema which she believes is secondary to prior injury.  Most recently, patient has not been active due to mobility issues.  She endorses caffeine intake of 4 glasses of tea per day, and an adequate water intake of 2 bottles per day.  In regards to family history, patient states her father had coronary artery disease with MI at age 80.  She also states that her sister had \"a leaky heart valve\".    Of note, patient has previously followed with Dr. Del Castillo.  She states she has undergone previous echocardiogram, and stress testing which she believes has been within approximately 1 year.      Objective     Vital Signs:   Vitals:    04/15/24 0808 04/15/24 0809   BP: 135/86 136/60   BP Location: Right arm Right arm   Patient Position: Sitting Standing   Pulse: 72 95   SpO2: 96% 96%   Weight: 93.4 kg (206 lb)    Height: 165.1 cm (65\")      Body mass index is 34.28 kg/m².  Physical Exam  Vitals and nursing note reviewed.   Constitutional:       Appearance: Normal appearance. She is obese.   HENT:      Head: Normocephalic.   Eyes:      Pupils: Pupils are equal, round, and reactive to light.   Cardiovascular:      Rate and Rhythm: Normal rate and regular rhythm.      Pulses: Normal pulses.      Heart sounds: Normal heart sounds. No murmur heard.  Pulmonary:      Effort: Pulmonary effort is normal.      Breath sounds: Normal breath sounds.   Abdominal:      General: Bowel sounds are normal.      Palpations: Abdomen is soft.   Musculoskeletal:         General: Normal range of motion.      Cervical back: Normal range of motion.      Right lower leg: No edema.      Left lower leg: No edema.   Skin:     General: Skin is warm and dry.      Capillary Refill: Capillary refill takes less than 2 seconds.   Neurological:      Mental Status: She is alert and oriented to person, place, and time.   Psychiatric:         Mood and Affect: Mood normal.         Thought Content: " Thought content normal.                Data Reviewed:{ Labs  Result Review  Imaging  Med Tab  Media :23}     Lab Results   Component Value Date    WBC 13.95 (H) 03/31/2024    HGB 16.0 (H) 03/31/2024    HCT 46.8 (H) 03/31/2024    MCV 85.4 03/31/2024     03/31/2024      Lab Results   Component Value Date    GLUCOSE 134 (H) 03/31/2024    BUN 15 03/31/2024    CREATININE 0.60 03/31/2024    EGFR 99.1 03/31/2024    BCR 25.0 03/31/2024    K 4.2 03/31/2024    CO2 26.0 03/31/2024    CALCIUM 9.6 03/31/2024    ALBUMIN 4.3 03/31/2024    BILITOT 0.6 03/31/2024    AST 31 03/31/2024    ALT 28 03/31/2024     Lab Results   Component Value Date    CKTOTAL 52 11/16/2022    TROPONINT 10 03/31/2024      ECG 12 Lead ED Triage Standing Order; Chest Pain (03/31/2024 11:40)  ECG 12 Lead ED Triage Standing Order; Chest Pain (03/31/2024 14:40)  CT Angiogram Chest With & Without Contrast (09/08/2019 21:30)  CT Angiogram Chest (03/31/2024 14:51)  XR Chest 1 View (03/31/2024 12:25)    Assessment & Plan   Assessment and Plan {CC Problem List  Visit Diagnosis  ROS  Review (Popup)  Health Maintenance  Quality  BestPractice  Medications  SmartSets  SnapShot Encounters  Media :23}     1. Other chest pain  -Chest pain described as atypical in nature.  Pain was suspected to be pleuritic chest pain.  She states that recently her chest discomfort has not been improved by addition of Z-Aramis, Medrol Dosepak, and Phenergan cough syrup.  She does still have some discomfort which is located in between her shoulder blades.  -Patient with ongoing radiation.  Would like to obtain updated echocardiogram imaging to assess for any structural or functional abnormalities.  -Patient states she was previously established with Dr. Del Castillo.  She believes she has had a stress test within the last year.  We will obtain records of most recent cardiac testing.  -Patient without recent lipid panel.  Will obtain fasting lipid panel  - Adult Transthoracic  Echo Complete W/ Cont if Necessary Per Protocol; Future  - Lipid Panel; Future    2. Essential hypertension, benign  -Blood pressure currently well-controlled.  Patient states she does not consistently check blood pressure at home  -Plan to continue Toprol-hydrochlorothiazide at current dose.    3. Dyspnea on exertion  -Again, would like to obtain updated echocardiogram.  -Plan to follow-up with patient in approximately 2 weeks to discuss results of echocardiogram, fasting lipid panel, and obtained records from Dr. Del Castillo.  Patient to reach out prior to that time if symptoms change or worsen.      Follow Up {Instructions Charge Capture  Follow-up Communications :23}     Return in about 2 weeks (around 4/29/2024) for Chest pain, Result review.      Patient was given instructions and counseling regarding her condition or for health maintenance advice. Please see specific information pulled into the AVS if appropriate.  Patient was instructed to call the [facility] with any questions, concerns, or worsening symptoms.    Dictated Utilizing Dragon Dictation   Please note that portions of this note were completed with a voice recognition program.   Part of this note may be an electronic transcription/translation of spoken language to printed text using the Dragon Dictation System.

## 2024-04-16 ENCOUNTER — HOSPITAL ENCOUNTER (OUTPATIENT)
Dept: CARDIOLOGY | Facility: HOSPITAL | Age: 69
Discharge: HOME | End: 2024-04-16
Admitting: NURSE PRACTITIONER
Payer: COMMERCIAL

## 2024-04-16 VITALS — WEIGHT: 205.91 LBS | HEIGHT: 65 IN | BODY MASS INDEX: 34.31 KG/M2

## 2024-04-16 DIAGNOSIS — R07.89 OTHER CHEST PAIN: ICD-10-CM

## 2024-04-16 LAB
ASCENDING AORTA: 3.5 CM
BH CV ECHO MEAS - AO MAX PG: 10.8 MMHG
BH CV ECHO MEAS - AO MEAN PG: 6.9 MMHG
BH CV ECHO MEAS - AO ROOT DIAM: 3.1 CM
BH CV ECHO MEAS - AO V2 MAX: 163.3 CM/SEC
BH CV ECHO MEAS - AO V2 VTI: 37.5 CM
BH CV ECHO MEAS - AVA(I,D): 3.3 CM2
BH CV ECHO MEAS - EF(MOD-BP): 60.3 %
BH CV ECHO MEAS - IVS/LVPW: 1.11 CM
BH CV ECHO MEAS - IVSD: 0.88 CM
BH CV ECHO MEAS - LA DIMENSION: 3.7 CM
BH CV ECHO MEAS - LAT PEAK E' VEL: 10.7 CM/SEC
BH CV ECHO MEAS - LV MAX PG: 12 MMHG
BH CV ECHO MEAS - LV MEAN PG: 5.8 MMHG
BH CV ECHO MEAS - LV V1 MAX: 172.5 CM/SEC
BH CV ECHO MEAS - LV V1 VTI: 36.7 CM
BH CV ECHO MEAS - LVIDD: 3.7 CM
BH CV ECHO MEAS - LVIDS: 2.5 CM
BH CV ECHO MEAS - LVOT DIAM: 2 CM
BH CV ECHO MEAS - LVPWD: 0.79 CM
BH CV ECHO MEAS - MED PEAK E' VEL: 9.1 CM/SEC
BH CV ECHO MEAS - MR MAX PG: 29.8 MMHG
BH CV ECHO MEAS - MR MAX VEL: 273 CM/SEC
BH CV ECHO MEAS - MV A MAX VEL: 82 CM/SEC
BH CV ECHO MEAS - MV E MAX VEL: 67 CM/SEC
BH CV ECHO MEAS - MV E/A: 0.82
BH CV ECHO MEAS - MV MAX PG: 2.8 MMHG
BH CV ECHO MEAS - MV MEAN PG: 1.2 MMHG
BH CV ECHO MEAS - MV V2 VTI: 21.1 CM
BH CV ECHO MEAS - PA ACC TIME: 0.08 SEC
BH CV ECHO MEAS - PA V2 MAX: 84 CM/SEC
BH CV ECHO MEAS - PI END-D VEL: 106 CM/SEC
BH CV ECHO MEAS - RAP SYSTOLE: 3 MMHG
BH CV ECHO MEAS - RV MAX PG: 1.29 MMHG
BH CV ECHO MEAS - RV V1 MAX: 56.5 CM/SEC
BH CV ECHO MEAS - RV V1 VTI: 10.3 CM
BH CV ECHO MEAS - RVSP: 17.7 MMHG
BH CV ECHO MEAS - TAPSE (>1.6): 2.2 CM
BH CV ECHO MEAS - TR MAX PG: 14.7 MMHG
BH CV ECHO MEAS - TR MAX VEL: 191.3 CM/SEC
BH CV ECHO MEASUREMENTS AVERAGE E/E' RATIO: 6.77
BH CV XLRA - RV BASE: 3.1 CM
BH CV XLRA - RV LENGTH: 7.1 CM
BH CV XLRA - RV MID: 2.8 CM
BH CV XLRA - TDI S': 13.1 CM/SEC
IVRT: 85 MS

## 2024-04-16 PROCEDURE — 93306 TTE W/DOPPLER COMPLETE: CPT | Performed by: INTERNAL MEDICINE

## 2024-04-16 PROCEDURE — 93306 TTE W/DOPPLER COMPLETE: CPT

## 2024-04-16 NOTE — PROGRESS NOTES
Your echocardiogram is within acceptable limits.  Your heart contracts normally.  There are no significant valvular abnormalities noted.    If you have any questions regarding this, we can discuss in further detail at our next follow-up appointment.    Sincerely yours,        Namita BARROS

## 2024-04-22 DIAGNOSIS — C50.312 CARCINOMA OF LOWER-INNER QUADRANT OF LEFT BREAST IN FEMALE, ESTROGEN RECEPTOR POSITIVE: Primary | ICD-10-CM

## 2024-04-22 DIAGNOSIS — Z17.0 CARCINOMA OF LOWER-INNER QUADRANT OF LEFT BREAST IN FEMALE, ESTROGEN RECEPTOR POSITIVE: Primary | ICD-10-CM

## 2024-04-23 ENCOUNTER — HOSPITAL ENCOUNTER (OUTPATIENT)
Dept: BONE DENSITY | Facility: HOSPITAL | Age: 69
Discharge: HOME | End: 2024-04-23
Admitting: INTERNAL MEDICINE
Payer: COMMERCIAL

## 2024-04-23 ENCOUNTER — TELEPHONE (OUTPATIENT)
Dept: FAMILY MEDICINE CLINIC | Facility: CLINIC | Age: 69
End: 2024-04-23
Payer: COMMERCIAL

## 2024-04-23 DIAGNOSIS — Z17.0 MALIGNANT NEOPLASM OF RIGHT BREAST IN FEMALE, ESTROGEN RECEPTOR POSITIVE, UNSPECIFIED SITE OF BREAST: ICD-10-CM

## 2024-04-23 DIAGNOSIS — Z13.820 ENCOUNTER FOR SCREENING FOR OSTEOPOROSIS: ICD-10-CM

## 2024-04-23 DIAGNOSIS — C50.911 MALIGNANT NEOPLASM OF RIGHT BREAST IN FEMALE, ESTROGEN RECEPTOR POSITIVE, UNSPECIFIED SITE OF BREAST: ICD-10-CM

## 2024-04-23 PROCEDURE — 77080 DXA BONE DENSITY AXIAL: CPT

## 2024-04-23 RX ORDER — DOXYCYCLINE 100 MG/1
100 CAPSULE ORAL 2 TIMES DAILY
Qty: 20 CAPSULE | Refills: 0 | Status: SHIPPED | OUTPATIENT
Start: 2024-04-23 | End: 2024-04-29 | Stop reason: SDUPTHER

## 2024-04-23 RX ORDER — DEXTROMETHORPHAN HYDROBROMIDE AND PROMETHAZINE HYDROCHLORIDE 15; 6.25 MG/5ML; MG/5ML
2.5 SYRUP ORAL 4 TIMES DAILY PRN
Qty: 120 ML | Refills: 0 | Status: SHIPPED | OUTPATIENT
Start: 2024-04-23

## 2024-04-23 NOTE — TELEPHONE ENCOUNTER
Caller: Rosalba Girard    Relationship: Self    Best call back number:       [phone] (Mobile)     What medication are you requesting:     PATIENT REQUESTED A NEW MEDICATION BE PRESCRIBED FOR SYMPTOMS LINGERING FROM LAST WEEK'S APPOINTMENT    PATIENT ALSO REQUESTED A REFILL OF COUGH SYRUP LISTED BELOW      promethazine-dextromethorphan (PROMETHAZINE-DM) 6.25-15 MG/5ML syrup     What are your current symptoms:     COUGH  SORE THROAT    Have you had these symptoms before:    [x] Yes  [] No    Have you been treated for these symptoms before:   [x] Yes  [] No    If a prescription is needed, what is your preferred pharmacy and phone number:     [facility] - [place], [place]    TELEPHONE CONTACT:    [phone]

## 2024-04-25 ENCOUNTER — HOSPITAL ENCOUNTER (OUTPATIENT)
Dept: PHYSICAL THERAPY | Facility: HOSPITAL | Age: 69
Discharge: HOME | End: 2024-04-25

## 2024-04-25 NOTE — THERAPY DISCHARGE NOTE
Outpatient Physical Therapy Lymphedema Initial Evaluation & Discharge  [facility]     Patient Name: Rosalba Girard  : 1955  MRN: [id number]  Today's Date: 2024      Visit Date: 2024    Visit Dx:  No diagnosis found.    Patient Active Problem List   Diagnosis    Essential hypertension, benign    GERD (gastroesophageal reflux disease)    IBS (irritable bowel syndrome)    Leukocytosis    Mixed incontinence    Fecal incontinence    Abnormal computed tomography angiography (CTA) of abdomen and pelvis    Carcinoma of left breast in female, estrogen receptor positive        Past Medical History:   Diagnosis Date    Asthma     Breast cancer     Chronic superficial dermatitis     Closed fracture of left tibial plateau with routine healing     Dietary counseling     Encounter for routine adult health examination     Fibromyalgia     GERD (gastroesophageal reflux disease)     Hair or hair follicle disorder     History of anemia     Hives     Hypertension     Irritable bowel disease     Malaise and fatigue     Myalgia and myositis     PONV (postoperative nausea and vomiting)     on occasion     Prediabetes     Rash and nonspecific skin eruption     S/P ORIF (open reduction internal fixation) fracture  left tibial plateau  2016    Sinusitis, acute maxillary     Tibial plateau fracture, left 2016    UTI (urinary tract infection)     Wears glasses         Past Surgical History:   Procedure Laterality Date    BLADDER SURGERY      Probable anterior repair x 2 ( Dr. Noble)    BREAST BIOPSY Right     BREAST LUMPECTOMY Right 2023    Re-Excision - 24    CHOLECYSTECTOMY      COLONOSCOPY W/ POLYPECTOMY      [facility]    ECTOPIC PREGNANCY SURGERY      HERNIA REPAIR      umbilical     TIBIAL PLATEAU OPEN REDUCTION INTERNAL FIXATION Left 2016    LEFT TIBIAL PLATEAU OPEN REDUCTION INT FIXATION;  Surgeon: Constantine Durbin MD;  Location: [facility];  Service:      TOTAL ABDOMINAL HYSTERECTOMY WITH SALPINGO OOPHORECTOMY Bilateral 1998    bleeding/pain Dr Noble         PT was ordered by Dr. DAMON as part of his BRCA Protocol: post surgical protocol. However, pt declined formal assessment based on:    [] S is not in-network with the patient's insurance  [] Cost/expense of assessment at this time  [] Does not feel that assessment is warranted at this time  [x] Pt does not feel able to complete at this time.     Pt currently has pleurisy and notes increased overall pain. Requested PT touch base at her next appointment. Encouraged to contact PT if needs develop for additional assessment or for referral to a participating clinic. Advised on signs/symptoms of when therapy is indicated to response appropriately.     No charges were placed at this time.      Malorie Bhatti, PT  4/25/2024

## 2024-04-26 ENCOUNTER — PATIENT OUTREACH (OUTPATIENT)
Dept: CASE MANAGEMENT | Facility: OTHER | Age: 69
End: 2024-04-26
Payer: COMMERCIAL

## 2024-04-26 NOTE — OUTREACH NOTE
AMBULATORY CASE MANAGEMENT NOTE    Names and Relationships of Patient/Support Persons: Caller: Rosalba Girard; Relationship: Self -     Patient Outreach    Patient completed radiation therapy, complains of pleurisy, taking doxycycline 100 mg tablets, twice daily and has promethazine-dextromethorphan liquid 2.5 mL 4 times daily as needed.  Complains of pleurisy pain and shortness of breath with exertion.  Has appointments for cardiology and PCP on 5/1/2024. Denies needs or concerns at this time.    Judy RIVAS  Ambulatory Case Management    4/26/2024, 12:22 EDT

## 2024-04-29 ENCOUNTER — OFFICE VISIT (OUTPATIENT)
Dept: CARDIOLOGY | Facility: HOSPITAL | Age: 69
End: 2024-04-29
Payer: COMMERCIAL

## 2024-04-29 ENCOUNTER — OFFICE VISIT (OUTPATIENT)
Dept: FAMILY MEDICINE CLINIC | Facility: CLINIC | Age: 69
End: 2024-04-29
Payer: COMMERCIAL

## 2024-04-29 VITALS
SYSTOLIC BLOOD PRESSURE: 126 MMHG | HEART RATE: 99 BPM | WEIGHT: 207.6 LBS | HEIGHT: 65 IN | RESPIRATION RATE: 16 BRPM | OXYGEN SATURATION: 99 % | DIASTOLIC BLOOD PRESSURE: 68 MMHG | BODY MASS INDEX: 34.59 KG/M2

## 2024-04-29 VITALS
SYSTOLIC BLOOD PRESSURE: 121 MMHG | TEMPERATURE: 98.4 F | HEART RATE: 87 BPM | OXYGEN SATURATION: 96 % | RESPIRATION RATE: 20 BRPM | BODY MASS INDEX: 34.57 KG/M2 | HEIGHT: 65 IN | WEIGHT: 207.5 LBS | DIASTOLIC BLOOD PRESSURE: 59 MMHG

## 2024-04-29 DIAGNOSIS — R06.09 DYSPNEA ON EXERTION: ICD-10-CM

## 2024-04-29 DIAGNOSIS — R07.89 OTHER CHEST PAIN: Primary | ICD-10-CM

## 2024-04-29 DIAGNOSIS — J40 BRONCHITIS: Primary | ICD-10-CM

## 2024-04-29 DIAGNOSIS — I10 ESSENTIAL HYPERTENSION, BENIGN: ICD-10-CM

## 2024-04-29 PROCEDURE — 99213 OFFICE O/P EST LOW 20 MIN: CPT | Performed by: PHYSICIAN ASSISTANT

## 2024-04-29 PROCEDURE — 99213 OFFICE O/P EST LOW 20 MIN: CPT | Performed by: NURSE PRACTITIONER

## 2024-04-29 RX ORDER — METHYLPREDNISOLONE 4 MG/1
TABLET ORAL
Qty: 21 TABLET | Refills: 0 | Status: SHIPPED | OUTPATIENT
Start: 2024-04-29

## 2024-04-29 RX ORDER — DEXTROMETHORPHAN HYDROBROMIDE AND PROMETHAZINE HYDROCHLORIDE 15; 6.25 MG/5ML; MG/5ML
2.5 SYRUP ORAL 4 TIMES DAILY PRN
Qty: 120 ML | Refills: 0 | Status: SHIPPED | OUTPATIENT
Start: 2024-04-29

## 2024-04-29 RX ORDER — DOXYCYCLINE 100 MG/1
100 CAPSULE ORAL 2 TIMES DAILY
Qty: 20 CAPSULE | Refills: 0 | Status: SHIPPED | OUTPATIENT
Start: 2024-04-29

## 2024-04-29 NOTE — PROGRESS NOTES
Subjective   Rosalba Girard is a 68 y.o. female  Cough (Runny nose, PND, chest congestion. Is almost done with doxy and promethazine-DM. Dx'd with pleurisy April 10 and has finished Zpak and Medrol Dosepak. )      Cough  This is a new problem. The current episode started 1 to 4 weeks ago. The problem has been worse. The cough is Productive of green sputum. Associated symptoms include nasal congestion, postnasal drip, a sore throat and wheezing. Pertinent negatives include no chest pain, chills, fever or shortness of breath. The symptoms are aggravated by lying down. She has tried prescription cough suppressant and oral steroids for the symptoms. The treatment provided mild relief.       The following portions of the patient's history were reviewed and updated as appropriate: allergies, current medications, past social history and problem list    Review of Systems   Constitutional:  Negative for chills, fatigue and fever.   HENT:  Positive for postnasal drip and sore throat.    Respiratory:  Positive for cough, chest tightness and wheezing. Negative for shortness of breath.    Cardiovascular:  Negative for chest pain.   Gastrointestinal:  Negative for nausea.       Objective     Vitals:    04/29/24 1355   BP: 126/68   Pulse: 99   Resp: 16   SpO2: 99%       Physical Exam  Vitals and nursing note reviewed.   Constitutional:       General: She is not in acute distress.     Appearance: She is well-developed. She is not diaphoretic.   HENT:      Head: Normocephalic and atraumatic.      Nose: Nose normal.   Neck:      Vascular: No JVD.   Cardiovascular:      Rate and Rhythm: Normal rate and regular rhythm.      Heart sounds: Normal heart sounds. No murmur heard.  Pulmonary:      Effort: Pulmonary effort is normal. No respiratory distress.      Breath sounds: No stridor. Wheezing present.   Musculoskeletal:      Cervical back: Neck supple.   Lymphadenopathy:      Cervical: No cervical adenopathy.         Assessment &  Plan     Diagnoses and all orders for this visit:    1. Bronchitis (Primary)  -     doxycycline (MONODOX) 100 MG capsule; Take 1 capsule by mouth 2 (Two) Times a Day.  Dispense: 20 capsule; Refill: 0  -     methylPREDNISolone (MEDROL) 4 MG dose pack; Take as directed on package instructions.  Dispense: 21 tablet; Refill: 0  -     promethazine-dextromethorphan (PROMETHAZINE-DM) 6.25-15 MG/5ML syrup; Take 2.5 mL by mouth 4 (Four) Times a Day As Needed for Cough.  Dispense: 120 mL; Refill: 0     I spent 15 minutes in patient care: Reviewing records prior to the visit, examining the patient, entering orders and documentation    Part of this note may be an electronic transcription/translation of spoken language to printed text using the Dragon Dictation System.

## 2024-04-29 NOTE — PROGRESS NOTES
Chief Complaint  Chest Pain and Follow-up    Subjective      History of Present Illness {  Problem List  Visit  Diagnosis   Encounters  Notes  Medications  Labs  Result Review Imaging  Media :23}     Rosalba Girard, 68 y.o. female with past medical history significant for asthma, breast cancer with current radiation, fibromyalgia, GERD, anemia, hypertension, IBS, and prediabetes, who presents to [facility] for Chest Pain and Follow-up  Since time of previous evaluation, patient has undergone echocardiogram.  Patient currently denies any exertional chest pain.  She has ongoing localized pain in between her shoulder blades.  She also endorses ongoing dyspnea.  Patient believes that the majority of her symptoms are secondary to pleurisy.  She has been having congestion, and cough recently.  She endorses mild dizziness and occasional palpitations.  Currently denies any syncopal episodes.  Most recent dose of radiation was approximately 2 to 3 weeks ago.      Echocardiogram 4/16/2024:    Left ventricular systolic function is normal. Calculated left ventricular EF = 60.3%    Left ventricular diastolic function is consistent with (grade I) impaired relaxation.    Estimated right ventricular systolic pressure from tricuspid regurgitation is normal (<35 mmHg).       SPECT stress test at [facility] 4/21/2022:  Myocardial perfusion imaging is normal, overall LV systolic function is normal without regional wall abnormalities.    Echocardiogram 4/14/2022 at [facility]:  Left ventricle has normal cavity size, wall motion and thickness, and normal systolic function.  The estimated LV ejection fraction is 65%.  The aortic valve is trileaflet and normal.  There is mild mitral regurgitation.  Tricuspid valve appears normal, and pulmonic valve appears normal as well.  Is no pericardial effusion.  The aortic root and ascending aorta appear normal.      Prior  "presentation:  Patient presented to the ED on 3/31/2024 with chief complaint of chest pain.  At that time, patient stated she was having pain across entire anterior chest with radiation into the upper back.  Patient endorsed ongoing current radiation treatment.  After she has received radiation treatment, she admitted to having headache, fatigue, and chest discomfort.  Twelve-lead EKG showed sinus tachycardia, rate 112, normal EKG.  Chest x-ray showed no evidence of acute disease in the chest.  CTA of the chest showed no pulmonary embolus, or other acute findings in the chest.  CBC revealed leukocytosis at 13.95.  Other lab work including a CMP, lipase, BNP, and high-sensitivity troponins x 2 noted to be unremarkable.  Patient was referred to heart and valve for further evaluation of chest pain.    Since time of evaluation in ED, patient has followed up with her primary care provider on 4/10/2024.  At that time, patient endorsed pleuritic  pain, neck pain, and cough.  Patient was prescribed Z-Aramis, Medrol Dosepak, and promethazine cough syrup.  Patient was also recently seen by hematology and oncology on 4/9/2024.  At time of today's visit, chest pain is improved.  Most recently, discomfort is only in her back and between shoulder blades.  She had recently endorsed shortness of air which is now better after initiating Z-Aramis, and Medrol Dosepak.  Patient endorses palpitations only when she has increased amounts of caffeine.  She denies syncope, but endorses dizziness with position changes.  Patient also endorses some mild left lower extremity edema which she believes is secondary to prior injury.  Most recently, patient has not been active due to mobility issues.  She endorses caffeine intake of 4 glasses of tea per day, and an adequate water intake of 2 bottles per day.  In regards to family history, patient states her father had coronary artery disease with MI at age 80.  She also states that her sister had \"a leaky " "heart valve\".    Of note, patient has previously followed with Dr. Del Castillo.  She states she has undergone previous echocardiogram, and stress testing which she believes has been within approximately 1 year.      Objective     Vital Signs:   Vitals:    04/29/24 0906   BP: 121/59   BP Location: Right arm   Patient Position: Sitting   Cuff Size: Adult   Pulse: 87   Resp: 20   Temp: 98.4 °F (36.9 °C)   TempSrc: Temporal   SpO2: 96%   Weight: 94.1 kg (207 lb 8 oz)   Height: 165.1 cm (65\")       Body mass index is 34.53 kg/m².  Physical Exam  Vitals and nursing note reviewed.   Constitutional:       Appearance: Normal appearance. She is obese.   HENT:      Head: Normocephalic.   Eyes:      Pupils: Pupils are equal, round, and reactive to light.   Cardiovascular:      Rate and Rhythm: Normal rate and regular rhythm.      Pulses: Normal pulses.      Heart sounds: Normal heart sounds. No murmur heard.  Pulmonary:      Effort: Pulmonary effort is normal.      Breath sounds: Normal breath sounds.   Abdominal:      General: Bowel sounds are normal.      Palpations: Abdomen is soft.   Musculoskeletal:         General: Normal range of motion.      Cervical back: Normal range of motion.      Right lower leg: No edema.      Left lower leg: No edema.   Skin:     General: Skin is warm and dry.      Capillary Refill: Capillary refill takes less than 2 seconds.   Neurological:      Mental Status: She is alert and oriented to person, place, and time.   Psychiatric:         Mood and Affect: Mood normal.         Thought Content: Thought content normal.            Data Reviewed:{ Labs  Result Review  Imaging  Med Tab  Media :23}     Lab Results   Component Value Date    WBC 13.95 (H) 03/31/2024    HGB 16.0 (H) 03/31/2024    HCT 46.8 (H) 03/31/2024    MCV 85.4 03/31/2024     03/31/2024      Lab Results   Component Value Date    GLUCOSE 134 (H) 03/31/2024    BUN 15 03/31/2024    CREATININE 0.60 03/31/2024    EGFR 99.1 03/31/2024 "    BCR 25.0 03/31/2024    K 4.2 03/31/2024    CO2 26.0 03/31/2024    CALCIUM 9.6 03/31/2024    ALBUMIN 4.3 03/31/2024    BILITOT 0.6 03/31/2024    AST 31 03/31/2024    ALT 28 03/31/2024     Lab Results   Component Value Date    CKTOTAL 52 11/16/2022    TROPONINT 10 03/31/2024      ECG 12 Lead ED Triage Standing Order; Chest Pain (03/31/2024 11:40)  ECG 12 Lead ED Triage Standing Order; Chest Pain (03/31/2024 14:40)  CT Angiogram Chest With & Without Contrast (09/08/2019 21:30)  CT Angiogram Chest (03/31/2024 14:51)  XR Chest 1 View (03/31/2024 12:25)    Assessment & Plan   Assessment and Plan {CC Problem List  Visit Diagnosis  ROS  Review (Popup)  Health Maintenance  Quality  BestPractice  Medications  SmartSets  SnapShot Encounters  Media :23}     1. Other chest pain  -Chest pain described as atypical in nature.  Pain was suspected to be pleuritic chest pain.   She does still have some discomfort which is located in between her shoulder blades.  -Most recent CTA from 3/31 also reviewed which revealed some scattered lower lobe atelectasis.  Unsure if this is pertinent as patient has decreased immune system.  Her white blood cell count at time of evaluation in ED was elevated.  She currently has congestion and cough.  -Encourage patient to evaluate for possible acute lung pathology such as bronchitis, allergies, or possible pneumonia.  Patient has follow-up with primary care today 4/29.  Rosa results of recent echocardiogram and comparison to prior echocardiogram today with patient  -Echocardiogram 4/16/2024:    Left ventricular systolic function is normal. Calculated left ventricular EF = 60.3%    Left ventricular diastolic function is consistent with (grade I) impaired relaxation.    Estimated right ventricular systolic pressure from tricuspid regurgitation is normal (<35 mmHg).   -SPECT stress test at [facility] 4/21/2022:  Myocardial perfusion imaging is normal, overall LV systolic  "function is normal without regional wall abnormalities.  -Would be willing to repeat patient's stress test as her prior stress test was completed 2 years ago.  Discussed with patient who would like to address possible pulmonary causes of symptoms.  Patient to undergo further evaluation for possible pulmonary cause of symptoms.  Would be happy to complete stress testing upon completion of pulmonary evaluation.  -For now, would recommend patient to follow-up with heart and valve as needed, or if symptoms change or worsen    2. Essential hypertension, benign  -Blood pressure currently well-controlled.  Patient states she does not consistently check blood pressure at home  -Plan to continue bisoprolol-hydrochlorothiazide at current dose.    3. Dyspnea on exertion  -Patient with ongoing dyspnea.  She currently has other symptoms such as congestion, and cough.  She describes a \"rattling\" in her chest.  -Recommend patient follow-up closely with primary care for further evaluation of possible acute pulmonary process      Follow Up {Instructions Charge Capture  Follow-up Communications :23}     Return if symptoms worsen or fail to improve.      Patient was given instructions and counseling regarding her condition or for health maintenance advice. Please see specific information pulled into the AVS if appropriate.  Patient was instructed to call the [facility] with any questions, concerns, or worsening symptoms.    Dictated Utilizing Dragon Dictation   Please note that portions of this note were completed with a voice recognition program.   Part of this note may be an electronic transcription/translation of spoken language to printed text using the Dragon Dictation System.     "

## 2024-04-30 DIAGNOSIS — Z17.0 MALIGNANT NEOPLASM OF RIGHT BREAST IN FEMALE, ESTROGEN RECEPTOR POSITIVE, UNSPECIFIED SITE OF BREAST: ICD-10-CM

## 2024-04-30 DIAGNOSIS — C50.911 MALIGNANT NEOPLASM OF RIGHT BREAST IN FEMALE, ESTROGEN RECEPTOR POSITIVE, UNSPECIFIED SITE OF BREAST: ICD-10-CM

## 2024-04-30 RX ORDER — ANASTROZOLE 1 MG/1
1 TABLET ORAL DAILY
Qty: 30 TABLET | Refills: 5 | Status: SHIPPED | OUTPATIENT
Start: 2024-04-30

## 2024-04-30 RX ORDER — ASPIRIN 81 MG
1 TABLET, DELAYED RELEASE (ENTERIC COATED) ORAL DAILY
Qty: 30 TABLET | Refills: 5 | Status: SHIPPED | OUTPATIENT
Start: 2024-04-30

## 2024-05-02 ENCOUNTER — HOSPITAL ENCOUNTER (OUTPATIENT)
Dept: RADIATION ONCOLOGY | Facility: HOSPITAL | Age: 69
Setting detail: RADIATION/ONCOLOGY SERIES
Discharge: HOME | End: 2024-05-02
Payer: COMMERCIAL

## 2024-05-02 LAB
QT INTERVAL: 328 MS
QT INTERVAL: 334 MS
QTC INTERVAL: 449 MS
QTC INTERVAL: 455 MS

## 2024-05-10 ENCOUNTER — OFFICE VISIT (OUTPATIENT)
Dept: FAMILY MEDICINE CLINIC | Facility: CLINIC | Age: 69
End: 2024-05-10
Payer: COMMERCIAL

## 2024-05-10 VITALS
DIASTOLIC BLOOD PRESSURE: 64 MMHG | OXYGEN SATURATION: 98 % | SYSTOLIC BLOOD PRESSURE: 122 MMHG | TEMPERATURE: 97.4 F | HEART RATE: 81 BPM | BODY MASS INDEX: 35.69 KG/M2 | RESPIRATION RATE: 16 BRPM | WEIGHT: 214.2 LBS | HEIGHT: 65 IN

## 2024-05-10 DIAGNOSIS — I10 ESSENTIAL HYPERTENSION: ICD-10-CM

## 2024-05-10 DIAGNOSIS — R05.9 COUGH, UNSPECIFIED TYPE: Primary | ICD-10-CM

## 2024-05-10 PROCEDURE — 99213 OFFICE O/P EST LOW 20 MIN: CPT | Performed by: PHYSICIAN ASSISTANT

## 2024-05-10 RX ORDER — BISOPROLOL FUMARATE AND HYDROCHLOROTHIAZIDE 2.5; 6.25 MG/1; MG/1
1 TABLET ORAL DAILY
Qty: 90 TABLET | Refills: 3 | Status: SHIPPED | OUTPATIENT
Start: 2024-05-10

## 2024-05-10 RX ORDER — LEVOCETIRIZINE DIHYDROCHLORIDE 5 MG/1
5 TABLET, FILM COATED ORAL EVERY 12 HOURS
Qty: 60 TABLET | Refills: 3 | Status: SHIPPED | OUTPATIENT
Start: 2024-05-10

## 2024-05-10 RX ORDER — DEXTROMETHORPHAN HYDROBROMIDE AND PROMETHAZINE HYDROCHLORIDE 15; 6.25 MG/5ML; MG/5ML
2.5 SYRUP ORAL 4 TIMES DAILY PRN
Qty: 120 ML | Refills: 0 | Status: SHIPPED | OUTPATIENT
Start: 2024-05-10

## 2024-05-16 ENCOUNTER — PATIENT OUTREACH (OUTPATIENT)
Dept: CASE MANAGEMENT | Facility: OTHER | Age: 69
End: 2024-05-16
Payer: COMMERCIAL

## 2024-05-16 NOTE — OUTREACH NOTE
AMBULATORY CASE MANAGEMENT NOTE    Names and Relationships of Patient/Support Persons: Caller: Rosalba Girard; Relationship: Self -     Patient Outreach    Patient reports she is feeling a little better, continues to experience cough, congestion, pleuritic pain.  Continued medications include amoxicillin,promethazine -dextromethorphan liquid, and steroids, taking as prescribed. Reports healthy appetite and is increasing fluid in take. Denies other needs or concerns at this time.    Judy RIVAS  Ambulatory Case Management    5/16/2024, 12:02 EDT

## 2024-05-24 ENCOUNTER — TELEPHONE (OUTPATIENT)
Age: 69
End: 2024-05-24
Payer: COMMERCIAL

## 2024-05-24 NOTE — TELEPHONE ENCOUNTER
Called patient and left a VM to re-schedule missed appointment on 5/2/24.  Contact information provided and requested that patient return my call to re-schedule F/U.

## 2024-05-24 NOTE — TELEPHONE ENCOUNTER
Patient returned my call to re-schedule F/U with Denisse Aponte for Monday, 6/10/24 at 9:00 am at [facility].  Patient verbalized an understanding of date, time, and location of appointment.  Contact information and patient will call with questions or concerns.

## 2024-06-10 ENCOUNTER — OFFICE VISIT (OUTPATIENT)
Dept: RADIATION ONCOLOGY | Facility: HOSPITAL | Age: 69
End: 2024-06-10
Payer: COMMERCIAL

## 2024-06-10 ENCOUNTER — HOSPITAL ENCOUNTER (OUTPATIENT)
Dept: RADIATION ONCOLOGY | Facility: HOSPITAL | Age: 69
Setting detail: RADIATION/ONCOLOGY SERIES
End: 2024-06-10
Payer: COMMERCIAL

## 2024-06-10 VITALS
TEMPERATURE: 97.6 F | OXYGEN SATURATION: 96 % | BODY MASS INDEX: 34.23 KG/M2 | HEIGHT: 66 IN | RESPIRATION RATE: 18 BRPM | DIASTOLIC BLOOD PRESSURE: 59 MMHG | SYSTOLIC BLOOD PRESSURE: 119 MMHG | WEIGHT: 213 LBS | HEART RATE: 96 BPM

## 2024-06-10 DIAGNOSIS — Z17.0 CARCINOMA OF LOWER-INNER QUADRANT OF LEFT BREAST IN FEMALE, ESTROGEN RECEPTOR POSITIVE: Primary | ICD-10-CM

## 2024-06-10 DIAGNOSIS — C50.312 CARCINOMA OF LOWER-INNER QUADRANT OF LEFT BREAST IN FEMALE, ESTROGEN RECEPTOR POSITIVE: Primary | ICD-10-CM

## 2024-06-10 PROCEDURE — G0463 HOSPITAL OUTPT CLINIC VISIT: HCPCS

## 2024-06-10 NOTE — PROGRESS NOTES
Follow Up Office Visit      Encounter Date: 06/10/2024   Patient Name: Rosalba Girard  YOB: 1955   Medical Record Number: [id number]   Primary Diagnosis: Carcinoma of lower-inner quadrant of left breast in female, estrogen receptor positive [C50.312, Z17.0]   Cancer Staging: Cancer Staging   No matching staging information was found for the patient.                Cancer Staging   No matching staging information was found for the patient.          Chief Complaint:    Chief Complaint   Patient presents with    Breast Cancer     Malignant neoplasm of lower-inner quadrant of left breast in female, estrogen receptor positive       Oncologic History: Rosalba Girard is a 68 y.o. 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  Final pathology revealed grade 1 mucinous carcinoma measuring 3mm with associated DCIS involving the medial margin focally. Margins negative for invasive carcinoma. 0/1 SLN.  She underwent repeat excision for extended medial margin 1/4/24 - residual low grade DCIS resected with margins negative by 7 mm.  CT imaging was stable, no new or progressive abnormalities.  She has a stable portacaval lymph node and abdominal fat stranding.  Dr. Ocampo will repeat CT scan due December 2024.    Interval History: She completed adjuvant radiation under the care of Dr. Melissa Dey, receiving 40 GY/15 fractions directed towards the left breast with a 10 GY/5 fraction boost to the lumpectomy cavity.  She completed on 3/29/2024 and is here for 1 month follow-up.  Toxicities were assessed and managed on a weekly basis.  She experienced fatigue and dermatitis which were managed supportively.    She did develop pleuritic chest pain and cough post radiation.  Her cough has subsided but is still experiencing some pleuritic chest pain.  She has been  "evaluated by her PCP and has been on albuterol inhaler, antibiotics, and promethazine.   She reports her dermatitis has subsided and has now tanned.  She used moisturizers.  He reports her skin has healed well and continues to use moisturizers on a as needed basis.  No further breast concerns at this time.  Surgical incisions have healed well.  She is overall pleased with her cosmetic outcome.    Dr. Reji Daniels did order lymphedema PT but she has been unable to start due to pleuritic chest pain.    She initiated anastrozole January 2024 and is planned for 5 years of treatment.  She is overall tolerating, reports of fatigue unsure whether it is from the anastrozole or radiation.      Subjective      Review of Systems: Review of Systems   Constitutional:  Positive for fatigue.   HENT:  Positive for ear pain.         Left earache   Respiratory:          Pleurisy chest pain. Cough has improved   Cardiovascular: Negative.    Gastrointestinal:         Chronic occasional stool incontinence   Genitourinary:         Urgency    Skin: Negative.    Neurological:  Positive for headaches.   All other systems reviewed and are negative.      The following portions of the patient's history were reviewed and updated as appropriate: allergies, current medications, past family history, past medical history, past social history, past surgical history and problem list.    Measures:   KPS/Quality of Life: 80 - Restricted Physical Activity      Objective     Physical Exam:   Vital Signs:   Vitals:    06/10/24 0912   BP: 119/59   Pulse: 96   Resp: 18   Temp: 97.6 °F (36.4 °C)   TempSrc: Skin   SpO2: 96%   Weight: 96.6 kg (213 lb)   Height: 166.4 cm (65.5\")   PainSc:   4   PainLoc: Chest  Comment: ongoing pleurisy     Body mass index is 34.91 kg/m².     Constitutional: No acute distress, sitting comfortably  Eye: EOMI, anicteric sclerae  HENT: NC/AT, MMM   Respiratory: Symmetric expansion, nonlabored respiration  MSK: ROM intact in all four " extremities, no obvious deformities  Neuro: Alert, oriented x3, CN3-12 grossly intact.   Psych: Appropriate mood and affect.  Left breast: Left breast skin and areola are hyperpigmented.  No masses, seromas, or other skin changes.  Left breast and axillary surgical scar are well-healed without complication.  No left axillary, SCV, IM adenopathy.        Assessment / Plan        Assessment/Plan:     Diagnoses and all orders for this visit:    1. 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    Following left breast lumpectomy, she underwent adjuvant radiotherapy on the left as part of her breast conserving treatment, completing 8 weeks ago.  She tolerated treatment well.  She developed the anticipated grade 1 fatigue and grade 1 radiation dermatitis which have appropriately subsided.  She also developed pleuritic chest pain and cough.  She has been treated by her PCP.  Clinical exam of the left breast today is benign.  We discussed the role of local breast/scar massage, as well as stretching and range of motion exercises of the left upper extremity to minimize the risk of treatment related fibrosis or lymphedema.  Recommend the use of topical Vitamin E.  Recommend maintaining a healthy lifestyle with a well balanced diet, calcium and vitamin D for osteoporosis prevention, and exercise as well as continue with recommended health and cancer screening guidelines.  The patient is now on adjuvant endocrine therapy with anastrozole, which she is tolerating.  She is scheduled for repeat diagnostic mammogram of the left breast 6 months out from radiation, on 10/18/2024.  We discussed follow-up intervals, including biannual diagnostic mammograms to alternate between the left and bilateral breasts, biannual clinical breast exams, and monthly self breast  exams.        Follow Up:   Return in about 1 year (around 6/10/2025) for Appt with FIORELLA.        Time:   I spent 35 minutes on this encounter today, 06/10/24. Activities that took place during this time include:   - preparing to see the patient  - obtaining and reviewing separately obtained history  - performing a medically appropriate examination and evaluation  - counseling and educating the patient  - ordering medications/tests/procedures  - communicating with other healthcare providers  - documenting clinical information in the health record  - coordinating care for this patient.     Sincerely,        FIORELLA Nuno, DNP  Radiation Oncology  This document has been signed by FIORELLA Abdul on Jie 10, 2024 10:06 EDT           NOTICE TO PATIENTS  At [facility], we believe that sharing information builds trust and better relationships. You are receiving this note because you recently visited [facility]. It is possible you will see health information before a provider has talked with you about it. This kind of information can be easy to misunderstand. To help you fully understand what it means for your health, we urge you to discuss this note with your provider.

## 2024-06-10 NOTE — LETTER
Jie 10, 2024       No Recipients    Patient: Rosalba Girard   YOB: 1955   Date of Visit: 6/10/2024     Dear Melissa Dey MD:       Thank you for referring Rosalba Girard to me for evaluation. Below are the relevant portions of my assessment and plan of care.    If you have questions, please do not hesitate to call me. I look forward to following Rosalba along with you.         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                Cancer Staging   No matching staging information was found for the patient.          Chief Complaint:    Chief Complaint   Patient presents with   • Breast Cancer     Malignant neoplasm of lower-inner quadrant of left breast in female, estrogen receptor positive       Oncologic History: Rosalba Girard is a 68 y.o. 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  Final pathology revealed grade 1 mucinous carcinoma measuring 3mm with associated DCIS involving the medial margin focally. Margins negative for invasive carcinoma. 0/1 SLN.  She underwent repeat excision for extended medial margin 1/4/24 - residual low grade DCIS resected with margins negative by 7 mm.  CT imaging was stable, no new or progressive abnormalities.  She has a stable portacaval lymph node and abdominal fat stranding.  Dr. Ocampo will repeat CT scan due December 2024.    Interval History:  She completed adjuvant radiation under the care of Dr. Melissa Dey, receiving 40 GY/15 fractions directed towards the left breast with a 10 GY/5 fraction boost to the lumpectomy cavity.  She completed on 3/29/2024 and is here for 1 month follow-up.  Toxicities were assessed and managed on a weekly basis.  She experienced fatigue and dermatitis which were managed supportively.    She did develop pleuritic chest pain and cough post radiation.  Her cough has subsided but is still experiencing some pleuritic chest pain.  She has been evaluated by her PCP and has been on albuterol inhaler, antibiotics, and promethazine.   She reports her dermatitis has subsided and has now tanned.  She used moisturizers.  He reports her skin has healed well and continues to use moisturizers on a as needed basis.  No further breast concerns at this time.  Surgical incisions have healed well.  She is overall pleased with her cosmetic outcome.    Dr. Reji Daniels did order lymphedema PT but she has been unable to start due to pleuritic chest pain.    She initiated anastrozole January 2024 and is planned for 5 years of treatment.  She is overall tolerating, reports of fatigue unsure whether it is from the anastrozole or radiation.      Subjective      Review of Systems: Review of Systems   Constitutional:  Positive for fatigue.   HENT:  Positive for ear pain.         Left earache   Respiratory:          Pleurisy chest pain. Cough has improved   Cardiovascular: Negative.    Gastrointestinal:         Chronic occasional stool incontinence   Genitourinary:         Urgency    Skin: Negative.    Neurological:  Positive for headaches.   All other systems reviewed and are negative.      The following portions of the patient's history were reviewed and updated as appropriate: allergies, current medications, past family history, past medical history, past social history, past surgical history and problem list.    Measures:   KPS/Quality of Life: 80 -  "Restricted Physical Activity      Objective     Physical Exam:   Vital Signs:   Vitals:    06/10/24 0912   BP: 119/59   Pulse: 96   Resp: 18   Temp: 97.6 °F (36.4 °C)   TempSrc: Skin   SpO2: 96%   Weight: 96.6 kg (213 lb)   Height: 166.4 cm (65.5\")   PainSc:   4   PainLoc: Chest  Comment: ongoing pleurisy     Body mass index is 34.91 kg/m².     Constitutional: No acute distress, sitting comfortably  Eye: EOMI, anicteric sclerae  HENT: NC/AT, MMM   Respiratory: Symmetric expansion, nonlabored respiration  MSK: ROM intact in all four extremities, no obvious deformities  Neuro: Alert, oriented x3, CN3-12 grossly intact.   Psych: Appropriate mood and affect.  Left breast: Left breast skin and areola are hyperpigmented.  No masses, seromas, or other skin changes.  Left breast and axillary surgical scar are well-healed without complication.  No left axillary, SCV, IM adenopathy.        Assessment / Plan        Assessment/Plan:     Diagnoses and all orders for this visit:    1. 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    Following left breast lumpectomy, she underwent adjuvant radiotherapy on the left as part of her breast conserving treatment, completing 8 weeks ago.  She tolerated treatment well.  She developed the anticipated grade 1 fatigue and grade 1 radiation dermatitis which have appropriately subsided.  She also developed pleuritic chest pain and cough.  She has been treated by her PCP.  Clinical exam of the left breast today is benign.  We discussed the role of local breast/scar massage, as well as stretching and range of motion exercises of the left upper extremity to minimize the risk of treatment related fibrosis or lymphedema.  Recommend the use of topical Vitamin E.  Recommend maintaining a healthy lifestyle with a well balanced diet, " calcium and vitamin D for osteoporosis prevention, and exercise as well as continue with recommended health and cancer screening guidelines.  The patient is now on adjuvant endocrine therapy with anastrozole, which she is tolerating.  She is scheduled for repeat diagnostic mammogram of the left breast 6 months out from radiation, on 10/18/2024.  We discussed follow-up intervals, including biannual diagnostic mammograms to alternate between the left and bilateral breasts, biannual clinical breast exams, and monthly self breast exams.        Follow Up:   Return in about 1 year (around 6/10/2025) for Appt with FIORELLA.        Time:   I spent 35 minutes on this encounter today, 06/10/24. Activities that took place during this time include:   - preparing to see the patient  - obtaining and reviewing separately obtained history  - performing a medically appropriate examination and evaluation  - counseling and educating the patient  - ordering medications/tests/procedures  - communicating with other healthcare providers  - documenting clinical information in the health record  - coordinating care for this patient.     Sincerely,        FIORELLA Nuno, DNP  Radiation Oncology  This document has been signed by FIORELLA Abdul on Jie 10, 2024 10:06 EDT           NOTICE TO PATIENTS  At [facility], we believe that sharing information builds trust and better relationships. You are receiving this note because you recently visited [facility]. It is possible you will see health information before a provider has talked with you about it. This kind of information can be easy to misunderstand. To help you fully understand what it means for your health, we urge you to discuss this note with your provider.

## 2024-06-13 ENCOUNTER — PATIENT OUTREACH (OUTPATIENT)
Dept: CASE MANAGEMENT | Facility: OTHER | Age: 69
End: 2024-06-13
Payer: COMMERCIAL

## 2024-06-26 ENCOUNTER — OFFICE VISIT (OUTPATIENT)
Dept: FAMILY MEDICINE CLINIC | Facility: CLINIC | Age: 69
End: 2024-06-26
Payer: COMMERCIAL

## 2024-06-26 VITALS
TEMPERATURE: 97.1 F | HEART RATE: 85 BPM | BODY MASS INDEX: 34.1 KG/M2 | DIASTOLIC BLOOD PRESSURE: 74 MMHG | SYSTOLIC BLOOD PRESSURE: 112 MMHG | RESPIRATION RATE: 16 BRPM | WEIGHT: 212.2 LBS | HEIGHT: 66 IN | OXYGEN SATURATION: 99 %

## 2024-06-26 DIAGNOSIS — J01.00 ACUTE NON-RECURRENT MAXILLARY SINUSITIS: Primary | ICD-10-CM

## 2024-06-26 PROCEDURE — 99213 OFFICE O/P EST LOW 20 MIN: CPT | Performed by: PHYSICIAN ASSISTANT

## 2024-06-26 RX ORDER — PREDNISONE 10 MG/1
10 TABLET ORAL DAILY
Qty: 14 TABLET | Refills: 0 | Status: SHIPPED | OUTPATIENT
Start: 2024-06-26

## 2024-06-26 RX ORDER — CEFDINIR 300 MG/1
300 CAPSULE ORAL 2 TIMES DAILY
Qty: 20 CAPSULE | Refills: 0 | Status: SHIPPED | OUTPATIENT
Start: 2024-06-26

## 2024-06-26 RX ORDER — DEXTROMETHORPHAN HYDROBROMIDE AND PROMETHAZINE HYDROCHLORIDE 15; 6.25 MG/5ML; MG/5ML
2.5 SYRUP ORAL 4 TIMES DAILY PRN
Qty: 120 ML | Refills: 0 | Status: SHIPPED | OUTPATIENT
Start: 2024-06-26

## 2024-06-26 NOTE — PROGRESS NOTES
Subjective   Rosalba Girard is a 68 y.o. female  Sinus Problem (Pt complaining of Ear and sinus pressure/ headache ), Sore Throat (Started about a week ago ), and Earache (Pain in both ears, started about a week ago )      Sinus Problem  Associated symptoms include congestion, coughing, ear pain, headaches, sinus pressure and a sore throat. Pertinent negatives include no chills or shortness of breath.   Sore Throat   Associated symptoms include congestion, coughing, ear pain and headaches. Pertinent negatives include no shortness of breath.   Earache   Associated symptoms include coughing, headaches, rhinorrhea and a sore throat.     History of Present Illness  The patient presents for evaluation of a sinus infection.    The patient initially experienced an earache in her left ear, which has since progressed to the right ear. Concurrently, she has been experiencing sinus pressure, a mild sore throat, and a cough. Additionally, she reports pain in the back of her neck.    The following portions of the patient's history were reviewed and updated as appropriate: allergies, current medications, past social history and problem list    Review of Systems   Constitutional:  Negative for chills, fatigue and fever.   HENT:  Positive for congestion, ear pain, postnasal drip, rhinorrhea, sinus pressure and sore throat.    Eyes:  Positive for discharge and itching. Negative for pain.   Respiratory:  Positive for cough. Negative for shortness of breath.    Gastrointestinal: Negative.    Genitourinary: Negative.    Musculoskeletal:  Negative for myalgias.   Neurological:  Positive for light-headedness and headaches. Negative for dizziness.   Hematological:  Negative for adenopathy.   Psychiatric/Behavioral:  Positive for sleep disturbance.        Objective     Vitals:    06/26/24 1006   BP: 112/74   Pulse: 85   Resp: 16   Temp: 97.1 °F (36.2 °C)   SpO2: 99%       Physical Exam  Vitals and nursing note reviewed.    Constitutional:       Appearance: She is well-developed.   HENT:      Head: Normocephalic and atraumatic.      Right Ear: Tympanic membrane and ear canal normal.      Left Ear: Tympanic membrane and ear canal normal.      Nose: Mucosal edema and rhinorrhea present.      Right Sinus: Maxillary sinus tenderness and frontal sinus tenderness present.      Left Sinus: Maxillary sinus tenderness and frontal sinus tenderness present.      Mouth/Throat:      Pharynx: No oropharyngeal exudate.   Eyes:      Pupils: Pupils are equal, round, and reactive to light.   Cardiovascular:      Rate and Rhythm: Normal rate and regular rhythm.   Pulmonary:      Effort: Pulmonary effort is normal.      Breath sounds: Normal breath sounds.       Physical Exam      Assessment & Plan   Assessment & Plan      Diagnoses and all orders for this visit:    1. Acute non-recurrent maxillary sinusitis (Primary)  -     cefdinir (OMNICEF) 300 MG capsule; Take 1 capsule by mouth 2 (Two) Times a Day.  Dispense: 20 capsule; Refill: 0  -     predniSONE (DELTASONE) 10 MG tablet; Take 1 tablet by mouth Daily.  Dispense: 14 tablet; Refill: 0  -     promethazine-dextromethorphan (PROMETHAZINE-DM) 6.25-15 MG/5ML syrup; Take 2.5 mL by mouth 4 (Four) Times a Day As Needed for Cough.  Dispense: 120 mL; Refill: 0       I spent 15 minutes in patient care: Reviewing records prior to the visit, examining the patient, entering orders and documentation    Part of this note may be an electronic transcription/translation of spoken language to printed text using the Dragon Dictation System.     Patient or patient representative verbalized consent for the use of Ambient Listening during the visit with  AMINAH Campos for chart documentation. 6/26/2024  10:27 EDT

## 2024-07-25 ENCOUNTER — APPOINTMENT (OUTPATIENT)
Dept: GENERAL RADIOLOGY | Facility: HOSPITAL | Age: 69
End: 2024-07-25
Payer: COMMERCIAL

## 2024-07-25 ENCOUNTER — APPOINTMENT (OUTPATIENT)
Dept: CT IMAGING | Facility: HOSPITAL | Age: 69
End: 2024-07-25
Payer: COMMERCIAL

## 2024-07-25 ENCOUNTER — HOSPITAL ENCOUNTER (EMERGENCY)
Facility: HOSPITAL | Age: 69
Discharge: HOME | End: 2024-07-25
Attending: EMERGENCY MEDICINE
Payer: COMMERCIAL

## 2024-07-25 VITALS
DIASTOLIC BLOOD PRESSURE: 78 MMHG | HEIGHT: 64 IN | WEIGHT: 207 LBS | RESPIRATION RATE: 18 BRPM | SYSTOLIC BLOOD PRESSURE: 129 MMHG | OXYGEN SATURATION: 98 % | TEMPERATURE: 98.3 F | BODY MASS INDEX: 35.34 KG/M2 | HEART RATE: 71 BPM

## 2024-07-25 DIAGNOSIS — R07.9 CHEST PAIN, UNSPECIFIED TYPE: Primary | ICD-10-CM

## 2024-07-25 DIAGNOSIS — R09.1 PLEURISY: ICD-10-CM

## 2024-07-25 LAB
ALBUMIN SERPL-MCNC: 4 G/DL (ref 3.5–5.2)
ALBUMIN/GLOB SERPL: 1.2 G/DL
ALP SERPL-CCNC: 105 U/L (ref 39–117)
ALT SERPL W P-5'-P-CCNC: 26 U/L (ref 1–33)
ANION GAP SERPL CALCULATED.3IONS-SCNC: 11 MMOL/L (ref 5–15)
AST SERPL-CCNC: 25 U/L (ref 1–32)
BASOPHILS # BLD AUTO: 0.02 10*3/MM3 (ref 0–0.2)
BASOPHILS NFR BLD AUTO: 0.2 % (ref 0–1.5)
BILIRUB SERPL-MCNC: 0.5 MG/DL (ref 0–1.2)
BUN SERPL-MCNC: 12 MG/DL (ref 8–23)
BUN/CREAT SERPL: 14.3 (ref 7–25)
CALCIUM SPEC-SCNC: 9.2 MG/DL (ref 8.6–10.5)
CHLORIDE SERPL-SCNC: 102 MMOL/L (ref 98–107)
CO2 SERPL-SCNC: 26 MMOL/L (ref 22–29)
CREAT SERPL-MCNC: 0.84 MG/DL (ref 0.57–1)
DEPRECATED RDW RBC AUTO: 36.1 FL (ref 37–54)
EGFRCR SERPLBLD CKD-EPI 2021: 76.3 ML/MIN/1.73
EOSINOPHIL # BLD AUTO: 0.11 10*3/MM3 (ref 0–0.4)
EOSINOPHIL NFR BLD AUTO: 1.3 % (ref 0.3–6.2)
ERYTHROCYTE [DISTWIDTH] IN BLOOD BY AUTOMATED COUNT: 12 % (ref 12.3–15.4)
GEN 5 2HR TROPONIN T REFLEX: <6 NG/L
GLOBULIN UR ELPH-MCNC: 3.4 GM/DL
GLUCOSE SERPL-MCNC: 89 MG/DL (ref 65–99)
HCT VFR BLD AUTO: 43 % (ref 34–46.6)
HGB BLD-MCNC: 15.1 G/DL (ref 12–15.9)
HOLD SPECIMEN: NORMAL
IMM GRANULOCYTES # BLD AUTO: 0.04 10*3/MM3 (ref 0–0.05)
IMM GRANULOCYTES NFR BLD AUTO: 0.5 % (ref 0–0.5)
LIPASE SERPL-CCNC: 32 U/L (ref 13–60)
LYMPHOCYTES # BLD AUTO: 2.53 10*3/MM3 (ref 0.7–3.1)
LYMPHOCYTES NFR BLD AUTO: 29.9 % (ref 19.6–45.3)
MCH RBC QN AUTO: 29.5 PG (ref 26.6–33)
MCHC RBC AUTO-ENTMCNC: 35.1 G/DL (ref 31.5–35.7)
MCV RBC AUTO: 84 FL (ref 79–97)
MONOCYTES # BLD AUTO: 0.71 10*3/MM3 (ref 0.1–0.9)
MONOCYTES NFR BLD AUTO: 8.4 % (ref 5–12)
NEUTROPHILS NFR BLD AUTO: 5.06 10*3/MM3 (ref 1.7–7)
NEUTROPHILS NFR BLD AUTO: 59.7 % (ref 42.7–76)
NRBC BLD AUTO-RTO: 0 /100 WBC (ref 0–0.2)
NT-PROBNP SERPL-MCNC: 76.9 PG/ML (ref 0–900)
PLATELET # BLD AUTO: 247 10*3/MM3 (ref 140–450)
PMV BLD AUTO: 10 FL (ref 6–12)
POTASSIUM SERPL-SCNC: 3.7 MMOL/L (ref 3.5–5.2)
PROT SERPL-MCNC: 7.4 G/DL (ref 6–8.5)
QT INTERVAL: 402 MS
QTC INTERVAL: 460 MS
RBC # BLD AUTO: 5.12 10*6/MM3 (ref 3.77–5.28)
SODIUM SERPL-SCNC: 139 MMOL/L (ref 136–145)
TROPONIN T DELTA: NORMAL
TROPONIN T SERPL HS-MCNC: 7 NG/L
WBC NRBC COR # BLD AUTO: 8.47 10*3/MM3 (ref 3.4–10.8)
WHOLE BLOOD HOLD COAG: NORMAL
WHOLE BLOOD HOLD SPECIMEN: NORMAL

## 2024-07-25 PROCEDURE — 83690 ASSAY OF LIPASE: CPT | Performed by: EMERGENCY MEDICINE

## 2024-07-25 PROCEDURE — 74174 CTA ABD&PLVS W/CONTRAST: CPT

## 2024-07-25 PROCEDURE — 36415 COLL VENOUS BLD VENIPUNCTURE: CPT

## 2024-07-25 PROCEDURE — 93005 ELECTROCARDIOGRAM TRACING: CPT

## 2024-07-25 PROCEDURE — 96374 THER/PROPH/DIAG INJ IV PUSH: CPT

## 2024-07-25 PROCEDURE — 25010000002 ONDANSETRON PER 1 MG: Performed by: EMERGENCY MEDICINE

## 2024-07-25 PROCEDURE — 80053 COMPREHEN METABOLIC PANEL: CPT | Performed by: EMERGENCY MEDICINE

## 2024-07-25 PROCEDURE — 83880 ASSAY OF NATRIURETIC PEPTIDE: CPT | Performed by: EMERGENCY MEDICINE

## 2024-07-25 PROCEDURE — 25010000002 HYDROMORPHONE PER 4 MG: Performed by: EMERGENCY MEDICINE

## 2024-07-25 PROCEDURE — 71045 X-RAY EXAM CHEST 1 VIEW: CPT

## 2024-07-25 PROCEDURE — 84484 ASSAY OF TROPONIN QUANT: CPT | Performed by: EMERGENCY MEDICINE

## 2024-07-25 PROCEDURE — 99285 EMERGENCY DEPT VISIT HI MDM: CPT

## 2024-07-25 PROCEDURE — 25810000003 SODIUM CHLORIDE 0.9 % SOLUTION: Performed by: EMERGENCY MEDICINE

## 2024-07-25 PROCEDURE — 71275 CT ANGIOGRAPHY CHEST: CPT

## 2024-07-25 PROCEDURE — 96375 TX/PRO/DX INJ NEW DRUG ADDON: CPT

## 2024-07-25 PROCEDURE — 25510000001 IOPAMIDOL PER 1 ML: Performed by: EMERGENCY MEDICINE

## 2024-07-25 PROCEDURE — 85025 COMPLETE CBC W/AUTO DIFF WBC: CPT | Performed by: EMERGENCY MEDICINE

## 2024-07-25 PROCEDURE — 93005 ELECTROCARDIOGRAM TRACING: CPT | Performed by: EMERGENCY MEDICINE

## 2024-07-25 RX ORDER — HYDROMORPHONE HYDROCHLORIDE 1 MG/ML
0.25 INJECTION, SOLUTION INTRAMUSCULAR; INTRAVENOUS; SUBCUTANEOUS ONCE
Status: COMPLETED | OUTPATIENT
Start: 2024-07-25 | End: 2024-07-25

## 2024-07-25 RX ORDER — DICYCLOMINE HCL 20 MG
20 TABLET ORAL EVERY 8 HOURS PRN
Qty: 20 TABLET | Refills: 0 | Status: SHIPPED | OUTPATIENT
Start: 2024-07-25

## 2024-07-25 RX ORDER — SODIUM CHLORIDE 0.9 % (FLUSH) 0.9 %
10 SYRINGE (ML) INJECTION AS NEEDED
Status: DISCONTINUED | OUTPATIENT
Start: 2024-07-25 | End: 2024-07-25 | Stop reason: HOSPADM

## 2024-07-25 RX ORDER — ASPIRIN 81 MG/1
324 TABLET, CHEWABLE ORAL ONCE
Status: DISCONTINUED | OUTPATIENT
Start: 2024-07-25 | End: 2024-07-25 | Stop reason: HOSPADM

## 2024-07-25 RX ORDER — ONDANSETRON 2 MG/ML
4 INJECTION INTRAMUSCULAR; INTRAVENOUS ONCE
Status: COMPLETED | OUTPATIENT
Start: 2024-07-25 | End: 2024-07-25

## 2024-07-25 RX ADMIN — SODIUM CHLORIDE 500 ML: 9 INJECTION, SOLUTION INTRAVENOUS at 14:48

## 2024-07-25 RX ADMIN — HYDROMORPHONE HYDROCHLORIDE 0.25 MG: 1 INJECTION, SOLUTION INTRAMUSCULAR; INTRAVENOUS; SUBCUTANEOUS at 14:49

## 2024-07-25 RX ADMIN — IOPAMIDOL 85 ML: 755 INJECTION, SOLUTION INTRAVENOUS at 15:09

## 2024-07-25 RX ADMIN — ONDANSETRON 4 MG: 2 INJECTION INTRAMUSCULAR; INTRAVENOUS at 14:49

## 2024-07-25 NOTE — Clinical Note
[facility] EMERGENCY DEPARTMENT  [address]  [facility] [address]  Phone: [phone]    Rosalba Girard was seen and treated in our emergency department on 7/25/2024.  She may return to work on 07/29/2024.         Thank you for choosing [facility].    Raheel Brizuela MD

## 2024-07-25 NOTE — Clinical Note
[facility] EMERGENCY DEPARTMENT  [address]  [facility] [address]  Phone: [phone]    Rosalba Girard was seen and treated in our emergency department on 7/25/2024.  She may return to work on 07/29/2024.         Thank you for choosing [facility].    Raheel Brizuela MD

## 2024-07-26 LAB
QT INTERVAL: 420 MS
QTC INTERVAL: 462 MS

## 2024-07-26 NOTE — ED PROVIDER NOTES
"Subjective   History of Present Illness  69-year-old female presents for evaluation of chest pain.  Of note, the patient has risk factors for coronary artery disease of hypertension, positive family history, and being a former smoker.  She tells me that over the past 2 weeks she has been experiencing intermittent episodes of chest discomfort.  The pain will occasionally radiate to her back and at times the pain is accompanied by \"hot flashes.\"  She denies any accompanying vomiting or diaphoresis.  She denies any exertional component to her symptoms.  Given her ongoing symptoms she came here to the emergency department to be evaluated.  She does have a history of fibromyalgia and pleurisy and wonders if either 1 of these could be a contributing factor to her symptoms as well.  She currently rates her pain at 3 out of 10 in severity.  She has a prior history of breast cancer as well.      Review of Systems   Respiratory:  Positive for chest tightness.    Cardiovascular:  Positive for chest pain.   All other systems reviewed and are negative.      Past Medical History:   Diagnosis Date   • Asthma    • Breast cancer    • Chronic superficial dermatitis    • Closed fracture of left tibial plateau with routine healing    • Dietary counseling    • Encounter for routine adult health examination    • Fibromyalgia    • GERD (gastroesophageal reflux disease)    • Hair or hair follicle disorder    • History of anemia    • Hives    • Hypertension    • Irritable bowel disease    • Malaise and fatigue    • Myalgia and myositis    • PONV (postoperative nausea and vomiting)     on occasion    • Prediabetes    • Rash and nonspecific skin eruption    • S/P ORIF (open reduction internal fixation) fracture  left tibial plateau  12/21/2016   • Sinusitis, acute maxillary    • Tibial plateau fracture, left 12/21/2016   • UTI (urinary tract infection)    • Wears glasses        Allergies   Allergen Reactions   • Myrbetriq [Mirabegron] Itching "   • Pepper Other (See Comments)     Runny nose   • Tramadol Rash       Past Surgical History:   Procedure Laterality Date   • BLADDER SURGERY      Probable anterior repair x 2 ( Dr. Noble)   • BREAST BIOPSY Right    • BREAST LUMPECTOMY Right 2023    Re-Excision - 24   • CHOLECYSTECTOMY     • COLONOSCOPY W/ POLYPECTOMY      [facility]   • ECTOPIC PREGNANCY SURGERY     • HERNIA REPAIR      umbilical    • TIBIAL PLATEAU OPEN REDUCTION INTERNAL FIXATION Left 2016    LEFT TIBIAL PLATEAU OPEN REDUCTION INT FIXATION;  Surgeon: Constantine Durbin MD;  Location: [facility] OR;  Service:    • TOTAL ABDOMINAL HYSTERECTOMY WITH SALPINGO OOPHORECTOMY Bilateral 1998    bleeding/pain Dr Noble       Family History   Problem Relation Age of Onset   • Osteoarthritis Mother    • Aneurysm Mother    • Cancer Father         Lung   • Heart disease Father    • Heart attack Father    • Stroke Father    • Hypertension Sister    • Hypertension Brother    • Breast cancer Maternal Aunt    • Ovarian cancer Paternal Aunt    • Uterine cancer Paternal Aunt    • Cancer Maternal Grandfather    • Depression Other    • Diabetes Other    • Osteoarthritis Other    • Rheum arthritis Other    • Tuberculosis Other    • Bleeding Disorder Other    • Other Other    • Colon cancer Other        Social History     Socioeconomic History   • Marital status:    Tobacco Use   • Smoking status: Former     Current packs/day: 0.00     Average packs/day: 2.0 packs/day for 10.0 years (20.0 ttl pk-yrs)     Types: Cigarettes     Start date:      Quit date:      Years since quittin.5     Passive exposure: Never   • Smokeless tobacco: Never   Vaping Use   • Vaping status: Never Used   Substance and Sexual Activity   • Alcohol use: No     Comment: Rarely   • Drug use: No   • Sexual activity: Not Currently           Objective   Physical Exam  Vitals and nursing note reviewed.   Constitutional:       General: She is not in  "acute distress.     Appearance: She is well-developed. She is not diaphoretic.      Comments: Nontoxic-appearing elderly female   HENT:      Head: Normocephalic and atraumatic.   Eyes:      Pupils: Pupils are equal, round, and reactive to light.   Cardiovascular:      Rate and Rhythm: Normal rate and regular rhythm.      Heart sounds: Normal heart sounds. No murmur heard.     No friction rub. No gallop.   Pulmonary:      Effort: Pulmonary effort is normal. No respiratory distress.      Breath sounds: Normal breath sounds. No wheezing or rales.   Abdominal:      General: Bowel sounds are normal. There is no distension.      Palpations: Abdomen is soft. There is no mass.      Tenderness: There is no abdominal tenderness. There is no guarding or rebound.      Comments: No focal abdominal tenderness, no peritoneal signs, no pain out of proportion to exam   Musculoskeletal:         General: Normal range of motion.      Cervical back: Neck supple.      Right lower leg: No edema.      Left lower leg: No edema.   Skin:     General: Skin is warm and dry.      Findings: No erythema or rash.   Neurological:      Mental Status: She is alert and oriented to person, place, and time.   Psychiatric:         Mood and Affect: Mood normal.         Thought Content: Thought content normal.         Judgment: Judgment normal.         Procedures           ED Course  ED Course as of 07/25/24 2046   Thu Jul 25, 2024   1521 69-year-old female presents for evaluation of chest pain.  Of note, the patient has risk factors for coronary artery disease of hypertension, positive family history, and being a former smoker.  She tells me that over the past 2 weeks she has been experiencing intermittent episodes of chest discomfort.  The pain will occasionally radiate to her back and at times is accompanied by \"hot flashes.\"  She denies any accompanying vomiting or diaphoresis.  Given her ongoing symptoms she came here to the ED to be evaluated today.  " On arrival, the patient is nontoxic-appearing.  Benign exam.  No pulse deficits noted.  Nonsurgical abdomen. [DD]   1522 Initial EKG interpreted independently by me revealed normal sinus rhythm with a heart rate of 79 and no ST segments suggestive of or concerning for ischemia.  Labs are bland/unrevealing.  High-sensitivity troponin is negative. [DD]   1522 HEART score of 4. [DD]   1522 I personally and independently viewed the patient's x-ray images myself, and I am in agreement with the radiologist's reading for final interpretation, particularly there is no pneumothorax or pulmonary edema present. [DD]   1548 I personally and independently reviewed the patient's CT images and findings, and I am in agreement with the radiologist regarding CT interpretation--particularly there is no evidence of aortic dissection or emergent cardiothoracic process present. [DD]   1548 Upon reevaluation, the patient looks and feels well. [DD]   1549 Repeat troponin/EKG negative/unchanged.  Doubt ACS, PE, dissection, or emergent cardiothoracic process at this time based on exam, history, clinical presentation, gestalt, and objective findings in the ED. we discussed inpatient versus outpatient management, and using shared decision making, we elected for the latter option.  I feel that this is completely reasonable given the patient's well appearance and overall clinical picture with negative workup here in the ED.  The patient will follow up with the chest pain clinic within the next 72 hours for further outpatient work-up and evaluation.  Agreeable with plan and given appropriate strict return precautions.     [DD]   1557 Additionally, we discussed multiple nonemergent potential etiologies for the patient's symptoms including pleurisy and multiple gastrointestinal etiologies.  The patient is requesting a prescription for a trial of Bentyl to help her with her symptoms. [DD]      ED Course User Index  [DD] Raheel Brizuela MD                                    Recent Results (from the past 24 hour(s))   ECG 12 Lead ED Triage Standing Order; Chest Pain    Collection Time: 07/25/24 11:59 AM   Result Value Ref Range    QT Interval 402 ms    QTC Interval 460 ms   High Sensitivity Troponin T    Collection Time: 07/25/24 12:09 PM    Specimen: Blood   Result Value Ref Range    HS Troponin T 7 <14 ng/L   Comprehensive Metabolic Panel    Collection Time: 07/25/24 12:09 PM    Specimen: Blood   Result Value Ref Range    Glucose 89 65 - 99 mg/dL    BUN 12 8 - 23 mg/dL    Creatinine 0.84 0.57 - 1.00 mg/dL    Sodium 139 136 - 145 mmol/L    Potassium 3.7 3.5 - 5.2 mmol/L    Chloride 102 98 - 107 mmol/L    CO2 26.0 22.0 - 29.0 mmol/L    Calcium 9.2 8.6 - 10.5 mg/dL    Total Protein 7.4 6.0 - 8.5 g/dL    Albumin 4.0 3.5 - 5.2 g/dL    ALT (SGPT) 26 1 - 33 U/L    AST (SGOT) 25 1 - 32 U/L    Alkaline Phosphatase 105 39 - 117 U/L    Total Bilirubin 0.5 0.0 - 1.2 mg/dL    Globulin 3.4 gm/dL    A/G Ratio 1.2 g/dL    BUN/Creatinine Ratio 14.3 7.0 - 25.0    Anion Gap 11.0 5.0 - 15.0 mmol/L    eGFR 76.3 >60.0 mL/min/1.73   Lipase    Collection Time: 07/25/24 12:09 PM    Specimen: Blood   Result Value Ref Range    Lipase 32 13 - 60 U/L   BNP    Collection Time: 07/25/24 12:09 PM    Specimen: Blood   Result Value Ref Range    proBNP 76.9 0.0 - 900.0 pg/mL   Green Top (Gel)    Collection Time: 07/25/24 12:09 PM   Result Value Ref Range    Extra Tube Hold for add-ons.    Lavender Top    Collection Time: 07/25/24 12:09 PM   Result Value Ref Range    Extra Tube hold for add-on    Gold Top - SST    Collection Time: 07/25/24 12:09 PM   Result Value Ref Range    Extra Tube Hold for add-ons.    Gray Top    Collection Time: 07/25/24 12:09 PM   Result Value Ref Range    Extra Tube Hold for add-ons.    Light Blue Top    Collection Time: 07/25/24 12:09 PM   Result Value Ref Range    Extra Tube Hold for add-ons.    CBC Auto Differential    Collection Time: 07/25/24 12:09 PM     Specimen: Blood   Result Value Ref Range    WBC 8.47 3.40 - 10.80 10*3/mm3    RBC 5.12 3.77 - 5.28 10*6/mm3    Hemoglobin 15.1 12.0 - 15.9 g/dL    Hematocrit 43.0 34.0 - 46.6 %    MCV 84.0 79.0 - 97.0 fL    MCH 29.5 26.6 - 33.0 pg    MCHC 35.1 31.5 - 35.7 g/dL    RDW 12.0 (L) 12.3 - 15.4 %    RDW-SD 36.1 (L) 37.0 - 54.0 fl    MPV 10.0 6.0 - 12.0 fL    Platelets 247 140 - 450 10*3/mm3    Neutrophil % 59.7 42.7 - 76.0 %    Lymphocyte % 29.9 19.6 - 45.3 %    Monocyte % 8.4 5.0 - 12.0 %    Eosinophil % 1.3 0.3 - 6.2 %    Basophil % 0.2 0.0 - 1.5 %    Immature Grans % 0.5 0.0 - 0.5 %    Neutrophils, Absolute 5.06 1.70 - 7.00 10*3/mm3    Lymphocytes, Absolute 2.53 0.70 - 3.10 10*3/mm3    Monocytes, Absolute 0.71 0.10 - 0.90 10*3/mm3    Eosinophils, Absolute 0.11 0.00 - 0.40 10*3/mm3    Basophils, Absolute 0.02 0.00 - 0.20 10*3/mm3    Immature Grans, Absolute 0.04 0.00 - 0.05 10*3/mm3    nRBC 0.0 0.0 - 0.2 /100 WBC   ECG 12 Lead ED Triage Standing Order; Chest Pain    Collection Time: 07/25/24  2:02 PM   Result Value Ref Range    QT Interval 420 ms    QTC Interval 462 ms   High Sensitivity Troponin T 2Hr    Collection Time: 07/25/24  2:06 PM    Specimen: Blood   Result Value Ref Range    HS Troponin T <6 <14 ng/L    Troponin T Delta       Note: In addition to lab results from this visit, the labs listed above may include labs taken at another facility or during a different encounter within the last 24 hours. Please correlate lab times with ED admission and discharge times for further clarification of the services performed during this visit.    CT Angiogram Chest   Final Result      1. No evidence of aortic dissection or pulmonary embolism   2. No acute thoracic or abdominopelvic process   3. 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 Hepatic steatosis   5. Colonic diverticulosis               Electronically Signed: Schuyler Giles     7/25/2024 3:46 PM EDT     Workstation ID: [id number]      CT Angiogram  Abdomen Pelvis   Final Result      1. No evidence of aortic dissection or pulmonary embolism   2. No acute thoracic or abdominopelvic process   3. 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 Hepatic steatosis   5. Colonic diverticulosis               Electronically Signed: Schuyler Giles     7/25/2024 3:46 PM EDT     Workstation ID: [id number]      XR Chest 1 View   Final Result   Impression:   No evidence of acute cardiopulmonary disease.          Electronically Signed: Jasson Monreal MD     7/25/2024 2:03 PM EDT     Workstation ID: [id number]        Vitals:    07/25/24 1400 07/25/24 1430 07/25/24 1630 07/25/24 1646   BP: 139/71 130/65 129/78    BP Location:       Patient Position:       Pulse: 73 69 69 71   Resp:       Temp:       TempSrc:       SpO2: 100% 100% 99% 98%   Weight:       Height:         Medications   sodium chloride 0.9 % bolus 500 mL (0 mL Intravenous Stopped 7/25/24 1700)   HYDROmorphone (DILAUDID) injection 0.25 mg (0.25 mg Intravenous Given 7/25/24 1449)   ondansetron (ZOFRAN) injection 4 mg (4 mg Intravenous Given 7/25/24 1449)   iopamidol (ISOVUE-370) 76 % injection 100 mL (85 mL Intravenous Given 7/25/24 1509)     ECG/EMG Results (last 24 hours)       Procedure Component Value Units Date/Time    ECG 12 Lead ED Triage Standing Order; Chest Pain [[id number]] Collected: 07/25/24 1402     Updated: 07/25/24 1401     QT Interval 420 ms      QTC Interval 462 ms     Narrative:      Test Reason : ED Triage Standing Order~  Blood Pressure :   */*   mmHG  Vent. Rate :  73 BPM     Atrial Rate :  73 BPM     P-R Int : 178 ms          QRS Dur :  90 ms      QT Int : 420 ms       P-R-T Axes :  12  23  32 degrees     QTc Int : 462 ms    Normal sinus rhythm  Normal ECG  When compared with ECG of 25-JUL-2024 11:59, (Unconfirmed)  No significant change was found    Referred By: EDMD           Confirmed By:     ECG 12 Lead ED Triage Standing Order; Chest Pain [[id number]] Collected: 07/25/24 1159     Updated:  07/25/24 1534     QT Interval 402 ms      QTC Interval 460 ms     Narrative:      Test Reason : ED Triage Standing Order~  Blood Pressure :   */*   mmHG  Vent. Rate :  79 BPM     Atrial Rate :  79 BPM     P-R Int : 176 ms          QRS Dur :  92 ms      QT Int : 402 ms       P-R-T Axes :  -2  18  18 degrees     QTc Int : 460 ms    Normal sinus rhythm  Cannot rule out Anterior infarct , age undetermined  Abnormal ECG  When compared with ECG of 31-MAR-2024 14:40,  No significant change was found  Confirmed by MD Brizuela Michael ([id number]) on 7/25/2024 3:33:57 PM    Referred By: EDMD           Confirmed By: Wally Brizuela MD          ECG 12 Lead ED Triage Standing Order; Chest Pain   Preliminary Result   Test Reason : ED Triage Standing Order~   Blood Pressure :   */*   mmHG   Vent. Rate :  73 BPM     Atrial Rate :  73 BPM      P-R Int : 178 ms          QRS Dur :  90 ms       QT Int : 420 ms       P-R-T Axes :  12  23  32 degrees      QTc Int : 462 ms      Normal sinus rhythm   Normal ECG   When compared with ECG of 25-JUL-2024 11:59, (Unconfirmed)   No significant change was found      Referred By: EDMD           Confirmed By:       ECG 12 Lead ED Triage Standing Order; Chest Pain   Final Result   Test Reason : ED Triage Standing Order~   Blood Pressure :   */*   mmHG   Vent. Rate :  79 BPM     Atrial Rate :  79 BPM      P-R Int : 176 ms          QRS Dur :  92 ms       QT Int : 402 ms       P-R-T Axes :  -2  18  18 degrees      QTc Int : 460 ms      Normal sinus rhythm   Cannot rule out Anterior infarct , age undetermined   Abnormal ECG   When compared with ECG of 31-MAR-2024 14:40,   No significant change was found   Confirmed by MD Brizuela Michael ([id number]) on 7/25/2024 3:33:57 PM      Referred By: EDMD           Confirmed By: Wally Brizuela MD                    Medical Decision Making  Problems Addressed:  Chest pain, unspecified type: complicated acute illness or injury  Pleurisy: complicated acute illness or injury    Amount  and/or Complexity of Data Reviewed  Labs: ordered.  Radiology: ordered.  ECG/medicine tests: ordered.    Risk  Prescription drug management.        Final diagnoses:   Chest pain, unspecified type   Pleurisy       ED Disposition  ED Disposition       ED Disposition   Discharge    Condition   Stable    Comment   --               [facility] CARDIOLOGY  [facility]  [address]  [facility] [id number] [phone]  In 3 days           Medication List        New Prescriptions      dicyclomine 20 MG tablet  Commonly known as: BENTYL  Take 1 tablet by mouth Every 8 (Eight) Hours As Needed for Abdominal Cramping.               Where to Get Your Medications        These medications were sent to [facility]  [address] [place] [address] [address], [place] [address]      Hours: Monday to Friday 7 AM to 5:30 PM, Saturday & Sunday 8 AM to 4:30 PM Phone: [phone]   dicyclomine 20 MG tablet            Raheel Brizuela MD  07/25/24 9907

## 2024-07-30 ENCOUNTER — LAB (OUTPATIENT)
Dept: LAB | Facility: HOSPITAL | Age: 69
End: 2024-07-30
Payer: COMMERCIAL

## 2024-07-30 ENCOUNTER — OFFICE VISIT (OUTPATIENT)
Dept: ONCOLOGY | Facility: CLINIC | Age: 69
End: 2024-07-30
Payer: COMMERCIAL

## 2024-07-30 VITALS
OXYGEN SATURATION: 96 % | BODY MASS INDEX: 34.66 KG/M2 | SYSTOLIC BLOOD PRESSURE: 144 MMHG | HEART RATE: 94 BPM | TEMPERATURE: 97.5 F | WEIGHT: 203 LBS | HEIGHT: 64 IN | RESPIRATION RATE: 18 BRPM | DIASTOLIC BLOOD PRESSURE: 83 MMHG

## 2024-07-30 DIAGNOSIS — Z17.0 CARCINOMA OF LOWER-INNER QUADRANT OF LEFT BREAST IN FEMALE, ESTROGEN RECEPTOR POSITIVE: Primary | ICD-10-CM

## 2024-07-30 DIAGNOSIS — C50.911 MALIGNANT NEOPLASM OF RIGHT BREAST IN FEMALE, ESTROGEN RECEPTOR POSITIVE, UNSPECIFIED SITE OF BREAST: ICD-10-CM

## 2024-07-30 DIAGNOSIS — C50.312 CARCINOMA OF LOWER-INNER QUADRANT OF LEFT BREAST IN FEMALE, ESTROGEN RECEPTOR POSITIVE: Primary | ICD-10-CM

## 2024-07-30 DIAGNOSIS — Z17.0 MALIGNANT NEOPLASM OF RIGHT BREAST IN FEMALE, ESTROGEN RECEPTOR POSITIVE, UNSPECIFIED SITE OF BREAST: ICD-10-CM

## 2024-07-30 DIAGNOSIS — C50.312 CARCINOMA OF LOWER-INNER QUADRANT OF LEFT BREAST IN FEMALE, ESTROGEN RECEPTOR POSITIVE: ICD-10-CM

## 2024-07-30 DIAGNOSIS — Z17.0 CARCINOMA OF LOWER-INNER QUADRANT OF LEFT BREAST IN FEMALE, ESTROGEN RECEPTOR POSITIVE: ICD-10-CM

## 2024-07-30 LAB
ALBUMIN SERPL-MCNC: 4.1 G/DL (ref 3.5–5.2)
ALBUMIN/GLOB SERPL: 1.2 G/DL
ALP SERPL-CCNC: 105 U/L (ref 39–117)
ALT SERPL W P-5'-P-CCNC: 25 U/L (ref 1–33)
ANION GAP SERPL CALCULATED.3IONS-SCNC: 6 MMOL/L (ref 5–15)
AST SERPL-CCNC: 30 U/L (ref 1–32)
BASOPHILS # BLD AUTO: 0.02 10*3/MM3 (ref 0–0.2)
BASOPHILS NFR BLD AUTO: 0.3 % (ref 0–1.5)
BILIRUB SERPL-MCNC: 0.4 MG/DL (ref 0–1.2)
BUN SERPL-MCNC: 16 MG/DL (ref 8–23)
BUN/CREAT SERPL: 18.8 (ref 7–25)
CALCIUM SPEC-SCNC: 9.4 MG/DL (ref 8.6–10.5)
CHLORIDE SERPL-SCNC: 103 MMOL/L (ref 98–107)
CO2 SERPL-SCNC: 30 MMOL/L (ref 22–29)
CREAT SERPL-MCNC: 0.85 MG/DL (ref 0.57–1)
DEPRECATED RDW RBC AUTO: 37.6 FL (ref 37–54)
EGFRCR SERPLBLD CKD-EPI 2021: 75.2 ML/MIN/1.73
EOSINOPHIL # BLD AUTO: 0.05 10*3/MM3 (ref 0–0.4)
EOSINOPHIL NFR BLD AUTO: 0.7 % (ref 0.3–6.2)
ERYTHROCYTE [DISTWIDTH] IN BLOOD BY AUTOMATED COUNT: 12.2 % (ref 12.3–15.4)
GLOBULIN UR ELPH-MCNC: 3.5 GM/DL
GLUCOSE SERPL-MCNC: 104 MG/DL (ref 65–99)
HCT VFR BLD AUTO: 43.1 % (ref 34–46.6)
HGB BLD-MCNC: 15.2 G/DL (ref 12–15.9)
IMM GRANULOCYTES # BLD AUTO: 0.02 10*3/MM3 (ref 0–0.05)
IMM GRANULOCYTES NFR BLD AUTO: 0.3 % (ref 0–0.5)
LYMPHOCYTES # BLD AUTO: 1.67 10*3/MM3 (ref 0.7–3.1)
LYMPHOCYTES NFR BLD AUTO: 24.5 % (ref 19.6–45.3)
MCH RBC QN AUTO: 29.7 PG (ref 26.6–33)
MCHC RBC AUTO-ENTMCNC: 35.3 G/DL (ref 31.5–35.7)
MCV RBC AUTO: 84.2 FL (ref 79–97)
MONOCYTES # BLD AUTO: 0.5 10*3/MM3 (ref 0.1–0.9)
MONOCYTES NFR BLD AUTO: 7.3 % (ref 5–12)
NEUTROPHILS NFR BLD AUTO: 4.57 10*3/MM3 (ref 1.7–7)
NEUTROPHILS NFR BLD AUTO: 66.9 % (ref 42.7–76)
PLATELET # BLD AUTO: 260 10*3/MM3 (ref 140–450)
PMV BLD AUTO: 9.7 FL (ref 6–12)
POTASSIUM SERPL-SCNC: 4.1 MMOL/L (ref 3.5–5.2)
PROT SERPL-MCNC: 7.6 G/DL (ref 6–8.5)
QT INTERVAL: 420 MS
QTC INTERVAL: 462 MS
RBC # BLD AUTO: 5.12 10*6/MM3 (ref 3.77–5.28)
SODIUM SERPL-SCNC: 139 MMOL/L (ref 136–145)
WBC NRBC COR # BLD AUTO: 6.83 10*3/MM3 (ref 3.4–10.8)

## 2024-07-30 PROCEDURE — 80053 COMPREHEN METABOLIC PANEL: CPT

## 2024-07-30 PROCEDURE — 36415 COLL VENOUS BLD VENIPUNCTURE: CPT

## 2024-07-30 PROCEDURE — 85025 COMPLETE CBC W/AUTO DIFF WBC: CPT

## 2024-07-30 PROCEDURE — 99214 OFFICE O/P EST MOD 30 MIN: CPT | Performed by: INTERNAL MEDICINE

## 2024-07-30 RX ORDER — ANASTROZOLE 1 MG/1
1 TABLET ORAL DAILY
Qty: 30 TABLET | Refills: 5 | Status: SHIPPED | OUTPATIENT
Start: 2024-07-30

## 2024-07-30 RX ORDER — ASPIRIN 81 MG
1 TABLET, DELAYED RELEASE (ENTERIC COATED) ORAL DAILY
Qty: 30 TABLET | Refills: 5 | Status: SHIPPED | OUTPATIENT
Start: 2024-07-30

## 2024-07-30 NOTE — PROGRESS NOTES
DATE OF VISIT: 7/30/2024    REASON FOR VISIT: Followup for abnormal CT scan     PROBLEM LIST:  1.  Mesenteric fat stranding:  A.  Incidentally noted on CT abdomen pelvis done March 2023 for kidney stone  2.  Morbid obesity, BMI 32  3.  Hypertension  4.  Left lower inner quadrant invasive ductal carcinoma, T1 a N0 M0 stage Ia:  A.  Presented with abnormal screening mammogram July 2023.  B.  Diagnosed after ultrasound-guided biopsy done October 2023  C.  Status post lumpectomy with sentinel lymph node biopsy done by Dr. DAMON December 18, 2023  D.  Final pathology revealed low-grade ductal carcinoma ER +95% AR +90% continue -0 by IHC.  E.completed adjuvant radiation March 2024.  F.  Completed adjuvant radiation March 2024  G.  Started adjuvant anastrozole January 8, 2024    HISTORY OF PRESENT ILLNESS: The patient is a very pleasant 69 y.o. female  with past medical history significant for abnormal CT abdomen pelvis diagnosed March 2023 showing abdominal fat stranding felt to be reactive. The  patient is here today for scheduled follow-up visit.    SUBJECTIVE: Rosalba is here today by herself.  All in all she is doing fairly well.  She does complain of chest tightness.  Her blood pressure has been running higher than usual.  She is requesting refill on anastrozole.    Past History:  Medical History: has a past medical history of Asthma, Breast cancer, Chronic superficial dermatitis, Closed fracture of left tibial plateau with routine healing, Dietary counseling, Encounter for routine adult health examination, Fibromyalgia, GERD (gastroesophageal reflux disease), Hair or hair follicle disorder, History of anemia, Hives, Hypertension, Irritable bowel disease, Malaise and fatigue, Myalgia and myositis, PONV (postoperative nausea and vomiting), Prediabetes, Rash and nonspecific skin eruption, S/P ORIF (open reduction internal fixation) fracture  left tibial plateau  (12/21/2016), Sinusitis, acute maxillary, Tibial plateau  "fracture, left (12/21/2016), UTI (urinary tract infection), and Wears glasses.   Surgical History: has a past surgical history that includes Bladder surgery (2004); Cholecystectomy (2012); Hernia repair; Colonoscopy w/ polypectomy (2015); Tibial Plateau Open Reduction Internal Fixation (Left, 12/21/2016); Breast biopsy (Right); Total abdominal hysterectomy w/ bilateral salpingoophorectomy (Bilateral, 1998); Ectopic pregnancy surgery (1994); and Breast lumpectomy (Right, 12/18/2023).   Family History: family history includes Aneurysm in her mother; Bleeding Disorder in an other family member; Breast cancer in her maternal aunt; Cancer in her father and maternal grandfather; Colon cancer in an other family member; Depression in an other family member; Diabetes in an other family member; Heart attack in her father; Heart disease in her father; Hypertension in her brother and sister; Osteoarthritis in her mother and another family member; Other in an other family member; Ovarian cancer in her paternal aunt; Rheum arthritis in an other family member; Stroke in her father; Tuberculosis in an other family member; Uterine cancer in her paternal aunt.   Social History: reports that she quit smoking about 34 years ago. Her smoking use included cigarettes. She started smoking about 44 years ago. She has a 20 pack-year smoking history. She has never been exposed to tobacco smoke. She has never used smokeless tobacco. She reports that she does not drink alcohol and does not use drugs.    (Not in a hospital admission)     Allergies: Myrbetriq [mirabegron], Pepper, and Tramadol     Review of Systems   Constitutional:  Positive for fatigue.   Psychiatric/Behavioral:  The patient is nervous/anxious.        PHYSICAL EXAMINATION:   /83   Pulse 94   Temp 97.5 °F (36.4 °C)   Resp 18   Ht 162.6 cm (64.02\")   Wt 92.1 kg (203 lb)   LMP  (LMP Unknown)   SpO2 96%   BMI 34.83 kg/m²    Pain Score    07/30/24 1043   PainSc: 0-No " pain            ECOG score: 0            ECOG Performance Status: 1 - Symptomatic but completely ambulatory      General Appearance:      alert, cooperative, no apparent distress and appears stated age   Lungs:   Clear to auscultation bilaterally; respirations regular, even, and unlabored bilaterally   Heart:  Regular rate and rhythm, no murmurs appreciated   Abdomen:   Soft, non-tender, non-distended, and no organomegaly                 Lab on 07/30/2024   Component Date Value Ref Range Status    WBC 07/30/2024 6.83  3.40 - 10.80 10*3/mm3 Final    RBC 07/30/2024 5.12  3.77 - 5.28 10*6/mm3 Final    Hemoglobin 07/30/2024 15.2  12.0 - 15.9 g/dL Final    Hematocrit 07/30/2024 43.1  34.0 - 46.6 % Final    MCV 07/30/2024 84.2  79.0 - 97.0 fL Final    MCH 07/30/2024 29.7  26.6 - 33.0 pg Final    MCHC 07/30/2024 35.3  31.5 - 35.7 g/dL Final    RDW 07/30/2024 12.2 (L)  12.3 - 15.4 % Final    RDW-SD 07/30/2024 37.6  37.0 - 54.0 fl Final    MPV 07/30/2024 9.7  6.0 - 12.0 fL Final    Platelets 07/30/2024 260  140 - 450 10*3/mm3 Final    Neutrophil % 07/30/2024 66.9  42.7 - 76.0 % Final    Lymphocyte % 07/30/2024 24.5  19.6 - 45.3 % Final    Monocyte % 07/30/2024 7.3  5.0 - 12.0 % Final    Eosinophil % 07/30/2024 0.7  0.3 - 6.2 % Final    Basophil % 07/30/2024 0.3  0.0 - 1.5 % Final    Immature Grans % 07/30/2024 0.3  0.0 - 0.5 % Final    Neutrophils, Absolute 07/30/2024 4.57  1.70 - 7.00 10*3/mm3 Final    Lymphocytes, Absolute 07/30/2024 1.67  0.70 - 3.10 10*3/mm3 Final    Monocytes, Absolute 07/30/2024 0.50  0.10 - 0.90 10*3/mm3 Final    Eosinophils, Absolute 07/30/2024 0.05  0.00 - 0.40 10*3/mm3 Final    Basophils, Absolute 07/30/2024 0.02  0.00 - 0.20 10*3/mm3 Final    Immature Grans, Absolute 07/30/2024 0.02  0.00 - 0.05 10*3/mm3 Final   Admission on 07/25/2024, Discharged on 07/25/2024   Component Date Value Ref Range Status    QT Interval 07/25/2024 402  ms Final    QTC Interval 07/25/2024 460  ms Final    QT Interval  07/25/2024 420  ms Preliminary    QTC Interval 07/25/2024 462  ms Preliminary    HS Troponin T 07/25/2024 7  <14 ng/L Final    Glucose 07/25/2024 89  65 - 99 mg/dL Final    BUN 07/25/2024 12  8 - 23 mg/dL Final    Creatinine 07/25/2024 0.84  0.57 - 1.00 mg/dL Final    Sodium 07/25/2024 139  136 - 145 mmol/L Final    Potassium 07/25/2024 3.7  3.5 - 5.2 mmol/L Final    Slight hemolysis detected by analyzer. Result may be falsely elevated.    Chloride 07/25/2024 102  98 - 107 mmol/L Final    CO2 07/25/2024 26.0  22.0 - 29.0 mmol/L Final    Calcium 07/25/2024 9.2  8.6 - 10.5 mg/dL Final    Total Protein 07/25/2024 7.4  6.0 - 8.5 g/dL Final    Albumin 07/25/2024 4.0  3.5 - 5.2 g/dL Final    ALT (SGPT) 07/25/2024 26  1 - 33 U/L Final    AST (SGOT) 07/25/2024 25  1 - 32 U/L Final    Alkaline Phosphatase 07/25/2024 105  39 - 117 U/L Final    Total Bilirubin 07/25/2024 0.5  0.0 - 1.2 mg/dL Final    Globulin 07/25/2024 3.4  gm/dL Final    Calculated Result    A/G Ratio 07/25/2024 1.2  g/dL Final    BUN/Creatinine Ratio 07/25/2024 14.3  7.0 - 25.0 Final    Anion Gap 07/25/2024 11.0  5.0 - 15.0 mmol/L Final    eGFR 07/25/2024 76.3  >60.0 mL/min/1.73 Final    Lipase 07/25/2024 32  13 - 60 U/L Final    proBNP 07/25/2024 76.9  0.0 - 900.0 pg/mL Final    Extra Tube 07/25/2024 Hold for add-ons.   Final    Auto resulted.    Extra Tube 07/25/2024 hold for add-on   Final    Auto resulted    Extra Tube 07/25/2024 Hold for add-ons.   Final    Auto resulted.    Extra Tube 07/25/2024 Hold for add-ons.   Final    Auto resulted.    Extra Tube 07/25/2024 Hold for add-ons.   Final    Auto resulted    WBC 07/25/2024 8.47  3.40 - 10.80 10*3/mm3 Final    RBC 07/25/2024 5.12  3.77 - 5.28 10*6/mm3 Final    Hemoglobin 07/25/2024 15.1  12.0 - 15.9 g/dL Final    Hematocrit 07/25/2024 43.0  34.0 - 46.6 % Final    MCV 07/25/2024 84.0  79.0 - 97.0 fL Final    MCH 07/25/2024 29.5  26.6 - 33.0 pg Final    MCHC 07/25/2024 35.1  31.5 - 35.7 g/dL Final    RDW  07/25/2024 12.0 (L)  12.3 - 15.4 % Final    RDW-SD 07/25/2024 36.1 (L)  37.0 - 54.0 fl Final    MPV 07/25/2024 10.0  6.0 - 12.0 fL Final    Platelets 07/25/2024 247  140 - 450 10*3/mm3 Final    Neutrophil % 07/25/2024 59.7  42.7 - 76.0 % Final    Lymphocyte % 07/25/2024 29.9  19.6 - 45.3 % Final    Monocyte % 07/25/2024 8.4  5.0 - 12.0 % Final    Eosinophil % 07/25/2024 1.3  0.3 - 6.2 % Final    Basophil % 07/25/2024 0.2  0.0 - 1.5 % Final    Immature Grans % 07/25/2024 0.5  0.0 - 0.5 % Final    Neutrophils, Absolute 07/25/2024 5.06  1.70 - 7.00 10*3/mm3 Final    Lymphocytes, Absolute 07/25/2024 2.53  0.70 - 3.10 10*3/mm3 Final    Monocytes, Absolute 07/25/2024 0.71  0.10 - 0.90 10*3/mm3 Final    Eosinophils, Absolute 07/25/2024 0.11  0.00 - 0.40 10*3/mm3 Final    Basophils, Absolute 07/25/2024 0.02  0.00 - 0.20 10*3/mm3 Final    Immature Grans, Absolute 07/25/2024 0.04  0.00 - 0.05 10*3/mm3 Final    nRBC 07/25/2024 0.0  0.0 - 0.2 /100 WBC Final    HS Troponin T 07/25/2024 <6  <14 ng/L Final    Troponin T Delta 07/25/2024    Final    Unable to calculate.        CT Angiogram Chest    Result Date: 7/25/2024  Narrative: CT ANGIOGRAM CHEST, CT ANGIOGRAM ABDOMEN PELVIS Date of Exam: 7/25/2024 3:07 PM EDT Indication: CP radiating to back, dissection protocol. Comparison: CTA chest 3/31/2024, CT abdomen pelvis 12/5/2023 Technique: CTA of the chest was performed after the uneventful intravenous administration of 85 cc Isovue-370. Reconstructed coronal and sagittal images were also obtained. In addition, a 3-D volume rendered image was created for interpretation. Automated exposure control and iterative reconstruction methods were used. Findings: Chest: Vascular: Thoracic aorta normal in caliber. No intimal flap. No significant atherosclerotic plaque. Intrathoracic brachiocephalic arteries are patent without significant stenosis. Pulmonary arteries are adequately opacified with no emboli demonstrated Cayla/mediastinum: No  pericardial effusion. No definite coronary calcification. No adenopathy. Small hiatal hernia Lungs/pleura: New subpleural fibrosis in the anterior left lung. Scarring in the right middle and right lower lobes. No pleural effusion Bones/soft tissues: Treatment related changes of the left breast. No acute or suspicious bone lesion Abdomen/Pelvis: Vascular: Abdominal aorta normal in caliber with no intimal flap. Aortic vascular calcification without significant stenosis. Visceral arteries patent without stenosis. Iliac arteries normal in caliber without stenosis Organs: Low-attenuation of the liver. Spleen, pancreas, kidneys, adrenal glands unremarkable. Gallbladder surgically absent Gastrointestinal: Colonic diverticula without inflammation. No intestinal distention or evident wall thickening Pelvis: No abnormal fluid collection. Uterus surgically absent. Urinary bladder unremarkable Peritoneum/Retroperitoneum: No ascites or pneumoperitoneum. No retroperitoneal adenopathy Bones/Soft Tissues: No acute or suspicious bone lesion     Impression: 1. No evidence of aortic dissection or pulmonary embolism 2. No acute thoracic or abdominopelvic process 3. Treatment related fibrosis in the anterior left lung 4. Hepatic steatosis 5. Colonic diverticulosis Electronically Signed: Schuyler Giles  7/25/2024 3:46 PM EDT  Workstation ID: [id number]    CT Angiogram Abdomen Pelvis    Result Date: 7/25/2024  Narrative: CT ANGIOGRAM CHEST, CT ANGIOGRAM ABDOMEN PELVIS Date of Exam: 7/25/2024 3:07 PM EDT Indication: CP radiating to back, dissection protocol. Comparison: CTA chest 3/31/2024, CT abdomen pelvis 12/5/2023 Technique: CTA of the chest was performed after the uneventful intravenous administration of 85 cc Isovue-370. Reconstructed coronal and sagittal images were also obtained. In addition, a 3-D volume rendered image was created for interpretation. Automated exposure control and iterative reconstruction methods were used. Findings:  Chest: Vascular: Thoracic aorta normal in caliber. No intimal flap. No significant atherosclerotic plaque. Intrathoracic brachiocephalic arteries are patent without significant stenosis. Pulmonary arteries are adequately opacified with no emboli demonstrated Cayla/mediastinum: No pericardial effusion. No definite coronary calcification. No adenopathy. Small hiatal hernia Lungs/pleura: New subpleural fibrosis in the anterior left lung. Scarring in the right middle and right lower lobes. No pleural effusion Bones/soft tissues: Treatment related changes of the left breast. No acute or suspicious bone lesion Abdomen/Pelvis: Vascular: Abdominal aorta normal in caliber with no intimal flap. Aortic vascular calcification without significant stenosis. Visceral arteries patent without stenosis. Iliac arteries normal in caliber without stenosis Organs: Low-attenuation of the liver. Spleen, pancreas, kidneys, adrenal glands unremarkable. Gallbladder surgically absent Gastrointestinal: Colonic diverticula without inflammation. No intestinal distention or evident wall thickening Pelvis: No abnormal fluid collection. Uterus surgically absent. Urinary bladder unremarkable Peritoneum/Retroperitoneum: No ascites or pneumoperitoneum. No retroperitoneal adenopathy Bones/Soft Tissues: No acute or suspicious bone lesion     Impression: 1. No evidence of aortic dissection or pulmonary embolism 2. No acute thoracic or abdominopelvic process 3. Treatment related fibrosis in the anterior left lung 4. Hepatic steatosis 5. Colonic diverticulosis Electronically Signed: Schuyler Giles  7/25/2024 3:46 PM EDT  Workstation ID: [id number]    XR Chest 1 View    Result Date: 7/25/2024  Narrative: XR CHEST 1 VW Date of Exam: 7/25/2024 1:49 PM EDT Indication: Chest Pain Triage Protocol Comparison: Chest radiograph and chest CT 3/31/2024. Findings: Cardiomediastinal silhouette is unchanged. Similar scattered areas of subsegmental atelectasis/scarring. No  focal consolidation or overt pulmonary edema. No pleural effusion or pneumothorax. Osseous structures are unchanged.     Impression: Impression: No evidence of acute cardiopulmonary disease. Electronically Signed: Jasson Monreal MD  7/25/2024 2:03 PM EDT  Workstation ID: [id number]     ASSESSMENT: The patient is a very pleasant 69 y.o. female  with abdominal fat stranding      PLAN:    1.  Abdominal fat stranding:  A.  I did go over the scan results with the patient from July 25, 2024 and reassured the patient no evidence of significant abnormalities were noted.  She will need to have 1 year follow-up study which should be due July 2025.    2.  Splenomegaly:  A.  Spleen size 16 cm.  B.  Most likely induced by her fatty liver.    3.  Left inner lower quadrant breast cancer T1 a N0 M0 stage Ia:  A.  I will continue anastrozole 1 mg daily.  The patient will complete 5 years of treatment January 2029.  I will refill that today.  B.  The patient has completed adjuvant radiation March 2024.  She will need to have 6-month follow-up mammogram which will be due September 2024.  C. We reviewed the potential side effects of anastrozole including hot flashes, vaginal dryness, joint pain, decrease in bone density, and mood or sleep disturbance.  D.  She will follow-up with me in 3 months to evaluate for treatment tolerance.    4.  Osteopenia:  A.  I did go over the bone density from April 23, 2024 it did reveal osteopenia with T-score -2.3 in the femoral neck and -1.1 at the lumbar spine.  Will do 2 years follow-up which would be due April 2026.  B.  I will start the patient on calcium vitamin D daily.  I will refill it today.    FOLLOW UP: 3 months with labs and mammogram.    Alexey Ocampo MD  7/30/2024

## 2024-07-31 ENCOUNTER — OFFICE VISIT (OUTPATIENT)
Dept: CARDIOLOGY | Facility: HOSPITAL | Age: 69
End: 2024-07-31
Payer: COMMERCIAL

## 2024-07-31 VITALS
DIASTOLIC BLOOD PRESSURE: 57 MMHG | OXYGEN SATURATION: 98 % | HEART RATE: 87 BPM | HEIGHT: 64 IN | WEIGHT: 210 LBS | BODY MASS INDEX: 35.85 KG/M2 | SYSTOLIC BLOOD PRESSURE: 122 MMHG

## 2024-07-31 DIAGNOSIS — R06.09 DYSPNEA ON EXERTION: ICD-10-CM

## 2024-07-31 DIAGNOSIS — I10 ESSENTIAL HYPERTENSION, BENIGN: ICD-10-CM

## 2024-07-31 DIAGNOSIS — R07.89 OTHER CHEST PAIN: Primary | ICD-10-CM

## 2024-07-31 NOTE — PROGRESS NOTES
"Chief Complaint  Chest Pain    Subjective      History of Present Illness {  Problem List  Visit  Diagnosis   Encounters  Notes  Medications  Labs  Result Review Imaging  Media :23}     Rosalba Girard, 69 y.o. female with past medical history significant for asthma, breast cancer with current radiation, fibromyalgia, GERD, anemia, hypertension, IBS, and prediabetes, who presents to [facility] for Chest Pain  Since time of previous evaluation at heart and valve, patient has presented back to the emergency department on 7/25 with a chief complaint of chest pain.  Patient stated that she had been having intermittent episodes of chest pain for previous 2 weeks.  The pain does occasionally radiate into her back and is accompanied by \"hot flashes\" pain is not exertional in nature.  Twelve-lead EKG showed normal sinus rhythm, rate 73, normal EKG.  Chest x-ray showed no evidence of acute cardiopulmonary disease.  CTA of chest abdomen and pelvis showed no evidence of aortic dissection, PE, thoracic or abdomenopelvic process.  Imaging did note treatment related fibrosis in the anterior left lung, hepatic steatosis, and colonic diverticulosis.  Lab work including CBC, CMP, lipase, BNP, and high-sensitivity troponin x 2, all noted to be unremarkable.    Since time of evaluation the ED, patient continues to endorse mild chest discomfort.  This is occurring a few times per week.  Pain is midsternal in location with associated shortness of air.  She states that prior to with the evaluation in the ED she did also experience a strange pain in the back of her neck with radiation up into her head.  She also had numbness and pain to the left side of her jaw which she believed to be secondary to elevated blood pressure.  She currently endorses dyspnea on exertion, near syncope also with exertion, and palpitations noted with use of caffeine.    Patient does not consistently monitor blood " pressure, or heart rate at home.    Echocardiogram 4/16/2024:    Left ventricular systolic function is normal. Calculated left ventricular EF = 60.3%    Left ventricular diastolic function is consistent with (grade I) impaired relaxation.    Estimated right ventricular systolic pressure from tricuspid regurgitation is normal (<35 mmHg).       SPECT stress test at [facility] 4/21/2022:  Myocardial perfusion imaging is normal, overall LV systolic function is normal without regional wall abnormalities.    Echocardiogram 4/14/2022 at [facility]:  Left ventricle has normal cavity size, wall motion and thickness, and normal systolic function.  The estimated LV ejection fraction is 65%.  The aortic valve is trileaflet and normal.  There is mild mitral regurgitation.  Tricuspid valve appears normal, and pulmonic valve appears normal as well.  Is no pericardial effusion.  The aortic root and ascending aorta appear normal.      Prior presentation:  Patient presented to the ED on 3/31/2024 with chief complaint of chest pain.  At that time, patient stated she was having pain across entire anterior chest with radiation into the upper back.  Patient endorsed ongoing current radiation treatment.  After she has received radiation treatment, she admitted to having headache, fatigue, and chest discomfort.  Twelve-lead EKG showed sinus tachycardia, rate 112, normal EKG.  Chest x-ray showed no evidence of acute disease in the chest.  CTA of the chest showed no pulmonary embolus, or other acute findings in the chest.  CBC revealed leukocytosis at 13.95.  Other lab work including a CMP, lipase, BNP, and high-sensitivity troponins x 2 noted to be unremarkable.  Patient was referred to heart and valve for further evaluation of chest pain.    Since time of evaluation in ED, patient has followed up with her primary care provider on 4/10/2024.  At that time, patient endorsed pleuritic  pain, neck pain, and  "cough.  Patient was prescribed Z-Aramis, Medrol Dosepak, and promethazine cough syrup.  Patient was also recently seen by hematology and oncology on 4/9/2024.  At time of today's visit, chest pain is improved.  Most recently, discomfort is only in her back and between shoulder blades.  She had recently endorsed shortness of air which is now better after initiating Z-Aramis, and Medrol Dosepak.  Patient endorses palpitations only when she has increased amounts of caffeine.  She denies syncope, but endorses dizziness with position changes.  Patient also endorses some mild left lower extremity edema which she believes is secondary to prior injury.  Most recently, patient has not been active due to mobility issues.  She endorses caffeine intake of 4 glasses of tea per day, and an adequate water intake of 2 bottles per day.  In regards to family history, patient states her father had coronary artery disease with MI at age 80.  She also states that her sister had \"a leaky heart valve\".    Of note, patient has previously followed with Dr. Del Castillo.  She states she has undergone previous echocardiogram, and stress testing which she believes has been within approximately 1 year.      Objective     Vital Signs:   Vitals:    07/31/24 0904   BP: 122/57   BP Location: Right arm   Patient Position: Sitting   Pulse: 87   SpO2: 98%   Weight: 95.3 kg (210 lb)   Height: 162.6 cm (64\")         Body mass index is 36.05 kg/m².  Physical Exam  Vitals and nursing note reviewed.   Constitutional:       Appearance: Normal appearance. She is obese.   HENT:      Head: Normocephalic.   Eyes:      Pupils: Pupils are equal, round, and reactive to light.   Cardiovascular:      Rate and Rhythm: Normal rate and regular rhythm.      Pulses: Normal pulses.      Heart sounds: Normal heart sounds. No murmur heard.  Pulmonary:      Effort: Pulmonary effort is normal.      Breath sounds: Normal breath sounds.   Abdominal:      General: Abdomen is protuberant. " Bowel sounds are normal.      Palpations: Abdomen is soft.   Musculoskeletal:         General: Normal range of motion.      Cervical back: Normal range of motion.      Right lower leg: No edema.      Left lower leg: No edema.   Skin:     General: Skin is warm and dry.      Capillary Refill: Capillary refill takes less than 2 seconds.   Neurological:      Mental Status: She is alert and oriented to person, place, and time.   Psychiatric:         Mood and Affect: Mood normal.         Thought Content: Thought content normal.            Data Reviewed:{ Labs  Result Review  Imaging  Med Tab  Media :23}     Lab Results   Component Value Date    WBC 6.83 07/30/2024    HGB 15.2 07/30/2024    HCT 43.1 07/30/2024    MCV 84.2 07/30/2024     07/30/2024      Lab Results   Component Value Date    GLUCOSE 104 (H) 07/30/2024    BUN 16 07/30/2024    CREATININE 0.85 07/30/2024    EGFR 75.2 07/30/2024    BCR 18.8 07/30/2024    K 4.1 07/30/2024    CO2 30.0 (H) 07/30/2024    CALCIUM 9.4 07/30/2024    ALBUMIN 4.1 07/30/2024    BILITOT 0.4 07/30/2024    AST 30 07/30/2024    ALT 25 07/30/2024     Lab Results   Component Value Date    CKTOTAL 52 11/16/2022    TROPONINT <6 07/25/2024      ECG 12 Lead ED Triage Standing Order; Chest Pain (03/31/2024 11:40)  ECG 12 Lead ED Triage Standing Order; Chest Pain (03/31/2024 14:40)  CT Angiogram Chest With & Without Contrast (09/08/2019 21:30)  CT Angiogram Chest (03/31/2024 14:51)  XR Chest 1 View (03/31/2024 12:25)    Assessment & Plan   Assessment and Plan {CC Problem List  Visit Diagnosis  ROS  Review (Popup)  Health Maintenance  Quality  BestPractice  Medications  SmartSets  SnapShot Encounters  Media :23}     1. Other chest pain  -Most recent evaluation in the ED discussed today with patient  -Chest pain described as atypical in nature.  Pain was suspected to be pleuritic chest pain.  She does still have some discomfort which is located in the mid sternal region  -Most  recent CTA from 7/25 which specifically mentioned no significant coronary calcification  -Patient with ongoing persistent chest discomfort which requires further evaluation.  -Will pursue further ischemic evaluation in the form of a PET stress test.  PET stress test is preferred in patient who ambulates with a cane and is not a treadmill candidate  -PET stress test also preferred in patient with BMI of 36, large chest circumference, and protuberant abdomen.  Prefer PET stress test to avoid breast and GI artifact attenuation  -Echocardiogram 4/16/2024:    Left ventricular systolic function is normal. Calculated left ventricular EF = 60.3%    Left ventricular diastolic function is consistent with (grade I) impaired relaxation.    Estimated right ventricular systolic pressure from tricuspid regurgitation is normal (<35 mmHg).   -SPECT stress test at [place] [facility] 4/21/2022:  Myocardial perfusion imaging is normal, overall LV systolic function is normal without regional wall abnormalities.      2. Essential hypertension, benign  -Blood pressure currently well-controlled.  Patient states she does not consistently check blood pressure at home  -Plan to continue bisoprolol-hydrochlorothiazide at current dose.    3. Dyspnea on exertion  -Patient with ongoing dyspnea.  She also endorses episodes of sweating profusely, and near syncope also associated with exercise.  -At this time, we will consider dyspnea as an anginal equivalent and pursue further testing    Will plan to obtain PET stress test and follow-up after results.  Patient strongly encouraged to reach out prior to next evaluation if symptoms change or worsen.      Follow Up {Instructions Charge Capture  Follow-up Communications :23}     Return in about 4 weeks (around 8/28/2024) for Chest pain.      Patient was given instructions and counseling regarding her condition or for health maintenance advice. Please see specific information pulled into the  AVS if appropriate.  Patient was instructed to call the [facility] with any questions, concerns, or worsening symptoms.    Dictated Utilizing Dragon Dictation   Please note that portions of this note were completed with a voice recognition program.   Part of this note may be an electronic transcription/translation of spoken language to printed text using the Dragon Dictation System.

## 2024-08-14 ENCOUNTER — HOSPITAL ENCOUNTER (OUTPATIENT)
Dept: CARDIOLOGY | Facility: HOSPITAL | Age: 69
Discharge: HOME | End: 2024-08-14
Admitting: NURSE PRACTITIONER
Payer: COMMERCIAL

## 2024-08-14 VITALS — BODY MASS INDEX: 35.85 KG/M2 | HEIGHT: 64 IN | WEIGHT: 210 LBS

## 2024-08-14 DIAGNOSIS — R07.89 OTHER CHEST PAIN: ICD-10-CM

## 2024-08-14 DIAGNOSIS — R06.09 DYSPNEA ON EXERTION: ICD-10-CM

## 2024-08-14 LAB
BH CV REST NUCLEAR ISOTOPE DOSE: 30.1 MCI
BH CV STRESS BP STAGE 1: NORMAL
BH CV STRESS BP STAGE 3: NORMAL
BH CV STRESS COMMENTS STAGE 1: NORMAL
BH CV STRESS DOSE REGADENOSON STAGE 1: 0.4
BH CV STRESS DURATION MIN STAGE 1: 1
BH CV STRESS DURATION MIN STAGE 2: 1
BH CV STRESS DURATION MIN STAGE 3: 1
BH CV STRESS DURATION MIN STAGE 4: 1
BH CV STRESS DURATION SEC STAGE 1: 0
BH CV STRESS DURATION SEC STAGE 2: 0
BH CV STRESS DURATION SEC STAGE 3: 0
BH CV STRESS DURATION SEC STAGE 4: 0
BH CV STRESS HR STAGE 1: 82
BH CV STRESS HR STAGE 2: 100
BH CV STRESS HR STAGE 3: 87
BH CV STRESS HR STAGE 4: 81
BH CV STRESS NUCLEAR ISOTOPE DOSE: 30.1 MCI
BH CV STRESS O2 STAGE 1: 99
BH CV STRESS O2 STAGE 2: 100
BH CV STRESS O2 STAGE 3: 100
BH CV STRESS O2 STAGE 4: 99
BH CV STRESS PROTOCOL 1: NORMAL
BH CV STRESS RECOVERY BP: NORMAL MMHG
BH CV STRESS RECOVERY HR: 82 BPM
BH CV STRESS RECOVERY O2: 98 %
BH CV STRESS STAGE 1: 1
BH CV STRESS STAGE 2: 2
BH CV STRESS STAGE 3: 3
BH CV STRESS STAGE 4: 4
LV EF NUC BP: 83 %
MAXIMAL PREDICTED HEART RATE: 153 BPM
PERCENT MAX PREDICTED HR: 65.36 %
STRESS BASELINE BP: NORMAL MMHG
STRESS BASELINE HR: 74 BPM
STRESS O2 SAT REST: 98 %
STRESS PERCENT HR: 77 %
STRESS POST ESTIMATED WORKLOAD: 1 METS
STRESS POST EXERCISE DUR MIN: 4 MIN
STRESS POST EXERCISE DUR SEC: 0 SEC
STRESS POST O2 SAT PEAK: 100 %
STRESS POST PEAK BP: NORMAL MMHG
STRESS POST PEAK HR: 100 BPM
STRESS TARGET HR: 130 BPM

## 2024-08-14 PROCEDURE — 25010000002 REGADENOSON 0.4 MG/5ML SOLUTION: Performed by: NURSE PRACTITIONER

## 2024-08-14 PROCEDURE — A9555 RB82 RUBIDIUM: HCPCS | Performed by: NURSE PRACTITIONER

## 2024-08-14 PROCEDURE — 93017 CV STRESS TEST TRACING ONLY: CPT

## 2024-08-14 PROCEDURE — 78431 MYOCRD IMG PET RST&STRS CT: CPT

## 2024-08-14 PROCEDURE — 0 RUBIDIUM CHLORIDE: Performed by: NURSE PRACTITIONER

## 2024-08-14 RX ORDER — REGADENOSON 0.08 MG/ML
0.4 INJECTION, SOLUTION INTRAVENOUS ONCE
Status: COMPLETED | OUTPATIENT
Start: 2024-08-14 | End: 2024-08-14

## 2024-08-14 RX ADMIN — RUBIDIUM CHLORIDE RB-82 1 DOSE: 150 INJECTION, SOLUTION INTRAVENOUS at 09:11

## 2024-08-14 RX ADMIN — REGADENOSON 0.4 MG: 0.08 INJECTION, SOLUTION INTRAVENOUS at 09:21

## 2024-08-14 RX ADMIN — RUBIDIUM CHLORIDE RB-82 1 DOSE: 150 INJECTION, SOLUTION INTRAVENOUS at 09:24

## 2024-08-14 NOTE — PROGRESS NOTES
Your stress test was low risk and showed no evidence of ischemia/significant heart blockage.  We will discuss these results in further detail at time of our next office visit.

## 2024-08-28 ENCOUNTER — OFFICE VISIT (OUTPATIENT)
Dept: CARDIOLOGY | Facility: HOSPITAL | Age: 69
End: 2024-08-28
Payer: COMMERCIAL

## 2024-08-28 VITALS
BODY MASS INDEX: 35.85 KG/M2 | WEIGHT: 210 LBS | SYSTOLIC BLOOD PRESSURE: 126 MMHG | HEART RATE: 81 BPM | HEIGHT: 64 IN | DIASTOLIC BLOOD PRESSURE: 67 MMHG | OXYGEN SATURATION: 97 %

## 2024-08-28 DIAGNOSIS — R00.2 PALPITATIONS: ICD-10-CM

## 2024-08-28 DIAGNOSIS — R05.9 COUGH, UNSPECIFIED TYPE: ICD-10-CM

## 2024-08-28 DIAGNOSIS — R07.89 OTHER CHEST PAIN: Primary | ICD-10-CM

## 2024-08-28 DIAGNOSIS — I10 ESSENTIAL HYPERTENSION, BENIGN: ICD-10-CM

## 2024-08-28 DIAGNOSIS — R06.09 DYSPNEA ON EXERTION: ICD-10-CM

## 2024-08-28 RX ORDER — LEVOCETIRIZINE DIHYDROCHLORIDE 5 MG/1
5 TABLET, FILM COATED ORAL EVERY 12 HOURS
Qty: 60 TABLET | Refills: 5 | Status: SHIPPED | OUTPATIENT
Start: 2024-08-28

## 2024-08-28 NOTE — PROGRESS NOTES
Chief Complaint  Chest Pain    Subjective      History of Present Illness {  Problem List  Visit  Diagnosis   Encounters  Notes  Medications  Labs  Result Review Imaging  Media :23}     Rosalba Girard, 69 y.o. female with past medical history significant for asthma, breast cancer with current radiation, fibromyalgia, GERD, anemia, hypertension, IBS, and prediabetes, who presents to [facility] for Chest Pain  Since time of previous evaluation, patient has undergone stress testing. At time of today's visit she continues to have some chest discomfort once every 3 weeks. Pain in neck and in between shoulder blades. She endorses mild dyspnea, but states she overall feels better. She currently denies syncope, near syncope, or worsening lower extremity edema.  She does notice that she has increased episodes of palpitations when she drinks tea.  She does not consistently check blood pressure or heart rate at home.      PET stress test 8/14/2024:    The patient reported baseline 3/10 chest pressure that increased to 7/10 midsternal pain with headache pressure up the back of her neck. She also reported dizziness and SOA (SPO2 100%). Symptoms resolved for all except chest pressure which returned to her baseline of 3/10 pressure.    SR/ST with PAC in pretest. No changes noted.    Myocardial perfusion imaging indicates a normal myocardial perfusion study with no evidence of ischemia. Impressions are consistent with a low risk study.    Left ventricular ejection fraction is hyperdynamic (Calculated EF > 70%).    Rest EF = 79% Stress EF = 83%.    Findings consistent with a normal ECG stress test.    Echocardiogram 4/16/2024:    Left ventricular systolic function is normal. Calculated left ventricular EF = 60.3%    Left ventricular diastolic function is consistent with (grade I) impaired relaxation.    Estimated right ventricular systolic pressure from tricuspid regurgitation is normal  (<35 mmHg).       SPECT stress test at [place] [facility] 4/21/2022:  Myocardial perfusion imaging is normal, overall LV systolic function is normal without regional wall abnormalities.    Echocardiogram 4/14/2022 at [place] [facility]:  Left ventricle has normal cavity size, wall motion and thickness, and normal systolic function.  The estimated LV ejection fraction is 65%.  The aortic valve is trileaflet and normal.  There is mild mitral regurgitation.  Tricuspid valve appears normal, and pulmonic valve appears normal as well.  Is no pericardial effusion.  The aortic root and ascending aorta appear normal.      Prior presentation:  Patient presented to the ED on 3/31/2024 with chief complaint of chest pain.  At that time, patient stated she was having pain across entire anterior chest with radiation into the upper back.  Patient endorsed ongoing current radiation treatment.  After she has received radiation treatment, she admitted to having headache, fatigue, and chest discomfort.  Twelve-lead EKG showed sinus tachycardia, rate 112, normal EKG.  Chest x-ray showed no evidence of acute disease in the chest.  CTA of the chest showed no pulmonary embolus, or other acute findings in the chest.  CBC revealed leukocytosis at 13.95.  Other lab work including a CMP, lipase, BNP, and high-sensitivity troponins x 2 noted to be unremarkable.  Patient was referred to heart and valve for further evaluation of chest pain.    Since time of evaluation in ED, patient has followed up with her primary care provider on 4/10/2024.  At that time, patient endorsed pleuritic  pain, neck pain, and cough.  Patient was prescribed Z-Aramis, Medrol Dosepak, and promethazine cough syrup.  Patient was also recently seen by hematology and oncology on 4/9/2024.  At time of today's visit, chest pain is improved.  Most recently, discomfort is only in her back and between shoulder blades.  She had recently endorsed shortness of air  "which is now better after initiating Z-Aramis, and Medrol Dosepak.  Patient endorses palpitations only when she has increased amounts of caffeine.  She denies syncope, but endorses dizziness with position changes.  Patient also endorses some mild left lower extremity edema which she believes is secondary to prior injury.  Most recently, patient has not been active due to mobility issues.  She endorses caffeine intake of 4 glasses of tea per day, and an adequate water intake of 2 bottles per day.  In regards to family history, patient states her father had coronary artery disease with MI at age 80.  She also states that her sister had \"a leaky heart valve\".    Of note, patient has previously followed with Dr. Del Castillo.  She states she has undergone previous echocardiogram, and stress testing which she believes has been within approximately 1 year.      Objective     Vital Signs:   Vitals:    08/28/24 0925   BP: 126/67   BP Location: Right arm   Patient Position: Sitting   Pulse: 81   SpO2: 97%   Weight: 95.3 kg (210 lb)   Height: 162.6 cm (64\")       Body mass index is 36.05 kg/m².  Physical Exam  Vitals and nursing note reviewed.   Constitutional:       Appearance: Normal appearance. She is obese.   HENT:      Head: Normocephalic.   Eyes:      Pupils: Pupils are equal, round, and reactive to light.   Cardiovascular:      Rate and Rhythm: Normal rate and regular rhythm.      Pulses: Normal pulses.      Heart sounds: Normal heart sounds. No murmur heard.  Pulmonary:      Effort: Pulmonary effort is normal.      Breath sounds: Normal breath sounds.   Abdominal:      General: Abdomen is protuberant. Bowel sounds are normal.      Palpations: Abdomen is soft.   Musculoskeletal:         General: Normal range of motion.      Cervical back: Normal range of motion.      Right lower leg: No edema.      Left lower leg: No edema.   Skin:     General: Skin is warm and dry.      Capillary Refill: Capillary refill takes less than 2 " seconds.   Neurological:      Mental Status: She is alert and oriented to person, place, and time.   Psychiatric:         Mood and Affect: Mood normal.         Thought Content: Thought content normal.            Data Reviewed:{ Labs  Result Review  Imaging  Med Tab  Media :23}     Lab Results   Component Value Date    WBC 6.83 07/30/2024    HGB 15.2 07/30/2024    HCT 43.1 07/30/2024    MCV 84.2 07/30/2024     07/30/2024      Lab Results   Component Value Date    GLUCOSE 104 (H) 07/30/2024    BUN 16 07/30/2024    CREATININE 0.85 07/30/2024    EGFR 75.2 07/30/2024    BCR 18.8 07/30/2024    K 4.1 07/30/2024    CO2 30.0 (H) 07/30/2024    CALCIUM 9.4 07/30/2024    ALBUMIN 4.1 07/30/2024    BILITOT 0.4 07/30/2024    AST 30 07/30/2024    ALT 25 07/30/2024     Lab Results   Component Value Date    CKTOTAL 52 11/16/2022    TROPONINT <6 07/25/2024      ECG 12 Lead ED Triage Standing Order; Chest Pain (03/31/2024 11:40)  ECG 12 Lead ED Triage Standing Order; Chest Pain (03/31/2024 14:40)  CT Angiogram Chest With & Without Contrast (09/08/2019 21:30)  CT Angiogram Chest (03/31/2024 14:51)  XR Chest 1 View (03/31/2024 12:25)    Assessment & Plan   Assessment and Plan {CC Problem List  Visit Diagnosis  ROS  Review (Popup)  Health Maintenance  Quality  BestPractice  Medications  SmartSets  SnapShot Encounters  Media :23}     1. Other chest pain  -Most recent stress test discussed today with patient  -Most recent CTA from 7/25 which specifically mentioned no significant coronary calcification  -PET stress test 8/14/2024:    The patient reported baseline 3/10 chest pressure that increased to 7/10 midsternal pain with headache pressure up the back of her neck. She also reported dizziness and SOA (SPO2 100%). Symptoms resolved for all except chest pressure which returned to her baseline of 3/10 pressure.    SR/ST with PAC in pretest. No changes noted.    Myocardial perfusion imaging indicates a normal myocardial  perfusion study with no evidence of ischemia. Impressions are consistent with a low risk study.    Left ventricular ejection fraction is hyperdynamic (Calculated EF > 70%).    Rest EF = 79% Stress EF = 83%.    Findings consistent with a normal ECG stress test.  -Echocardiogram 4/16/2024:    Left ventricular systolic function is normal. Calculated left ventricular EF = 60.3%    Left ventricular diastolic function is consistent with (grade I) impaired relaxation.    Estimated right ventricular systolic pressure from tricuspid regurgitation is normal (<35 mmHg).   -SPECT stress test at [place] [facility] 4/21/2022:  Myocardial perfusion imaging is normal, overall LV systolic function is normal without regional wall abnormalities.  -Recommend patient to follow-up with primary care to evaluate for noncardiac causes of chest pain.  Patient suspects her discomfort could be secondary to pleurisy which is a very real possibility    2. Essential hypertension, benign  -Blood pressure currently well-controlled.  Patient states she does not consistently check blood pressure at home  -Plan to continue bisoprolol-hydrochlorothiazide at current dose.    3. Dyspnea on exertion  -Patient with ongoing dyspnea.  She also endorses episodes of sweating profusely,  associated with exercise.  -Suspect that patient may have component of deconditioning.  Discussed with patient that we recommend initiating routine exercise regimen for at least 20 minutes a day 5 days a week.    4.  Palpitations  -Patient endorses palpitations during times where she has increased caffeine use  -Discussed with patient that she should attempt to stay adequately hydrated drinking 2 quarts of water daily, and limit caffeine use  -Also discussed with patient that if she would like to undergo further evaluation of her palpitations we would be willing to complete a 14-day cardiac monitor.  Patient politely declines at this time and states she will continue to  monitor her symptoms for any worsening        At this time, patient may follow-up with heart and valve as needed, or if symptoms change or worsen.  Otherwise, would recommend close continue follow-up with primary care for noncardiac causes of symptoms.    Follow Up {Instructions Charge Capture  Follow-up Communications :23}     Return if symptoms worsen or fail to improve.      Patient was given instructions and counseling regarding her condition or for health maintenance advice. Please see specific information pulled into the AVS if appropriate.  Patient was instructed to call the [facility] with any questions, concerns, or worsening symptoms.    Dictated Utilizing Dragon Dictation   Please note that portions of this note were completed with a voice recognition program.   Part of this note may be an electronic transcription/translation of spoken language to printed text using the Dragon Dictation System.

## 2024-09-17 ENCOUNTER — OFFICE VISIT (OUTPATIENT)
Dept: ORTHOPEDIC SURGERY | Facility: CLINIC | Age: 69
End: 2024-09-17
Payer: COMMERCIAL

## 2024-09-17 VITALS
DIASTOLIC BLOOD PRESSURE: 90 MMHG | HEIGHT: 64 IN | SYSTOLIC BLOOD PRESSURE: 150 MMHG | WEIGHT: 204.4 LBS | BODY MASS INDEX: 34.89 KG/M2

## 2024-09-17 DIAGNOSIS — M17.32 POST-TRAUMATIC OSTEOARTHRITIS OF LEFT KNEE: Primary | ICD-10-CM

## 2024-09-17 DIAGNOSIS — M17.11 PRIMARY OSTEOARTHRITIS OF RIGHT KNEE: ICD-10-CM

## 2024-09-17 PROCEDURE — 99214 OFFICE O/P EST MOD 30 MIN: CPT | Performed by: ORTHOPAEDIC SURGERY

## 2024-09-23 ENCOUNTER — PATIENT OUTREACH (OUTPATIENT)
Dept: CASE MANAGEMENT | Facility: OTHER | Age: 69
End: 2024-09-23
Payer: COMMERCIAL

## 2024-10-11 LAB — NCCN CRITERIA FLAG: ABNORMAL

## 2024-10-18 ENCOUNTER — HOSPITAL ENCOUNTER (OUTPATIENT)
Dept: MAMMOGRAPHY | Facility: HOSPITAL | Age: 69
Discharge: HOME | End: 2024-10-18
Admitting: SURGERY
Payer: COMMERCIAL

## 2024-10-18 DIAGNOSIS — C50.312 MALIGNANT NEOPLASM OF LOWER-INNER QUADRANT OF LEFT FEMALE BREAST, UNSPECIFIED ESTROGEN RECEPTOR STATUS: ICD-10-CM

## 2024-10-18 PROCEDURE — G0279 TOMOSYNTHESIS, MAMMO: HCPCS

## 2024-10-18 PROCEDURE — 77066 DX MAMMO INCL CAD BI: CPT

## 2024-10-22 ENCOUNTER — CLINICAL SUPPORT (OUTPATIENT)
Dept: ORTHOPEDIC SURGERY | Facility: CLINIC | Age: 69
End: 2024-10-22
Payer: COMMERCIAL

## 2024-10-22 DIAGNOSIS — M17.11 PRIMARY OSTEOARTHRITIS OF RIGHT KNEE: ICD-10-CM

## 2024-10-22 DIAGNOSIS — M17.32 POST-TRAUMATIC OSTEOARTHRITIS OF LEFT KNEE: ICD-10-CM

## 2024-10-22 RX ORDER — HYALURONATE SODIUM 10 MG/ML
20 SYRINGE (ML) INTRAARTICULAR
Status: COMPLETED | OUTPATIENT
Start: 2024-10-22 | End: 2024-10-22

## 2024-10-22 RX ORDER — LIDOCAINE HYDROCHLORIDE 10 MG/ML
3 INJECTION, SOLUTION EPIDURAL; INFILTRATION; INTRACAUDAL; PERINEURAL
Status: COMPLETED | OUTPATIENT
Start: 2024-10-22 | End: 2024-10-22

## 2024-10-22 RX ADMIN — LIDOCAINE HYDROCHLORIDE 3 ML: 10 INJECTION, SOLUTION EPIDURAL; INFILTRATION; INTRACAUDAL; PERINEURAL at 09:33

## 2024-10-22 RX ADMIN — Medication 20 MG: at 09:33

## 2024-10-22 NOTE — PROGRESS NOTES
Procedure   - Large Joint Arthrocentesis: bilateral knee on 10/22/2024 9:33 AM  Indications: pain  Details: (23G) needle, superolateral approach  Medications (Right): 3 mL lidocaine PF 1% 1 %; 20 mg Sodium Hyaluronate (Viscosup) 20 MG/2ML  Medications (Left): 3 mL lidocaine PF 1% 1 %; 20 mg Sodium Hyaluronate (Viscosup) 20 MG/2ML  Outcome: tolerated well, no immediate complications  Procedure, treatment alternatives, risks and benefits explained, specific risks discussed. Consent was given by the patient. Immediately prior to procedure a time out was called to verify the correct patient, procedure, equipment, support staff and site/side marked as required. Patient was prepped and draped in the usual sterile fashion.

## 2024-10-22 NOTE — PROGRESS NOTES
Procedure Note:    I discussed with the patient the potential benefits of performing a therapeutic injections as well as potential risks including but not limited to infection, swelling, pain, bleeding, bruising, nerve/vessel damage, skin color changes, transient elevation in blood glucose levels, and fat atrophy. After informed consent and after the areas were prepped with chlorhexadine soap, ethyl chloride was used to numb the skin. Via the superiorlateral approach, 3mL of 1% lidocaine followed by Euflexxa No. 1 were each injected into the right and left knee joint. The patient tolerated the procedure well. There were no complications. A sterile dressing was placed over the injection sites.      Follow-up: 1 week

## 2024-10-28 NOTE — PROGRESS NOTES
DATE OF VISIT: 10/29/2024    REASON FOR VISIT: Followup for abnormal CT scan     PROBLEM LIST:  1.  Mesenteric fat stranding:  A.  Incidentally noted on CT abdomen pelvis done March 2023 for kidney stone  2.  Morbid obesity, BMI 32  3.  Hypertension  4.  Left lower inner quadrant invasive ductal carcinoma, T1 a N0 M0 stage Ia:  A.  Presented with abnormal screening mammogram July 2023.  B.  Diagnosed after ultrasound-guided biopsy done October 2023  C.  Status post lumpectomy with sentinel lymph node biopsy done by Dr. DAMON December 18, 2023  D.  Final pathology revealed low-grade ductal carcinoma ER +95% ME +90% continue -0 by IHC.  E.completed adjuvant radiation March 2024.  F.  Completed adjuvant radiation March 2024  G.  Started adjuvant anastrozole January 8, 2024    HISTORY OF PRESENT ILLNESS: The patient is a very pleasant 69 y.o. female with past medical history significant for abnormal CT abdomen pelvis diagnosed March 2023 showing abdominal fat stranding felt to be reactive. The  patient is here today for scheduled follow-up visit.    SUBJECTIVE: The patient is here today by herself. She has been doing fairly well. She has been tolerating her anastrazole without significant side effects. She has some chronic back discomfort for which she is receiving injections with Dr. Jasmine. She still uses a cane for ambulation. She has no new breast complaints. She still has some intermittent pleurisy and cough related to radiation changes.     Past History:  Medical History: has a past medical history of Asthma, Breast cancer, Chronic superficial dermatitis, Closed fracture of left tibial plateau with routine healing, Dietary counseling, Encounter for routine adult health examination, Fibromyalgia, GERD (gastroesophageal reflux disease), Hair or hair follicle disorder, History of anemia, Hives, radiation therapy, Hypertension, Irritable bowel disease, Malaise and fatigue, Myalgia and myositis, PONV (postoperative  nausea and vomiting), Prediabetes, Rash and nonspecific skin eruption, S/P ORIF (open reduction internal fixation) fracture  left tibial plateau  (12/21/2016), Sinusitis, acute maxillary, Tibial plateau fracture, left (12/21/2016), UTI (urinary tract infection), and Wears glasses.   Surgical History: has a past surgical history that includes Bladder surgery (2004); Cholecystectomy (2012); Hernia repair; Colonoscopy w/ polypectomy (2015); Tibial Plateau Open Reduction Internal Fixation (Left, 12/21/2016); Breast biopsy (Right); Total abdominal hysterectomy w/ bilateral salpingoophorectomy (Bilateral, 1998); Ectopic pregnancy surgery (1994); and Breast lumpectomy (Right, 12/18/2023).   Family History: family history includes Aneurysm in her mother; Bleeding Disorder in an other family member; Breast cancer in her maternal aunt; Cancer in her father and maternal grandfather; Colon cancer in an other family member; Depression in an other family member; Diabetes in an other family member; Heart attack in her father; Heart disease in her father; Hypertension in her brother and sister; Osteoarthritis in her mother and another family member; Other in an other family member; Ovarian cancer in her paternal aunt; Rheum arthritis in an other family member; Stroke in her father; Tuberculosis in an other family member; Uterine cancer in her paternal aunt.   Social History: reports that she quit smoking about 34 years ago. Her smoking use included cigarettes. She started smoking about 44 years ago. She has a 20 pack-year smoking history. She has never been exposed to tobacco smoke. She has never used smokeless tobacco. She reports that she does not drink alcohol and does not use drugs.    (Not in a hospital admission)     Allergies: Myrbetriq [mirabegron], Pepper, and Tramadol     Review of Systems   Constitutional:  Positive for fatigue.   Respiratory:  Positive for cough.    Musculoskeletal:  Positive for arthralgias, back pain  "and gait problem.        Ambulates with cane       PHYSICAL EXAMINATION:   /72   Pulse 91   Temp 97.8 °F (36.6 °C) (Infrared)   Resp 16   Ht 162.6 cm (64\")   Wt 91.6 kg (202 lb)   LMP  (LMP Unknown)   SpO2 98% Comment: RA  BMI 34.67 kg/m²    Pain Score    10/29/24 1030   PainSc:   6   PainLoc: Back     ECOG score: 1        ECOG Performance Status: 1 - Symptomatic but completely ambulatory      General Appearance:      alert, cooperative, no apparent distress and appears stated age   Lungs:   Clear to auscultation bilaterally; respirations regular, even, and unlabored bilaterally   Heart:  Regular rate and rhythm, no murmurs appreciated   Abdomen:   Soft, non-tender, non-distended, and no organomegaly                 Lab on 10/29/2024   Component Date Value Ref Range Status    WBC 10/29/2024 7.98  3.40 - 10.80 10*3/mm3 Final    RBC 10/29/2024 5.23  3.77 - 5.28 10*6/mm3 Final    Hemoglobin 10/29/2024 15.3  12.0 - 15.9 g/dL Final    Hematocrit 10/29/2024 43.3  34.0 - 46.6 % Final    MCV 10/29/2024 82.8  79.0 - 97.0 fL Final    MCH 10/29/2024 29.3  26.6 - 33.0 pg Final    MCHC 10/29/2024 35.3  31.5 - 35.7 g/dL Final    RDW 10/29/2024 12.1 (L)  12.3 - 15.4 % Final    RDW-SD 10/29/2024 36.8 (L)  37.0 - 54.0 fl Final    MPV 10/29/2024 9.7  6.0 - 12.0 fL Final    Platelets 10/29/2024 270  140 - 450 10*3/mm3 Final    Neutrophil % 10/29/2024 65.6  42.7 - 76.0 % Final    Lymphocyte % 10/29/2024 26.2  19.6 - 45.3 % Final    Monocyte % 10/29/2024 6.5  5.0 - 12.0 % Final    Eosinophil % 10/29/2024 0.9  0.3 - 6.2 % Final    Basophil % 10/29/2024 0.4  0.0 - 1.5 % Final    Immature Grans % 10/29/2024 0.4  0.0 - 0.5 % Final    Neutrophils, Absolute 10/29/2024 5.24  1.70 - 7.00 10*3/mm3 Final    Lymphocytes, Absolute 10/29/2024 2.09  0.70 - 3.10 10*3/mm3 Final    Monocytes, Absolute 10/29/2024 0.52  0.10 - 0.90 10*3/mm3 Final    Eosinophils, Absolute 10/29/2024 0.07  0.00 - 0.40 10*3/mm3 Final    Basophils, Absolute " 10/29/2024 0.03  0.00 - 0.20 10*3/mm3 Final    Immature Grans, Absolute 10/29/2024 0.03  0.00 - 0.05 10*3/mm3 Final        Mammo Diagnostic Digital Tomosynthesis Bilateral With CAD    Result Date: 10/18/2024  Narrative: EXAMINATION:MAMMO DIAGNOSTIC DIGITAL TOMOSYNTHESIS BILATERAL W CAD-  HISTORY: 69-year-old female status post left lumpectomy in December 2023 with reexcision in January 2024. Postlumpectomy protocol. Bilateral exam.  TECHNIQUE: 2D and 3D bilateral MLO, CC, as well as left exaggerated lateral cc views were obtained. CAD was utilized.  COMPARISON: Prior studies dating back to 11/15/2017.  FINDINGS: There are scattered areas of fibroglandular density. New post lumpectomy changes are noted in the left breast. Otherwise, no worrisome masses, calcifications, or architectural distortion is identified bilaterally.      Impression: BI-RADS 3, probably benign findings.  RECOMMENDATION: 6-month follow-up left diagnostic mammogram is advised for the postlumpectomy changes, per the postlumpectomy protocol  The standard false-negative rate of mammography is between 10% and 25%. Complex patterns or increased breast density will markedly elevate the false-negative rate of mammography.   A results letter, in lay terminology, will be given to the patient at the conclusion of the exam.  _____________________________________________________ Physician Order  Diagnostic Mammogram with Breast Ultrasound if needed.  Diagnosis:   Abnormal Mammogram  This report was finalized on 10/18/2024 11:04 AM by Dr. Mikal Rausch MD.       ASSESSMENT: The patient is a very pleasant 69 y.o. female  with abdominal fat stranding      PLAN:    1.  Abdominal fat stranding:  A.  Her CT scan from July 25, 2024 showed no significant abnormalities. We will do 1 year follow up scan that will be due July 2025.     2.  Splenomegaly:  A.  Spleen size 16 cm.  B.  Most likely induced by her fatty liver. We will follow up on her CBC as well as repeat  scan planned for July 2025.     3.  Left inner lower quadrant breast cancer T1 a N0 M0 stage Ia:  A.  I will continue anastrozole 1 mg daily.  The patient will complete 5 years of treatment January 2029. She was given a refill on this today.   B.  She had mammogram done 10/18/2024 that revealed Birads 3 with recommended 6 month follow up that is scheduled for 4/21/2025.  C. We reviewed the potential side effects of anastrozole including hot flashes, vaginal dryness, joint pain, decrease in bone density, and mood or sleep disturbance.  D.  She will follow-up with us in 6 months to evaluate for treatment tolerance.  E. I reviewed the lab results from today with the patient. I reassured her that her blood counts are normal including WBC, hemoglobin, and platelet count. We will follow up on the CMP results.     4.  Osteopenia:  A.  We will do 2 years follow-up which would be due April 2026.  B.  She will continue calcium and vitamin D supplement daily.     5. Chronic back pain:  A. She will continue follow up with Dr. Jasmine regarding injections for pain relief.     FOLLOW UP: 6 months with labs and mammogram.    Elva Scott, APRN  10/29/2024

## 2024-10-29 ENCOUNTER — LAB (OUTPATIENT)
Dept: LAB | Facility: HOSPITAL | Age: 69
End: 2024-10-29
Payer: COMMERCIAL

## 2024-10-29 ENCOUNTER — TELEPHONE (OUTPATIENT)
Dept: RADIATION ONCOLOGY | Facility: HOSPITAL | Age: 69
End: 2024-10-29
Payer: COMMERCIAL

## 2024-10-29 ENCOUNTER — OFFICE VISIT (OUTPATIENT)
Dept: ONCOLOGY | Facility: CLINIC | Age: 69
End: 2024-10-29
Payer: COMMERCIAL

## 2024-10-29 VITALS
HEIGHT: 64 IN | SYSTOLIC BLOOD PRESSURE: 133 MMHG | HEART RATE: 91 BPM | RESPIRATION RATE: 16 BRPM | WEIGHT: 202 LBS | OXYGEN SATURATION: 98 % | BODY MASS INDEX: 34.49 KG/M2 | DIASTOLIC BLOOD PRESSURE: 72 MMHG | TEMPERATURE: 97.8 F

## 2024-10-29 DIAGNOSIS — Z17.0 CARCINOMA OF LOWER-INNER QUADRANT OF LEFT BREAST IN FEMALE, ESTROGEN RECEPTOR POSITIVE: Primary | ICD-10-CM

## 2024-10-29 DIAGNOSIS — Z17.0 MALIGNANT NEOPLASM OF RIGHT BREAST IN FEMALE, ESTROGEN RECEPTOR POSITIVE, UNSPECIFIED SITE OF BREAST: ICD-10-CM

## 2024-10-29 DIAGNOSIS — C50.312 CARCINOMA OF LOWER-INNER QUADRANT OF LEFT BREAST IN FEMALE, ESTROGEN RECEPTOR POSITIVE: ICD-10-CM

## 2024-10-29 DIAGNOSIS — C50.312 CARCINOMA OF LOWER-INNER QUADRANT OF LEFT BREAST IN FEMALE, ESTROGEN RECEPTOR POSITIVE: Primary | ICD-10-CM

## 2024-10-29 DIAGNOSIS — Z17.0 CARCINOMA OF LOWER-INNER QUADRANT OF LEFT BREAST IN FEMALE, ESTROGEN RECEPTOR POSITIVE: ICD-10-CM

## 2024-10-29 DIAGNOSIS — C50.911 MALIGNANT NEOPLASM OF RIGHT BREAST IN FEMALE, ESTROGEN RECEPTOR POSITIVE, UNSPECIFIED SITE OF BREAST: ICD-10-CM

## 2024-10-29 LAB
ALBUMIN SERPL-MCNC: 4 G/DL (ref 3.5–5.2)
ALBUMIN/GLOB SERPL: 1.2 G/DL
ALP SERPL-CCNC: 109 U/L (ref 39–117)
ALT SERPL W P-5'-P-CCNC: 18 U/L (ref 1–33)
ANION GAP SERPL CALCULATED.3IONS-SCNC: 11 MMOL/L (ref 5–15)
AST SERPL-CCNC: 21 U/L (ref 1–32)
BASOPHILS # BLD AUTO: 0.03 10*3/MM3 (ref 0–0.2)
BASOPHILS NFR BLD AUTO: 0.4 % (ref 0–1.5)
BILIRUB SERPL-MCNC: 0.4 MG/DL (ref 0–1.2)
BUN SERPL-MCNC: 16 MG/DL (ref 8–23)
BUN/CREAT SERPL: 21.6 (ref 7–25)
CALCIUM SPEC-SCNC: 9.3 MG/DL (ref 8.6–10.5)
CHLORIDE SERPL-SCNC: 103 MMOL/L (ref 98–107)
CO2 SERPL-SCNC: 28 MMOL/L (ref 22–29)
CREAT SERPL-MCNC: 0.74 MG/DL (ref 0.57–1)
DEPRECATED RDW RBC AUTO: 36.8 FL (ref 37–54)
EGFRCR SERPLBLD CKD-EPI 2021: 88.8 ML/MIN/1.73
EOSINOPHIL # BLD AUTO: 0.07 10*3/MM3 (ref 0–0.4)
EOSINOPHIL NFR BLD AUTO: 0.9 % (ref 0.3–6.2)
ERYTHROCYTE [DISTWIDTH] IN BLOOD BY AUTOMATED COUNT: 12.1 % (ref 12.3–15.4)
GLOBULIN UR ELPH-MCNC: 3.3 GM/DL
GLUCOSE SERPL-MCNC: 117 MG/DL (ref 65–99)
HCT VFR BLD AUTO: 43.3 % (ref 34–46.6)
HGB BLD-MCNC: 15.3 G/DL (ref 12–15.9)
IMM GRANULOCYTES # BLD AUTO: 0.03 10*3/MM3 (ref 0–0.05)
IMM GRANULOCYTES NFR BLD AUTO: 0.4 % (ref 0–0.5)
LYMPHOCYTES # BLD AUTO: 2.09 10*3/MM3 (ref 0.7–3.1)
LYMPHOCYTES NFR BLD AUTO: 26.2 % (ref 19.6–45.3)
MCH RBC QN AUTO: 29.3 PG (ref 26.6–33)
MCHC RBC AUTO-ENTMCNC: 35.3 G/DL (ref 31.5–35.7)
MCV RBC AUTO: 82.8 FL (ref 79–97)
MONOCYTES # BLD AUTO: 0.52 10*3/MM3 (ref 0.1–0.9)
MONOCYTES NFR BLD AUTO: 6.5 % (ref 5–12)
NEUTROPHILS NFR BLD AUTO: 5.24 10*3/MM3 (ref 1.7–7)
NEUTROPHILS NFR BLD AUTO: 65.6 % (ref 42.7–76)
PLATELET # BLD AUTO: 270 10*3/MM3 (ref 140–450)
PMV BLD AUTO: 9.7 FL (ref 6–12)
POTASSIUM SERPL-SCNC: 3.7 MMOL/L (ref 3.5–5.2)
PROT SERPL-MCNC: 7.3 G/DL (ref 6–8.5)
RBC # BLD AUTO: 5.23 10*6/MM3 (ref 3.77–5.28)
SODIUM SERPL-SCNC: 142 MMOL/L (ref 136–145)
WBC NRBC COR # BLD AUTO: 7.98 10*3/MM3 (ref 3.4–10.8)

## 2024-10-29 PROCEDURE — 36415 COLL VENOUS BLD VENIPUNCTURE: CPT

## 2024-10-29 PROCEDURE — 80053 COMPREHEN METABOLIC PANEL: CPT

## 2024-10-29 PROCEDURE — 85025 COMPLETE CBC W/AUTO DIFF WBC: CPT

## 2024-10-29 PROCEDURE — 99214 OFFICE O/P EST MOD 30 MIN: CPT | Performed by: NURSE PRACTITIONER

## 2024-10-29 RX ORDER — ASPIRIN 81 MG
1 TABLET, DELAYED RELEASE (ENTERIC COATED) ORAL DAILY
Qty: 90 TABLET | Refills: 3 | Status: SHIPPED | OUTPATIENT
Start: 2024-10-29

## 2024-10-29 RX ORDER — ANASTROZOLE 1 MG/1
1 TABLET ORAL DAILY
Qty: 90 TABLET | Refills: 3 | Status: SHIPPED | OUTPATIENT
Start: 2024-10-29

## 2024-10-29 NOTE — TELEPHONE ENCOUNTER
Called patient and left a VM.  Patient had stopped in regarding f/u appointment and FMLA paperwork.  Patient had also called Maddi on the [employer] machine to request assistance with getting this paperwork completed.  Information was passed on to me.  Patient did not remember her Medical/Oncologist, Dr. Dey and thought her appointment was with Dr. DAMON today.  Her appointment was actually with Allyn Scott in Med/Onc and patient was sent promptly upstairs for appointment.  I recommended that patient address FMLA paperwork with Med/Onc, as she is still under treatment with Med/Onc.  Patient has a 1 year F/U with Denisse on 6/11/25.  Contact number provided should patient have questions or concerns or should she continue to need assistance with FMLA paperwork if resolution has not been reached.

## 2024-10-30 ENCOUNTER — TELEPHONE (OUTPATIENT)
Dept: RADIATION ONCOLOGY | Facility: HOSPITAL | Age: 69
End: 2024-10-30
Payer: COMMERCIAL

## 2024-10-30 NOTE — TELEPHONE ENCOUNTER
After speaking with Dr. Dey regarding patient's request and ongoing symptoms, called patient and left a VM to schedule Follow-Up appointment with Dr. Dey.  Contact number provided and requested that patient return my call to schedule.  Requested that patient bring her [place] paperwork with her.    Patient returned my call to follow-up, placed on hold, lost patient's call as she was hanging up the call as I was answering.  Left a second VM.

## 2024-10-30 NOTE — PROGRESS NOTES
Procedure Note:    I discussed with the patient the potential benefits of performing a therapeutic injections as well as potential risks including but not limited to infection, swelling, pain, bleeding, bruising, nerve/vessel damage, skin color changes, transient elevation in blood glucose levels, and fat atrophy. After informed consent and after the areas were prepped with chlorhexadine soap, ethyl chloride was used to numb the skin. Via the superiorlateral approach, 3mL of 1% lidocaine followed by Euflexxa No. 2 were each injected into the right and left knee joint. The patient tolerated the procedure well. There were no complications. A sterile dressing was placed over the injection sites.      Follow-up: 1 week

## 2024-10-30 NOTE — TELEPHONE ENCOUNTER
Patient returned my call to schedule F/U to assess continuation of symptoms for Monday, 11/11/24 at 2:30 pm.  Patient verbalized an understanding of date, time, and location of appointment.  Patient has my contact number and will call with questions or concerns.

## 2024-10-31 ENCOUNTER — CLINICAL SUPPORT (OUTPATIENT)
Dept: ORTHOPEDIC SURGERY | Facility: CLINIC | Age: 69
End: 2024-10-31
Payer: COMMERCIAL

## 2024-10-31 DIAGNOSIS — M17.11 PRIMARY OSTEOARTHRITIS OF RIGHT KNEE: ICD-10-CM

## 2024-10-31 DIAGNOSIS — M17.32 POST-TRAUMATIC OSTEOARTHRITIS OF LEFT KNEE: Primary | ICD-10-CM

## 2024-11-06 NOTE — PROGRESS NOTES
Procedure Note:    I discussed with the patient the potential benefits of performing a therapeutic injections as well as potential risks including but not limited to infection, swelling, pain, bleeding, bruising, nerve/vessel damage, skin color changes, transient elevation in blood glucose levels, and fat atrophy. After informed consent and after the areas were prepped with chlorhexadine soap, ethyl chloride was used to numb the skin. Via the superiorlateral approach, 3mL of 1% lidocaine followed by Euflexxa No. 3 were each injected into the right and left knee joint. The patient tolerated the procedure well. There were no complications. A sterile dressing was placed over the injection sites.      Follow-up: Yearly

## 2024-11-07 ENCOUNTER — CLINICAL SUPPORT (OUTPATIENT)
Dept: ORTHOPEDIC SURGERY | Facility: CLINIC | Age: 69
End: 2024-11-07
Payer: COMMERCIAL

## 2024-11-07 DIAGNOSIS — M17.11 PRIMARY OSTEOARTHRITIS OF RIGHT KNEE: ICD-10-CM

## 2024-11-07 DIAGNOSIS — M17.32 POST-TRAUMATIC OSTEOARTHRITIS OF LEFT KNEE: Primary | ICD-10-CM

## 2024-11-07 RX ORDER — LIDOCAINE HYDROCHLORIDE 10 MG/ML
3 INJECTION, SOLUTION EPIDURAL; INFILTRATION; INTRACAUDAL; PERINEURAL
Status: COMPLETED | OUTPATIENT
Start: 2024-11-07 | End: 2024-11-07

## 2024-11-07 RX ORDER — HYALURONATE SODIUM 10 MG/ML
20 SYRINGE (ML) INTRAARTICULAR
Status: COMPLETED | OUTPATIENT
Start: 2024-11-07 | End: 2024-11-07

## 2024-11-07 RX ADMIN — Medication 20 MG: at 09:19

## 2024-11-07 RX ADMIN — LIDOCAINE HYDROCHLORIDE 3 ML: 10 INJECTION, SOLUTION EPIDURAL; INFILTRATION; INTRACAUDAL; PERINEURAL at 09:19

## 2024-11-07 NOTE — PROGRESS NOTES
Procedure   - Large Joint Arthrocentesis: bilateral knee on 11/7/2024 9:19 AM  Indications: pain  Details: (23g) needle, anterolateral approach  Medications (Right): 3 mL lidocaine PF 1% 1 %; 20 mg Sodium Hyaluronate (Viscosup) 20 MG/2ML  Medications (Left): 3 mL lidocaine PF 1% 1 %; 20 mg Sodium Hyaluronate (Viscosup) 20 MG/2ML  Outcome: tolerated well, no immediate complications  Procedure, treatment alternatives, risks and benefits explained, specific risks discussed. Consent was given by the patient. Immediately prior to procedure a time out was called to verify the correct patient, procedure, equipment, support staff and site/side marked as required. Patient was prepped and draped in the usual sterile fashion.

## 2024-11-11 ENCOUNTER — OFFICE VISIT (OUTPATIENT)
Dept: RADIATION ONCOLOGY | Facility: HOSPITAL | Age: 69
End: 2024-11-11
Payer: COMMERCIAL

## 2024-11-11 ENCOUNTER — HOSPITAL ENCOUNTER (OUTPATIENT)
Dept: RADIATION ONCOLOGY | Facility: HOSPITAL | Age: 69
Setting detail: RADIATION/ONCOLOGY SERIES
Discharge: HOME | End: 2024-11-11
Payer: COMMERCIAL

## 2024-11-11 VITALS
BODY MASS INDEX: 34.98 KG/M2 | DIASTOLIC BLOOD PRESSURE: 71 MMHG | WEIGHT: 204.9 LBS | OXYGEN SATURATION: 100 % | HEART RATE: 90 BPM | RESPIRATION RATE: 20 BRPM | TEMPERATURE: 97.3 F | HEIGHT: 64 IN | SYSTOLIC BLOOD PRESSURE: 132 MMHG

## 2024-11-11 DIAGNOSIS — Z17.0 CARCINOMA OF LOWER-INNER QUADRANT OF LEFT BREAST IN FEMALE, ESTROGEN RECEPTOR POSITIVE: Primary | ICD-10-CM

## 2024-11-11 DIAGNOSIS — C50.312 CARCINOMA OF LOWER-INNER QUADRANT OF LEFT BREAST IN FEMALE, ESTROGEN RECEPTOR POSITIVE: Primary | ICD-10-CM

## 2024-11-11 NOTE — PROGRESS NOTES
Follow Up Office Visit      Encounter Date: 11/11/2024   Patient Name: Rosalba Girard  YOB: 1955   Medical Record Number: [id number]   Primary Diagnosis: Carcinoma of lower-inner quadrant of left breast in female, estrogen receptor positive [C50.312, Z17.0]   Cancer Staging: Cancer Staging   No matching staging information was found for the patient.                Cancer Staging   No matching staging information was found for the patient.          Chief Complaint:    Chief Complaint   Patient presents with    Breast Cancer       Oncologic History: Rosalba Girard is a 69 y.o. female  here for follow up regarding her invasive carcinoma of the left breast. This was identified following an abnormality identified on screening mammography.      Screening mammography 7/2023 - microcalcifications over a low lying lymph node in UOQ of right breast, oval asymmetries in left breast  Diagnostic mammogram/US 8/16/23 - Right breast - 9:00 position of right breast 16 cfn: lobulated lymph node felt to represent mammogram correlate. Recommendation for US guided core needle biopsy and FNA. Left breast - hypoechoic duct at 8:00 3cfn with recommendation for US guided core needle biopsy  US guided core biopsies 10/6/23 - left breast specimen: +/+/- invasive carcinoma with limited DCIS. Right breast specimen benign.   Left lumpectomy and SLN evaluation - grade 1 mucinous carcinoma measuring 3mm with associated DCIS involving the medial margin focally. Margins negative for invasive carcinoma. 0/1 SLN. pT1a pN0   Repeat excision for extended medial margin 1/4/24 - residual low grade DCIS resected with margins negative by 7 mm.      She completed radiation to a dose of 40 Gy/15 fractions with a 10 Gy/5 fraction boost on 3/9/24  She is on anastrazole, and follows with Dr. Ocampo.     Interval History:   She stopped in the office on 10/29 regarding obtaining [facility] paperwork as she  reports symptoms that interfere with her ability to work. She was scheduled for an office visit.     Today, she describes intermittent episodes of chest pain, originating in the center of her chest, involving her chest wall bilaterally with pain also in her back, both arms, and radiating upwards into her neck  She reports that during these episodes, it is hard to breathe. The pain is not provoked by inspiration or expiration. She is overall more sensitive and reports generalized tenderness to palpation while having these episodes.     She has a known history of fibromyalgia. She has been to the ED in 4/2024 and 7/2024 for evaluation of chest pain. She believes the cause of her symptoms to be the radiation that she received, as she has been told radiation can cause pleurisy. She believes that the bentyl she was given from the ED in 7/2024 did provide some relief.     Prior Radiation: Left breast, 4/3/24        Subjective      Review of Systems: Review of Systems   Constitutional:  Positive for fatigue.   Respiratory:  Positive for chest tightness and shortness of breath.         Pt reports SOA w/ exertion; pt reports continuation of chest pain/tightness that she feels is r/t ongoing Pleurisy.   Cardiovascular:  Positive for chest pain.        Pt reports ongoing chest pain r/t pleurisy   Genitourinary:  Positive for frequency and urgency.        Pt reports taking Ditropan XL for Urinary urgency/frequency   Musculoskeletal:  Positive for back pain.        Pt reports having episodes of ongoing pain in chest, and back, between shoulders blades with pain radiating out to shoulders.   Neurological:  Positive for weakness.        Pt walks w/ cane   Psychiatric/Behavioral:  The patient is nervous/anxious.        The following portions of the patient's history were reviewed and updated as appropriate: allergies, current medications, past family history, past medical history, past social history, past surgical history and  "problem list.    Measures:   KPS/Quality of Life: 80 - Restricted Physical Activity      Objective     Physical Exam:   Vital Signs:   Vitals:    11/11/24 1441   BP: 132/71   Pulse: 90   Resp: 20   Temp: 97.3 °F (36.3 °C)   TempSrc: Temporal   SpO2: 100%   Weight: 92.9 kg (204 lb 14.4 oz)   Height: 162.6 cm (64\")   PainSc:   6   PainLoc: Chest  Comment: Chest & Back between shoulder blades     Body mass index is 35.17 kg/m².     Constitutional: No acute distress, sitting comfortably  Eye: EOMI, anicteric sclerae  HENT: NC/AT, MMM   Respiratory: Symmetric expansion, nonlabored respiration  MSK: ROM intact in all four extremities, no obvious deformities  Neuro: Alert, oriented x3, CN3-12 grossly intact.   Psych: Appropriate mood and affect.    Mammography 10/2024 -  EXAMINATION:MAMMO DIAGNOSTIC DIGITAL TOMOSYNTHESIS BILATERAL W CAD-     HISTORY: 69-year-old female status post left lumpectomy in December 2023  with reexcision in January 2024. Postlumpectomy protocol. Bilateral  exam.     TECHNIQUE: 2D and 3D bilateral MLO, CC, as well as left exaggerated  lateral cc views were obtained. CAD was utilized.     COMPARISON: Prior studies dating back to 11/15/2017.     FINDINGS: There are scattered areas of fibroglandular density. New post  lumpectomy changes are noted in the left breast. Otherwise, no worrisome  masses, calcifications, or architectural distortion is identified  bilaterally.     IMPRESSION:  BI-RADS 3, probably benign findings.     RECOMMENDATION: 6-month follow-up left diagnostic mammogram is advised  for the postlumpectomy changes, per the postlumpectomy protocol     The standard false-negative rate of mammography is between 10% and 25%.   Complex patterns or increased breast density will markedly elevate the  false-negative rate of mammography.       A results letter, in lay terminology, will be given to the patient at  the conclusion of the exam.        Assessment / Plan        Assessment/Plan: "     Diagnoses and all orders for this visit:    1. 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 Her tumor measured 3 mm and there was no LVI present. Margins were negative for invasive tumor. She had focally positive DCIS involving the medial margin with the closest distance from invasive cancer being 1.5 cm. On 1/4/24, she had a repeat excision for extended medial margins, at which residual low grade DCIS was identified and extended margins were negative by 7 mm.    She completed radiation directed towards the left breast to a dose of 40 Gy/15 fractions with a 10 Gy/5 fraction boost on 3/9/24. She is on anastrazole. Mammography 10/2024 did not show findings concerning for recurrent disease.     She is here today to request [facility] paperwork, due to episodic symptoms that interfere with her ability to work with her current schedule. She describes intermittent episodes of chest pain, originating in the center of her chest, involving her chest wall bilaterally with pain also involving her back (midline), both arms, and radiating upwards into her neck on both sides. She has had chest Xray and CT imaging to evaluate these symptoms during episodes in preceding months. She reports pain with breathing while having these episodes and tenderness to palpation diffusely while having these episodes. I explained that the distribution of her symptoms is not explained by the radiation course that she received (directed towards the left breast), therefore I am unable to fill out her ongoing [facility] paperwork as she is requesting. I suspect her symptoms may relate to her pre-existing fibromyalgia and offered to reconnect her with Deshawn Bazzi, her primary care provider. I also offered to  discuss her symptoms with him today to help determine a cause. She expressed certainty that radiation is the cause of her symptoms and states that she is disappointed in the care provided by our office and was frustrated by our clinic encounter. I asked administrators Lexii Valdovinos and Max Adame to speak with her today. She will return to clinic as scheduled in June 2025 for follow up with FIORELLA Nuno.     Deshawn Bazzi and I discussed her symptoms by phone today and he is glad to see her for evaluation.     Follow Up:   No follow-ups on file.        Time:   I spent 45 minutes on this encounter today, 11/11/24. Activities that took place during this time include:   - preparing to see the patient  - obtaining and reviewing separately obtained history  - performing a medically appropriate examination and evaluation  - counseling and educating the patient  - ordering medications/tests/procedures  - communicating with other healthcare providers  - documenting clinical information in the health record  - coordinating care for this patient.     Sincerely,        Melissa Dey MD  Radiation Oncology  This document has been signed by Melissa Dey MD on November 11, 2024 16:56 EST           NOTICE TO PATIENTS  At [facility], we believe that sharing information builds trust and better relationships. You are receiving this note because you recently visited [facility]. It is possible you will see health information before a provider has talked with you about it. This kind of information can be easy to misunderstand. To help you fully understand what it means for your health, we urge you to discuss this note with your provider.

## 2024-12-10 ENCOUNTER — OFFICE VISIT (OUTPATIENT)
Dept: FAMILY MEDICINE CLINIC | Facility: CLINIC | Age: 69
End: 2024-12-10
Payer: COMMERCIAL

## 2024-12-10 VITALS
HEIGHT: 64 IN | TEMPERATURE: 97.6 F | WEIGHT: 202.8 LBS | BODY MASS INDEX: 34.62 KG/M2 | OXYGEN SATURATION: 98 % | DIASTOLIC BLOOD PRESSURE: 74 MMHG | HEART RATE: 95 BPM | SYSTOLIC BLOOD PRESSURE: 124 MMHG | RESPIRATION RATE: 14 BRPM

## 2024-12-10 DIAGNOSIS — J01.00 ACUTE NON-RECURRENT MAXILLARY SINUSITIS: ICD-10-CM

## 2024-12-10 DIAGNOSIS — J30.1 SEASONAL ALLERGIC RHINITIS DUE TO POLLEN: ICD-10-CM

## 2024-12-10 DIAGNOSIS — R05.9 COUGH, UNSPECIFIED TYPE: ICD-10-CM

## 2024-12-10 DIAGNOSIS — I10 PRIMARY HYPERTENSION: ICD-10-CM

## 2024-12-10 DIAGNOSIS — M79.7 FIBROMYALGIA: ICD-10-CM

## 2024-12-10 DIAGNOSIS — R11.0 NAUSEA: Primary | ICD-10-CM

## 2024-12-10 PROCEDURE — 99214 OFFICE O/P EST MOD 30 MIN: CPT | Performed by: PHYSICIAN ASSISTANT

## 2024-12-10 RX ORDER — BISOPROLOL FUMARATE AND HYDROCHLOROTHIAZIDE 2.5; 6.25 MG/1; MG/1
1 TABLET ORAL DAILY
Qty: 90 TABLET | Refills: 3 | Status: SHIPPED | OUTPATIENT
Start: 2024-12-10

## 2024-12-10 RX ORDER — ONDANSETRON 8 MG/1
8 TABLET, ORALLY DISINTEGRATING ORAL EVERY 8 HOURS PRN
Qty: 15 TABLET | Refills: 0 | Status: SHIPPED | OUTPATIENT
Start: 2024-12-10

## 2024-12-10 RX ORDER — CEFDINIR 300 MG/1
300 CAPSULE ORAL 2 TIMES DAILY
Qty: 20 CAPSULE | Refills: 0 | Status: SHIPPED | OUTPATIENT
Start: 2024-12-10

## 2024-12-10 RX ORDER — LEVOCETIRIZINE DIHYDROCHLORIDE 5 MG/1
5 TABLET, FILM COATED ORAL EVERY 12 HOURS
Qty: 60 TABLET | Refills: 5 | Status: SHIPPED | OUTPATIENT
Start: 2024-12-10

## 2024-12-10 NOTE — PROGRESS NOTES
Subjective   Rosalba Girard is a 69 y.o. female  Nausea (RF Zofran ), Allergic Rhinitis (RF Xyzal ), Hypertension (RF bisoprolol-HCTZ ), Back Pain (F/U. Needs updated FMLA forms completed), and Fibromyalgia (Needs updated FMLA forms completed )      Nausea  Symptoms include nausea.    Pertinent negative symptoms include no chest pain, no cough, no fatigue, no headaches, no rash and no weakness.   Allergic Rhinitis  She reports no cough, fatigue, headaches or rash.   Hypertension  Pertinent negatives include no chest pain, headaches, palpitations or shortness of breath.   Back Pain  Pertinent negatives include no chest pain, headaches or weakness.   Fibromyalgia  Symptoms include nausea.    Pertinent negative symptoms include no chest pain, no cough, no fatigue, no headaches, no rash and no weakness.     History of Present Illness  The patient presents for evaluation of pleurisy, fibromyalgia, sinus infection, and medication management.    She has been referred to a pulmonologist due to her pleurisy, which she believes is a consequence of her radiation therapy.    She also reports experiencing nausea and is seeking a refill of her Zofran prescription.    She has been diagnosed with fibromyalgia and has received injections in her knees, which have provided some relief. However, she has developed back pain and is uncertain if it is a side effect of the medication. She also reports hip pain.    She suspects she may have a sinus infection, as she has been experiencing sinus pressure and congestion.  No sore throat or fever    She is currently on a regimen of cancer medication, vitamin D, and vitamin K, but reports that these are not effectively managing her symptoms.        MEDICATIONS  Current: Zofran, Xyzal, vitamin D, vitamin K    The following portions of the patient's history were reviewed and updated as appropriate: allergies, current medications, past social history and problem list    Review of Systems    Constitutional:  Negative for fatigue and unexpected weight change.   Respiratory:  Negative for cough, chest tightness and shortness of breath.    Cardiovascular:  Negative for chest pain, palpitations and leg swelling.   Gastrointestinal:  Positive for nausea.   Musculoskeletal:  Positive for back pain.   Skin:  Negative for color change and rash.   Neurological:  Negative for dizziness, syncope, weakness and headaches.       Objective     Vitals:    12/10/24 0854   BP: 124/74   Pulse: 95   Resp: 14   Temp: 97.6 °F (36.4 °C)   SpO2: 98%       Physical Exam  Vitals and nursing note reviewed.   Constitutional:       General: She is not in acute distress.     Appearance: Normal appearance. She is well-developed. She is not ill-appearing, toxic-appearing or diaphoretic.   Neck:      Vascular: No carotid bruit or JVD.   Cardiovascular:      Rate and Rhythm: Normal rate and regular rhythm.      Pulses: Normal pulses.      Heart sounds: Normal heart sounds. No murmur heard.  Pulmonary:      Effort: Pulmonary effort is normal. No respiratory distress.      Breath sounds: Normal breath sounds.   Abdominal:      Palpations: Abdomen is soft.      Tenderness: There is no abdominal tenderness.   Musculoskeletal:      Lumbar back: Bony tenderness present. Decreased range of motion.   Skin:     General: Skin is warm and dry.   Neurological:      Mental Status: She is alert.       Physical Exam      Assessment & Plan   Assessment & Plan  1. Pleurisy.  The etiology of the pleurisy is likely multifactorial, potentially related to her previous radiation therapy. She will be referred to a pulmonologist for further evaluation and management.    2. Sinus Infection.  She reports sinus pressure and congestion, likely due to a sinus infection. An antibiotic will be prescribed to address the sinus infection.    3. Fibromyalgia.  Her fibromyalgia appears to be exacerbated, possibly due to seasonal changes or recent knee injections. She is  advised to monitor her symptoms and report any significant changes.    4. Nausea.  A refill of her Zofran prescription will be provided to manage her nausea.    5. Medication Management.  Refills for her Xyzal and blood pressure medications will be provided.    6. FMLA paperwork.  FMLA forms will be completed for her back, Fibromylgia and radiation-related conditions.    PROCEDURE  The patient has received injections in her knees, which have provided some relief.    Diagnoses and all orders for this visit:    1. Nausea (Primary)  -     ondansetron ODT (ZOFRAN-ODT) 8 MG disintegrating tablet; Place 1 tablet on the tongue Every 8 (Eight) Hours As Needed for Nausea or Vomiting.  Dispense: 15 tablet; Refill: 0    2. Seasonal allergic rhinitis due to pollen  -     levocetirizine (XYZAL) 5 MG tablet; Take 1 tablet by mouth Every 12 (Twelve) Hours.  Dispense: 60 tablet; Refill: 5    3. Acute non-recurrent maxillary sinusitis  -     cefdinir (OMNICEF) 300 MG capsule; Take 1 capsule by mouth 2 (Two) Times a Day.  Dispense: 20 capsule; Refill: 0    4. Primary hypertension  -     bisoprolol-hydrochlorothiazide (ZIAC) 2.5-6.25 MG per tablet; Take 1 tablet by mouth Daily.  Dispense: 90 tablet; Refill: 3    5. Fibromyalgia    6. Cough, unspecified type       I spent 15 minutes in patient care: Reviewing records prior to the visit, examining the patient, entering orders and documentation    Part of this note may be an electronic transcription/translation of spoken language to printed text using the Dragon Dictation System.       Part of this note may be an electronic transcription/translation of spoken language to printed text using the Dragon Dictation System.     Patient or patient representative verbalized consent for the use of Ambient Listening during the visit with  AMINAH Campos for chart documentation. 12/10/2024  09:31 EST

## 2024-12-16 ENCOUNTER — TELEPHONE (OUTPATIENT)
Dept: FAMILY MEDICINE CLINIC | Facility: CLINIC | Age: 69
End: 2024-12-16
Payer: COMMERCIAL

## 2024-12-16 NOTE — TELEPHONE ENCOUNTER
Patient called requesting update on [facility] paperwork. She would like a copy to be sent in the mail of all three concerns and would like a call back from Elvie.

## 2024-12-18 ENCOUNTER — TELEPHONE (OUTPATIENT)
Dept: FAMILY MEDICINE CLINIC | Facility: CLINIC | Age: 69
End: 2024-12-18
Payer: COMMERCIAL

## 2024-12-18 RX ORDER — DEXTROMETHORPHAN HYDROBROMIDE AND PROMETHAZINE HYDROCHLORIDE 15; 6.25 MG/5ML; MG/5ML
2.5 SYRUP ORAL 4 TIMES DAILY PRN
Qty: 120 ML | Refills: 0 | Status: SHIPPED | OUTPATIENT
Start: 2024-12-18

## 2024-12-18 NOTE — TELEPHONE ENCOUNTER
Caller: Rosalba Girard    Relationship: Self    Best call back number:[phone]     What is the best time to reach you: ANY    Who are you requesting to speak with (clinical staff, provider,  specific staff member): CLINICAL STAFF    Do you know the name of the person who called: SELF    What was the call regarding: PATIENT IS CHECKING ON THE STATUS OF HER [employer] PAPERWORK. PLEASE CALL AND ADVISE.

## 2024-12-18 NOTE — TELEPHONE ENCOUNTER
Patient notified that the last set of forms has been completed. I faxed it to [employer] and placed a copy up front for her to  tomorrow.

## 2024-12-18 NOTE — TELEPHONE ENCOUNTER
Caller: Rosalba Girard    Relationship: Self    Best call back number: [phone]     What medication are you requesting: PATIENT IS REQUESTING SOMETHING FOR HER COUGH.    What are your current symptoms: COUGHING    How long have you been experiencing symptoms: A MONTH        If a prescription is needed, what is your preferred pharmacy and phone number:      [facility]

## 2025-01-18 RX ORDER — DEXTROMETHORPHAN HYDROBROMIDE AND PROMETHAZINE HYDROCHLORIDE 15; 6.25 MG/5ML; MG/5ML
2.5 SYRUP ORAL 4 TIMES DAILY PRN
Qty: 120 ML | Refills: 0 | Status: CANCELLED | OUTPATIENT
Start: 2025-01-18

## 2025-01-20 RX ORDER — DEXTROMETHORPHAN HYDROBROMIDE AND PROMETHAZINE HYDROCHLORIDE 15; 6.25 MG/5ML; MG/5ML
2.5 SYRUP ORAL 4 TIMES DAILY PRN
Qty: 120 ML | Refills: 0 | Status: SHIPPED | OUTPATIENT
Start: 2025-01-20

## 2025-02-27 ENCOUNTER — OFFICE VISIT (OUTPATIENT)
Dept: FAMILY MEDICINE CLINIC | Facility: CLINIC | Age: 70
End: 2025-02-27
Payer: COMMERCIAL

## 2025-02-27 VITALS
OXYGEN SATURATION: 98 % | DIASTOLIC BLOOD PRESSURE: 70 MMHG | SYSTOLIC BLOOD PRESSURE: 128 MMHG | BODY MASS INDEX: 35.07 KG/M2 | RESPIRATION RATE: 16 BRPM | HEART RATE: 86 BPM | HEIGHT: 64 IN | TEMPERATURE: 97.6 F | WEIGHT: 205.4 LBS

## 2025-02-27 DIAGNOSIS — J40 BRONCHITIS: Primary | ICD-10-CM

## 2025-02-27 LAB
EXPIRATION DATE: NORMAL
FLUAV AG UPPER RESP QL IA.RAPID: NOT DETECTED
FLUBV AG UPPER RESP QL IA.RAPID: NOT DETECTED
INTERNAL CONTROL: NORMAL
Lab: NORMAL
SARS-COV-2 AG UPPER RESP QL IA.RAPID: NOT DETECTED

## 2025-02-27 PROCEDURE — 99213 OFFICE O/P EST LOW 20 MIN: CPT | Performed by: PHYSICIAN ASSISTANT

## 2025-02-27 RX ORDER — DOXYCYCLINE 100 MG/1
100 CAPSULE ORAL 2 TIMES DAILY
Qty: 20 CAPSULE | Refills: 0 | Status: SHIPPED | OUTPATIENT
Start: 2025-02-27

## 2025-02-27 RX ORDER — PREDNISONE 10 MG/1
10 TABLET ORAL DAILY
Qty: 14 TABLET | Refills: 0 | Status: SHIPPED | OUTPATIENT
Start: 2025-02-27

## 2025-02-27 RX ORDER — DEXTROMETHORPHAN HYDROBROMIDE AND PROMETHAZINE HYDROCHLORIDE 15; 6.25 MG/5ML; MG/5ML
2.5 SYRUP ORAL 4 TIMES DAILY PRN
Qty: 120 ML | Refills: 0 | Status: SHIPPED | OUTPATIENT
Start: 2025-02-27

## 2025-02-27 NOTE — PROGRESS NOTES
Subjective   Rosalba Girard is a 69 y.o. female  Cough (Congestion, chills x2 days. Has been using promethazine DM but is almost out.)      Cough  Associated symptoms include wheezing. Pertinent negatives include no chest pain, chills, fever or shortness of breath.     History of Present Illness  The patient presents for evaluation of upper respiratory infection.    She reports experiencing symptoms of cough, congestion, and fatigue. Additionally, she has been dealing with sinus pressure and a sore throat. She expresses concern about potential exposure to influenza due to a recent case in her workplace. A swab test was conducted to rule out influenza.    The following portions of the patient's history were reviewed and updated as appropriate: allergies, current medications, past social history and problem list    Review of Systems   Constitutional:  Negative for chills, fatigue and fever.   HENT: Negative.     Respiratory:  Positive for cough, chest tightness and wheezing. Negative for shortness of breath.    Cardiovascular:  Negative for chest pain.   Gastrointestinal:  Negative for nausea.       Objective     Vitals:    02/27/25 0941   BP: 128/70   Pulse: 86   Resp: 16   Temp: 97.6 °F (36.4 °C)   SpO2: 98%       Physical Exam  Vitals and nursing note reviewed.   Constitutional:       General: She is not in acute distress.     Appearance: She is well-developed. She is not diaphoretic.   HENT:      Head: Normocephalic and atraumatic.      Nose: Nose normal.   Neck:      Vascular: No JVD.   Cardiovascular:      Rate and Rhythm: Normal rate and regular rhythm.      Heart sounds: Normal heart sounds. No murmur heard.  Pulmonary:      Effort: Pulmonary effort is normal. No respiratory distress.      Breath sounds: No stridor. Wheezing present.   Musculoskeletal:      Cervical back: Neck supple.   Lymphadenopathy:      Cervical: No cervical adenopathy.       Physical Exam      Assessment & Plan   Assessment &  Plan  1. Upper respiratory infection.  Her symptoms include chest congestion, sinus pressure, and cough. Influenza and COVID-19 tests were negative. A prescription for doxycycline has been issued. Additionally, she will be provided with cough syrup and a steroid to manage her symptoms.    2. Pleurisy.  The upper respiratory infection is likely exacerbating her pleurisy.    Diagnoses and all orders for this visit:    1. Bronchitis (Primary)  -     doxycycline (MONODOX) 100 MG capsule; Take 1 capsule by mouth 2 (Two) Times a Day.  Dispense: 20 capsule; Refill: 0  -     predniSONE (DELTASONE) 10 MG tablet; Take 1 tablet by mouth Daily.  Dispense: 14 tablet; Refill: 0  -     promethazine-dextromethorphan (PROMETHAZINE-DM) 6.25-15 MG/5ML syrup; Take 2.5 mL by mouth 4 (Four) Times a Day As Needed for Cough.  Dispense: 120 mL; Refill: 0     I spent 15 minutes in patient care: Reviewing records prior to the visit, examining the patient, entering orders and documentation    Part of this note may be an electronic transcription/translation of spoken language to printed text using the Dragon Dictation System.       Patient or patient representative verbalized consent for the use of Ambient Listening during the visit with  AMINAH Campos for chart documentation. 2/27/2025  10:03 EST

## 2025-03-13 ENCOUNTER — TELEPHONE (OUTPATIENT)
Dept: FAMILY MEDICINE CLINIC | Facility: CLINIC | Age: 70
End: 2025-03-13

## 2025-03-13 RX ORDER — AMOXICILLIN 500 MG/1
500 TABLET, FILM COATED ORAL 3 TIMES DAILY
Qty: 30 TABLET | Refills: 0 | Status: SHIPPED | OUTPATIENT
Start: 2025-03-13

## 2025-03-13 RX ORDER — DEXTROMETHORPHAN HYDROBROMIDE AND PROMETHAZINE HYDROCHLORIDE 15; 6.25 MG/5ML; MG/5ML
2.5 SYRUP ORAL 4 TIMES DAILY PRN
Qty: 120 ML | Refills: 0 | Status: SHIPPED | OUTPATIENT
Start: 2025-03-13

## 2025-03-13 RX ORDER — PREDNISONE 10 MG/1
10 TABLET ORAL DAILY
Qty: 14 TABLET | Refills: 0 | Status: SHIPPED | OUTPATIENT
Start: 2025-03-13

## 2025-03-13 NOTE — TELEPHONE ENCOUNTER
Caller: Girard Rosalbaradha Mathews    Relationship: Self    Best call back number:      [phone] (Mobile)     Requested Prescriptions:     AMOXICILLIN  STEROID  COUGH SYRUP    PATIENT REQUESTED A SECOND ROUND OF MEDICATIONS TO HELP WITH LINGERING BRONCHITIS SYMPTOMS     Pharmacy where request should be sent:      [facility] [place], [place]    TELEPHONE CONTACT:    [phone]    Last office visit with prescribing clinician: 2/27/2025   Last telemedicine visit with prescribing clinician: Visit date not found   Next office visit with prescribing clinician: 6/10/2025     PATIENT FINISHED MEDICATIONS AND REQUESTED REFILLS    Does the patient have less than a 3 day supply:  [x] Yes  [] No    Would you like a call back once the refill request has been completed: [] Yes [] No    If the office needs to give you a call back, can they leave a voicemail: [] Yes [] No    Olive Slaughter Rep   03/13/25 10:03 EDT

## 2025-04-17 ENCOUNTER — TELEPHONE (OUTPATIENT)
Dept: FAMILY MEDICINE CLINIC | Facility: CLINIC | Age: 70
End: 2025-04-17

## 2025-04-17 NOTE — TELEPHONE ENCOUNTER
Caller: Rosalba Girard    Relationship to patient: Self    Best call back number: [phone]     Chief complaint: CONCERNS OF STREP, CONGESTION, AND SORE THROAT, EAR ISSUES    Type of visit: SAME    Requested date: 04/17/25

## 2025-04-18 ENCOUNTER — TELEPHONE (OUTPATIENT)
Dept: FAMILY MEDICINE CLINIC | Facility: CLINIC | Age: 70
End: 2025-04-18
Payer: COMMERCIAL

## 2025-04-18 RX ORDER — DEXTROMETHORPHAN HYDROBROMIDE AND PROMETHAZINE HYDROCHLORIDE 15; 6.25 MG/5ML; MG/5ML
2.5 SYRUP ORAL 4 TIMES DAILY PRN
Qty: 120 ML | Refills: 0 | Status: SHIPPED | OUTPATIENT
Start: 2025-04-18

## 2025-04-18 NOTE — TELEPHONE ENCOUNTER
Caller: Rosalba Girard    Relationship: Self    Best call back number: [phone]     What medication are you requesting: COUGH MEDICINE AND MEDICATION FOR SYMPTOMS    What are your current symptoms: SORE THROAT, HARD TO SWOLLEN, RUNNY NOSE, COUGH, CHEST CONGESTION    How long have you been experiencing symptoms: 04/14/25     Have you had these symptoms before:    [x] Yes  [] No    Have you been treated for these symptoms before:   [x] Yes  [] No    If a prescription is needed, what is your preferred pharmacy and phone number: [facility]     Additional notes:PATIENT HAS BEEN TRYING TO GET AN APPOINTMENT BUT HAS NOT BEEN SUCCESSFUL.

## 2025-04-21 ENCOUNTER — OFFICE VISIT (OUTPATIENT)
Dept: FAMILY MEDICINE CLINIC | Facility: CLINIC | Age: 70
End: 2025-04-21
Payer: COMMERCIAL

## 2025-04-21 VITALS
OXYGEN SATURATION: 99 % | SYSTOLIC BLOOD PRESSURE: 126 MMHG | DIASTOLIC BLOOD PRESSURE: 78 MMHG | BODY MASS INDEX: 34.15 KG/M2 | HEART RATE: 76 BPM | HEIGHT: 64 IN | TEMPERATURE: 98.4 F | WEIGHT: 200 LBS

## 2025-04-21 DIAGNOSIS — R05.9 COUGH, UNSPECIFIED TYPE: ICD-10-CM

## 2025-04-21 DIAGNOSIS — R11.2 NAUSEA AND VOMITING, UNSPECIFIED VOMITING TYPE: ICD-10-CM

## 2025-04-21 DIAGNOSIS — J02.9 SORE THROAT: ICD-10-CM

## 2025-04-21 DIAGNOSIS — J40 BRONCHITIS: Primary | ICD-10-CM

## 2025-04-21 DIAGNOSIS — J45.21 MILD INTERMITTENT ASTHMA WITH ACUTE EXACERBATION: ICD-10-CM

## 2025-04-21 LAB
EXPIRATION DATE: NORMAL
EXPIRATION DATE: NORMAL
FLUAV AG UPPER RESP QL IA.RAPID: NOT DETECTED
FLUBV AG UPPER RESP QL IA.RAPID: NOT DETECTED
INTERNAL CONTROL: NORMAL
INTERNAL CONTROL: NORMAL
Lab: NORMAL
Lab: NORMAL
S PYO AG THROAT QL: NEGATIVE
SARS-COV-2 AG UPPER RESP QL IA.RAPID: NOT DETECTED

## 2025-04-21 PROCEDURE — 87428 SARSCOV & INF VIR A&B AG IA: CPT | Performed by: PHYSICIAN ASSISTANT

## 2025-04-21 PROCEDURE — 99214 OFFICE O/P EST MOD 30 MIN: CPT | Performed by: PHYSICIAN ASSISTANT

## 2025-04-21 PROCEDURE — 87880 STREP A ASSAY W/OPTIC: CPT | Performed by: PHYSICIAN ASSISTANT

## 2025-04-21 RX ORDER — PREDNISONE 20 MG/1
20 TABLET ORAL 2 TIMES DAILY
Qty: 10 TABLET | Refills: 0 | Status: SHIPPED | OUTPATIENT
Start: 2025-04-21

## 2025-04-21 RX ORDER — BROMPHENIRAMINE MALEATE, PSEUDOEPHEDRINE HYDROCHLORIDE, AND DEXTROMETHORPHAN HYDROBROMIDE 2; 30; 10 MG/5ML; MG/5ML; MG/5ML
5 SYRUP ORAL 4 TIMES DAILY PRN
Qty: 118 ML | Refills: 1 | Status: SHIPPED | OUTPATIENT
Start: 2025-04-21 | End: 2025-04-25

## 2025-04-21 RX ORDER — ONDANSETRON 8 MG/1
8 TABLET, ORALLY DISINTEGRATING ORAL EVERY 8 HOURS PRN
Qty: 15 TABLET | Refills: 0 | Status: SHIPPED | OUTPATIENT
Start: 2025-04-21

## 2025-04-21 RX ORDER — AMOXICILLIN 500 MG/1
500 CAPSULE ORAL 3 TIMES DAILY
Qty: 21 CAPSULE | Refills: 0 | Status: SHIPPED | OUTPATIENT
Start: 2025-04-21

## 2025-04-21 NOTE — PROGRESS NOTES
Subjective   Rosalba Girard is a 69 y.o. female  Cough (Dry cough ), Sore Throat (Sore throat ), and Generalized Body Aches (Body aches )      History of Present Illness  History of Present Illness  The patient is a 69-year-old female who presents today for evaluation of new onset cough, sore throat, and body aches.    Symptoms began last week and have progressively worsened. She reports a sore throat, dry cough, and runny nose. Initially, tonsillar pain was noted, which has since migrated to the chest. Shortness of breath and wheezing are also reported. Clear sputum is being expectorated. Hydration is being maintained by drinking water, but she is unable to retain any food due to concurrent vomiting and diarrhea. Ondansetron has not yet been tried for symptom management. Consumption of chicken noodle soup and probiotics is reported. She is not currently undergoing radiation therapy. A history of pleurisy, which developed during breast cancer treatment, is noted.    The following portions of the patient's history were reviewed and updated as appropriate: allergies, current medications, past social history and problem list    Review of Systems   Constitutional:  Positive for activity change, appetite change, chills, diaphoresis, fatigue and fever.   HENT:  Positive for congestion and sore throat.    Respiratory:  Positive for cough.    Gastrointestinal:  Positive for diarrhea.   Genitourinary: Negative.    Musculoskeletal:  Positive for myalgias.   Neurological:  Positive for weakness (generalized), light-headedness and headaches.       Objective     Vitals:    04/21/25 1134   BP: 126/78   Pulse: 76   Temp: 98.4 °F (36.9 °C)   SpO2: 99%       Physical Exam  Vitals and nursing note reviewed.   Constitutional:       General: She is not in acute distress.     Appearance: Normal appearance. She is well-developed. She is obese. She is ill-appearing. She is not toxic-appearing or diaphoretic.      Comments: BMI34    HENT:      Head: Normocephalic and atraumatic.      Nose: Nose normal.   Neck:      Vascular: No JVD.   Cardiovascular:      Rate and Rhythm: Normal rate and regular rhythm.      Heart sounds: Normal heart sounds. No murmur heard.  Pulmonary:      Effort: Pulmonary effort is normal. No respiratory distress.      Breath sounds: No stridor. Wheezing present.   Musculoskeletal:      Cervical back: Neck supple.   Lymphadenopathy:      Cervical: No cervical adenopathy.   Skin:     General: Skin is warm and dry.      Coloration: Skin is not jaundiced or pale.   Neurological:      Mental Status: She is alert.       Physical Exam  Mouth/Throat: Erythematous posterior pharynx  Respiratory: Bronchial breath sounds, tightness noted    Assessment & Plan   Assessment & Plan  1. Bronchitis.  - Symptoms include dry cough, sore throat, body aches, and chest tightness.  - Physical exam reveals redness in the throat and tightness in the chest.  - Discussion includes the need for antibiotics and steroids to manage the infection and inflammation.  - Prescribed amoxicillin and prednisone; work note provided for 3 days off (04/21/2025 to 04/23/2025), with a return to work on 04/24/2025. Further evaluation if no improvement by 04/24/2025.    2. Vomiting and diarrhea.  - Reports difficulty keeping food down, experiencing both vomiting and diarrhea.  - Ondansetron prescribed to manage symptoms.  - Advised to continue drinking water to prevent dehydration and consume chicken noodle soup and probiotics.    3. Cough.  - Persistent dry cough noted.  - Promethazine DM cough syrup prescribed to manage cough and congestion.    4. Advanced directives.  - Patient will bring [facility] papers later this week for completion due to the need for 3 days off work.    Diagnoses and all orders for this visit:    1. Bronchitis (Primary)    2. Cough, unspecified type  -     POCT SARS-CoV-2 Antigen PAMELLA + Flu    3. Sore throat  -     POCT SARS-CoV-2 Antigen PAMELLA +  Flu  -     POCT rapid strep A    4. Nausea and vomiting, unspecified vomiting type  -     ondansetron ODT (ZOFRAN-ODT) 8 MG disintegrating tablet; Place 1 tablet on the tongue Every 8 (Eight) Hours As Needed for Nausea or Vomiting.  Dispense: 15 tablet; Refill: 0    5. Mild intermittent asthma with acute exacerbation    Other orders  -     amoxicillin (AMOXIL) 500 MG capsule; Take 1 capsule by mouth 3 (Three) Times a Day.  Dispense: 21 capsule; Refill: 0  -     predniSONE (DELTASONE) 20 MG tablet; Take 1 tablet by mouth 2 (Two) Times a Day.  Dispense: 10 tablet; Refill: 0  -     brompheniramine-pseudoephedrine-DM 30-2-10 MG/5ML syrup; Take 5 mL by mouth 4 (Four) Times a Day As Needed for Congestion or Cough.  Dispense: 118 mL; Refill: 1         Patient or patient representative verbalized consent for the use of Ambient Listening during the visit with  Claire Burton PA-C for chart documentation. 4/21/2025  12:46 EDT

## 2025-04-24 ENCOUNTER — TELEPHONE (OUTPATIENT)
Dept: FAMILY MEDICINE CLINIC | Facility: CLINIC | Age: 70
End: 2025-04-24
Payer: COMMERCIAL

## 2025-04-24 NOTE — TELEPHONE ENCOUNTER
Caller: Rosalba Girard    Relationship: Self    Best call back number: [phone]    What form or medical record are you requesting: FMLA PAPERWORK    Who is requesting this form or medical record from you: WORK    How would you like to receive the form or medical records (pick-up, mail, fax): FAX TO [employer]    Timeframe paperwork needed: ASAP    Additional notes: PT STATES IT WAS FAXED TWICE AND SHE NEEDS THIS FAXED BACK ASAP

## 2025-04-25 ENCOUNTER — OFFICE VISIT (OUTPATIENT)
Dept: FAMILY MEDICINE CLINIC | Facility: CLINIC | Age: 70
End: 2025-04-25
Payer: COMMERCIAL

## 2025-04-25 VITALS
SYSTOLIC BLOOD PRESSURE: 126 MMHG | HEIGHT: 64 IN | BODY MASS INDEX: 34.15 KG/M2 | OXYGEN SATURATION: 96 % | WEIGHT: 200 LBS | DIASTOLIC BLOOD PRESSURE: 72 MMHG | HEART RATE: 79 BPM | RESPIRATION RATE: 16 BRPM | TEMPERATURE: 98.1 F

## 2025-04-25 DIAGNOSIS — J40 BRONCHITIS: Primary | ICD-10-CM

## 2025-04-25 PROCEDURE — 99213 OFFICE O/P EST LOW 20 MIN: CPT | Performed by: PHYSICIAN ASSISTANT

## 2025-04-25 RX ORDER — DEXTROMETHORPHAN HYDROBROMIDE AND PROMETHAZINE HYDROCHLORIDE 15; 6.25 MG/5ML; MG/5ML
2.5 SYRUP ORAL 4 TIMES DAILY PRN
Qty: 120 ML | Refills: 0 | Status: SHIPPED | OUTPATIENT
Start: 2025-04-25

## 2025-04-25 RX ORDER — DOXYCYCLINE 100 MG/1
100 CAPSULE ORAL 2 TIMES DAILY
Qty: 20 CAPSULE | Refills: 0 | Status: SHIPPED | OUTPATIENT
Start: 2025-04-25

## 2025-04-25 NOTE — PROGRESS NOTES
Subjective   Rosalba Girard is a 69 y.o. female  Cough (Dx'd with bronchitis 4/21 and is still taking amoxicillin, Bromfed, and prednisone (almost out).)      Cough  Associated symptoms include wheezing. Pertinent negatives include no chest pain, chills, fever or shortness of breath.     History of Present Illness  The patient presents for evaluation of bronchitis and upper respiratory infection.    Persistent symptoms of coughing, chest congestion, and a sore throat are reported. Fatigue is noted, and the potential benefits of a B12 injection are inquired about. Current condition is exacerbating pleurisy. Despite being on a regimen of amoxicillin, Bromfed, and prednisone since 04/21/2025, no significant improvement has been observed.    The following portions of the patient's history were reviewed and updated as appropriate: allergies, current medications, past social history and problem list    Review of Systems   Constitutional:  Negative for chills, fatigue and fever.   HENT: Negative.     Respiratory:  Positive for cough, chest tightness and wheezing. Negative for shortness of breath.    Cardiovascular:  Negative for chest pain.   Gastrointestinal:  Negative for nausea.       Objective     Vitals:    04/25/25 1148   BP: 126/72   Pulse: 79   Resp: 16   Temp: 98.1 °F (36.7 °C)   SpO2: 96%       Physical Exam  Vitals and nursing note reviewed.   Constitutional:       General: She is not in acute distress.     Appearance: She is well-developed. She is not diaphoretic.   HENT:      Head: Normocephalic and atraumatic.      Nose: Nose normal.   Neck:      Vascular: No JVD.   Cardiovascular:      Rate and Rhythm: Normal rate and regular rhythm.      Heart sounds: Normal heart sounds. No murmur heard.  Pulmonary:      Effort: Pulmonary effort is normal. No respiratory distress.      Breath sounds: No stridor. Wheezing present.   Musculoskeletal:      Cervical back: Neck supple.   Lymphadenopathy:      Cervical: No  cervical adenopathy.       Physical Exam  Mouth/Throat: Drainage noted in the back of the throat  Respiratory: Wheezing noted    Assessment & Plan   Assessment & Plan  1. Bronchitis.  - Persistent coughing, congestion, and sore throat noted despite treatment with amoxicillin, Bromfed, and prednisone since 04/21/2025.  - Wheezing and significant drainage in the back of the throat observed during the physical examination.  - Discussion included the need to change the current antibiotic and provide a different cough syrup.  - A new antibiotic and cough syrup will be prescribed. Rest is advised over the weekend, with a return to work on Monday. A work note for today will be issued.    2. Upper respiratory infection.  - Symptoms include coughing, congestion, and sore throat.  - Current treatment regimen will be adjusted to better manage symptoms.  - Advised to rest and follow the new medication plan.  - New medications will be prescribed to address the infection and associated symptoms.    Diagnoses and all orders for this visit:    1. Bronchitis (Primary)  -     promethazine-dextromethorphan (PROMETHAZINE-DM) 6.25-15 MG/5ML syrup; Take 2.5 mL by mouth 4 (Four) Times a Day As Needed for Cough.  Dispense: 120 mL; Refill: 0  -     doxycycline (MONODOX) 100 MG capsule; Take 1 capsule by mouth 2 (Two) Times a Day.  Dispense: 20 capsule; Refill: 0       I spent 15 minutes in patient care: Reviewing records prior to the visit, examining the patient, entering orders and documentation    Part of this note may be an electronic transcription/translation of spoken language to printed text using the Dragon Dictation System.     Patient or patient representative verbalized consent for the use of Ambient Listening during the visit with  AMINAH Campos for chart documentation. 4/28/2025  12:03 EDT

## 2025-04-29 ENCOUNTER — TELEPHONE (OUTPATIENT)
Dept: ONCOLOGY | Facility: CLINIC | Age: 70
End: 2025-04-29

## 2025-04-29 DIAGNOSIS — Z17.0 MALIGNANT NEOPLASM OF RIGHT BREAST IN FEMALE, ESTROGEN RECEPTOR POSITIVE, UNSPECIFIED SITE OF BREAST: ICD-10-CM

## 2025-04-29 DIAGNOSIS — C50.911 MALIGNANT NEOPLASM OF RIGHT BREAST IN FEMALE, ESTROGEN RECEPTOR POSITIVE, UNSPECIFIED SITE OF BREAST: ICD-10-CM

## 2025-04-29 RX ORDER — ANASTROZOLE 1 MG/1
1 TABLET ORAL DAILY
Qty: 90 TABLET | Refills: 3 | Status: SHIPPED | OUTPATIENT
Start: 2025-04-29

## 2025-04-29 RX ORDER — ASPIRIN 81 MG
1 TABLET, DELAYED RELEASE (ENTERIC COATED) ORAL DAILY
Qty: 90 TABLET | Refills: 3 | Status: SHIPPED | OUTPATIENT
Start: 2025-04-29

## 2025-04-29 NOTE — TELEPHONE ENCOUNTER
PATIENT COME TO OFFICE TODAY. WAS SUPPOSED TO HAVE AN APPOINTMENT. APPOINTMENT WAS RESCHEDULED WITH OUT PATIENTS KNOWLEDGE. PATIENT NEEDS REFILLS ON MEDICATIONS. ANASTROZOLE AND VITAMIN E & VITAMIN K. PLEASE CALL PATIENT IF ANY QUESTION, AND ALSO LET HER KNOW IT WAS CALLED IN.

## 2025-05-02 ENCOUNTER — TRANSCRIBE ORDERS (OUTPATIENT)
Dept: ADMINISTRATIVE | Facility: HOSPITAL | Age: 70
End: 2025-05-02
Payer: COMMERCIAL

## 2025-05-02 ENCOUNTER — HOSPITAL ENCOUNTER (OUTPATIENT)
Dept: MAMMOGRAPHY | Facility: HOSPITAL | Age: 70
Discharge: HOME | End: 2025-05-02
Admitting: RADIOLOGY
Payer: COMMERCIAL

## 2025-05-02 DIAGNOSIS — R92.8 ABNORMAL MAMMOGRAM: ICD-10-CM

## 2025-05-02 DIAGNOSIS — R92.8 ABNORMAL MAMMOGRAM: Primary | ICD-10-CM

## 2025-05-02 PROCEDURE — 77065 DX MAMMO INCL CAD UNI: CPT

## 2025-05-02 PROCEDURE — G0279 TOMOSYNTHESIS, MAMMO: HCPCS

## 2025-05-09 ENCOUNTER — NURSE TRIAGE (OUTPATIENT)
Dept: CALL CENTER | Facility: HOSPITAL | Age: 70
End: 2025-05-09
Payer: COMMERCIAL

## 2025-05-09 NOTE — TELEPHONE ENCOUNTER
Patient wants to schedule a follow up appointment with her PCP. She was seen recently and diagnosed with bronchitis. She thought she was getting better, but it is still lingering. She says she is also having muscle spasms in her back when she takes a deep breath, similar to when she has pleurisy after chemo.   Triage completed and patient warm transferred to the office to schedule an appointment. I did advise her that if she feels any worse that she should be seen in the ED or UC over the weekend.   Reason for Disposition   [1] MILD difficulty breathing (e.g., minimal/no SOB at rest, SOB with walking, pulse <100) AND [2] NEW-onset or WORSE than normal    Additional Information   Negative: SEVERE difficulty breathing (e.g., struggling for each breath, speaks in single words)   Negative: [1] Breathing stopped AND [2] hasn't returned   Negative: Choking on something   Negative: Bluish (or gray) lips or face now   Negative: Difficult to awaken or acting confused (e.g., disoriented, slurred speech)   Negative: Passed out (i.e., lost consciousness, collapsed and was not responding)   Negative: Wheezing started suddenly after medicine, an allergic food or bee sting   Negative: Stridor (harsh sound while breathing in)   Negative: Slow, shallow and weak breathing   Negative: Sounds like a life-threatening emergency to the triager   Negative: Chest pain   Negative: [1] Wheezing (high pitched whistling sound) AND [2] previous asthma attacks or use of asthma medicines   Negative: [1] Difficulty breathing AND [2] only present when coughing   Negative: [1] Difficulty breathing AND [2] only from stuffy or runny nose   Negative: [1] Difficulty breathing AND [2] within 14 days of COVID-19 Exposure   Negative: [1] MODERATE difficulty breathing (e.g., speaks in phrases, SOB even at rest, pulse 100-120) AND [2] NEW-onset or WORSE than normal   Negative: Oxygen level (e.g., pulse oximetry) 90 percent or lower   Negative: Wheezing can be  "heard across the room   Negative: Drooling or spitting out saliva (because can't swallow)   Negative: History of prior \"blood clot\" in leg or lungs (i.e., deep vein thrombosis, pulmonary embolism)   Negative: History of inherited increased risk of blood clots (e.g., Factor 5 Leiden, Anti-thrombin 3, Protein C or Protein S deficiency, Prothrombin mutation)   Negative: Major surgery in the past month   Negative: Hip or leg fracture (broken bone) in past month (or had cast on leg or ankle in past month)   Negative: Illness requiring prolonged bedrest in past month (e.g., immobilization, long hospital stay)   Negative: Long-distance travel in past month (e.g., car, bus, train, plane; with trip lasting 6 or more hours)   Negative: Cancer treatment in past six months (or has cancer now)   Negative: Extra heartbeats, irregular heart beating, or heart is beating very fast  (i.e., \"palpitations\")   Negative: Fever > 103 F (39.4 C)   Negative: [1] Fever > 101 F (38.3 C) AND [2] age > 60 years   Negative: [1] Fever > 100.0 F (37.8 C) AND [2] bedridden (e.g., CVA, chronic illness, recovering from surgery)   Negative: [1] Fever > 100.0 F (37.8 C) AND [2] diabetes mellitus or weak immune system (e.g., HIV positive, cancer chemo, splenectomy, organ transplant, chronic steroids)   Negative: [1] Periods where breathing stops and then resumes normally AND [2] bedridden (e.g., CVA)   Negative: Pregnant or postpartum (from 0 to 6 weeks after delivery)   Negative: Patient sounds very sick or weak to the triager    Answer Assessment - Initial Assessment Questions  1. RESPIRATORY STATUS: \"Describe your breathing?\" (e.g., wheezing, shortness of breath, unable to speak, severe coughing)       Shortness of breath at all times   2. ONSET: \"When did this breathing problem begin?\"       Week or more   3. PATTERN \"Does the difficult breathing come and go, or has it been constant since it started?\"       Comes and goes   4. SEVERITY: \"How bad is " "your breathing?\" (e.g., mild, moderate, severe)     - MILD: No SOB at rest, mild SOB with walking, speaks normally in sentences, can lie down, no retractions, pulse < 100.     - MODERATE: SOB at rest, SOB with minimal exertion and prefers to sit, cannot lie down flat, speaks in phrases, mild retractions, audible wheezing, pulse 100-120.     - SEVERE: Very SOB at rest, speaks in single words, struggling to breathe, sitting hunched forward, retractions, pulse > 120       Mild to moderate   5. RECURRENT SYMPTOM: \"Have you had difficulty breathing before?\" If Yes, ask: \"When was the last time?\" and \"What happened that time?\"       Yes   6. CARDIAC HISTORY: \"Do you have any history of heart disease?\" (e.g., heart attack, angina, bypass surgery, angioplasty)       No   7. LUNG HISTORY: \"Do you have any history of lung disease?\"  (e.g., pulmonary embolus, asthma, emphysema)      Pleurisy   8. CAUSE: \"What do you think is causing the breathing problem?\"       Continued bronchitis   9. OTHER SYMPTOMS: \"Do you have any other symptoms? (e.g., dizziness, runny nose, cough, chest pain, fever)      Runny nose, muscle spasms when she takes a deep breath. Cough   10. O2 SATURATION MONITOR:  \"Do you use an oxygen saturation monitor (pulse oximeter) at home?\" If Yes, ask: \"What is your reading (oxygen level) today?\" \"What is your usual oxygen saturation reading?\" (e.g., 95%)        No   11. PREGNANCY: \"Is there any chance you are pregnant?\" \"When was your last menstrual period?\"        NA  12. TRAVEL: \"Have you traveled out of the country in the last month?\" (e.g., travel history, exposures)        No    Protocols used: Breathing Difficulty-ADULT-AH    "

## 2025-05-12 ENCOUNTER — OFFICE VISIT (OUTPATIENT)
Dept: FAMILY MEDICINE CLINIC | Facility: CLINIC | Age: 70
End: 2025-05-12
Payer: COMMERCIAL

## 2025-05-12 VITALS
RESPIRATION RATE: 16 BRPM | WEIGHT: 204 LBS | SYSTOLIC BLOOD PRESSURE: 124 MMHG | BODY MASS INDEX: 34.83 KG/M2 | TEMPERATURE: 98.1 F | DIASTOLIC BLOOD PRESSURE: 72 MMHG | HEIGHT: 64 IN | HEART RATE: 102 BPM | OXYGEN SATURATION: 97 %

## 2025-05-12 DIAGNOSIS — J40 BRONCHITIS: Primary | ICD-10-CM

## 2025-05-12 PROCEDURE — 99213 OFFICE O/P EST LOW 20 MIN: CPT | Performed by: PHYSICIAN ASSISTANT

## 2025-05-12 RX ORDER — DEXTROMETHORPHAN HYDROBROMIDE AND PROMETHAZINE HYDROCHLORIDE 15; 6.25 MG/5ML; MG/5ML
2.5 SYRUP ORAL 4 TIMES DAILY PRN
Qty: 120 ML | Refills: 0 | Status: SHIPPED | OUTPATIENT
Start: 2025-05-12

## 2025-05-12 RX ORDER — DOXYCYCLINE 100 MG/1
100 CAPSULE ORAL 2 TIMES DAILY
Qty: 20 CAPSULE | Refills: 0 | Status: SHIPPED | OUTPATIENT
Start: 2025-05-12

## 2025-05-12 RX ORDER — PREDNISONE 20 MG/1
20 TABLET ORAL 2 TIMES DAILY
Qty: 14 TABLET | Refills: 0 | Status: SHIPPED | OUTPATIENT
Start: 2025-05-12

## 2025-05-12 NOTE — PROGRESS NOTES
Subjective   Rosalba Girard is a 69 y.o. female  Cough (Started approx two months ago. She has finished several antibiotics, cough syrup, steroids. She describes cough as dry and also has rib and back pain. )      Cough  Associated symptoms: wheezing    Associated symptoms: no chest pain, no chills, no fever, no myalgias, no shortness of breath and no sore throat      History of Present Illness  The patient presents for evaluation of a persistent cough and chest congestion.    She reports experiencing muscle spasms on Friday, accompanied by pain between her shoulder blades, which has been causing difficulty in breathing. She describes a sensation of chest tightness and congestion. The patient mentions that she started coughing again recently and has been wheezing. She inquires about pleurisy and is informed that it is inflammation of the lung lining, likely exacerbated by her frequent coughing. She finds that the cough medicine she has been using is helpful in managing her symptoms.    She has an upcoming appointment with a pulmonologist on 08/22/2025.    The following portions of the patient's history were reviewed and updated as appropriate: allergies, current medications, past social history and problem list    Review of Systems   Constitutional:  Negative for chills, fatigue and fever.   HENT: Negative.  Negative for sore throat.    Eyes:  Positive for discharge and itching. Negative for pain.   Respiratory:  Positive for cough, chest tightness and wheezing. Negative for shortness of breath.    Cardiovascular:  Negative for chest pain.   Gastrointestinal: Negative.  Negative for nausea.   Genitourinary: Negative.    Musculoskeletal:  Negative for myalgias.   Neurological:  Positive for light-headedness. Negative for dizziness.   Hematological:  Negative for adenopathy.   Psychiatric/Behavioral:  Positive for sleep disturbance.        Objective     Vitals:    05/12/25 1043   BP: 124/72   Pulse: 102   Resp:  16   Temp: 98.1 °F (36.7 °C)   SpO2: 97%       Physical Exam  Vitals and nursing note reviewed.   Constitutional:       Appearance: She is well-developed.   HENT:      Head: Normocephalic and atraumatic.      Right Ear: Tympanic membrane and ear canal normal.      Left Ear: Tympanic membrane and ear canal normal.      Nose: Mucosal edema and rhinorrhea present.      Right Sinus: Maxillary sinus tenderness and frontal sinus tenderness present.      Left Sinus: Maxillary sinus tenderness and frontal sinus tenderness present.      Mouth/Throat:      Pharynx: No oropharyngeal exudate.   Eyes:      Pupils: Pupils are equal, round, and reactive to light.   Cardiovascular:      Rate and Rhythm: Normal rate and regular rhythm.   Pulmonary:      Effort: Pulmonary effort is normal.      Breath sounds: Normal breath sounds.       Physical Exam  Respiratory: Wheezing noted    Assessment & Plan   Assessment & Plan  1. Upper respiratory infection.  - Persistent coughing, congestion, and chest pain noted, likely due to an upper respiratory infection.  - Physical exam reveals wheezing and tightness in the chest, indicating ongoing respiratory issues.  - Discussion includes the possibility of reinfection and the role of allergies in exacerbating symptoms. The patient has an upcoming pulmonology appointment on 08/22/2025.  - Treatment plan includes antibiotics, steroids, and Tessalon for cough management. The patient is advised to support her chest with a pillow while coughing to reduce irritation. An earlier pulmonology consultation will be arranged if symptoms worsen.    2. Pleurisy.  - Pleuritic chest pain reported, likely due to inflammation of the lung lining from persistent coughing.  - Physical exam findings include pain between the shoulder blades that worsens with deep breaths.  - Counseling provided on the nature of pleurisy and the importance of minimizing chest wall movement during coughing to reduce pain and  irritation.  - The condition is expected to improve with the treatment of the upper respiratory infection. Further evaluation will be necessary if pain persists or worsens.    Diagnoses and all orders for this visit:    1. Bronchitis (Primary)  -     doxycycline (MONODOX) 100 MG capsule; Take 1 capsule by mouth 2 (Two) Times a Day.  Dispense: 20 capsule; Refill: 0  -     predniSONE (DELTASONE) 20 MG tablet; Take 1 tablet by mouth 2 (Two) Times a Day.  Dispense: 14 tablet; Refill: 0  -     promethazine-dextromethorphan (PROMETHAZINE-DM) 6.25-15 MG/5ML syrup; Take 2.5 mL by mouth 4 (Four) Times a Day As Needed for Cough.  Dispense: 120 mL; Refill: 0     I spent 15 minutes in patient care: Reviewing records prior to the visit, examining the patient, entering orders and documentation    Part of this note may be an electronic transcription/translation of spoken language to printed text using the Dragon Dictation System.       Patient or patient representative verbalized consent for the use of Ambient Listening during the visit with  AMINAH Campos for chart documentation. 5/12/2025  17:33 EDT

## 2025-05-13 ENCOUNTER — OFFICE VISIT (OUTPATIENT)
Dept: ONCOLOGY | Facility: CLINIC | Age: 70
End: 2025-05-13
Payer: COMMERCIAL

## 2025-05-13 ENCOUNTER — LAB (OUTPATIENT)
Dept: LAB | Facility: HOSPITAL | Age: 70
End: 2025-05-13
Payer: COMMERCIAL

## 2025-05-13 VITALS
TEMPERATURE: 97.3 F | WEIGHT: 202 LBS | HEART RATE: 78 BPM | HEIGHT: 64 IN | DIASTOLIC BLOOD PRESSURE: 65 MMHG | SYSTOLIC BLOOD PRESSURE: 127 MMHG | RESPIRATION RATE: 16 BRPM | OXYGEN SATURATION: 98 % | BODY MASS INDEX: 34.49 KG/M2

## 2025-05-13 DIAGNOSIS — C50.312 CARCINOMA OF LOWER-INNER QUADRANT OF LEFT BREAST IN FEMALE, ESTROGEN RECEPTOR POSITIVE: Primary | ICD-10-CM

## 2025-05-13 DIAGNOSIS — C50.312 CARCINOMA OF LOWER-INNER QUADRANT OF LEFT BREAST IN FEMALE, ESTROGEN RECEPTOR POSITIVE: ICD-10-CM

## 2025-05-13 DIAGNOSIS — C50.911 MALIGNANT NEOPLASM OF RIGHT BREAST IN FEMALE, ESTROGEN RECEPTOR POSITIVE, UNSPECIFIED SITE OF BREAST: ICD-10-CM

## 2025-05-13 DIAGNOSIS — R16.1 SPLENOMEGALY: ICD-10-CM

## 2025-05-13 DIAGNOSIS — Z17.0 MALIGNANT NEOPLASM OF RIGHT BREAST IN FEMALE, ESTROGEN RECEPTOR POSITIVE, UNSPECIFIED SITE OF BREAST: ICD-10-CM

## 2025-05-13 DIAGNOSIS — Z17.0 CARCINOMA OF LOWER-INNER QUADRANT OF LEFT BREAST IN FEMALE, ESTROGEN RECEPTOR POSITIVE: Primary | ICD-10-CM

## 2025-05-13 DIAGNOSIS — Z17.0 CARCINOMA OF LOWER-INNER QUADRANT OF LEFT BREAST IN FEMALE, ESTROGEN RECEPTOR POSITIVE: ICD-10-CM

## 2025-05-13 LAB
ALBUMIN SERPL-MCNC: 4.4 G/DL (ref 3.5–5.2)
ALBUMIN/GLOB SERPL: 1.3 G/DL
ALP SERPL-CCNC: 117 U/L (ref 39–117)
ALT SERPL W P-5'-P-CCNC: 27 U/L (ref 1–33)
ANION GAP SERPL CALCULATED.3IONS-SCNC: 12 MMOL/L (ref 5–15)
AST SERPL-CCNC: 23 U/L (ref 1–32)
BASOPHILS # BLD AUTO: 0.03 10*3/MM3 (ref 0–0.2)
BASOPHILS NFR BLD AUTO: 0.2 % (ref 0–1.5)
BILIRUB SERPL-MCNC: 0.5 MG/DL (ref 0–1.2)
BUN SERPL-MCNC: 18 MG/DL (ref 8–23)
BUN/CREAT SERPL: 26.1 (ref 7–25)
CALCIUM SPEC-SCNC: 9.8 MG/DL (ref 8.6–10.5)
CHLORIDE SERPL-SCNC: 100 MMOL/L (ref 98–107)
CO2 SERPL-SCNC: 26 MMOL/L (ref 22–29)
CREAT SERPL-MCNC: 0.69 MG/DL (ref 0.57–1)
DEPRECATED RDW RBC AUTO: 39.8 FL (ref 37–54)
EGFRCR SERPLBLD CKD-EPI 2021: 95.3 ML/MIN/1.73
EOSINOPHIL # BLD AUTO: 0.01 10*3/MM3 (ref 0–0.4)
EOSINOPHIL NFR BLD AUTO: 0.1 % (ref 0.3–6.2)
ERYTHROCYTE [DISTWIDTH] IN BLOOD BY AUTOMATED COUNT: 13 % (ref 12.3–15.4)
GLOBULIN UR ELPH-MCNC: 3.5 GM/DL
GLUCOSE SERPL-MCNC: 129 MG/DL (ref 65–99)
HCT VFR BLD AUTO: 44.5 % (ref 34–46.6)
HGB BLD-MCNC: 15.5 G/DL (ref 12–15.9)
IMM GRANULOCYTES # BLD AUTO: 0.07 10*3/MM3 (ref 0–0.05)
IMM GRANULOCYTES NFR BLD AUTO: 0.5 % (ref 0–0.5)
LYMPHOCYTES # BLD AUTO: 1.53 10*3/MM3 (ref 0.7–3.1)
LYMPHOCYTES NFR BLD AUTO: 11.4 % (ref 19.6–45.3)
MCH RBC QN AUTO: 29.1 PG (ref 26.6–33)
MCHC RBC AUTO-ENTMCNC: 34.8 G/DL (ref 31.5–35.7)
MCV RBC AUTO: 83.5 FL (ref 79–97)
MONOCYTES # BLD AUTO: 0.38 10*3/MM3 (ref 0.1–0.9)
MONOCYTES NFR BLD AUTO: 2.8 % (ref 5–12)
NEUTROPHILS NFR BLD AUTO: 11.44 10*3/MM3 (ref 1.7–7)
NEUTROPHILS NFR BLD AUTO: 85 % (ref 42.7–76)
PLATELET # BLD AUTO: 310 10*3/MM3 (ref 140–450)
PMV BLD AUTO: 9.7 FL (ref 6–12)
POTASSIUM SERPL-SCNC: 4.4 MMOL/L (ref 3.5–5.2)
PROT SERPL-MCNC: 7.9 G/DL (ref 6–8.5)
RBC # BLD AUTO: 5.33 10*6/MM3 (ref 3.77–5.28)
SODIUM SERPL-SCNC: 138 MMOL/L (ref 136–145)
WBC NRBC COR # BLD AUTO: 13.46 10*3/MM3 (ref 3.4–10.8)

## 2025-05-13 PROCEDURE — 85025 COMPLETE CBC W/AUTO DIFF WBC: CPT

## 2025-05-13 PROCEDURE — 99214 OFFICE O/P EST MOD 30 MIN: CPT | Performed by: NURSE PRACTITIONER

## 2025-05-13 PROCEDURE — 80053 COMPREHEN METABOLIC PANEL: CPT

## 2025-05-13 PROCEDURE — 36415 COLL VENOUS BLD VENIPUNCTURE: CPT

## 2025-05-13 NOTE — PROGRESS NOTES
DATE OF VISIT: 5/13/2025    REASON FOR VISIT: Followup for abnormal CT scan     PROBLEM LIST:  1.  Mesenteric fat stranding:  A.  Incidentally noted on CT abdomen pelvis done March 2023 for kidney stone  2.  Morbid obesity, BMI 32  3.  Hypertension  4.  Left lower inner quadrant invasive ductal carcinoma, T1 a N0 M0 stage Ia:  A.  Presented with abnormal screening mammogram July 2023.  B.  Diagnosed after ultrasound-guided biopsy done October 2023  C.  Status post lumpectomy with sentinel lymph node biopsy done by Dr. DAMON December 18, 2023  D.  Final pathology revealed low-grade ductal carcinoma ER +95% MA +90% continue -0 by IHC.  E.completed adjuvant radiation March 2024.  F.  Completed adjuvant radiation March 2024  G.  Started adjuvant anastrozole January 8, 2024    HISTORY OF PRESENT ILLNESS: The patient is a very pleasant 69 y.o. female with past medical history significant for abnormal CT abdomen pelvis diagnosed March 2023 showing abdominal fat stranding felt to be reactive. The  patient is here today for scheduled follow-up visit.    SUBJECTIVE: The patient is here today by herself. She has been doing fair. She has had a recent episode of bronchitis with pleurisy for which she is taking steroids, antibiotics, and cough medication. She is starting to feel better. She has been compliant with use of anastrazole. She is also taking calcium and vitamin D.     Past History:  Medical History: has a past medical history of Asthma, Breast cancer, Chronic superficial dermatitis, Closed fracture of left tibial plateau with routine healing, Dietary counseling, Encounter for routine adult health examination, Fibromyalgia, GERD (gastroesophageal reflux disease), Hair or hair follicle disorder, History of anemia, Hives, radiation therapy, Hypertension, Irritable bowel disease, Malaise and fatigue, Myalgia and myositis, PONV (postoperative nausea and vomiting), Prediabetes, Rash and nonspecific skin eruption, S/P ORIF (open  reduction internal fixation) fracture  left tibial plateau  (12/21/2016), Sinusitis, acute maxillary, Tibial plateau fracture, left (12/21/2016), UTI (urinary tract infection), and Wears glasses.   Surgical History: has a past surgical history that includes Bladder surgery (2004); Cholecystectomy (2012); Hernia repair; Colonoscopy w/ polypectomy (2015); Tibial Plateau Open Reduction Internal Fixation (Left, 12/21/2016); Breast biopsy (Right); Total abdominal hysterectomy w/ bilateral salpingoophorectomy (Bilateral, 1998); Ectopic pregnancy surgery (1994); and Breast lumpectomy (Right, 12/18/2023).   Family History: family history includes Aneurysm in her mother; Bleeding Disorder in an other family member; Breast cancer in her maternal aunt; Cancer in her father and maternal grandfather; Colon cancer in an other family member; Depression in an other family member; Diabetes in an other family member; Heart attack in her father; Heart disease in her father; Hypertension in her brother and sister; Osteoarthritis in her mother and another family member; Other in an other family member; Ovarian cancer in her paternal aunt; Rheum arthritis in an other family member; Stroke in her father; Tuberculosis in an other family member; Uterine cancer in her paternal aunt.   Social History: reports that she quit smoking about 35 years ago. Her smoking use included cigarettes. She started smoking about 45 years ago. She has a 20 pack-year smoking history. She has never been exposed to tobacco smoke. She has never used smokeless tobacco. She reports that she does not drink alcohol and does not use drugs.    (Not in a hospital admission)     Allergies: Myrbetriq [mirabegron], Pepper, and Tramadol     Review of Systems   Constitutional:  Positive for fatigue.   Respiratory:  Positive for cough.    Cardiovascular:  Positive for chest pain.        Pleuritic   Psychiatric/Behavioral:          Irritability       PHYSICAL EXAMINATION:   BP  "127/65 Comment: RUE  Pulse 78   Temp 97.3 °F (36.3 °C) (Temporal)   Resp 16   Ht 162.6 cm (64\")   Wt 91.6 kg (202 lb)   LMP  (LMP Unknown)   SpO2 98% Comment: RA  BMI 34.67 kg/m²    Pain Score    05/13/25 1157   PainSc: 0-No pain       ECOG score: 1        ECOG Performance Status: 1 - Symptomatic but completely ambulatory      General Appearance:      alert, cooperative, no apparent distress and appears stated age   Lungs:   Clear to auscultation bilaterally; respirations regular, even, and unlabored bilaterally   Heart:  Regular rate and rhythm, no murmurs appreciated   Abdomen:   Soft, non-tender, non-distended, and no organomegaly                 Lab on 05/13/2025   Component Date Value Ref Range Status    WBC 05/13/2025 13.46 (H)  3.40 - 10.80 10*3/mm3 Final    RBC 05/13/2025 5.33 (H)  3.77 - 5.28 10*6/mm3 Final    Hemoglobin 05/13/2025 15.5  12.0 - 15.9 g/dL Final    Hematocrit 05/13/2025 44.5  34.0 - 46.6 % Final    MCV 05/13/2025 83.5  79.0 - 97.0 fL Final    MCH 05/13/2025 29.1  26.6 - 33.0 pg Final    MCHC 05/13/2025 34.8  31.5 - 35.7 g/dL Final    RDW 05/13/2025 13.0  12.3 - 15.4 % Final    RDW-SD 05/13/2025 39.8  37.0 - 54.0 fl Final    MPV 05/13/2025 9.7  6.0 - 12.0 fL Final    Platelets 05/13/2025 310  140 - 450 10*3/mm3 Final    Neutrophil % 05/13/2025 85.0 (H)  42.7 - 76.0 % Final    Lymphocyte % 05/13/2025 11.4 (L)  19.6 - 45.3 % Final    Monocyte % 05/13/2025 2.8 (L)  5.0 - 12.0 % Final    Eosinophil % 05/13/2025 0.1 (L)  0.3 - 6.2 % Final    Basophil % 05/13/2025 0.2  0.0 - 1.5 % Final    Immature Grans % 05/13/2025 0.5  0.0 - 0.5 % Final    Neutrophils, Absolute 05/13/2025 11.44 (H)  1.70 - 7.00 10*3/mm3 Final    Lymphocytes, Absolute 05/13/2025 1.53  0.70 - 3.10 10*3/mm3 Final    Monocytes, Absolute 05/13/2025 0.38  0.10 - 0.90 10*3/mm3 Final    Eosinophils, Absolute 05/13/2025 0.01  0.00 - 0.40 10*3/mm3 Final    Basophils, Absolute 05/13/2025 0.03  0.00 - 0.20 10*3/mm3 Final    Immature " [place], Absolute 05/13/2025 0.07 (H)  0.00 - 0.05 10*3/mm3 Final   Lab Requisition on 05/09/2025   Component Date Value Ref Range Status    Hep B S Ab 05/09/2025 Non-Reactive   Final        Mammo Diagnostic Digital Tomosynthesis Left With CAD  Result Date: 5/2/2025  Narrative: EXAMINATION:MAMMO DIAGNOSTIC DIGITAL TOMOSYNTHESIS LEFT W CAD-  HISTORY: Second mammogram status post left breast conservation therapy. No current complaint  TECHNIQUE: Combination 2D 3D standard views of the left breast  COMPARISON: Prior mammograms left mammograms dating back to 7/14/2020  FINDINGS: There are scattered areas of fibroglandular density. Changes consistent with left breast conservation therapy in the medial lower left breast are stable. No new suspicious masses microcalcifications or areas of architectural distortion are identified.      Impression: BI-RADS 3 probably benign findings left breast  RECOMMENDATION: 6-month follow-up diagnostic bilateral mammogram using 2D 3D technique as per the lumpectomy protocol.  The standard false-negative rate of mammography is between 10% and 25%. Complex patterns or increased breast density will markedly elevate the false-negative rate of mammography.   A results letter, in lay terminology, will be given to the patient at the conclusion of the exam.  ________________________________________________________________________ _ Physician Order  Diagnostic 6 Month follow up Mammogram with Breast Ultrasound if needed.  Diagnosis: Abnormal Mammogram follow-up  5/2/2025 2:25 PM by Dr. Isabela Hearn MD on Workstation: [id number]        ASSESSMENT: The patient is a very pleasant 69 y.o. female  with abdominal fat stranding      PLAN:    1.  Abdominal fat stranding:  A.  Her CT scan from July 25, 2024 showed no significant abnormalities. We will do 1 year follow up scan that will be due July 2025.     2.  Splenomegaly:  A.  Spleen size 16 cm.  B.  Most likely induced by her fatty liver. We will  continue to follow up on labs including CBC and CMP, as well evaluate spleen size on follow up imaging studies ordered for prior to return.     3.  Left inner lower quadrant breast cancer T1 a N0 M0 stage Ia:  A.  I will continue anastrozole 1 mg daily.  The patient will complete 5 years of treatment January 2029. She was given a refill on this today.   B.  She had mammogram done 5/2/25 that revealed Birads 3 with recommended 6 month follow up that is scheduled for 11/4/25.  C. We reviewed the potential side effects of anastrozole including hot flashes, vaginal dryness, joint pain, decrease in bone density, and mood or sleep disturbance.  D.  She will follow-up with us in 6 months to evaluate for treatment tolerance.  E. I reviewed the lab results from today with the patient. She has slight leukocytosis likely induced by steroids. Her hemoglobin and platelet count are normal. We will follow up on the CMP results.     4.  Osteopenia:  A.  We will do 2 years follow-up which would be due April 2026.  B.  She will continue calcium and vitamin D supplement daily.     5. Chronic back pain:  A. She will continue follow up with Dr. Jasmine regarding injections for pain relief.     6. Bronchitis with pleurisy:  A. She will complete steroids and antibiotics.     FOLLOW UP: 6 months with labs and scan.     Elva Scott, APRN  5/13/2025

## 2025-06-10 ENCOUNTER — TELEPHONE (OUTPATIENT)
Dept: FAMILY MEDICINE CLINIC | Facility: CLINIC | Age: 70
End: 2025-06-10

## 2025-06-10 ENCOUNTER — OFFICE VISIT (OUTPATIENT)
Dept: FAMILY MEDICINE CLINIC | Facility: CLINIC | Age: 70
End: 2025-06-10
Payer: COMMERCIAL

## 2025-06-10 VITALS
HEIGHT: 64 IN | BODY MASS INDEX: 35.1 KG/M2 | RESPIRATION RATE: 14 BRPM | WEIGHT: 205.6 LBS | SYSTOLIC BLOOD PRESSURE: 118 MMHG | HEART RATE: 88 BPM | DIASTOLIC BLOOD PRESSURE: 70 MMHG | OXYGEN SATURATION: 97 %

## 2025-06-10 DIAGNOSIS — J45.21 MILD INTERMITTENT ASTHMA WITH ACUTE EXACERBATION: ICD-10-CM

## 2025-06-10 DIAGNOSIS — J30.1 SEASONAL ALLERGIC RHINITIS DUE TO POLLEN: Primary | ICD-10-CM

## 2025-06-10 DIAGNOSIS — I10 PRIMARY HYPERTENSION: ICD-10-CM

## 2025-06-10 PROCEDURE — 99213 OFFICE O/P EST LOW 20 MIN: CPT | Performed by: PHYSICIAN ASSISTANT

## 2025-06-10 RX ORDER — BENZONATATE 100 MG/1
100 CAPSULE ORAL 2 TIMES DAILY
Qty: 20 CAPSULE | Refills: 0 | Status: SHIPPED | OUTPATIENT
Start: 2025-06-10 | End: 2025-06-10 | Stop reason: SDUPTHER

## 2025-06-10 RX ORDER — LEVOCETIRIZINE DIHYDROCHLORIDE 5 MG/1
5 TABLET, FILM COATED ORAL EVERY EVENING
Qty: 30 TABLET | Refills: 3 | Status: SHIPPED | OUTPATIENT
Start: 2025-06-10

## 2025-06-10 RX ORDER — BISOPROLOL FUMARATE AND HYDROCHLOROTHIAZIDE 2.5; 6.25 MG/1; MG/1
1 TABLET ORAL DAILY
Qty: 90 TABLET | Refills: 3 | Status: SHIPPED | OUTPATIENT
Start: 2025-06-10 | End: 2025-06-10 | Stop reason: SDUPTHER

## 2025-06-10 RX ORDER — LEVOCETIRIZINE DIHYDROCHLORIDE 5 MG/1
5 TABLET, FILM COATED ORAL EVERY EVENING
Qty: 30 TABLET | Refills: 3 | Status: SHIPPED | OUTPATIENT
Start: 2025-06-10 | End: 2025-06-10 | Stop reason: SDUPTHER

## 2025-06-10 RX ORDER — BISOPROLOL FUMARATE AND HYDROCHLOROTHIAZIDE 2.5; 6.25 MG/1; MG/1
1 TABLET ORAL DAILY
Qty: 90 TABLET | Refills: 3 | Status: SHIPPED | OUTPATIENT
Start: 2025-06-10

## 2025-06-10 RX ORDER — EPINEPHRINE 0.3 MG/.3ML
0.3 INJECTION SUBCUTANEOUS ONCE
Qty: 2 EACH | Refills: 0 | Status: SHIPPED | OUTPATIENT
Start: 2025-06-10 | End: 2025-06-12

## 2025-06-10 RX ORDER — BENZONATATE 100 MG/1
100 CAPSULE ORAL 2 TIMES DAILY
Qty: 20 CAPSULE | Refills: 0 | Status: SHIPPED | OUTPATIENT
Start: 2025-06-10

## 2025-06-10 NOTE — TELEPHONE ENCOUNTER
Caller: Ingrid Girard    Relationship: Self    Best call back number:      What is the best time to reach you: ANYTIME MAININLY 1130A    Who are you requesting to speak with (clinical staff, provider,  specific staff member): NISREEN    Do you know the name of the person who called: INGRID    What was the call regarding: PATIENT WAS SEEN TODAY AND NEEDS TO HAVE PLUERSEY ADDED TO HER FMLA PAPERWORK

## 2025-06-10 NOTE — TELEPHONE ENCOUNTER
Caller: Rosalba Girard    Relationship: Self    Best call back number:  [phone] (Mobile)     Who are you requesting to speak with (clinical staff, provider,  specific staff member):   DEVIN ONEAL     What was the call regarding:  PATIENT IS REQUESTING A PHONE CALL FROM DEVIN   REGARDING SOME INFORMATION ABOUT HER APPOINTMENT TODAY   WE LOST PHONE CONNECTION AND WAS UNABLE TO REACH HER BACK TO GET THE INFORMATION TO RELAY TO DEVIN     PLEASE CALL PATIENT

## 2025-06-10 NOTE — PROGRESS NOTES
Subjective   Rosalba Girard is a 69 y.o. female  Cough (Persistent cough, dx'd with bronchitis May 12) and Anaphylaxis (Had allergic reaction approx one week ago-rapid heart rate, vomiting, difficulty breathing, sweating, sneezing. Unsure what food it was related to but req prescription for Epi-pen)      Cough  Associated symptoms: wheezing      History of Present Illness  The patient presents for evaluation of a persistent cough, allergic reaction, hypertension, and cancer-related symptoms.    She reports experiencing a persistent cough with a sensation of gurgling in her chest. She is not currently on any medication for this symptom and does not report coughing up mucus.    She describes a recent allergic reaction after consuming shrimp, crab, strawberries, nuts, and soy, which resulted in sneezing and difficulty breathing. Additionally, she experienced a racing heart following the consumption of an unidentified substance. She has a known allergy to strawberries from childhood and also to nuts.    She is currently undergoing treatment for cancer, which causes fatigue. She reports a flare-up of pleurisy and neuropathy in her feet following chemotherapy. She notes that her feet turned orange and leaked fluid into her socks.    She has been on Ziac for the past year to manage her blood pressure.    FAMILY HISTORY  Her mother and father have a history of asthma.    The following portions of the patient's history were reviewed and updated as appropriate: allergies, current medications, past social history and problem list    Review of Systems   Constitutional: Negative.    HENT: Negative.     Eyes: Negative.    Respiratory:  Positive for cough and wheezing.    Cardiovascular: Negative.    Gastrointestinal: Negative.    Endocrine: Negative.    Genitourinary: Negative.    Musculoskeletal: Negative.    Skin: Negative.    Allergic/Immunologic: Negative.    Neurological: Negative.    Hematological: Negative.     Psychiatric/Behavioral: Negative.     All other systems reviewed and are negative.      Objective     Vitals:    06/10/25 0902   BP: 118/70   Pulse: 88   Resp: 14   SpO2: 97%       Physical Exam  Physical Exam  General: No acute distress  Cardiovascular: Blood pressure reading is 118/70    Assessment & Plan   Assessment & Plan  1. Cough.  - Persistent cough with gurgling in the chest.  - No mucus production.  - Likely related to allergies.  - Prescription for Xyzal provided; EpiPen prescribed for emergency use; Benadryl recommended for immediate relief.    2. Allergic reaction.  - Recent allergic reaction after consuming shrimp, crab, strawberries, nuts, and soy.  - Symptoms included sneezing and difficulty breathing.  - History of asthma.  - Prescription for Xyzal provided; EpiPen prescribed for emergency use; Benadryl recommended for immediate relief.    3. Hypertension.  - Blood pressure well-controlled at 118/70 mmHg.  - Ziac (bisoprolol) prescription refilled.    4. Cancer-related fatigue.  - Reports fatigue related to cancer treatment.  - Advised to rest as needed.    Diagnoses and all orders for this visit:    1. Seasonal allergic rhinitis due to pollen (Primary)    2. Mild intermittent asthma with acute exacerbation  -     levocetirizine (XYZAL) 5 MG tablet; Take 1 tablet by mouth Every Evening.  Dispense: 30 tablet; Refill: 3    3. Primary hypertension  -     bisoprolol-hydrochlorothiazide (ZIAC) 2.5-6.25 MG per tablet; Take 1 tablet by mouth Daily.  Dispense: 90 tablet; Refill: 3     I spent 15 minutes in patient care: Reviewing records prior to the visit, examining the patient, entering orders and documentation    Part of this note may be an electronic transcription/translation of spoken language to printed text using the Dragon Dictation System.       Patient or patient representative verbalized consent for the use of Ambient Listening during the visit with  AMINAH Campos for chart  documentation. 6/10/2025  09:29 EDT

## 2025-06-11 ENCOUNTER — HOSPITAL ENCOUNTER (OUTPATIENT)
Dept: RADIATION ONCOLOGY | Facility: HOSPITAL | Age: 70
Setting detail: RADIATION/ONCOLOGY SERIES
Discharge: HOME | End: 2025-06-11
Payer: COMMERCIAL

## 2025-06-11 ENCOUNTER — OFFICE VISIT (OUTPATIENT)
Dept: RADIATION ONCOLOGY | Facility: HOSPITAL | Age: 70
End: 2025-06-11
Payer: COMMERCIAL

## 2025-06-11 ENCOUNTER — TELEPHONE (OUTPATIENT)
Dept: FAMILY MEDICINE CLINIC | Facility: CLINIC | Age: 70
End: 2025-06-11
Payer: COMMERCIAL

## 2025-06-11 VITALS
DIASTOLIC BLOOD PRESSURE: 65 MMHG | RESPIRATION RATE: 16 BRPM | HEART RATE: 74 BPM | WEIGHT: 204 LBS | HEIGHT: 66 IN | TEMPERATURE: 97.9 F | BODY MASS INDEX: 32.78 KG/M2 | SYSTOLIC BLOOD PRESSURE: 139 MMHG | OXYGEN SATURATION: 98 %

## 2025-06-11 DIAGNOSIS — C50.312 CARCINOMA OF LOWER-INNER QUADRANT OF LEFT BREAST IN FEMALE, ESTROGEN RECEPTOR POSITIVE: Primary | ICD-10-CM

## 2025-06-11 DIAGNOSIS — Z17.0 CARCINOMA OF LOWER-INNER QUADRANT OF LEFT BREAST IN FEMALE, ESTROGEN RECEPTOR POSITIVE: Primary | ICD-10-CM

## 2025-06-11 NOTE — TELEPHONE ENCOUNTER
Caller: Rosalba Girard    Relationship: Self    Best call back number:   Telephone Information:   Mobile [phone]     What is the best time to reach you: ANYTIME 730AM TO 4PM    Who are you requesting to speak with (clinical staff, provider,  specific staff member): DEVIN    Do you know the name of the person who called:     What was the call regarding: PATIENT CALLED IN TO APOLOGIZE TO DEVIN FOR ADDING EXTRA WORK FOR HER TO DO    Is it okay if the provider responds through MyChart: PREFERS CALL

## 2025-06-11 NOTE — PROGRESS NOTES
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                Cancer Staging   No matching staging information was found for the patient.          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 female here for follow up regarding her i+/+/- invasive carcinoma with limited DCIS managed with a left lumpectomy and SLN evaluation - grade 1 mucinous carcinoma measuring 3mm with associated DCIS involving the medial margin focally. Margins negative for invasive carcinoma. 0/1 SLN. pT1a pN0   Repeat excision for extended medial margin 1/4/24 - residual low grade DCIS resected with margins negative by 7 mm.      She completed radiation to a dose of 40 Gy/15 fractions with a 10 Gy/5 fraction boost on 3/9/24  She is on anastrazole, and follows with Dr. Ocampo.  She overall tolerates anastrozole well, she does note occasional mood swings and hot flashes.  Also does have annual CT abdomen and pelvis for ongoing surveillance of abdominal fat stranding.    Interval History: Her most recent left diagnostic mammogram 5/2/2025 was stable, BI-RADS 3 postlumpectomy protocol follow-up mammogram in 6 months recommended.  Bilateral mammogram scheduled for 11/4/2025.  She does not have any new breast concerns today.  Does note left areola slightly darker than the right.  Denies swelling of left upper extremity and reports full range of motion.    She does report intermittent ongoing pleurisy, she has an  "upcoming pulmonology appointment for further evaluation.    She otherwise has been well.    Prior Radiation: Whole left breast radiation completed 3/9/2024    Subjective      Review of Systems: Per HPI    The following portions of the patient's history were reviewed and updated as appropriate: allergies, current medications, past family history, past medical history, past social history, past surgical history and problem list.    Measures:   KPS/Quality of Life: 90 - Limited Activity      Objective     Physical Exam:   Vital Signs:   Vitals:    06/11/25 0909   BP: 139/65   Pulse: 74   Resp: 16   Temp: 97.9 °F (36.6 °C)   TempSrc: Skin   SpO2: 98%   Weight: 92.5 kg (204 lb)   Height: 166.4 cm (65.5\")   PainSc: 0-No pain     Body mass index is 33.43 kg/m².     Constitutional: No acute distress, sitting comfortably  Eye: EOMI, anicteric sclerae  HENT: NC/AT, MMM   Respiratory: Symmetric expansion, nonlabored respiration  MSK: ROM intact in all four extremities, no obvious deformities  Neuro: Alert, oriented x3, CN3-12 grossly intact.   Psych: Appropriate mood and affect.  Breasts: breasts appear normal, no suspicious masses, no skin or nipple changes or axillary nodes, surgical scars noted left breast periareolar and left axilla, mild hyperpigmentation of left areola.      Results:   Imaging: Im   EXAMINATION:MAMMO DIAGNOSTIC DIGITAL TOMOSYNTHESIS LEFT W CAD-     HISTORY: Second mammogram status post left breast conservation therapy.  No current complaint     TECHNIQUE: Combination 2D 3D standard views of the left breast     COMPARISON: Prior mammograms left mammograms dating back to 7/14/2020     FINDINGS: There are scattered areas of fibroglandular density. Changes  consistent with left breast conservation therapy in the medial lower  left breast are stable. No new suspicious masses microcalcifications or  areas of architectural distortion are identified.     IMPRESSION:  BI-RADS 3 probably benign findings left " breast     RECOMMENDATION: 6-month follow-up diagnostic bilateral mammogram using  2D 3D technique as per the lumpectomy protocol.     The standard false-negative rate of mammography is between 10% and 25%.   Complex patterns or increased breast density will markedly elevate the  false-negative rate of mammography.       A results letter, in lay terminology, will be given to the patient at  the conclusion of the exam.     ________________________________________________________________________  _  Physician Order     Diagnostic 6 Month follow up Mammogram with Breast Ultrasound if needed.     Diagnosis: Abnormal Mammogram follow-up     5/2/2025 2:25 PM by Dr. Isabela Hearn MD on Workstation: [id number]          Assessment / Plan        Assessment/Plan:     Diagnoses and all orders for this visit:    1. 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    Clinical exam of the left breast today is benign.  We discussed the role of local breast/scar massage, as well as stretching and range of motion exercises of the left upper extremity to minimize the risk of treatment related fibrosis or lymphedema.  Recommend the use of topical Vitamin E.  Recommend maintaining a healthy lifestyle with a well balanced diet, calcium and vitamin D for osteoporosis prevention, and exercise as well as continue with recommended health and cancer screening guidelines.  The patient continues on adjuvant endocrine therapy with anastrozole, which she is overall tolerating, patient reports intermittent hot flashes and moodiness.  She is scheduled for repeat diagnostic bilateral mammogram on 11/4/2025.  We discussed follow-up intervals,  including biannual diagnostic mammograms to alternate between the left and bilateral breasts, biannual clinical breast exams, and monthly self breast exams.    Follow Up:   Return in about 1 year (around 6/11/2026) for Appt with FIORELLA.        Time:   I spent 32 minutes on this encounter today, 06/11/25. Activities that took place during this time include:   - preparing to see the patient  - obtaining and reviewing separately obtained history  - performing a medically appropriate examination and evaluation  - counseling and educating the patient  - ordering medications/tests/procedures  - communicating with other healthcare providers  - documenting clinical information in the health record  - coordinating care for this patient.     Sincerely,        FIORELLA Nuno, DNP  Radiation Oncology  This document has been signed by FIORELLA Abdul on June 11, 2025 09:41 EDT           NOTICE TO PATIENTS  At [facility], we believe that sharing information builds trust and better relationships. You are receiving this note because you recently visited [facility]. It is possible you will see health information before a provider has talked with you about it. This kind of information can be easy to misunderstand. To help you fully understand what it means for your health, we urge you to discuss this note with your provider.

## 2025-06-12 NOTE — TELEPHONE ENCOUNTER
Caller: Rosalba Girard    Relationship: Self    Best call back number: [phone]     What is the best time to reach you: ANYTIME    Who are you requesting to speak with (clinical staff, provider,  specific staff member): NISREEN    Do you know the name of the person who called: PATIENT    What was the call regarding: PATIENT IS RETURNING NISREEN'S CALL.

## 2025-07-11 NOTE — THERAPY RE-EVALUATION
Outpatient Physical Therapy Ortho Re-Assessment   [facility]     Patient Name: Rosalba Girard  : 1955  MRN: [id number]  Today's Date: 2017      Visit Date: 2017    Patient Active Problem List   Diagnosis   • Essential hypertension, benign   • GERD (gastroesophageal reflux disease)   • IBS (irritable bowel syndrome)   • S/P ORIF (open reduction internal fixation) fracture  left tibial plateau    • Tibial plateau fracture, left   • Leukocytosis        Past Medical History:   Diagnosis Date   • Chronic superficial dermatitis    • Dietary counseling    • Encounter for routine adult health examination    • GERD (gastroesophageal reflux disease)    • Hair or hair follicle disorder    • History of anemia    • Hives    • Hypertension    • Irritable bowel disease    • Malaise and fatigue    • Myalgia and myositis    • PONV (postoperative nausea and vomiting)     on occasion    • Prediabetes    • Rash and nonspecific skin eruption    • Sinusitis, acute maxillary    • UTI (urinary tract infection)    • Wears glasses         Past Surgical History:   Procedure Laterality Date   • BLADDER SURGERY      Cuff   • CHOLECYSTECTOMY     • COLONOSCOPY W/ POLYPECTOMY     • HERNIA REPAIR      umbilical    • HYSTERECTOMY     • TIBIAL PLATEAU OPEN REDUCTION INTERNAL FIXATION Left 2016    Procedure: LEFT TIBIAL PLATEAU OPEN REDUCTION INTERNAL FIXATION;  Surgeon: Constantine Durbin MD;  Location: [facility];  Service:        Visit Dx:     ICD-10-CM ICD-9-CM   1. Impaired functional mobility, balance, gait, and endurance Z74.09 V49.89   2. Pain and swelling of left knee M25.562 719.46    M25.462 719.06   3. S/P ORIF (open reduction internal fixation) fracture  left tibial plateau  Z96.7 V45.89    Z87.81 V15.51                 PT Ortho       17 1030    Left Knee    Extension/Flexion ROM Details 86 pre-treatment, 91 following manual treatment  -LF    Left Hip    Hip Flexion Gross Movement (4+/5) good plus   Dr. Johnson note for 7/2/2025 was received. Sent to scanning.    -LF    Hip Extension Gross Movement (4-/5) good minus  -LF    Hip ABduction Gross Movement (4/5) good  -LF    Right Hip    Hip Flexion Gross Movement (4+/5) good plus  -LF    Hip Extension Gross Movement (4/5) good  -LF    Hip ABduction Gross Movement (5/5) normal  -LF    Left Knee    Knee Extension Gross Movement (5/5) normal  -LF    Knee Flexion Gross Movement (4+/5) good plus  -LF    Transfers    Transfers, Sit-Stand Kincaid independent  -LF    Transfers, Stand-Sit Kincaid independent  -LF    Transfer, Safety Issues weight-shifting ability decreased   cues needed to place L foot back even w/ R  -LF    Gait Assessment/Treatment    Gait, Kincaid Level independent  -LF    Gait, Assistive Device other (see comments)   none  -LF    Gait, Gait Pattern Analysis swing-through gait  -LF    Gait, Gait Deviations step length decreased  -LF    Gait, Comment wide base on left, decreased weight-shift and knee flex, ankle DF  -LF    Stairs Assessment/Treatment    Number of Stairs 1 flight  -LF    Stairs, Handrail Location right side (ascending)  -LF    Stairs, Kincaid Level independent  -LF    Stairs, Technique Used step to step (ascending);step to step (descending)  -LF    Stairs, Comment up/down flight of stairs w/o A.D.; also once carry 7lb weight to simulated carrying groceries.  She hesitates at the top step, fearful of falling but is able to do w/o assist  -LF      User Key  (r) = Recorded By, (t) = Taken By, (c) = Cosigned By    Initials Name Provider Type     Anastacia Shields, PT Physical Therapist                                PT OP Goals       07/06/17 1030       PT Short Term Goals    STG Date to Achieve 05/04/17  -LF     STG 1 Pt independent with initial HEP for ROM, strengthening and standing balance.   -LF     STG 1 Progress Met  -LF     STG 2 Pt to demonstrate increased LT knee AROM to 0-90 deg for improved transfers and function.   -LF     STG 2 Progress Met  -LF     STG 3 Pt to  demonstrate improved LT ankle AROM to > 5 deg DF for improved gait training / heel rise.   -LF     STG 3 Progress Ongoing;Progressing  -LF     STG 3 Progress Comments not assessed this date  -LF     STG 4 Pt to ambulate > 350 ft with least restrictive A.D. safely (WB per MD order).   -LF     STG 4 Progress Met  -LF     STG 5 Pt able to ascend / descend 6 steps independently with hand rail and A.D.   -LF     STG 5 Progress Met  -LF     Long Term Goals    LTG Date to Achieve 08/04/17  -LF     LTG 1 Pt to demonstrate increased LT knee AROM to 0-110 deg for improved transfers and function.   -LF     LTG 1 Progress Ongoing  -LF     LTG 2 Pt to ambulate household  and community distances with least restrictive A.D., WBAT.   -LF     LTG 2 Progress Met  -LF     LTG 3 Pt able to ascend / descend 1 flight of stairs independently with hand rail and carrying 8lb object to simulated groceries   -LF     LTG 3 Progress Goal Revised  -LF     LTG 3 Progress Comments met previous goal for stairs using A.D.  -LF     LTG 4 Pt to report increase in LEFS score by 50% to demonstrate improved functional mobility and QOL.   -LF     LTG 4 Progress Ongoing  -LF     LTG 5 Pt able to return home / live independently.   -LF     LTG 5 Progress Ongoing  -LF     LTG 5 Progress Comments lives 2nd floor apartment.  Stairs and getting in/out tubshower are concerns  -LF       User Key  (r) = Recorded By, (t) = Taken By, (c) = Cosigned By    Initials Name Provider Type     Anastacia Shields, PT Physical Therapist                PT Assessment/Plan       07/06/17 1030       PT Assessment    Functional Limitations Impaired gait;Limitation in home management;Limitations in community activities;Performance in leisure activities;Performance in self-care ADL;Performance in work activities  -LF     Impairments Edema;Gait;Impaired flexibility;Joint mobility;Pain;Muscle strength;Range of motion  -LF     Assessment Comments Rosalba has made slow, but steady  progress.  Primarily ambulating with rollator now.  She is independent ambulating without A.D., but decrease step-length, knee flex, weight-shift.  Significant improvement since last month in TUG score w/o A.D.  She has been using Dynasplint and consistently has 86 degree knee flex now. Included ASTYM today, and AROM improved to 91 degrees.  Good improvement in hip strength now that she is walking on more consistent basis.   Will benefit from continued treatment to help further progress ROM, as well as normalize gait.  Will decrease to 1x/week due to overall improvement and to conserve visits.    -LF     Please refer to paper survey for additional self-reported information Yes  -LF     Rehab Potential Good  -LF     Patient/caregiver participated in establishment of treatment plan and goals Yes  -LF     Patient would benefit from skilled therapy intervention Yes  -LF     PT Plan    PT Frequency 1x/week  -LF     Planned CPT's? PT THER PROC EA 15 MIN: 66678;PT MANUAL THERAPY EA 15 MIN: 97390;PT NEUROMUSC RE-EDUCATION EA 15 MIN: 68449;PT GAIT TRAINING EA 15 MIN: 77841;PT HOT/COLD PACK WC NONMCARE: [id number]  -LF     PT Plan Comments cont. 1x/week.  Work on knee flexion, quality of gait, stairs  -LF       User Key  (r) = Recorded By, (t) = Taken By, (c) = Cosigned By    Initials Name Provider Type    LF Anastacia Shields, PT Physical Therapist                  Exercises       07/06/17 1030          Subjective Comments    Subjective Comments Says she has been sleeping in her recliner since surgery.  last time she tried sleeping in her bed had too much pain.  Continues to use stretching brace  -LF      Subjective Pain    Able to rate subjective pain? yes  -LF      Pre-Treatment Pain Level 5  -LF      Post-Treatment Pain Level 5  -LF      Exercise 1    Exercise Name 1 Ther ex per f/s.  Also updated objective measures and functional tests for POC.  Included up/down flight of stair   -LF      Time (Minutes) 1 45  -LF       Treatment Type 1 Ther Ex  -LF        User Key  (r) = Recorded By, (t) = Taken By, (c) = Cosigned By    Initials Name Provider Type    LF Anastacia Shields, PT Physical Therapist           Manual Rx (last 36 hours)      Manual Treatments       07/06/17 1030          Manual Rx 1    Manual Rx 1 Location ASTYM left quads and knee.  Continues to have increased soft-tissue texture distal quads and around patella, although improved since last ASTYM treatment.  Also included techniques to increase ROM including contract-relax, PROM with overpressure, tibial glides  -LF      Manual Rx 1 Duration 10  -LF        User Key  (r) = Recorded By, (t) = Taken By, (c) = Cosigned By    Initials Name Provider Type    LF Anastacia Shields, AARTI Physical Therapist                            Outcome Measures       07/06/17 1030          30 Second Chair Stand Test    30 Second Chair Stand Test 9   using UE's; 7 w/o UE's  -LF      Lower Extremity Functional Index    Any of your usual work, housework or school activities 2  -LF      Your usual hobbies, recreational or sporting activities 0  -LF      Getting into or out of the bath 2  -LF      Walking between rooms 2  -LF      Putting on your shoes or socks 2  -LF      Squatting 1  -LF      Lifting an object, like a bag of groceries from the floor 2  -LF      Performing light activities around your home 1  -LF      Performing heavy activities around your home 0  -LF      Getting into or out of a car 2  -LF      Walking 2 blocks 0  -LF      Walking a mile 0  -LF      Going up or down 10 stairs (about 1 flight of stairs) 2  -LF      Standing for 1 hour 1  -LF      Sitting for 1 hour 1  -LF      Running on even ground 0  -LF      Running on uneven ground 0  -LF      Making sharp turns while running fast 0  -LF      Hopping 0  -LF      Rolling over in bed 1  -LF      Total 19  -LF      Timed Up and Go (TUG)    TUG Test 1 13.2 seconds  -LF      TUG Test 2 12.4 seconds  -LF      Timed Up and Go Comments  w/o A.D.  -      Functional Assessment    Outcome Measure Options Lower Extremity Functional Scale (LEFS);Timed Up and Go (TUG);30 Second Chair Stand Test  -        User Key  (r) = Recorded By, (t) = Taken By, (c) = Cosigned By    Initials Name Provider Type    LF Anastacia Shields, PT Physical Therapist            Time Calculation:   Start Time: 1030  Total Timed Code Minutes- PT: 55 minute(s)     Therapy Charges for Today     Code Description Service Date Service Provider Modifiers Qty    04600013334 HC PT MANUAL THERAPY EA 15 MIN 7/6/2017 Anastacia Shields, PT GP 1    24177852436 HC PT THER PROC EA 15 MIN 7/6/2017 Anastacia Shields, PT GP 3          PT G-Codes  Outcome Measure Options: Lower Extremity Functional Scale (LEFS), Timed Up and Go (TUG), 30 Second Chair Stand Test         Anastacia Shields, PT  7/6/2017

## 2025-08-22 ENCOUNTER — OFFICE VISIT (OUTPATIENT)
Dept: PULMONOLOGY | Facility: CLINIC | Age: 70
End: 2025-08-22
Payer: COMMERCIAL

## 2025-08-22 VITALS
WEIGHT: 202 LBS | OXYGEN SATURATION: 99 % | SYSTOLIC BLOOD PRESSURE: 124 MMHG | HEIGHT: 66 IN | TEMPERATURE: 97.4 F | BODY MASS INDEX: 32.47 KG/M2 | HEART RATE: 78 BPM | DIASTOLIC BLOOD PRESSURE: 70 MMHG

## 2025-08-22 DIAGNOSIS — R06.02 SOB (SHORTNESS OF BREATH): Primary | ICD-10-CM

## 2025-08-22 DIAGNOSIS — R07.89 NON-CARDIAC CHEST PAIN: ICD-10-CM

## 2025-08-22 DIAGNOSIS — K21.9 GERD WITHOUT ESOPHAGITIS: ICD-10-CM

## 2025-08-22 PROCEDURE — 99204 OFFICE O/P NEW MOD 45 MIN: CPT | Performed by: INTERNAL MEDICINE

## 2025-08-22 RX ORDER — PANTOPRAZOLE SODIUM 40 MG/1
40 TABLET, DELAYED RELEASE ORAL DAILY
Qty: 90 TABLET | Refills: 3 | Status: SHIPPED | OUTPATIENT
Start: 2025-08-22